# Patient Record
Sex: FEMALE | Race: WHITE | NOT HISPANIC OR LATINO
[De-identification: names, ages, dates, MRNs, and addresses within clinical notes are randomized per-mention and may not be internally consistent; named-entity substitution may affect disease eponyms.]

---

## 2017-09-19 ENCOUNTER — APPOINTMENT (OUTPATIENT)
Dept: ORTHOPEDIC SURGERY | Facility: CLINIC | Age: 62
End: 2017-09-19
Payer: COMMERCIAL

## 2017-09-19 VITALS
HEART RATE: 73 BPM | DIASTOLIC BLOOD PRESSURE: 95 MMHG | WEIGHT: 226 LBS | SYSTOLIC BLOOD PRESSURE: 163 MMHG | HEIGHT: 64 IN | BODY MASS INDEX: 38.58 KG/M2

## 2017-09-19 DIAGNOSIS — Z87.891 PERSONAL HISTORY OF NICOTINE DEPENDENCE: ICD-10-CM

## 2017-09-19 DIAGNOSIS — Z86.39 PERSONAL HISTORY OF OTHER ENDOCRINE, NUTRITIONAL AND METABOLIC DISEASE: ICD-10-CM

## 2017-09-19 PROCEDURE — 99204 OFFICE O/P NEW MOD 45 MIN: CPT

## 2017-09-19 PROCEDURE — 72170 X-RAY EXAM OF PELVIS: CPT | Mod: 59

## 2017-09-19 PROCEDURE — 73562 X-RAY EXAM OF KNEE 3: CPT | Mod: 50

## 2017-09-29 ENCOUNTER — OUTPATIENT (OUTPATIENT)
Dept: OUTPATIENT SERVICES | Facility: HOSPITAL | Age: 62
LOS: 1 days | End: 2017-09-29
Payer: COMMERCIAL

## 2017-09-29 VITALS
HEART RATE: 73 BPM | DIASTOLIC BLOOD PRESSURE: 92 MMHG | HEIGHT: 65 IN | OXYGEN SATURATION: 98 % | SYSTOLIC BLOOD PRESSURE: 160 MMHG | RESPIRATION RATE: 14 BRPM | WEIGHT: 223.99 LBS | TEMPERATURE: 98 F

## 2017-09-29 DIAGNOSIS — M17.11 UNILATERAL PRIMARY OSTEOARTHRITIS, RIGHT KNEE: ICD-10-CM

## 2017-09-29 DIAGNOSIS — Z90.710 ACQUIRED ABSENCE OF BOTH CERVIX AND UTERUS: Chronic | ICD-10-CM

## 2017-09-29 DIAGNOSIS — Z86.711 PERSONAL HISTORY OF PULMONARY EMBOLISM: ICD-10-CM

## 2017-09-29 DIAGNOSIS — Z86.718 PERSONAL HISTORY OF OTHER VENOUS THROMBOSIS AND EMBOLISM: ICD-10-CM

## 2017-09-29 DIAGNOSIS — Z01.818 ENCOUNTER FOR OTHER PREPROCEDURAL EXAMINATION: ICD-10-CM

## 2017-09-29 DIAGNOSIS — Z98.890 OTHER SPECIFIED POSTPROCEDURAL STATES: Chronic | ICD-10-CM

## 2017-09-29 LAB
ALBUMIN SERPL ELPH-MCNC: 4 G/DL — SIGNIFICANT CHANGE UP (ref 3.3–5)
ALP SERPL-CCNC: 93 U/L — SIGNIFICANT CHANGE UP (ref 30–120)
ALT FLD-CCNC: 19 U/L DA — SIGNIFICANT CHANGE UP (ref 10–60)
ANION GAP SERPL CALC-SCNC: 8 MMOL/L — SIGNIFICANT CHANGE UP (ref 5–17)
APTT BLD: 44.1 SEC — HIGH (ref 27.5–37.4)
AST SERPL-CCNC: 24 U/L — SIGNIFICANT CHANGE UP (ref 10–40)
BILIRUB SERPL-MCNC: 0.4 MG/DL — SIGNIFICANT CHANGE UP (ref 0.2–1.2)
BLD GP AB SCN SERPL QL: SIGNIFICANT CHANGE UP
BUN SERPL-MCNC: 20 MG/DL — SIGNIFICANT CHANGE UP (ref 7–23)
CALCIUM SERPL-MCNC: 9.5 MG/DL — SIGNIFICANT CHANGE UP (ref 8.4–10.5)
CHLORIDE SERPL-SCNC: 105 MMOL/L — SIGNIFICANT CHANGE UP (ref 96–108)
CO2 SERPL-SCNC: 26 MMOL/L — SIGNIFICANT CHANGE UP (ref 22–31)
CREAT SERPL-MCNC: 0.91 MG/DL — SIGNIFICANT CHANGE UP (ref 0.5–1.3)
GLUCOSE SERPL-MCNC: 101 MG/DL — HIGH (ref 70–99)
HCT VFR BLD CALC: 40.6 % — SIGNIFICANT CHANGE UP (ref 34.5–45)
HGB BLD-MCNC: 13.3 G/DL — SIGNIFICANT CHANGE UP (ref 11.5–15.5)
INR BLD: 2.44 RATIO — HIGH (ref 0.88–1.16)
MCHC RBC-ENTMCNC: 31 PG — SIGNIFICANT CHANGE UP (ref 27–34)
MCHC RBC-ENTMCNC: 32.8 GM/DL — SIGNIFICANT CHANGE UP (ref 32–36)
MCV RBC AUTO: 94.6 FL — SIGNIFICANT CHANGE UP (ref 80–100)
MRSA PCR RESULT.: SIGNIFICANT CHANGE UP
PLATELET # BLD AUTO: 261 K/UL — SIGNIFICANT CHANGE UP (ref 150–400)
POTASSIUM SERPL-MCNC: 4.6 MMOL/L — SIGNIFICANT CHANGE UP (ref 3.5–5.3)
POTASSIUM SERPL-SCNC: 4.6 MMOL/L — SIGNIFICANT CHANGE UP (ref 3.5–5.3)
PROT SERPL-MCNC: 8.6 G/DL — HIGH (ref 6–8.3)
PROTHROM AB SERPL-ACNC: 27.1 SEC — HIGH (ref 9.8–12.7)
RBC # BLD: 4.3 M/UL — SIGNIFICANT CHANGE UP (ref 3.8–5.2)
RBC # FLD: 11.8 % — SIGNIFICANT CHANGE UP (ref 10.3–14.5)
S AUREUS DNA NOSE QL NAA+PROBE: SIGNIFICANT CHANGE UP
SODIUM SERPL-SCNC: 139 MMOL/L — SIGNIFICANT CHANGE UP (ref 135–145)
WBC # BLD: 12.4 K/UL — HIGH (ref 3.8–10.5)
WBC # FLD AUTO: 12.4 K/UL — HIGH (ref 3.8–10.5)

## 2017-09-29 PROCEDURE — 86901 BLOOD TYPING SEROLOGIC RH(D): CPT

## 2017-09-29 PROCEDURE — 86850 RBC ANTIBODY SCREEN: CPT

## 2017-09-29 PROCEDURE — 93010 ELECTROCARDIOGRAM REPORT: CPT | Mod: NC

## 2017-09-29 PROCEDURE — 85027 COMPLETE CBC AUTOMATED: CPT

## 2017-09-29 PROCEDURE — 87641 MR-STAPH DNA AMP PROBE: CPT

## 2017-09-29 PROCEDURE — 87640 STAPH A DNA AMP PROBE: CPT

## 2017-09-29 PROCEDURE — 85610 PROTHROMBIN TIME: CPT

## 2017-09-29 PROCEDURE — 36415 COLL VENOUS BLD VENIPUNCTURE: CPT

## 2017-09-29 PROCEDURE — 93005 ELECTROCARDIOGRAM TRACING: CPT

## 2017-09-29 PROCEDURE — G0463: CPT

## 2017-09-29 PROCEDURE — 85730 THROMBOPLASTIN TIME PARTIAL: CPT

## 2017-09-29 PROCEDURE — 80053 COMPREHEN METABOLIC PANEL: CPT

## 2017-09-29 PROCEDURE — 86900 BLOOD TYPING SEROLOGIC ABO: CPT

## 2017-09-29 RX ORDER — HYDROCODONE BITARTRATE 50 MG/1
7.5 CAPSULE, EXTENDED RELEASE ORAL
Qty: 0 | Refills: 0 | COMMUNITY

## 2017-09-29 NOTE — H&P PST ADULT - HISTORY OF PRESENT ILLNESS
63 yo male is scheduled for "Right total knee replacement *all titanium implant*" on 10/11/17 with Yury Villalpando MD.  Patient complains of right knee pain with swelling for which she has tried cortisone injections withoit relief.  Pain is constant, rates 10/10, unrelieved with narcotic medications.

## 2017-09-29 NOTE — H&P PST ADULT - NSANTHOSAYNRD_GEN_A_CORE
No. WILDER screening performed.  STOP BANG Legend: 0-2 = LOW Risk; 3-4 = INTERMEDIATE Risk; 5-8 = HIGH Risk

## 2017-09-29 NOTE — H&P PST ADULT - PMH
BMI 37.0-37.9, adult    Chronic pain    DVT (deep venous thrombosis), bilateral    History of DVT (deep vein thrombosis)  bilateral, 2015 related to HRT  History of pulmonary embolism  2015 related to HRT  Hyperlipidemia    Insomnia    Obesity (BMI 30-39.9)    Osteoarthritis of right knee    PE (pulmonary embolism)

## 2017-09-29 NOTE — H&P PST ADULT - NEGATIVE ENMT SYMPTOMS
no dry mouth/no dysphagia/no ear pain/no tinnitus/no nasal discharge/no post-nasal discharge/no hearing difficulty/no abnormal taste sensation/no throat pain/no vertigo/no sinus symptoms/no nasal congestion

## 2017-09-29 NOTE — H&P PST ADULT - NEGATIVE ALLERGY TYPES
no outdoor environmental allergies/no indoor environmental allergies/no reactions to medicines/rash reaction to non-gold jewelry

## 2017-09-29 NOTE — H&P PST ADULT - PROBLEM SELECTOR PLAN 1
"Right total knee replacement *all titanium implant*" on 10/11/17  Pre op instructions were reviewed and signed.  Patient will obtain medical clearance.

## 2017-10-10 RX ORDER — ONDANSETRON 8 MG/1
4 TABLET, FILM COATED ORAL EVERY 6 HOURS
Qty: 0 | Refills: 0 | Status: DISCONTINUED | OUTPATIENT
Start: 2017-10-11 | End: 2017-10-14

## 2017-10-10 RX ORDER — SENNA PLUS 8.6 MG/1
2 TABLET ORAL AT BEDTIME
Qty: 0 | Refills: 0 | Status: DISCONTINUED | OUTPATIENT
Start: 2017-10-11 | End: 2017-10-14

## 2017-10-10 RX ORDER — MAGNESIUM HYDROXIDE 400 MG/1
30 TABLET, CHEWABLE ORAL DAILY
Qty: 0 | Refills: 0 | Status: DISCONTINUED | OUTPATIENT
Start: 2017-10-11 | End: 2017-10-14

## 2017-10-10 RX ORDER — DOCUSATE SODIUM 100 MG
100 CAPSULE ORAL THREE TIMES A DAY
Qty: 0 | Refills: 0 | Status: DISCONTINUED | OUTPATIENT
Start: 2017-10-11 | End: 2017-10-14

## 2017-10-10 RX ORDER — SODIUM CHLORIDE 9 MG/ML
1000 INJECTION, SOLUTION INTRAVENOUS
Qty: 0 | Refills: 0 | Status: DISCONTINUED | OUTPATIENT
Start: 2017-10-11 | End: 2017-10-12

## 2017-10-10 RX ORDER — POLYETHYLENE GLYCOL 3350 17 G/17G
17 POWDER, FOR SOLUTION ORAL DAILY
Qty: 0 | Refills: 0 | Status: DISCONTINUED | OUTPATIENT
Start: 2017-10-11 | End: 2017-10-14

## 2017-10-10 RX ORDER — PANTOPRAZOLE SODIUM 20 MG/1
40 TABLET, DELAYED RELEASE ORAL DAILY
Qty: 0 | Refills: 0 | Status: DISCONTINUED | OUTPATIENT
Start: 2017-10-11 | End: 2017-10-14

## 2017-10-11 ENCOUNTER — APPOINTMENT (OUTPATIENT)
Dept: ORTHOPEDIC SURGERY | Facility: HOSPITAL | Age: 62
End: 2017-10-11

## 2017-10-11 ENCOUNTER — RESULT REVIEW (OUTPATIENT)
Age: 62
End: 2017-10-11

## 2017-10-11 ENCOUNTER — INPATIENT (INPATIENT)
Facility: HOSPITAL | Age: 62
LOS: 2 days | Discharge: NURSING, REHAB OR LONG-TERM CARE | DRG: 470 | End: 2017-10-14
Attending: ORTHOPAEDIC SURGERY | Admitting: ORTHOPAEDIC SURGERY
Payer: COMMERCIAL

## 2017-10-11 VITALS
HEIGHT: 65 IN | DIASTOLIC BLOOD PRESSURE: 103 MMHG | RESPIRATION RATE: 16 BRPM | HEART RATE: 72 BPM | OXYGEN SATURATION: 99 % | SYSTOLIC BLOOD PRESSURE: 156 MMHG | TEMPERATURE: 98 F | WEIGHT: 222.89 LBS

## 2017-10-11 DIAGNOSIS — Z90.710 ACQUIRED ABSENCE OF BOTH CERVIX AND UTERUS: Chronic | ICD-10-CM

## 2017-10-11 DIAGNOSIS — Z01.818 ENCOUNTER FOR OTHER PREPROCEDURAL EXAMINATION: ICD-10-CM

## 2017-10-11 DIAGNOSIS — Z98.890 OTHER SPECIFIED POSTPROCEDURAL STATES: Chronic | ICD-10-CM

## 2017-10-11 DIAGNOSIS — M17.11 UNILATERAL PRIMARY OSTEOARTHRITIS, RIGHT KNEE: ICD-10-CM

## 2017-10-11 LAB
ANION GAP SERPL CALC-SCNC: 11 MMOL/L — SIGNIFICANT CHANGE UP (ref 5–17)
APTT BLD: 30.7 SEC — SIGNIFICANT CHANGE UP (ref 27.5–37.4)
BUN SERPL-MCNC: 18 MG/DL — SIGNIFICANT CHANGE UP (ref 7–23)
CALCIUM SERPL-MCNC: 9.1 MG/DL — SIGNIFICANT CHANGE UP (ref 8.4–10.5)
CHLORIDE SERPL-SCNC: 104 MMOL/L — SIGNIFICANT CHANGE UP (ref 96–108)
CO2 SERPL-SCNC: 24 MMOL/L — SIGNIFICANT CHANGE UP (ref 22–31)
CREAT SERPL-MCNC: 0.94 MG/DL — SIGNIFICANT CHANGE UP (ref 0.5–1.3)
GLUCOSE SERPL-MCNC: 185 MG/DL — HIGH (ref 70–99)
HCT VFR BLD CALC: 38.1 % — SIGNIFICANT CHANGE UP (ref 34.5–45)
HGB BLD-MCNC: 11.8 G/DL — SIGNIFICANT CHANGE UP (ref 11.5–15.5)
INR BLD: 1.03 RATIO — SIGNIFICANT CHANGE UP (ref 0.88–1.16)
MCHC RBC-ENTMCNC: 30.8 PG — SIGNIFICANT CHANGE UP (ref 27–34)
MCHC RBC-ENTMCNC: 30.9 GM/DL — LOW (ref 32–36)
MCV RBC AUTO: 99.6 FL — SIGNIFICANT CHANGE UP (ref 80–100)
PLATELET # BLD AUTO: 234 K/UL — SIGNIFICANT CHANGE UP (ref 150–400)
POTASSIUM SERPL-MCNC: 4 MMOL/L — SIGNIFICANT CHANGE UP (ref 3.5–5.3)
POTASSIUM SERPL-SCNC: 4 MMOL/L — SIGNIFICANT CHANGE UP (ref 3.5–5.3)
PROTHROM AB SERPL-ACNC: 11.2 SEC — SIGNIFICANT CHANGE UP (ref 9.8–12.7)
RBC # BLD: 3.82 M/UL — SIGNIFICANT CHANGE UP (ref 3.8–5.2)
RBC # FLD: 12.1 % — SIGNIFICANT CHANGE UP (ref 10.3–14.5)
SODIUM SERPL-SCNC: 139 MMOL/L — SIGNIFICANT CHANGE UP (ref 135–145)
WBC # BLD: 15.6 K/UL — HIGH (ref 3.8–10.5)
WBC # FLD AUTO: 15.6 K/UL — HIGH (ref 3.8–10.5)

## 2017-10-11 PROCEDURE — 27447 TOTAL KNEE ARTHROPLASTY: CPT | Mod: RT

## 2017-10-11 PROCEDURE — 88305 TISSUE EXAM BY PATHOLOGIST: CPT | Mod: 26

## 2017-10-11 PROCEDURE — 99223 1ST HOSP IP/OBS HIGH 75: CPT

## 2017-10-11 PROCEDURE — 27447 TOTAL KNEE ARTHROPLASTY: CPT | Mod: 82,RT

## 2017-10-11 PROCEDURE — 73562 X-RAY EXAM OF KNEE 3: CPT | Mod: 26,RT

## 2017-10-11 PROCEDURE — 88311 DECALCIFY TISSUE: CPT | Mod: 26

## 2017-10-11 RX ORDER — CEFAZOLIN SODIUM 1 G
2000 VIAL (EA) INJECTION ONCE
Qty: 0 | Refills: 0 | Status: COMPLETED | OUTPATIENT
Start: 2017-10-11 | End: 2017-10-11

## 2017-10-11 RX ORDER — CELECOXIB 200 MG/1
200 CAPSULE ORAL ONCE
Qty: 0 | Refills: 0 | Status: COMPLETED | OUTPATIENT
Start: 2017-10-11 | End: 2017-10-11

## 2017-10-11 RX ORDER — ACETAMINOPHEN 500 MG
1000 TABLET ORAL EVERY 6 HOURS
Qty: 0 | Refills: 0 | Status: COMPLETED | OUTPATIENT
Start: 2017-10-11 | End: 2017-10-12

## 2017-10-11 RX ORDER — ACETAMINOPHEN 500 MG
1000 TABLET ORAL ONCE
Qty: 0 | Refills: 0 | Status: COMPLETED | OUTPATIENT
Start: 2017-10-11 | End: 2017-10-11

## 2017-10-11 RX ORDER — CELECOXIB 200 MG/1
200 CAPSULE ORAL
Qty: 0 | Refills: 0 | Status: DISCONTINUED | OUTPATIENT
Start: 2017-10-11 | End: 2017-10-14

## 2017-10-11 RX ORDER — OXYCODONE HYDROCHLORIDE 5 MG/1
5 TABLET ORAL
Qty: 0 | Refills: 0 | Status: DISCONTINUED | OUTPATIENT
Start: 2017-10-11 | End: 2017-10-12

## 2017-10-11 RX ORDER — WARFARIN SODIUM 2.5 MG/1
5 TABLET ORAL ONCE
Qty: 0 | Refills: 0 | Status: COMPLETED | OUTPATIENT
Start: 2017-10-12 | End: 2017-10-12

## 2017-10-11 RX ORDER — SODIUM CHLORIDE 9 MG/ML
1000 INJECTION, SOLUTION INTRAVENOUS
Qty: 0 | Refills: 0 | Status: DISCONTINUED | OUTPATIENT
Start: 2017-10-11 | End: 2017-10-11

## 2017-10-11 RX ORDER — HYDROMORPHONE HYDROCHLORIDE 2 MG/ML
0.5 INJECTION INTRAMUSCULAR; INTRAVENOUS; SUBCUTANEOUS
Qty: 0 | Refills: 0 | Status: DISCONTINUED | OUTPATIENT
Start: 2017-10-11 | End: 2017-10-11

## 2017-10-11 RX ORDER — ENOXAPARIN SODIUM 100 MG/ML
30 INJECTION SUBCUTANEOUS EVERY 12 HOURS
Qty: 0 | Refills: 0 | Status: COMPLETED | OUTPATIENT
Start: 2017-10-12 | End: 2017-10-12

## 2017-10-11 RX ORDER — OXYCODONE HYDROCHLORIDE 5 MG/1
10 TABLET ORAL
Qty: 0 | Refills: 0 | Status: DISCONTINUED | OUTPATIENT
Start: 2017-10-11 | End: 2017-10-12

## 2017-10-11 RX ORDER — HYDROMORPHONE HYDROCHLORIDE 2 MG/ML
0.5 INJECTION INTRAMUSCULAR; INTRAVENOUS; SUBCUTANEOUS
Qty: 0 | Refills: 0 | Status: DISCONTINUED | OUTPATIENT
Start: 2017-10-11 | End: 2017-10-14

## 2017-10-11 RX ORDER — CEFAZOLIN SODIUM 1 G
2000 VIAL (EA) INJECTION EVERY 8 HOURS
Qty: 0 | Refills: 0 | Status: COMPLETED | OUTPATIENT
Start: 2017-10-11 | End: 2017-10-12

## 2017-10-11 RX ORDER — ZOLPIDEM TARTRATE 10 MG/1
5 TABLET ORAL AT BEDTIME
Qty: 0 | Refills: 0 | Status: DISCONTINUED | OUTPATIENT
Start: 2017-10-11 | End: 2017-10-11

## 2017-10-11 RX ORDER — ACETAMINOPHEN 500 MG
1000 TABLET ORAL EVERY 12 HOURS
Qty: 0 | Refills: 0 | Status: DISCONTINUED | OUTPATIENT
Start: 2017-10-12 | End: 2017-10-14

## 2017-10-11 RX ADMIN — ZOLPIDEM TARTRATE 5 MILLIGRAM(S): 10 TABLET ORAL at 22:09

## 2017-10-11 RX ADMIN — OXYCODONE HYDROCHLORIDE 10 MILLIGRAM(S): 5 TABLET ORAL at 20:15

## 2017-10-11 RX ADMIN — HYDROMORPHONE HYDROCHLORIDE 0.5 MILLIGRAM(S): 2 INJECTION INTRAMUSCULAR; INTRAVENOUS; SUBCUTANEOUS at 21:45

## 2017-10-11 RX ADMIN — Medication 100 MILLIGRAM(S): at 21:25

## 2017-10-11 RX ADMIN — HYDROMORPHONE HYDROCHLORIDE 0.5 MILLIGRAM(S): 2 INJECTION INTRAMUSCULAR; INTRAVENOUS; SUBCUTANEOUS at 19:05

## 2017-10-11 RX ADMIN — OXYCODONE HYDROCHLORIDE 10 MILLIGRAM(S): 5 TABLET ORAL at 19:45

## 2017-10-11 RX ADMIN — HYDROMORPHONE HYDROCHLORIDE 0.5 MILLIGRAM(S): 2 INJECTION INTRAMUSCULAR; INTRAVENOUS; SUBCUTANEOUS at 15:00

## 2017-10-11 RX ADMIN — HYDROMORPHONE HYDROCHLORIDE 0.5 MILLIGRAM(S): 2 INJECTION INTRAMUSCULAR; INTRAVENOUS; SUBCUTANEOUS at 16:15

## 2017-10-11 RX ADMIN — CELECOXIB 200 MILLIGRAM(S): 200 CAPSULE ORAL at 18:34

## 2017-10-11 RX ADMIN — Medication 400 MILLIGRAM(S): at 17:30

## 2017-10-11 RX ADMIN — Medication 400 MILLIGRAM(S): at 23:07

## 2017-10-11 RX ADMIN — CELECOXIB 200 MILLIGRAM(S): 200 CAPSULE ORAL at 09:34

## 2017-10-11 RX ADMIN — SENNA PLUS 2 TABLET(S): 8.6 TABLET ORAL at 21:25

## 2017-10-11 RX ADMIN — HYDROMORPHONE HYDROCHLORIDE 0.5 MILLIGRAM(S): 2 INJECTION INTRAMUSCULAR; INTRAVENOUS; SUBCUTANEOUS at 21:25

## 2017-10-11 RX ADMIN — HYDROMORPHONE HYDROCHLORIDE 0.5 MILLIGRAM(S): 2 INJECTION INTRAMUSCULAR; INTRAVENOUS; SUBCUTANEOUS at 15:50

## 2017-10-11 RX ADMIN — Medication 1000 MILLIGRAM(S): at 23:45

## 2017-10-11 RX ADMIN — SODIUM CHLORIDE 100 MILLILITER(S): 9 INJECTION, SOLUTION INTRAVENOUS at 13:30

## 2017-10-11 RX ADMIN — HYDROMORPHONE HYDROCHLORIDE 0.5 MILLIGRAM(S): 2 INJECTION INTRAMUSCULAR; INTRAVENOUS; SUBCUTANEOUS at 14:29

## 2017-10-11 RX ADMIN — HYDROMORPHONE HYDROCHLORIDE 0.5 MILLIGRAM(S): 2 INJECTION INTRAMUSCULAR; INTRAVENOUS; SUBCUTANEOUS at 18:35

## 2017-10-11 RX ADMIN — CELECOXIB 200 MILLIGRAM(S): 200 CAPSULE ORAL at 18:39

## 2017-10-11 RX ADMIN — Medication 100 MILLIGRAM(S): at 18:34

## 2017-10-11 NOTE — CONSULT NOTE ADULT - SUBJECTIVE AND OBJECTIVE BOX
PCP: Noe     Information Obtained from: Chart, Patient, EHR    Patient is a 62y old  Female who presents with a chief complaint of "Dr Villalpando is going to replace my right knee" (11 Oct 2017 09:11)      HPI:  61yo F with who is admitted s/p RIGHT TKR today.  Had had chronic right knee pain for which she has tried                Anesthesia:      Baseline functional status:      REVIEW OF SYSTEMS:  CONSTITUTIONAL: No fever, weight loss, or fatigue  EYES: No eye pain, visual disturbances, or discharge  ENMT:  No difficulty hearing, tinnitus, vertigo; No sinus or throat pain  NECK: No pain or stiffness  BREASTS: No pain, masses, or nipple discharge  RESPIRATORY: No cough, wheezing, chills or hemoptysis; No shortness of breath  CARDIOVASCULAR: No chest pain, palpitations, dizziness, or leg swelling  GASTROINTESTINAL: No abdominal or epigastric pain. No nausea, vomiting, or hematemesis; No diarrhea or constipation. No melena or hematochezia.  GENITOURINARY: No dysuria, frequency, hematuria, or incontinence  NEUROLOGICAL: No headaches, memory loss, loss of strength, numbness, or tremors  SKIN: No itching, burning, rashes, or lesions   LYMPH NODES: No enlarged glands  ENDOCRINE: No heat or cold intolerance; No hair loss  MUSCULOSKELETAL: No muscle or back pain  PSYCHIATRIC: No depression, anxiety, mood swings, or difficulty sleeping  HEME/LYMPH: No easy bruising, or bleeding gums  ALLERGY AND IMMUNOLOGIC: No hives or eczema    PAST MEDICAL & SURGICAL HISTORY:  BMI 37.0-37.9, adult  Obesity (BMI 30-39.9)  Chronic pain  Insomnia  History of pulmonary embolism: 2015 related to HRT  History of DVT (deep vein thrombosis): bilateral, 2015 related to HRT  Osteoarthritis of right knee  DVT (deep venous thrombosis), bilateral  PE (pulmonary embolism)  Hyperlipidemia  History of hernia repair: 2000, 2015  S/P hysterectomy: 1970&#x27;s      SOCIAL HISTORY:  Lives: Spouse  Smoking Hx: Former smoker - quit 1980, 0.5 PPD x 20 years  ETOH Hx: 1-2 glasses wine /daily  Illicit Drug Use:  None    Allergies    metal jewelry other than gold (Rash)  No Known Drug Allergies    Home Medications:  hydrocodone-acetaminophen 7.5mg-300mg oral tablet: 1 tab(s) orally every 6 hours (11 Oct 2017 09:20)  Lovenox: 50 milligram(s) injectable 2 times a day  started on 10/3 (11 Oct 2017 09:20)  warfarin 5 mg oral tablet: 1 tab(s) orally once a day (11 Oct 2017 09:20)  zolpidem 10 mg oral tablet: 1 tab(s) orally once a day (at bedtime) (11 Oct 2017 09:20)    FAMILY HISTORY:  Family history of colon cancer (Father)    PHYSICAL EXAM:  Vital Signs Last 24 Hrs  T(C): 36.7 (11 Oct 2017 09:27), Max: 36.7 (11 Oct 2017 09:27)  T(F): 98 (11 Oct 2017 09:27), Max: 98 (11 Oct 2017 09:27)  HR: 72 (11 Oct 2017 09:27) (72 - 72)  BP: 156/103 (11 Oct 2017 09:27) (156/103 - 156/103)  BP(mean): --  RR: 16 (11 Oct 2017 09:27) (16 - 16)  SpO2: 99% (11 Oct 2017 09:27) (99% - 99%)    GENERAL: NAD, well-groomed, well-developed, awake, alert, oriented x 3, fluent and coherent speech  HEAD:  Atraumatic, Normocephalic  EYES: EOMI, PERRLA, conjunctiva and sclera clear  ENMT: No tonsillar erythema, exudates, or enlargement; Moist mucous membranes, Good dentition, No lesions  NECK: Supple, No JVD, No Cervical LAD, No thyromegaly  NERVOUS SYSTEM:  Good concentration; Moving all 4 extremities; No gross sensory deficits, no facial droop  CHEST/LUNG: Clear to auscultation bilaterally; No rales, rhonchi, wheezing, or rubs  HEART: Regular rate and rhythm; No murmurs, rubs, or gallops  ABDOMEN: Soft, Nontender, Nondistended, Bowel sounds present, no palpable masses or organomegaly, no bruits  EXTREMITIES:  2+ Peripheral Pulses, No clubbing, cyanosis, or edema  LYMPH: No lymphadenopathy noted  /RECTAL: Not examined  BREAST: Not examined  SKIN: No rashes or lesions  INCISION:  Dressing clean/dry/intact    PT/INR - ( 11 Oct 2017 09:21 )   PT: 11.2 sec;   INR: 1.03 ratio       PTT - ( 11 Oct 2017 09:21 )  PTT:30.7 sec    CAPILLARY BLOOD GLUCOSE            EKG:   Personally Reviewed: YES      Imaging:   Personally Reviewed: NO PCP: Noe     Information Obtained from: Chart, Patient, EHR    Patient is a 62y old  Female who presents with a chief complaint of "Dr Irasema is going to replace my right knee" (11 Oct 2017 09:11)      HPI:  61yo F with who is admitted s/p RIGHT TKR today.  Had had chronic right knee pain for which she has tried cortisone injections, oral pain meds without significant relief.  She kept delaying surgery due to the illness of her .  Pain worsens throughout day up to 10/10 at end of the day.  Pain has been limiting her daily activities and negatively impacting her quality of life.    Anesthesia: Spinal + Block    REVIEW OF SYSTEMS:  CONSTITUTIONAL: No fever, weight loss, or fatigue  EYES: No eye pain, visual disturbances, or discharge  ENMT:  No difficulty hearing, tinnitus, vertigo; No sinus or throat pain  NECK: No pain or stiffness  BREASTS: No pain, masses, or nipple discharge  RESPIRATORY: No cough, wheezing, chills or hemoptysis; No shortness of breath  CARDIOVASCULAR: No chest pain, palpitations, dizziness, or leg swelling  GASTROINTESTINAL: No abdominal or epigastric pain. No nausea, vomiting, or hematemesis; No diarrhea or constipation. No melena or hematochezia.  GENITOURINARY: No dysuria, frequency, hematuria, or incontinence  NEUROLOGICAL: No headaches, memory loss, loss of strength, numbness, or tremors  SKIN: No itching, burning, rashes, or lesions   LYMPH NODES: No enlarged glands  ENDOCRINE: No heat or cold intolerance; No hair loss  MUSCULOSKELETAL: No muscle or back pain  PSYCHIATRIC: No depression, anxiety, mood swings, or difficulty sleeping  HEME/LYMPH: No easy bruising, or bleeding gums  ALLERGY AND IMMUNOLOGIC: No hives or eczema    PAST MEDICAL & SURGICAL HISTORY:  BMI 37.0-37.9, adult  Obesity (BMI 30-39.9)  Chronic pain  Insomnia  History of pulmonary embolism: 2015 related to HRT  History of DVT (deep vein thrombosis): bilateral, 2015 related to HRT  Osteoarthritis of right knee  DVT (deep venous thrombosis), bilateral  PE (pulmonary embolism)  Hyperlipidemia  History of hernia repair: 2000, 2015  S/P hysterectomy: 1970&#x27;s      SOCIAL HISTORY:  Lives: Spouse  Smoking Hx: Former smoker - quit 1980, 0.5 PPD x 20 years  ETOH Hx: 1-2 glasses wine /daily  Illicit Drug Use:  None    Allergies    metal jewelry other than gold (Rash)  No Known Drug Allergies    Home Medications:  hydrocodone-acetaminophen 7.5mg-300mg oral tablet: 1 tab(s) orally every 6 hours (11 Oct 2017 09:20)  Lovenox: 50 milligram(s) injectable 2 times a day  started on 10/3 (11 Oct 2017 09:20)  warfarin 5 mg oral tablet: 1 tab(s) orally once a day (11 Oct 2017 09:20)  zolpidem 10 mg oral tablet: 1 tab(s) orally once a day (at bedtime) (11 Oct 2017 09:20)    FAMILY HISTORY:  Family history of colon cancer (Father)    PHYSICAL EXAM:  Vital Signs Last 24 Hrs  T(C): 36.7 (11 Oct 2017 09:27), Max: 36.7 (11 Oct 2017 09:27)  T(F): 98 (11 Oct 2017 09:27), Max: 98 (11 Oct 2017 09:27)  HR: 72 (11 Oct 2017 09:27) (72 - 72)  BP: 156/103 (11 Oct 2017 09:27) (156/103 - 156/103)  BP(mean): --  RR: 16 (11 Oct 2017 09:27) (16 - 16)  SpO2: 99% (11 Oct 2017 09:27) (99% - 99%)    GENERAL: NAD, well-groomed, well-developed, awake, alert, oriented x 3, fluent and coherent speech  HEAD:  Atraumatic, Normocephalic  EYES: EOMI, PERRLA, conjunctiva and sclera clear  ENMT: No tonsillar erythema, exudates, or enlargement; Moist mucous membranes, Good dentition, No lesions  NECK: Supple, No JVD, No Cervical LAD, No thyromegaly  NERVOUS SYSTEM:  Good concentration; Moving all 4 extremities; No gross sensory deficits, no facial droop  CHEST/LUNG: Clear to auscultation bilaterally; No rales, rhonchi, wheezing, or rubs  HEART: Regular rate and rhythm; No murmurs, rubs, or gallops  ABDOMEN: Soft, Nontender, Nondistended, Bowel sounds present, no palpable masses or organomegaly, no bruits  EXTREMITIES:  2+ Peripheral Pulses, No clubbing, cyanosis, or edema  LYMPH: No lymphadenopathy noted  /RECTAL: Not examined  BREAST: Not examined  SKIN: No rashes or lesions  INCISION:  Dressing clean/dry/intact    PT/INR - ( 11 Oct 2017 09:21 )   PT: 11.2 sec;   INR: 1.03 ratio       PTT - ( 11 Oct 2017 09:21 )  PTT:30.7 sec    EKG:   Personally Reviewed: YES - NSR      Imaging:   Personally Reviewed: NO

## 2017-10-11 NOTE — PROGRESS NOTE ADULT - SUBJECTIVE AND OBJECTIVE BOX
Orthopaedic Post Op  Note    Procedure: Right Total Knee Replacement  Surgeon: Dr. Villalpando    62y Female comfortable, without complaints. Reported pain score = 3  Denies N/V, CP, SOB, numbness/tingling of extremities.    PE:  Vital Signs Last 24 Hrs  T(C): 36.5 (11 Oct 2017 12:51), Max: 36.7 (11 Oct 2017 09:27)  T(F): 97.7 (11 Oct 2017 12:51), Max: 98 (11 Oct 2017 09:27)  HR: 60 (11 Oct 2017 15:15) (60 - 73)  BP: 107/75 (11 Oct 2017 15:15) (103/82 - 156/103)  BP(mean): --  RR: 14 (11 Oct 2017 15:15) (14 - 20)  SpO2: 94% (11 Oct 2017 15:15) (94% - 100%)  General: Pt alert and oriented   Lungs: + BS CTA bilaterally  Heart: +S1 & S2 heard, RRR  Abd: + BS heard, soft, NT, ND  Right Knee Dressing: C/D/I   Bilateral LEs:  Motor:   5/5 dorsiflexion, plantarflexion, EHL  Sensation intact to LT   + DP Pulses    Post Op labs: Pending     A/P: 62y Female POD#0 s/p Right TKR  - Stable  - Tylenol, Celebrex, Oxy and Dilaudid for Pain Control   - DVT ppx: Significant Hx of dvt/PE -------> Lovenox ---> Coumadin Bridge as follows: POD 1: Lov 30mg q12hr x 2 doses  POD 2: Lov 50mg q12hr x 2 (until INR >2) + Coumadin 5mg qhs  - Silvia op IV abx: Ancef  - PT, OT per protocol  - F/U AM Labs  DCP= TBD

## 2017-10-11 NOTE — BRIEF OPERATIVE NOTE - PROCEDURE
<<-----Click on this checkbox to enter Procedure Knee replacement  10/11/2017    Active  James Sanabria

## 2017-10-11 NOTE — CONSULT NOTE ADULT - ASSESSMENT
POD#0 S/P RIGHT TKR  - Pain Control  - Bowel Regimen  - PT/OT  - DVT PPx - POD#0 S/P RIGHT TKR  - Pain Control  - Bowel Regimen  - PT/OT  - DVT PPx - Coumadin, Lovenox for bridge until INR>=2    Hx B/L DVT and PE in 2015 while on HRT  - patient is high risk given hx of DVT/PE (not sure if we can attribute 100% of her baseline risk to the HRT), obesity, s/p ortho srugery  - If ok with surgery, would prefer using Lovenox 1mg/kg q12 hours as bridge = 100mg q12 instead of 50mg q12 as recommended in medical clearance

## 2017-10-12 ENCOUNTER — TRANSCRIPTION ENCOUNTER (OUTPATIENT)
Age: 62
End: 2017-10-12

## 2017-10-12 LAB
ANION GAP SERPL CALC-SCNC: 7 MMOL/L — SIGNIFICANT CHANGE UP (ref 5–17)
BUN SERPL-MCNC: 15 MG/DL — SIGNIFICANT CHANGE UP (ref 7–23)
CALCIUM SERPL-MCNC: 9.3 MG/DL — SIGNIFICANT CHANGE UP (ref 8.4–10.5)
CHLORIDE SERPL-SCNC: 104 MMOL/L — SIGNIFICANT CHANGE UP (ref 96–108)
CO2 SERPL-SCNC: 26 MMOL/L — SIGNIFICANT CHANGE UP (ref 22–31)
CREAT SERPL-MCNC: 0.72 MG/DL — SIGNIFICANT CHANGE UP (ref 0.5–1.3)
GLUCOSE SERPL-MCNC: 126 MG/DL — HIGH (ref 70–99)
HCT VFR BLD CALC: 34.7 % — SIGNIFICANT CHANGE UP (ref 34.5–45)
HGB BLD-MCNC: 11.6 G/DL — SIGNIFICANT CHANGE UP (ref 11.5–15.5)
INR BLD: 1.11 RATIO — SIGNIFICANT CHANGE UP (ref 0.88–1.16)
MCHC RBC-ENTMCNC: 32.2 PG — SIGNIFICANT CHANGE UP (ref 27–34)
MCHC RBC-ENTMCNC: 33.4 GM/DL — SIGNIFICANT CHANGE UP (ref 32–36)
MCV RBC AUTO: 96.3 FL — SIGNIFICANT CHANGE UP (ref 80–100)
PLATELET # BLD AUTO: 231 K/UL — SIGNIFICANT CHANGE UP (ref 150–400)
POTASSIUM SERPL-MCNC: 4.5 MMOL/L — SIGNIFICANT CHANGE UP (ref 3.5–5.3)
POTASSIUM SERPL-SCNC: 4.5 MMOL/L — SIGNIFICANT CHANGE UP (ref 3.5–5.3)
PROTHROM AB SERPL-ACNC: 12.1 SEC — SIGNIFICANT CHANGE UP (ref 9.8–12.7)
RBC # BLD: 3.6 M/UL — LOW (ref 3.8–5.2)
RBC # FLD: 12 % — SIGNIFICANT CHANGE UP (ref 10.3–14.5)
SODIUM SERPL-SCNC: 137 MMOL/L — SIGNIFICANT CHANGE UP (ref 135–145)
WBC # BLD: 15 K/UL — HIGH (ref 3.8–10.5)
WBC # FLD AUTO: 15 K/UL — HIGH (ref 3.8–10.5)

## 2017-10-12 PROCEDURE — 99233 SBSQ HOSP IP/OBS HIGH 50: CPT

## 2017-10-12 RX ORDER — HYDROMORPHONE HYDROCHLORIDE 2 MG/ML
4 INJECTION INTRAMUSCULAR; INTRAVENOUS; SUBCUTANEOUS
Qty: 0 | Refills: 0 | Status: DISCONTINUED | OUTPATIENT
Start: 2017-10-12 | End: 2017-10-13

## 2017-10-12 RX ORDER — HYDROMORPHONE HYDROCHLORIDE 2 MG/ML
2 INJECTION INTRAMUSCULAR; INTRAVENOUS; SUBCUTANEOUS
Qty: 0 | Refills: 0 | Status: DISCONTINUED | OUTPATIENT
Start: 2017-10-12 | End: 2017-10-13

## 2017-10-12 RX ORDER — ENOXAPARIN SODIUM 100 MG/ML
100 INJECTION SUBCUTANEOUS EVERY 12 HOURS
Qty: 0 | Refills: 0 | Status: DISCONTINUED | OUTPATIENT
Start: 2017-10-13 | End: 2017-10-14

## 2017-10-12 RX ORDER — DIAZEPAM 5 MG
5 TABLET ORAL EVERY 8 HOURS
Qty: 0 | Refills: 0 | Status: DISCONTINUED | OUTPATIENT
Start: 2017-10-12 | End: 2017-10-14

## 2017-10-12 RX ADMIN — HYDROMORPHONE HYDROCHLORIDE 0.5 MILLIGRAM(S): 2 INJECTION INTRAMUSCULAR; INTRAVENOUS; SUBCUTANEOUS at 10:55

## 2017-10-12 RX ADMIN — HYDROMORPHONE HYDROCHLORIDE 0.5 MILLIGRAM(S): 2 INJECTION INTRAMUSCULAR; INTRAVENOUS; SUBCUTANEOUS at 15:16

## 2017-10-12 RX ADMIN — ENOXAPARIN SODIUM 30 MILLIGRAM(S): 100 INJECTION SUBCUTANEOUS at 09:27

## 2017-10-12 RX ADMIN — Medication 1000 MILLIGRAM(S): at 23:17

## 2017-10-12 RX ADMIN — Medication 100 MILLIGRAM(S): at 02:34

## 2017-10-12 RX ADMIN — HYDROMORPHONE HYDROCHLORIDE 0.5 MILLIGRAM(S): 2 INJECTION INTRAMUSCULAR; INTRAVENOUS; SUBCUTANEOUS at 03:08

## 2017-10-12 RX ADMIN — HYDROMORPHONE HYDROCHLORIDE 4 MILLIGRAM(S): 2 INJECTION INTRAMUSCULAR; INTRAVENOUS; SUBCUTANEOUS at 18:28

## 2017-10-12 RX ADMIN — HYDROMORPHONE HYDROCHLORIDE 4 MILLIGRAM(S): 2 INJECTION INTRAMUSCULAR; INTRAVENOUS; SUBCUTANEOUS at 18:59

## 2017-10-12 RX ADMIN — HYDROMORPHONE HYDROCHLORIDE 4 MILLIGRAM(S): 2 INJECTION INTRAMUSCULAR; INTRAVENOUS; SUBCUTANEOUS at 23:17

## 2017-10-12 RX ADMIN — Medication 1000 MILLIGRAM(S): at 10:59

## 2017-10-12 RX ADMIN — ENOXAPARIN SODIUM 30 MILLIGRAM(S): 100 INJECTION SUBCUTANEOUS at 21:20

## 2017-10-12 RX ADMIN — OXYCODONE HYDROCHLORIDE 10 MILLIGRAM(S): 5 TABLET ORAL at 04:39

## 2017-10-12 RX ADMIN — CELECOXIB 200 MILLIGRAM(S): 200 CAPSULE ORAL at 18:32

## 2017-10-12 RX ADMIN — Medication 400 MILLIGRAM(S): at 04:39

## 2017-10-12 RX ADMIN — OXYCODONE HYDROCHLORIDE 10 MILLIGRAM(S): 5 TABLET ORAL at 05:12

## 2017-10-12 RX ADMIN — HYDROMORPHONE HYDROCHLORIDE 4 MILLIGRAM(S): 2 INJECTION INTRAMUSCULAR; INTRAVENOUS; SUBCUTANEOUS at 13:15

## 2017-10-12 RX ADMIN — CELECOXIB 200 MILLIGRAM(S): 200 CAPSULE ORAL at 09:29

## 2017-10-12 RX ADMIN — Medication 1000 MILLIGRAM(S): at 05:00

## 2017-10-12 RX ADMIN — WARFARIN SODIUM 5 MILLIGRAM(S): 2.5 TABLET ORAL at 21:20

## 2017-10-12 RX ADMIN — HYDROMORPHONE HYDROCHLORIDE 0.5 MILLIGRAM(S): 2 INJECTION INTRAMUSCULAR; INTRAVENOUS; SUBCUTANEOUS at 21:05

## 2017-10-12 RX ADMIN — OXYCODONE HYDROCHLORIDE 10 MILLIGRAM(S): 5 TABLET ORAL at 08:48

## 2017-10-12 RX ADMIN — CELECOXIB 200 MILLIGRAM(S): 200 CAPSULE ORAL at 09:27

## 2017-10-12 RX ADMIN — PANTOPRAZOLE SODIUM 40 MILLIGRAM(S): 20 TABLET, DELAYED RELEASE ORAL at 12:45

## 2017-10-12 RX ADMIN — Medication 5 MILLIGRAM(S): at 21:23

## 2017-10-12 RX ADMIN — HYDROMORPHONE HYDROCHLORIDE 4 MILLIGRAM(S): 2 INJECTION INTRAMUSCULAR; INTRAVENOUS; SUBCUTANEOUS at 12:45

## 2017-10-12 RX ADMIN — CELECOXIB 200 MILLIGRAM(S): 200 CAPSULE ORAL at 18:28

## 2017-10-12 RX ADMIN — HYDROMORPHONE HYDROCHLORIDE 0.5 MILLIGRAM(S): 2 INJECTION INTRAMUSCULAR; INTRAVENOUS; SUBCUTANEOUS at 20:49

## 2017-10-12 RX ADMIN — HYDROMORPHONE HYDROCHLORIDE 0.5 MILLIGRAM(S): 2 INJECTION INTRAMUSCULAR; INTRAVENOUS; SUBCUTANEOUS at 11:25

## 2017-10-12 RX ADMIN — Medication 1000 MILLIGRAM(S): at 10:55

## 2017-10-12 RX ADMIN — HYDROMORPHONE HYDROCHLORIDE 0.5 MILLIGRAM(S): 2 INJECTION INTRAMUSCULAR; INTRAVENOUS; SUBCUTANEOUS at 15:46

## 2017-10-12 RX ADMIN — OXYCODONE HYDROCHLORIDE 10 MILLIGRAM(S): 5 TABLET ORAL at 08:18

## 2017-10-12 RX ADMIN — HYDROMORPHONE HYDROCHLORIDE 0.5 MILLIGRAM(S): 2 INJECTION INTRAMUSCULAR; INTRAVENOUS; SUBCUTANEOUS at 03:28

## 2017-10-12 NOTE — OCCUPATIONAL THERAPY INITIAL EVALUATION ADULT - ADDITIONAL COMMENTS
Pt lives with her family 4 [address]IE[address] and a flight of stairs Pt lives with her family 4 [address] and a flight of stairs + railing. Pt has a tub

## 2017-10-12 NOTE — DISCHARGE NOTE ADULT - HOSPITAL COURSE
JACKSON CARDOZA    Admitted on 10-11-17     62y y.o.  Female with history of Primary localized osteoarthritis of right knee    Surgery:   Knee replacement    Orthopedic Surgeon:   Dr. JIGNA Villalpando    Silvia-operative antibiotic:    ceFAZolin   IVPB:,       Consulted Services : Hospitalist, Physical Therapy, Occupational Therapy    Typical Physical & occupational therapy modalities post Knee replacement   were performed including ambulation training, range of motion, ADL's, and transfers.     DVT prophylaxis:    enoxaparin Injectable: 30 milliGRAM(s)       The patient had a clean appearing surgical incision with no sign of surgical site infections and had a stable neuro / vascular exam of the operated extremity.  After progression of mobility guided by the PT/ OT staff,  the patient was felt to benefit from further rehabilitative care for restoration to level of function. This was felt to best be accomplished in Rehab/Home.    Discharge and Orthopedic Care instructions were delineated in the Discharge Plan and reviewed with the patient. All medications were delineated in the medication reconciliation tool and key points were reviewed with the patient.     They were deemed stable from an Orthopedic & medical standpoint for discharge today.  Upon (discharge from the rehab facility they will be  following up with Dr. JIGNA Villalpando for office  follow up Orthopedic care. JACKSON CARDOZA    Admitted on 10-11-17     62y y.o.  Female with history of Primary localized osteoarthritis of right knee    Surgery:   Knee replacement    Orthopedic Surgeon:   Dr. JIGNA Villalpando    Silvia-operative antibiotic:    ceFAZolin   IVPB:,       Consulted Services : Hospitalist, Physical Therapy, Occupational Therapy    Typical Physical & occupational therapy modalities post Knee replacement   were performed including ambulation training, range of motion, ADL's, and transfers.     long term DVT prophylaxis:    enoxaparin Injectable: 30 milliGRAM(s) every 12 hours on POD#1, Lovenox 100mg every 12 hours plus Coumadin daily starting POD#2. Lovenox to be discontinued when INR 2 or greater for 2 consecutive days.       The patient had a clean appearing surgical incision with no sign of surgical site infections and had a stable neuro / vascular exam of the operated extremity.  After progression of mobility guided by the PT/ OT staff,  the patient was felt to benefit from further rehabilitative care for restoration to level of function. This was felt to best be accomplished in Rehab/Home.    Discharge and Orthopedic Care instructions were delineated in the Discharge Plan and reviewed with the patient. All medications were delineated in the medication reconciliation tool and key points were reviewed with the patient.     She was deemed stable from an Orthopedic & medical standpoint for discharge today 10-.

## 2017-10-12 NOTE — DISCHARGE NOTE ADULT - CARE PLAN
Principal Discharge DX:	S/P TKR (total knee replacement), right  Goal:	Improve quality of life  Instructions for follow-up, activity and diet:	Physical Therapy/Occupational Therapy for ambulation, transfers, stairs, ADL's, Range of Motion Exercises, Isometrics.  Full weight bearing as tolerated with rolling walker  Range of Motion Goals: Flexion 120 degrees; Extension 0 degrees  Keep incision clean and dry.  Suture/prineo dressing removal 14 days after surgery at rehab facility or Surgeon's office  May shower post-op day #5 if no drainage from incision Principal Discharge DX:	S/P TKR (total knee replacement), right  Goal:	Improve quality of life  Instructions for follow-up, activity and diet:	Physical Therapy/Occupational Therapy for ambulation, transfers, stairs, ADL's, Range of Motion Exercises, Isometrics.  Full weight bearing as tolerated with rolling walker  Range of Motion Goals: Flexion 120 degrees; Extension 0 degrees  Keep incision clean and dry.  Suture/prineo dressing removal 14 days after surgery at rehab facility or Surgeon's office  May shower post-op day #5 if no drainage from incision  Secondary Diagnosis:	History of DVT (deep vein thrombosis)  Goal:	Use Lovenox injections until INR>=2  Instructions for follow-up, activity and diet:	- Use Lovenox 100mg SC q12 until INR>=2  - Coumadin 5mg PO Daily  - Have INR checked every 2 days until INR in stable range between 2-3  - follow-up with your hematologist after having CTA Chest (you were given an order for this by your PCP) Principal Discharge DX:	S/P TKR (total knee replacement), right  Goal:	Improve quality of life and function with reduced knee pain  Instructions for follow-up, activity and diet:	Physical Therapy/Occupational Therapy for ambulation, transfers, stairs, ADL's, Range of Motion Exercises, Isometrics.  Full weight bearing as tolerated with rolling walker  Range of Motion Goals: Flexion 120 degrees; Extension 0 degrees  Keep surgical knee incision clean and dry.  Suture/prineo removal from right knee14 days after surgery at rehab facility or Surgeon's office  May shower if no drainage noted from surgical incision.  Secondary Diagnosis:	History of DVT (deep vein thrombosis)  Goal:	Use Lovenox injections until INR>=2  Instructions for follow-up, activity and diet:	- Use Lovenox 100mg SC q12 until INR>=2 or greater for 2 consecutive days  - Coumadin 5mg PO Daily (PREop)  - Have INR checked every 2 days until INR in stable range between 2-3  - follow-up with your hematologist after having CTA Chest (you were given an order for this by your PCP)

## 2017-10-12 NOTE — PROGRESS NOTE ADULT - ASSESSMENT
POD#1 S/P RIGHT TKR  - Pain Controlled  - Bowel Regimen  - PT/OT  - DVT PPx - Coumadin, Lovenox for bridge until INR>=2    Hx B/L DVT and PE in 2015 while on HRT  - patient is high risk given hx of DVT/PE (not sure if we can attribute 100% of her baseline risk to the HRT), obesity, s/p ortho srugery  - If ok with surgery, would prefer using Lovenox 1mg/kg q12 hours as bridge = 100mg q12 instead of 50mg q12 as recommended in medical clearance - will discuss with Ortho prior to changing POD#1 S/P RIGHT TKR  - Pain Controlled  - Bowel Regimen  - PT/OT  - DVT PPx - Coumadin, Lovenox for bridge until INR>=2    Hx B/L DVT and PE in 2015 while on HRT  - patient is high risk given hx of DVT/PE (not sure if we can attribute 100% of her baseline risk to the HRT), obesity, s/p ortho srugery  - If ok with surgery, would prefer using Lovenox 1mg/kg q12 hours as bridge = 100mg q12 instead of 50mg q12 as recommended in medical clearance - will discuss with Ortho prior to changing  - Spoke with patient's PCP over phone, there was talk of taking her off coumadin, but she still needs to get a CTA of Chest (Rx given by PCP - she did not go for it yet) and then follow-up with her hematologist to get the official go ahead.

## 2017-10-12 NOTE — DISCHARGE NOTE ADULT - PLAN OF CARE
Improve quality of life Physical Therapy/Occupational Therapy for ambulation, transfers, stairs, ADL's, Range of Motion Exercises, Isometrics.  Full weight bearing as tolerated with rolling walker  Range of Motion Goals: Flexion 120 degrees; Extension 0 degrees  Keep incision clean and dry.  Suture/prineo dressing removal 14 days after surgery at rehab facility or Surgeon's office  May shower post-op day #5 if no drainage from incision Use Lovenox injections until INR>=2 - Use Lovenox 100mg SC q12 until INR>=2  - Coumadin 5mg PO Daily  - Have INR checked every 2 days until INR in stable range between 2-3  - follow-up with your hematologist after having CTA Chest (you were given an order for this by your PCP) Improve quality of life and function with reduced knee pain Physical Therapy/Occupational Therapy for ambulation, transfers, stairs, ADL's, Range of Motion Exercises, Isometrics.  Full weight bearing as tolerated with rolling walker  Range of Motion Goals: Flexion 120 degrees; Extension 0 degrees  Keep surgical knee incision clean and dry.  Suture/prineo removal from right knee14 days after surgery at rehab facility or Surgeon's office  May shower if no drainage noted from surgical incision. - Use Lovenox 100mg SC q12 until INR>=2 or greater for 2 consecutive days  - Coumadin 5mg PO Daily (PREop)  - Have INR checked every 2 days until INR in stable range between 2-3  - follow-up with your hematologist after having CTA Chest (you were given an order for this by your PCP)

## 2017-10-12 NOTE — DISCHARGE NOTE ADULT - MEDICATION SUMMARY - MEDICATIONS TO STOP TAKING
I will STOP taking the medications listed below when I get home from the hospital:    hydrocodone-acetaminophen 7.5mg-300mg oral tablet  -- 1 tab(s) by mouth every 6 hours    Lovenox  -- 50 milligram(s) injectable 2 times a day  started on 10/3 I will STOP taking the medications listed below when I get home from the hospital:    zolpidem 10 mg oral tablet  -- 1 tab(s) by mouth once a day (at bedtime)    warfarin 5 mg oral tablet  -- 1 tab(s) by mouth once a day    hydrocodone-acetaminophen 7.5mg-300mg oral tablet  -- 1 tab(s) by mouth every 6 hours    Lovenox  -- 50 milligram(s) injectable 2 times a day  started on 10/3

## 2017-10-12 NOTE — DISCHARGE NOTE ADULT - MEDICATION SUMMARY - MEDICATIONS TO TAKE
I will START or STAY ON the medications listed below when I get home from the hospital:    acetaminophen 500 mg oral tablet  -- 2 tab(s) by mouth every 12 hours for 3 weeks max   -- Indication: For Pain regimen    celecoxib 200 mg oral capsule  -- 1 cap(s) by mouth 2 times a day (after meals) for max of 3 weeks  -- Indication: For Pain regimen    HYDROmorphone 4 mg oral tablet  -- 1 tab(s) by mouth every 3 hours, As needed, Mild Pain (1 - 3)  -- Indication: For Mild Pain    HYDROmorphone  -- 6 milligram(s) by mouth every 3 hours, As Needed for moderate pain  -- Indication: For Moderate Pain    warfarin 5 mg oral tablet  -- 1 tab(s) by mouth once a day  -- Indication: For DVT prophylaxis    enoxaparin  -- 100 milligram(s) subcutaneous every 12 hours, continue until INR is >2 for 2 days or 2 blood draws.  Then discontinue  -- Indication: For DVT prophylaxis    diazePAM 5 mg oral tablet  -- 1 tab(s) by mouth every 8 hours, As needed, Muscle spasm  -- Indication: For Muscle Relaxant    zolpidem 10 mg oral tablet  -- 1 tab(s) by mouth once a day (at bedtime)  -- Indication: For Insomnia    senna oral tablet  -- 2 tab(s) by mouth once a day (at bedtime)  -- Indication: For Constipation    docusate sodium 100 mg oral capsule  -- 1 cap(s) by mouth 3 times a day  -- Indication: For Constipation    pantoprazole 40 mg oral delayed release tablet  -- 1 tab(s) by mouth once a day  -- Indication: For GERD I will START or STAY ON the medications listed below when I get home from the hospital:    acetaminophen 500 mg oral tablet  -- 2 tab(s) by mouth every 12 hours for 3 weeks max   -- Indication: For Pain regimen    celecoxib 200 mg oral capsule  -- 1 cap(s) by mouth 2 times a day (after meals) for max of 3 weeks  -- Indication: For Pain regimen    HYDROmorphone 4 mg oral tablet  -- 1 tab(s) by mouth every 4 hours, As Needed for mild to moderate pain  -- Indication: For mild to moderate knee pain    HYDROmorphone  -- 6 milligram(s) by mouth every 4 hours, As Needed for moderate to severe pain  -- Indication: For moderate to severe knee pain    warfarin 7.5 mg oral tablet  -- 1 tab(s) by mouth once tonight 10-14.  -- Indication: For Hx of DVT/PE    enoxaparin  -- 100 milligram(s) subcutaneous every 12 hours, continue until INR is 2 or greater for 2 days or 2 consecutive blood draws.    -- Indication: For DVT prevention and bridging until Coumadin therapeutic    diazePAM 5 mg oral tablet  -- 1 tab(s) by mouth every 8 hours, As needed, Muscle spasm  -- Indication: For Muscle Relaxant    zolpidem 10 mg oral tablet  -- 1 tab(s) by mouth once a day (at bedtime), As Needed  -- Indication: For insomnia    senna oral tablet  -- 2 tab(s) by mouth once a day (at bedtime)  -- Indication: For Constipation    docusate sodium 100 mg oral capsule  -- 1 cap(s) by mouth 3 times a day  -- Indication: For Constipation    pantoprazole 40 mg oral delayed release tablet  -- 1 tab(s) by mouth once a day  -- Indication: For GI ulcer prevention

## 2017-10-12 NOTE — PROGRESS NOTE ADULT - SUBJECTIVE AND OBJECTIVE BOX
INTERVAL HPI/OVERNIGHT EVENTS:  Patient seen and examined.  Pain controlled.  Eating, voiding, no BM yet.  No fevers, chills, dizziness, cp, sob, n/v/d, abd pain, calf pain, focal weakness, numbness/tingling.    REVIEW OF SYSTEMS:  See HPI,  all others negative    PHYSICAL EXAM:  Vital Signs Last 24 Hrs  T(C): 36.4 (12 Oct 2017 07:10), Max: 36.8 (11 Oct 2017 23:30)  T(F): 97.6 (12 Oct 2017 07:10), Max: 98.2 (11 Oct 2017 23:30)  HR: 64 (12 Oct 2017 07:10) (60 - 78)  BP: 129/70 (12 Oct 2017 07:10) (103/82 - 155/70)  BP(mean): --  RR: 16 (12 Oct 2017 07:10) (14 - 20)  SpO2: 95% (12 Oct 2017 07:10) (94% - 100%)    GENERAL: NAD, well-groomed, well-developed, awake, alert, oriented x 3, fluent and coherent speech  HEAD:  Atraumatic, Normocephalic  EYES: EOMI, PERRLA, conjunctiva and sclera clear  ENMT: No tonsillar erythema, exudates, or enlargement; Moist mucous membranes, Good dentition, No lesions  NECK: Supple, No JVD, No Cervical LAD, No thyromegaly  NERVOUS SYSTEM:  Good concentration; Moving all 4 extremities; No gross sensory deficits, no facial droop  CHEST/LUNG: Clear to auscultation bilaterally; No rales, rhonchi, wheezing, or rubs  HEART: Regular rate and rhythm; No murmurs, rubs, or gallops  ABDOMEN: Soft, Nontender, Nondistended, Bowel sounds present, no palpable masses or organomegaly, no bruits  EXTREMITIES:  2+ Peripheral Pulses, No clubbing, cyanosis, or edema  LYMPH: No lymphadenopathy noted  /RECTAL: Not examined  BREAST: Not examined  SKIN: No rashes or lesions  INCISION: dressing c/d/i      LABS:                        11.6   15.0  )-----------( 231      ( 12 Oct 2017 07:26 )             34.7     12 Oct 2017 07:26    137    |  104    |  15     ----------------------------<  126    4.5     |  26     |  0.72     Ca    9.3        12 Oct 2017 07:26      PT/INR - ( 12 Oct 2017 07:26 )   PT: 12.1 sec;   INR: 1.11 ratio         PTT - ( 11 Oct 2017 09:21 )  PTT:30.7 sec

## 2017-10-12 NOTE — DIETITIAN INITIAL EVALUATION ADULT. - OTHER INFO
61yo female pt admitted s/p TKR. Pt was seen for coumadin education, verbal and written education provided on vit K and medication interactions. Reports , admission weight 211#.  Reports good appetite and no significant weight changes in the last 6 months. Reports signficant weight gain 2 years ago because she was not able to exercise or be active, pt states when she is able she is excited to start exercising again. No edema or pressure ulcers noted. PMH as below:

## 2017-10-12 NOTE — DISCHARGE NOTE ADULT - NS AS ACTIVITY OBS
Do not drive or operate machinery/Showering allowed/Walking-Indoors allowed/No Heavy lifting/straining/Stairs allowed Walking-Indoors allowed/Do not make important decisions/Do not drive or operate machinery/Stairs allowed/Showering allowed/No Heavy lifting/straining Do not make important decisions/Do not drive or operate machinery/Showering allowed/Walking-Indoors allowed/No Heavy lifting/straining/Walking-Outdoors allowed/Stairs allowed/may shower when knee wound dry

## 2017-10-12 NOTE — DIETITIAN INITIAL EVALUATION ADULT. - PERTINENT MEDS FT
coumadin, lactated ringers, valium, tylenol, celebrex, colace, hydromorphone, zofran, miralax, senna

## 2017-10-12 NOTE — PROGRESS NOTE ADULT - SUBJECTIVE AND OBJECTIVE BOX
ORTHOPEDIC PA PROGRESS NOTE  JACKSON SONY      62y Female                                                                                                                               POD #    1d    Pre-Op Dx: Primary localized osteoarthritis of right knee    Post-Op Dx:  Primary localized osteoarthritis of right knee      STATUS POST:       Procedure: Knee replacement                                                Pain (0-10):  Pt reports 7  Current Pain Management:  [ ] PCA   [x ] Po Analgesics [ ] IM /IV [employer]     T(F): 97.5  HR: 66  BP: 118/73  RR: 16  SpO2: 95%                         11.8   15.6  )-----------( 234      ( 11 Oct 2017 21:06 )             38.1         10-11    139  |  104  |  18  ----------------------------<  185<H>  4.0   |  24  |  0.94    Ca    9.1      11 Oct 2017 21:06      Physical Exam :    -   Dressing C/D/I.   -   Distal Neurvascular status intact grossly.   -   Warm well perfused; capillary refill <3 seconds   -   (+)EHL/FHL   -   (+) Sensation to light touch  -   (-) Calf tenderness Bilaterally    A/P: 62y Female s/p Knee replacement     -   Ortho Stable  -   Pain control: Tylenol, Celebrex, Oxy and Dilaudid for Pain Control + Valium for muscle spasm   -   Medicine to follow  -   Tylenol, Celebrex, Oxy and Dilaudid for Pain Control   -   DVT ppx: Significant Hx of dvt/PE -------> Lovenox ---> Coumadin Bridge as follows: POD 1: Lov 30mg q12hr x 2 doses  POD 2: Lov 50mg q12hr x 2 (until INR >2) + Coumadin 5mg qhs  -   Weight bearing status:   -  Dispo:   Pt wants Rehab  -   Prescribed Medications:    NA

## 2017-10-12 NOTE — DISCHARGE NOTE ADULT - PATIENT PORTAL LINK FT
“You can access the [employer] Patient Portal, offered by [facility], by registering with the following website: [email or web address]”

## 2017-10-13 LAB
ANION GAP SERPL CALC-SCNC: 8 MMOL/L — SIGNIFICANT CHANGE UP (ref 5–17)
BUN SERPL-MCNC: 18 MG/DL — SIGNIFICANT CHANGE UP (ref 7–23)
CALCIUM SERPL-MCNC: 8.7 MG/DL — SIGNIFICANT CHANGE UP (ref 8.4–10.5)
CHLORIDE SERPL-SCNC: 106 MMOL/L — SIGNIFICANT CHANGE UP (ref 96–108)
CO2 SERPL-SCNC: 26 MMOL/L — SIGNIFICANT CHANGE UP (ref 22–31)
CREAT SERPL-MCNC: 0.65 MG/DL — SIGNIFICANT CHANGE UP (ref 0.5–1.3)
GLUCOSE SERPL-MCNC: 89 MG/DL — SIGNIFICANT CHANGE UP (ref 70–99)
HCT VFR BLD CALC: 32.2 % — LOW (ref 34.5–45)
HGB BLD-MCNC: 10.3 G/DL — LOW (ref 11.5–15.5)
INR BLD: 1.07 RATIO — SIGNIFICANT CHANGE UP (ref 0.88–1.16)
MCHC RBC-ENTMCNC: 31.7 PG — SIGNIFICANT CHANGE UP (ref 27–34)
MCHC RBC-ENTMCNC: 31.9 GM/DL — LOW (ref 32–36)
MCV RBC AUTO: 99.2 FL — SIGNIFICANT CHANGE UP (ref 80–100)
PLATELET # BLD AUTO: 196 K/UL — SIGNIFICANT CHANGE UP (ref 150–400)
POTASSIUM SERPL-MCNC: 4.1 MMOL/L — SIGNIFICANT CHANGE UP (ref 3.5–5.3)
POTASSIUM SERPL-SCNC: 4.1 MMOL/L — SIGNIFICANT CHANGE UP (ref 3.5–5.3)
PROTHROM AB SERPL-ACNC: 11.7 SEC — SIGNIFICANT CHANGE UP (ref 9.8–12.7)
RBC # BLD: 3.24 M/UL — LOW (ref 3.8–5.2)
RBC # FLD: 12.6 % — SIGNIFICANT CHANGE UP (ref 10.3–14.5)
SODIUM SERPL-SCNC: 140 MMOL/L — SIGNIFICANT CHANGE UP (ref 135–145)
WBC # BLD: 9.3 K/UL — SIGNIFICANT CHANGE UP (ref 3.8–10.5)
WBC # FLD AUTO: 9.3 K/UL — SIGNIFICANT CHANGE UP (ref 3.8–10.5)

## 2017-10-13 PROCEDURE — 99232 SBSQ HOSP IP/OBS MODERATE 35: CPT

## 2017-10-13 RX ORDER — DOCUSATE SODIUM 100 MG
1 CAPSULE ORAL
Qty: 0 | Refills: 0 | COMMUNITY
Start: 2017-10-13

## 2017-10-13 RX ORDER — ENOXAPARIN SODIUM 100 MG/ML
50 INJECTION SUBCUTANEOUS
Qty: 0 | Refills: 0 | COMMUNITY

## 2017-10-13 RX ORDER — ENOXAPARIN SODIUM 100 MG/ML
100 INJECTION SUBCUTANEOUS
Qty: 0 | Refills: 0 | COMMUNITY
Start: 2017-10-13

## 2017-10-13 RX ORDER — HYDROMORPHONE HYDROCHLORIDE 2 MG/ML
6 INJECTION INTRAMUSCULAR; INTRAVENOUS; SUBCUTANEOUS
Qty: 0 | Refills: 0 | COMMUNITY
Start: 2017-10-13

## 2017-10-13 RX ORDER — DIAZEPAM 5 MG
1 TABLET ORAL
Qty: 0 | Refills: 0 | COMMUNITY
Start: 2017-10-13

## 2017-10-13 RX ORDER — SENNA PLUS 8.6 MG/1
2 TABLET ORAL
Qty: 0 | Refills: 0 | COMMUNITY
Start: 2017-10-13

## 2017-10-13 RX ORDER — HYDROMORPHONE HYDROCHLORIDE 2 MG/ML
4 INJECTION INTRAMUSCULAR; INTRAVENOUS; SUBCUTANEOUS
Qty: 0 | Refills: 0 | Status: DISCONTINUED | OUTPATIENT
Start: 2017-10-13 | End: 2017-10-14

## 2017-10-13 RX ORDER — WARFARIN SODIUM 2.5 MG/1
7.5 TABLET ORAL ONCE
Qty: 0 | Refills: 0 | Status: COMPLETED | OUTPATIENT
Start: 2017-10-13 | End: 2017-10-13

## 2017-10-13 RX ORDER — CELECOXIB 200 MG/1
1 CAPSULE ORAL
Qty: 0 | Refills: 0 | COMMUNITY
Start: 2017-10-13

## 2017-10-13 RX ORDER — HYDROMORPHONE HYDROCHLORIDE 2 MG/ML
2 INJECTION INTRAMUSCULAR; INTRAVENOUS; SUBCUTANEOUS ONCE
Qty: 0 | Refills: 0 | Status: DISCONTINUED | OUTPATIENT
Start: 2017-10-13 | End: 2017-10-13

## 2017-10-13 RX ORDER — HYDROMORPHONE HYDROCHLORIDE 2 MG/ML
1 INJECTION INTRAMUSCULAR; INTRAVENOUS; SUBCUTANEOUS
Qty: 0 | Refills: 0 | COMMUNITY
Start: 2017-10-13

## 2017-10-13 RX ORDER — HYDROMORPHONE HYDROCHLORIDE 2 MG/ML
6 INJECTION INTRAMUSCULAR; INTRAVENOUS; SUBCUTANEOUS
Qty: 0 | Refills: 0 | Status: DISCONTINUED | OUTPATIENT
Start: 2017-10-13 | End: 2017-10-14

## 2017-10-13 RX ORDER — ACETAMINOPHEN 500 MG
2 TABLET ORAL
Qty: 0 | Refills: 0 | COMMUNITY
Start: 2017-10-13

## 2017-10-13 RX ORDER — PANTOPRAZOLE SODIUM 20 MG/1
1 TABLET, DELAYED RELEASE ORAL
Qty: 0 | Refills: 0 | COMMUNITY
Start: 2017-10-13

## 2017-10-13 RX ADMIN — HYDROMORPHONE HYDROCHLORIDE 6 MILLIGRAM(S): 2 INJECTION INTRAMUSCULAR; INTRAVENOUS; SUBCUTANEOUS at 20:40

## 2017-10-13 RX ADMIN — CELECOXIB 200 MILLIGRAM(S): 200 CAPSULE ORAL at 18:14

## 2017-10-13 RX ADMIN — Medication 1000 MILLIGRAM(S): at 12:20

## 2017-10-13 RX ADMIN — HYDROMORPHONE HYDROCHLORIDE 4 MILLIGRAM(S): 2 INJECTION INTRAMUSCULAR; INTRAVENOUS; SUBCUTANEOUS at 18:14

## 2017-10-13 RX ADMIN — Medication 1000 MILLIGRAM(S): at 21:08

## 2017-10-13 RX ADMIN — Medication 1000 MILLIGRAM(S): at 11:50

## 2017-10-13 RX ADMIN — HYDROMORPHONE HYDROCHLORIDE 4 MILLIGRAM(S): 2 INJECTION INTRAMUSCULAR; INTRAVENOUS; SUBCUTANEOUS at 08:28

## 2017-10-13 RX ADMIN — HYDROMORPHONE HYDROCHLORIDE 4 MILLIGRAM(S): 2 INJECTION INTRAMUSCULAR; INTRAVENOUS; SUBCUTANEOUS at 06:00

## 2017-10-13 RX ADMIN — ENOXAPARIN SODIUM 100 MILLIGRAM(S): 100 INJECTION SUBCUTANEOUS at 08:29

## 2017-10-13 RX ADMIN — HYDROMORPHONE HYDROCHLORIDE 4 MILLIGRAM(S): 2 INJECTION INTRAMUSCULAR; INTRAVENOUS; SUBCUTANEOUS at 08:58

## 2017-10-13 RX ADMIN — HYDROMORPHONE HYDROCHLORIDE 4 MILLIGRAM(S): 2 INJECTION INTRAMUSCULAR; INTRAVENOUS; SUBCUTANEOUS at 00:05

## 2017-10-13 RX ADMIN — HYDROMORPHONE HYDROCHLORIDE 2 MILLIGRAM(S): 2 INJECTION INTRAMUSCULAR; INTRAVENOUS; SUBCUTANEOUS at 15:04

## 2017-10-13 RX ADMIN — HYDROMORPHONE HYDROCHLORIDE 4 MILLIGRAM(S): 2 INJECTION INTRAMUSCULAR; INTRAVENOUS; SUBCUTANEOUS at 02:30

## 2017-10-13 RX ADMIN — HYDROMORPHONE HYDROCHLORIDE 2 MILLIGRAM(S): 2 INJECTION INTRAMUSCULAR; INTRAVENOUS; SUBCUTANEOUS at 10:26

## 2017-10-13 RX ADMIN — ENOXAPARIN SODIUM 100 MILLIGRAM(S): 100 INJECTION SUBCUTANEOUS at 21:08

## 2017-10-13 RX ADMIN — HYDROMORPHONE HYDROCHLORIDE 4 MILLIGRAM(S): 2 INJECTION INTRAMUSCULAR; INTRAVENOUS; SUBCUTANEOUS at 12:50

## 2017-10-13 RX ADMIN — Medication 100 MILLIGRAM(S): at 15:05

## 2017-10-13 RX ADMIN — HYDROMORPHONE HYDROCHLORIDE 6 MILLIGRAM(S): 2 INJECTION INTRAMUSCULAR; INTRAVENOUS; SUBCUTANEOUS at 20:10

## 2017-10-13 RX ADMIN — HYDROMORPHONE HYDROCHLORIDE 2 MILLIGRAM(S): 2 INJECTION INTRAMUSCULAR; INTRAVENOUS; SUBCUTANEOUS at 15:35

## 2017-10-13 RX ADMIN — CELECOXIB 200 MILLIGRAM(S): 200 CAPSULE ORAL at 17:44

## 2017-10-13 RX ADMIN — HYDROMORPHONE HYDROCHLORIDE 4 MILLIGRAM(S): 2 INJECTION INTRAMUSCULAR; INTRAVENOUS; SUBCUTANEOUS at 05:26

## 2017-10-13 RX ADMIN — HYDROMORPHONE HYDROCHLORIDE 4 MILLIGRAM(S): 2 INJECTION INTRAMUSCULAR; INTRAVENOUS; SUBCUTANEOUS at 13:20

## 2017-10-13 RX ADMIN — Medication 1000 MILLIGRAM(S): at 00:05

## 2017-10-13 RX ADMIN — HYDROMORPHONE HYDROCHLORIDE 2 MILLIGRAM(S): 2 INJECTION INTRAMUSCULAR; INTRAVENOUS; SUBCUTANEOUS at 10:56

## 2017-10-13 RX ADMIN — WARFARIN SODIUM 7.5 MILLIGRAM(S): 2.5 TABLET ORAL at 22:33

## 2017-10-13 RX ADMIN — PANTOPRAZOLE SODIUM 40 MILLIGRAM(S): 20 TABLET, DELAYED RELEASE ORAL at 11:50

## 2017-10-13 RX ADMIN — CELECOXIB 200 MILLIGRAM(S): 200 CAPSULE ORAL at 08:59

## 2017-10-13 RX ADMIN — HYDROMORPHONE HYDROCHLORIDE 4 MILLIGRAM(S): 2 INJECTION INTRAMUSCULAR; INTRAVENOUS; SUBCUTANEOUS at 01:57

## 2017-10-13 RX ADMIN — CELECOXIB 200 MILLIGRAM(S): 200 CAPSULE ORAL at 08:29

## 2017-10-13 RX ADMIN — Medication 5 MILLIGRAM(S): at 21:09

## 2017-10-13 RX ADMIN — Medication 1000 MILLIGRAM(S): at 21:09

## 2017-10-13 RX ADMIN — HYDROMORPHONE HYDROCHLORIDE 4 MILLIGRAM(S): 2 INJECTION INTRAMUSCULAR; INTRAVENOUS; SUBCUTANEOUS at 17:44

## 2017-10-13 NOTE — PROGRESS NOTE ADULT - SUBJECTIVE AND OBJECTIVE BOX
INTERVAL HPI/OVERNIGHT EVENTS:  Patient seen and examined.  Pain controlled.  Eating, voiding, no BM yet.  No fevers, chills, dizziness, cp, sob, n/v/d, abd pain, calf pain, focal weakness, numbness/tingling.    REVIEW OF SYSTEMS:  See HPI,  all others negative    PHYSICAL EXAM:  Vital Signs Last 24 Hrs  T(C): 36.9 (13 Oct 2017 08:07), Max: 36.9 (13 Oct 2017 03:12)  T(F): 98.4 (13 Oct 2017 08:07), Max: 98.4 (13 Oct 2017 03:12)  HR: 74 (13 Oct 2017 08:07) (64 - 74)  BP: 116/78 (13 Oct 2017 08:07) (113/75 - 133/79)  BP(mean): --  RR: 16 (13 Oct 2017 08:07) (16 - 16)  SpO2: 95% (13 Oct 2017 08:07) (93% - 97%)    GENERAL: NAD, well-groomed, well-developed, awake, alert, oriented x 3, fluent and coherent speech  HEAD:  Atraumatic, Normocephalic  EYES: EOMI, PERRLA, conjunctiva and sclera clear  ENMT: No tonsillar erythema, exudates, or enlargement; Moist mucous membranes, Good dentition, No lesions  NECK: Supple, No JVD, No Cervical LAD, No thyromegaly  NERVOUS SYSTEM:  Good concentration; Moving all 4 extremities; No gross sensory deficits, no facial droop  CHEST/LUNG: Clear to auscultation bilaterally; No rales, rhonchi, wheezing, or rubs  HEART: Regular rate and rhythm; No murmurs, rubs, or gallops  ABDOMEN: Soft, Nontender, Nondistended, Bowel sounds present, no palpable masses or organomegaly, no bruits  EXTREMITIES:  2+ Peripheral Pulses, No clubbing, cyanosis, or edema  LYMPH: No lymphadenopathy noted  /RECTAL: Not examined  BREAST: Not examined  SKIN: No rashes or lesions  INCISION: dressing c/d/i      10-13    140  |  106  |  18  ----------------------------<  89  4.1   |  26  |  0.65    Ca    8.7      13 Oct 2017 07:44                          10.3   9.3   )-----------( 196      ( 13 Oct 2017 07:44 )             32.2   PT/INR - ( 13 Oct 2017 07:44 )   PT: 11.7 sec;   INR: 1.07 ratio

## 2017-10-13 NOTE — PROGRESS NOTE ADULT - ASSESSMENT
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    - She should get her INR checked every 2 days until INR is stable in therapeutic range between 2-3

## 2017-10-14 VITALS
TEMPERATURE: 99 F | SYSTOLIC BLOOD PRESSURE: 113 MMHG | OXYGEN SATURATION: 95 % | RESPIRATION RATE: 16 BRPM | DIASTOLIC BLOOD PRESSURE: 61 MMHG | HEART RATE: 88 BPM

## 2017-10-14 LAB
ANION GAP SERPL CALC-SCNC: 4 MMOL/L — LOW (ref 5–17)
BUN SERPL-MCNC: 15 MG/DL — SIGNIFICANT CHANGE UP (ref 7–23)
CALCIUM SERPL-MCNC: 8.8 MG/DL — SIGNIFICANT CHANGE UP (ref 8.4–10.5)
CHLORIDE SERPL-SCNC: 105 MMOL/L — SIGNIFICANT CHANGE UP (ref 96–108)
CO2 SERPL-SCNC: 29 MMOL/L — SIGNIFICANT CHANGE UP (ref 22–31)
CREAT SERPL-MCNC: 0.64 MG/DL — SIGNIFICANT CHANGE UP (ref 0.5–1.3)
GLUCOSE SERPL-MCNC: 95 MG/DL — SIGNIFICANT CHANGE UP (ref 70–99)
HCT VFR BLD CALC: 33.6 % — LOW (ref 34.5–45)
HGB BLD-MCNC: 10.7 G/DL — LOW (ref 11.5–15.5)
INR BLD: 1.22 RATIO — HIGH (ref 0.88–1.16)
MCHC RBC-ENTMCNC: 31.5 PG — SIGNIFICANT CHANGE UP (ref 27–34)
MCHC RBC-ENTMCNC: 32 GM/DL — SIGNIFICANT CHANGE UP (ref 32–36)
MCV RBC AUTO: 98.6 FL — SIGNIFICANT CHANGE UP (ref 80–100)
PLATELET # BLD AUTO: 199 K/UL — SIGNIFICANT CHANGE UP (ref 150–400)
POTASSIUM SERPL-MCNC: 3.9 MMOL/L — SIGNIFICANT CHANGE UP (ref 3.5–5.3)
POTASSIUM SERPL-SCNC: 3.9 MMOL/L — SIGNIFICANT CHANGE UP (ref 3.5–5.3)
PROTHROM AB SERPL-ACNC: 13.3 SEC — HIGH (ref 9.8–12.7)
RBC # BLD: 3.4 M/UL — LOW (ref 3.8–5.2)
RBC # FLD: 12.4 % — SIGNIFICANT CHANGE UP (ref 10.3–14.5)
SODIUM SERPL-SCNC: 138 MMOL/L — SIGNIFICANT CHANGE UP (ref 135–145)
WBC # BLD: 9.1 K/UL — SIGNIFICANT CHANGE UP (ref 3.8–10.5)
WBC # FLD AUTO: 9.1 K/UL — SIGNIFICANT CHANGE UP (ref 3.8–10.5)

## 2017-10-14 PROCEDURE — 97116 GAIT TRAINING THERAPY: CPT

## 2017-10-14 PROCEDURE — 88311 DECALCIFY TISSUE: CPT

## 2017-10-14 PROCEDURE — 99233 SBSQ HOSP IP/OBS HIGH 50: CPT

## 2017-10-14 PROCEDURE — 73562 X-RAY EXAM OF KNEE 3: CPT

## 2017-10-14 PROCEDURE — 80048 BASIC METABOLIC PNL TOTAL CA: CPT

## 2017-10-14 PROCEDURE — 36415 COLL VENOUS BLD VENIPUNCTURE: CPT

## 2017-10-14 PROCEDURE — 97110 THERAPEUTIC EXERCISES: CPT

## 2017-10-14 PROCEDURE — 88305 TISSUE EXAM BY PATHOLOGIST: CPT

## 2017-10-14 PROCEDURE — 85027 COMPLETE CBC AUTOMATED: CPT

## 2017-10-14 PROCEDURE — 85610 PROTHROMBIN TIME: CPT

## 2017-10-14 PROCEDURE — C1713: CPT

## 2017-10-14 PROCEDURE — 97535 SELF CARE MNGMENT TRAINING: CPT

## 2017-10-14 PROCEDURE — 97165 OT EVAL LOW COMPLEX 30 MIN: CPT

## 2017-10-14 PROCEDURE — 85730 THROMBOPLASTIN TIME PARTIAL: CPT

## 2017-10-14 PROCEDURE — 97161 PT EVAL LOW COMPLEX 20 MIN: CPT

## 2017-10-14 PROCEDURE — C1776: CPT

## 2017-10-14 PROCEDURE — 94664 DEMO&/EVAL PT USE INHALER: CPT

## 2017-10-14 PROCEDURE — 97530 THERAPEUTIC ACTIVITIES: CPT

## 2017-10-14 RX ORDER — WARFARIN SODIUM 2.5 MG/1
1 TABLET ORAL
Qty: 0 | Refills: 0 | COMMUNITY
Start: 2017-10-14

## 2017-10-14 RX ORDER — ZOLPIDEM TARTRATE 10 MG/1
1 TABLET ORAL
Qty: 0 | Refills: 0 | COMMUNITY

## 2017-10-14 RX ORDER — WARFARIN SODIUM 2.5 MG/1
7.5 TABLET ORAL ONCE
Qty: 0 | Refills: 0 | Status: DISCONTINUED | OUTPATIENT
Start: 2017-10-14 | End: 2017-10-14

## 2017-10-14 RX ORDER — WARFARIN SODIUM 2.5 MG/1
1 TABLET ORAL
Qty: 0 | Refills: 0 | COMMUNITY

## 2017-10-14 RX ADMIN — HYDROMORPHONE HYDROCHLORIDE 6 MILLIGRAM(S): 2 INJECTION INTRAMUSCULAR; INTRAVENOUS; SUBCUTANEOUS at 12:24

## 2017-10-14 RX ADMIN — CELECOXIB 200 MILLIGRAM(S): 200 CAPSULE ORAL at 08:51

## 2017-10-14 RX ADMIN — HYDROMORPHONE HYDROCHLORIDE 6 MILLIGRAM(S): 2 INJECTION INTRAMUSCULAR; INTRAVENOUS; SUBCUTANEOUS at 11:51

## 2017-10-14 RX ADMIN — ENOXAPARIN SODIUM 100 MILLIGRAM(S): 100 INJECTION SUBCUTANEOUS at 08:51

## 2017-10-14 RX ADMIN — HYDROMORPHONE HYDROCHLORIDE 4 MILLIGRAM(S): 2 INJECTION INTRAMUSCULAR; INTRAVENOUS; SUBCUTANEOUS at 05:30

## 2017-10-14 RX ADMIN — HYDROMORPHONE HYDROCHLORIDE 4 MILLIGRAM(S): 2 INJECTION INTRAMUSCULAR; INTRAVENOUS; SUBCUTANEOUS at 10:03

## 2017-10-14 RX ADMIN — HYDROMORPHONE HYDROCHLORIDE 6 MILLIGRAM(S): 2 INJECTION INTRAMUSCULAR; INTRAVENOUS; SUBCUTANEOUS at 01:00

## 2017-10-14 RX ADMIN — HYDROMORPHONE HYDROCHLORIDE 6 MILLIGRAM(S): 2 INJECTION INTRAMUSCULAR; INTRAVENOUS; SUBCUTANEOUS at 01:30

## 2017-10-14 RX ADMIN — HYDROMORPHONE HYDROCHLORIDE 4 MILLIGRAM(S): 2 INJECTION INTRAMUSCULAR; INTRAVENOUS; SUBCUTANEOUS at 05:00

## 2017-10-14 RX ADMIN — PANTOPRAZOLE SODIUM 40 MILLIGRAM(S): 20 TABLET, DELAYED RELEASE ORAL at 11:51

## 2017-10-14 RX ADMIN — HYDROMORPHONE HYDROCHLORIDE 4 MILLIGRAM(S): 2 INJECTION INTRAMUSCULAR; INTRAVENOUS; SUBCUTANEOUS at 08:51

## 2017-10-14 RX ADMIN — Medication 1000 MILLIGRAM(S): at 11:51

## 2017-10-14 RX ADMIN — Medication 1000 MILLIGRAM(S): at 11:52

## 2017-10-14 NOTE — PROGRESS NOTE ADULT - ASSESSMENT
S/P RIGHT TKR  - Pain Controlled  - Bowel Regimen  - PT/OT  - DVT PPx - Coumadin, Lovenox for bridge until INR>=2  - Stable for DC to [facility] from medical perspective  post op anemia due to acute blood loss.  stable. f/u cbc prn.  hypotention early morning, pt is asymptomatics, encorage po fluids.    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    - She should get her INR checked every 2 days until INR is stable in therapeutic range between 2-3

## 2017-10-14 NOTE — PROGRESS NOTE ADULT - SUBJECTIVE AND OBJECTIVE BOX
Patient is a 62y old  Female who presents with a chief complaint of "Dr Villalpando is going to replace my right knee" (12 Oct 2017 14:29)      INTERVAL HPI/OVERNIGHT EVENTS:  Pt is seen and examined.  pain is controlled. No new complains.  No dizziness, chest pain or palpitation.    Pain Location & Control:     MEDICATIONS  (STANDING):  acetaminophen   Tablet. 1000 milliGRAM(s) Oral every 12 hours  celecoxib 200 milliGRAM(s) Oral two times a day after meals  docusate sodium 100 milliGRAM(s) Oral three times a day  enoxaparin Injectable 100 milliGRAM(s) SubCutaneous every 12 hours  pantoprazole    Tablet 40 milliGRAM(s) Oral daily  senna 2 Tablet(s) Oral at bedtime  warfarin 7.5 milliGRAM(s) Oral once    MEDICATIONS  (PRN):  aluminum hydroxide/magnesium hydroxide/simethicone Suspension 30 milliLiter(s) Oral four times a day PRN Indigestion  bisacodyl Suppository 10 milliGRAM(s) Rectal daily PRN If no bowel movement by postoperative day #2  diazepam    Tablet 5 milliGRAM(s) Oral every 8 hours PRN Muscle spasm  HYDROmorphone   Tablet 4 milliGRAM(s) Oral every 3 hours PRN Mild Pain (1 - 3)  HYDROmorphone   Tablet 6 milliGRAM(s) Oral every 3 hours PRN Moderate Pain (4 - 6)  HYDROmorphone  Injectable 0.5 milliGRAM(s) IV Push every 3 hours PRN Severe Pain (7 - 10)  magnesium hydroxide Suspension 30 milliLiter(s) Oral daily PRN Constipation  ondansetron Injectable 4 milliGRAM(s) IV Push every 6 hours PRN Nausea and/or Vomiting  polyethylene glycol 3350 17 Gram(s) Oral daily PRN Constipation  promethazine IVPB 6.25 milliGRAM(s) IV Intermittent every 6 hours PRN n/v      Allergies    metal jewelry other than gold (Rash)  No Known Drug Allergies    Intolerances            Vital Signs Last 24 Hrs  T(C): 36.8 (14 Oct 2017 07:10), Max: 36.9 (13 Oct 2017 15:33)  T(F): 98.2 (14 Oct 2017 07:10), Max: 98.4 (13 Oct 2017 15:33)  HR: 81 (14 Oct 2017 07:10) (70 - 81)  BP: 145/78 (14 Oct 2017 07:10) (98/68 - 145/78)  BP(mean): --  RR: 16 (14 Oct 2017 07:10) (16 - 18)  SpO2: 96% (14 Oct 2017 07:10) (96% - 97%)        LABS:                        10.7   9.1   )-----------( 199      ( 14 Oct 2017 06:57 )             33.6     14 Oct 2017 06:57    138    |  105    |  15     ----------------------------<  95     3.9     |  29     |  0.64     Ca    8.8        14 Oct 2017 06:57      PT/INR - ( 14 Oct 2017 06:57 )   PT: 13.3 sec;   INR: 1.22 ratio

## 2017-10-14 NOTE — PROGRESS NOTE ADULT - SUBJECTIVE AND OBJECTIVE BOX
Orthopedic P.A.- POD# 3 - s/p Right TKR    Patient alert and comfortable in bed.  Much less knee pain than yesterday.  Vital Signs Last 24 Hrs  T(C): 36.8 (14 Oct 2017 07:10), Max: 36.9 (13 Oct 2017 15:33)  T(F): 98.2 (14 Oct 2017 07:10), Max: 98.4 (13 Oct 2017 15:33)  HR: 81 (14 Oct 2017 07:10) (70 - 81)  BP: 145/78 (14 Oct 2017 07:10) (98/68 - 145/78)  BP(mean): --  RR: 16 (14 Oct 2017 07:10) (16 - 18)  SpO2: 96% (14 Oct 2017 07:10) (96% - 97%)         I&O's Detail          Labs:                                                      10.7<L>  9.1   )-----------( 199      ( 14 Oct 2017 06:57 )             33.6<L>  14 Oct 2017 06:57                                14 Oct 2017 06:57    138    |  105    |  15     ----------------------------<  95     3.9     |  29     |  0.64     Ca    8.8        14 Oct 2017 06:57        MEDICATIONS:acetaminophen   Tablet. 1000 milliGRAM(s) Oral every 12 hours  aluminum hydroxide/magnesium hydroxide/simethicone Suspension 30 milliLiter(s) Oral four times a day PRN  bisacodyl Suppository 10 milliGRAM(s) Rectal daily PRN  celecoxib 200 milliGRAM(s) Oral two times a day after meals  diazepam    Tablet 5 milliGRAM(s) Oral every 8 hours PRN  docusate sodium 100 milliGRAM(s) Oral three times a day  enoxaparin Injectable 100 milliGRAM(s) SubCutaneous every 12 hours  HYDROmorphone   Tablet 4 milliGRAM(s) Oral every 3 hours PRN  HYDROmorphone   Tablet 6 milliGRAM(s) Oral every 3 hours PRN  HYDROmorphone  Injectable 0.5 milliGRAM(s) IV Push every 3 hours PRN  magnesium hydroxide Suspension 30 milliLiter(s) Oral daily PRN  ondansetron Injectable 4 milliGRAM(s) IV Push every 6 hours PRN  pantoprazole    Tablet 40 milliGRAM(s) Oral daily  polyethylene glycol 3350 17 Gram(s) Oral daily PRN  promethazine IVPB 6.25 milliGRAM(s) IV Intermittent every 6 hours PRN  senna 2 Tablet(s) Oral at bedtime  warfarin 7.5 milliGRAM(s) Oral once    Anticoagulation:  enoxaparin Injectable 100 milliGRAM(s) SubCutaneous every 12 hours for bredging  warfarin 7.5 milliGRAM(s) Oral once tonight        Pain medications:   acetaminophen   Tablet. 1000 milliGRAM(s) Oral every 12 hours  celecoxib 200 milliGRAM(s) Oral two times a day after meals  diazepam    Tablet 5 milliGRAM(s) Oral every 8 hours PRN  HYDROmorphone   Tablet 4 milliGRAM(s) Oral every 3 hours PRN  HYDROmorphone   Tablet 6 milliGRAM(s) Oral every 3 hours PRN  HYDROmorphone  Injectable 0.5 milliGRAM(s) IV Push every 3 hours PRN  ondansetron Injectable 4 milliGRAM(s) IV Push every 6 hours PRN  promethazine IVPB 6.25 milliGRAM(s) IV Intermittent every 6 hours PRN                                      Physical Exam:  Right knee- Prineo tape dry and intact over surgical wound.  Neurovascular grossly intact LE's.  PAS on LE's.  Calves soft and non-tender.                                                                                                                                                          A/P:  Orthopedically stable.  -Continue pain management with current plan  -Continue DVT prophylaxis and atrial fibrillation management with daily Coumadin and bridging treatment doses of Lovenox until INR 2. (monitor daily PT/INR)  -Increase ambulation and ROM right knee with PT/OT  -Dr. Knight for medical clearance for discharge to [facility] today.  -Further plan as per attendings. Orthopedic P.A.- POD# 3 - s/p Right TKR    Patient alert and comfortable in bed.; much less knee pain than yesterday. 6 out of 10 on the pain scale with higher doses of oral Dilaudid.  Vital Signs Last 24 Hrs  T(C): 36.8 (14 Oct 2017 07:10), Max: 36.9 (13 Oct 2017 15:33)  T(F): 98.2 (14 Oct 2017 07:10), Max: 98.4 (13 Oct 2017 15:33)  HR: 81 (14 Oct 2017 07:10) (70 - 81)  BP: 145/78 (14 Oct 2017 07:10) (98/68 - 145/78)  BP(mean): --  RR: 16 (14 Oct 2017 07:10) (16 - 18)  SpO2: 96% (14 Oct 2017 07:10) (96% - 97%)         I&O's Detail          Labs:                                                      10.7<L>  9.1   )-----------( 199      ( 14 Oct 2017 06:57 )             33.6<L>  14 Oct 2017 06:57                                14 Oct 2017 06:57    138    |  105    |  15     ----------------------------<  95     3.9     |  29     |  0.64     Ca    8.8        14 Oct 2017 06:57        MEDICATIONS:acetaminophen   Tablet. 1000 milliGRAM(s) Oral every 12 hours  aluminum hydroxide/magnesium hydroxide/simethicone Suspension 30 milliLiter(s) Oral four times a day PRN  bisacodyl Suppository 10 milliGRAM(s) Rectal daily PRN  celecoxib 200 milliGRAM(s) Oral two times a day after meals  diazepam    Tablet 5 milliGRAM(s) Oral every 8 hours PRN  docusate sodium 100 milliGRAM(s) Oral three times a day  enoxaparin Injectable 100 milliGRAM(s) SubCutaneous every 12 hours  HYDROmorphone   Tablet 4 milliGRAM(s) Oral every 3 hours PRN  HYDROmorphone   Tablet 6 milliGRAM(s) Oral every 3 hours PRN  HYDROmorphone  Injectable 0.5 milliGRAM(s) IV Push every 3 hours PRN  magnesium hydroxide Suspension 30 milliLiter(s) Oral daily PRN  ondansetron Injectable 4 milliGRAM(s) IV Push every 6 hours PRN  pantoprazole    Tablet 40 milliGRAM(s) Oral daily  polyethylene glycol 3350 17 Gram(s) Oral daily PRN  promethazine IVPB 6.25 milliGRAM(s) IV Intermittent every 6 hours PRN  senna 2 Tablet(s) Oral at bedtime  warfarin 7.5 milliGRAM(s) Oral once    Anticoagulation:  enoxaparin Injectable 100 milliGRAM(s) SubCutaneous every 12 hours for bredging  warfarin 7.5 milliGRAM(s) Oral once tonight        Pain medications:   acetaminophen   Tablet. 1000 milliGRAM(s) Oral every 12 hours  celecoxib 200 milliGRAM(s) Oral two times a day after meals  diazepam    Tablet 5 milliGRAM(s) Oral every 8 hours PRN  HYDROmorphone   Tablet 4 milliGRAM(s) Oral every 3 hours PRN  HYDROmorphone   Tablet 6 milliGRAM(s) Oral every 3 hours PRN  HYDROmorphone  Injectable 0.5 milliGRAM(s) IV Push every 3 hours PRN  ondansetron Injectable 4 milliGRAM(s) IV Push every 6 hours PRN  promethazine IVPB 6.25 milliGRAM(s) IV Intermittent every 6 hours PRN                                      Physical Exam:  Right knee- Prineo tape dry and intact over surgical wound.  Neurovascular grossly intact LE's.  PAS on LE's.  Calves soft and non-tender.                                                                                                                                                          A/P:  Orthopedically stable.  -Continue pain management with current plan  -Continue DVT prophylaxis and atrial fibrillation management with daily Coumadin and bridging treatment doses of Lovenox until INR 2. (monitor daily PT/INR)  -Increase ambulation and ROM right knee with PT/OT  -Dr. Knight for medical clearance for discharge to [facility] today.  -Further plan as per attendings.

## 2017-10-30 ENCOUNTER — APPOINTMENT (OUTPATIENT)
Dept: ORTHOPEDIC SURGERY | Facility: CLINIC | Age: 62
End: 2017-10-30

## 2017-11-01 ENCOUNTER — APPOINTMENT (OUTPATIENT)
Dept: ORTHOPEDIC SURGERY | Facility: CLINIC | Age: 62
End: 2017-11-01
Payer: COMMERCIAL

## 2017-11-01 PROCEDURE — 73562 X-RAY EXAM OF KNEE 3: CPT | Mod: RT

## 2017-11-01 PROCEDURE — 99024 POSTOP FOLLOW-UP VISIT: CPT

## 2017-11-10 ENCOUNTER — OUTPATIENT (OUTPATIENT)
Dept: OUTPATIENT SERVICES | Facility: HOSPITAL | Age: 62
LOS: 1 days | End: 2017-11-10
Payer: COMMERCIAL

## 2017-11-10 VITALS
HEIGHT: 65 IN | RESPIRATION RATE: 14 BRPM | TEMPERATURE: 98 F | DIASTOLIC BLOOD PRESSURE: 80 MMHG | SYSTOLIC BLOOD PRESSURE: 130 MMHG | HEART RATE: 77 BPM | WEIGHT: 218.92 LBS | OXYGEN SATURATION: 97 %

## 2017-11-10 DIAGNOSIS — Z90.710 ACQUIRED ABSENCE OF BOTH CERVIX AND UTERUS: Chronic | ICD-10-CM

## 2017-11-10 DIAGNOSIS — Z96.651 PRESENCE OF RIGHT ARTIFICIAL KNEE JOINT: Chronic | ICD-10-CM

## 2017-11-10 DIAGNOSIS — M17.12 UNILATERAL PRIMARY OSTEOARTHRITIS, LEFT KNEE: ICD-10-CM

## 2017-11-10 DIAGNOSIS — Z98.890 OTHER SPECIFIED POSTPROCEDURAL STATES: Chronic | ICD-10-CM

## 2017-11-10 DIAGNOSIS — M17.11 UNILATERAL PRIMARY OSTEOARTHRITIS, RIGHT KNEE: ICD-10-CM

## 2017-11-10 DIAGNOSIS — Z01.818 ENCOUNTER FOR OTHER PREPROCEDURAL EXAMINATION: ICD-10-CM

## 2017-11-10 LAB
ALBUMIN SERPL ELPH-MCNC: 3.8 G/DL — SIGNIFICANT CHANGE UP (ref 3.3–5)
ALP SERPL-CCNC: 114 U/L — SIGNIFICANT CHANGE UP (ref 30–120)
ALT FLD-CCNC: 22 U/L DA — SIGNIFICANT CHANGE UP (ref 10–60)
ANION GAP SERPL CALC-SCNC: 8 MMOL/L — SIGNIFICANT CHANGE UP (ref 5–17)
APTT BLD: 40.7 SEC — HIGH (ref 27.5–37.4)
AST SERPL-CCNC: 22 U/L — SIGNIFICANT CHANGE UP (ref 10–40)
BILIRUB SERPL-MCNC: 0.4 MG/DL — SIGNIFICANT CHANGE UP (ref 0.2–1.2)
BLD GP AB SCN SERPL QL: SIGNIFICANT CHANGE UP
BUN SERPL-MCNC: 21 MG/DL — SIGNIFICANT CHANGE UP (ref 7–23)
CALCIUM SERPL-MCNC: 9.4 MG/DL — SIGNIFICANT CHANGE UP (ref 8.4–10.5)
CHLORIDE SERPL-SCNC: 102 MMOL/L — SIGNIFICANT CHANGE UP (ref 96–108)
CO2 SERPL-SCNC: 27 MMOL/L — SIGNIFICANT CHANGE UP (ref 22–31)
CREAT SERPL-MCNC: 0.73 MG/DL — SIGNIFICANT CHANGE UP (ref 0.5–1.3)
GLUCOSE SERPL-MCNC: 106 MG/DL — HIGH (ref 70–99)
HCT VFR BLD CALC: 36.4 % — SIGNIFICANT CHANGE UP (ref 34.5–45)
HGB BLD-MCNC: 11.1 G/DL — LOW (ref 11.5–15.5)
INR BLD: 2.75 RATIO — HIGH (ref 0.88–1.16)
MCHC RBC-ENTMCNC: 28.4 PG — SIGNIFICANT CHANGE UP (ref 27–34)
MCHC RBC-ENTMCNC: 30.4 GM/DL — LOW (ref 32–36)
MCV RBC AUTO: 93.4 FL — SIGNIFICANT CHANGE UP (ref 80–100)
MRSA PCR RESULT.: SIGNIFICANT CHANGE UP
PLATELET # BLD AUTO: 259 K/UL — SIGNIFICANT CHANGE UP (ref 150–400)
POTASSIUM SERPL-MCNC: 4.1 MMOL/L — SIGNIFICANT CHANGE UP (ref 3.5–5.3)
POTASSIUM SERPL-SCNC: 4.1 MMOL/L — SIGNIFICANT CHANGE UP (ref 3.5–5.3)
PROT SERPL-MCNC: 8.2 G/DL — SIGNIFICANT CHANGE UP (ref 6–8.3)
PROTHROM AB SERPL-ACNC: 30.6 SEC — HIGH (ref 9.8–12.7)
RBC # BLD: 3.9 M/UL — SIGNIFICANT CHANGE UP (ref 3.8–5.2)
RBC # FLD: 13.5 % — SIGNIFICANT CHANGE UP (ref 10.3–14.5)
S AUREUS DNA NOSE QL NAA+PROBE: SIGNIFICANT CHANGE UP
SODIUM SERPL-SCNC: 137 MMOL/L — SIGNIFICANT CHANGE UP (ref 135–145)
WBC # BLD: 7 K/UL — SIGNIFICANT CHANGE UP (ref 3.8–10.5)
WBC # FLD AUTO: 7 K/UL — SIGNIFICANT CHANGE UP (ref 3.8–10.5)

## 2017-11-10 PROCEDURE — 85730 THROMBOPLASTIN TIME PARTIAL: CPT

## 2017-11-10 PROCEDURE — 85027 COMPLETE CBC AUTOMATED: CPT

## 2017-11-10 PROCEDURE — 87640 STAPH A DNA AMP PROBE: CPT

## 2017-11-10 PROCEDURE — 86901 BLOOD TYPING SEROLOGIC RH(D): CPT

## 2017-11-10 PROCEDURE — 80053 COMPREHEN METABOLIC PANEL: CPT

## 2017-11-10 PROCEDURE — 36415 COLL VENOUS BLD VENIPUNCTURE: CPT

## 2017-11-10 PROCEDURE — 85610 PROTHROMBIN TIME: CPT

## 2017-11-10 PROCEDURE — G0463: CPT

## 2017-11-10 PROCEDURE — 86850 RBC ANTIBODY SCREEN: CPT

## 2017-11-10 PROCEDURE — 86900 BLOOD TYPING SEROLOGIC ABO: CPT

## 2017-11-10 NOTE — H&P PST ADULT - PMH
BMI 37.0-37.9, adult    Chronic pain    DVT (deep venous thrombosis), bilateral    History of DVT (deep vein thrombosis)  bilateral, 2015 related to HRT  History of pulmonary embolism  2015 related to HRT  Hyperlipidemia    Insomnia    Obesity (BMI 30-39.9)    Osteoarthritis of left knee    PE (pulmonary embolism)

## 2017-11-10 NOTE — H&P PST ADULT - PROBLEM SELECTOR PLAN 1
"Left total knee replacement 8all titanium implant8 " on 11/27/17  pre op instructions were reviewed and signed  Patient will obtain medical clearance.

## 2017-11-10 NOTE — H&P PST ADULT - HISTORY OF PRESENT ILLNESS
61 yo female is scheduled for "Left total knee replacement *all titanium implant" on 11/27/17 with Yury Villalpando MD.  Presents with longstanding left knee pain for 2 years recently exacerbated for which she has tried HA injections without relief. Pain worst with sitting in car and rates 10/10.   S/P right total knee replacement 10/11//2017 with good results.

## 2017-11-10 NOTE — H&P PST ADULT - PSH
History of hernia repair  2000, 2015  History of total right knee replacement  2017  S/P hysterectomy  1970's

## 2017-11-16 RX ORDER — ACETAMINOPHEN 500 MG
1000 TABLET ORAL ONCE
Qty: 0 | Refills: 0 | Status: COMPLETED | OUTPATIENT
Start: 2017-11-27 | End: 2017-11-27

## 2017-11-16 RX ORDER — APREPITANT 80 MG/1
40 CAPSULE ORAL ONCE
Qty: 0 | Refills: 0 | Status: COMPLETED | OUTPATIENT
Start: 2017-11-27 | End: 2017-11-27

## 2017-11-25 RX ORDER — CELECOXIB 200 MG/1
200 CAPSULE ORAL ONCE
Qty: 0 | Refills: 0 | Status: COMPLETED | OUTPATIENT
Start: 2017-11-27 | End: 2017-11-27

## 2017-11-27 ENCOUNTER — APPOINTMENT (OUTPATIENT)
Dept: ORTHOPEDIC SURGERY | Facility: HOSPITAL | Age: 62
End: 2017-11-27

## 2017-11-27 ENCOUNTER — INPATIENT (INPATIENT)
Facility: HOSPITAL | Age: 62
LOS: 1 days | Discharge: NURSING, REHAB OR LONG-TERM CARE | DRG: 470 | End: 2017-11-29
Attending: ORTHOPAEDIC SURGERY | Admitting: ORTHOPAEDIC SURGERY
Payer: COMMERCIAL

## 2017-11-27 ENCOUNTER — RESULT REVIEW (OUTPATIENT)
Age: 62
End: 2017-11-27

## 2017-11-27 VITALS
OXYGEN SATURATION: 98 % | WEIGHT: 218.48 LBS | SYSTOLIC BLOOD PRESSURE: 106 MMHG | HEART RATE: 75 BPM | DIASTOLIC BLOOD PRESSURE: 54 MMHG | TEMPERATURE: 98 F | HEIGHT: 65 IN | RESPIRATION RATE: 16 BRPM

## 2017-11-27 DIAGNOSIS — Z98.890 OTHER SPECIFIED POSTPROCEDURAL STATES: Chronic | ICD-10-CM

## 2017-11-27 DIAGNOSIS — M17.12 UNILATERAL PRIMARY OSTEOARTHRITIS, LEFT KNEE: ICD-10-CM

## 2017-11-27 DIAGNOSIS — Z96.651 PRESENCE OF RIGHT ARTIFICIAL KNEE JOINT: Chronic | ICD-10-CM

## 2017-11-27 DIAGNOSIS — Z90.710 ACQUIRED ABSENCE OF BOTH CERVIX AND UTERUS: Chronic | ICD-10-CM

## 2017-11-27 LAB
ANION GAP SERPL CALC-SCNC: 10 MMOL/L — SIGNIFICANT CHANGE UP (ref 5–17)
BUN SERPL-MCNC: 17 MG/DL — SIGNIFICANT CHANGE UP (ref 7–23)
CALCIUM SERPL-MCNC: 9.2 MG/DL — SIGNIFICANT CHANGE UP (ref 8.4–10.5)
CHLORIDE SERPL-SCNC: 103 MMOL/L — SIGNIFICANT CHANGE UP (ref 96–108)
CO2 SERPL-SCNC: 24 MMOL/L — SIGNIFICANT CHANGE UP (ref 22–31)
CREAT SERPL-MCNC: 0.72 MG/DL — SIGNIFICANT CHANGE UP (ref 0.5–1.3)
GLUCOSE SERPL-MCNC: 176 MG/DL — HIGH (ref 70–99)
HCT VFR BLD CALC: 33.6 % — LOW (ref 34.5–45)
HGB BLD-MCNC: 10.4 G/DL — LOW (ref 11.5–15.5)
INR BLD: 1.13 RATIO — SIGNIFICANT CHANGE UP (ref 0.88–1.16)
POTASSIUM SERPL-MCNC: 4.2 MMOL/L — SIGNIFICANT CHANGE UP (ref 3.5–5.3)
POTASSIUM SERPL-SCNC: 4.2 MMOL/L — SIGNIFICANT CHANGE UP (ref 3.5–5.3)
PROTHROM AB SERPL-ACNC: 12.3 SEC — SIGNIFICANT CHANGE UP (ref 9.8–12.7)
SODIUM SERPL-SCNC: 137 MMOL/L — SIGNIFICANT CHANGE UP (ref 135–145)

## 2017-11-27 PROCEDURE — 88311 DECALCIFY TISSUE: CPT | Mod: 26

## 2017-11-27 PROCEDURE — 88305 TISSUE EXAM BY PATHOLOGIST: CPT | Mod: 26

## 2017-11-27 PROCEDURE — 73562 X-RAY EXAM OF KNEE 3: CPT | Mod: 26,LT

## 2017-11-27 PROCEDURE — 27447 TOTAL KNEE ARTHROPLASTY: CPT | Mod: 79,LT

## 2017-11-27 PROCEDURE — 27447 TOTAL KNEE ARTHROPLASTY: CPT | Mod: AS,LT

## 2017-11-27 RX ORDER — SODIUM CHLORIDE 9 MG/ML
1000 INJECTION, SOLUTION INTRAVENOUS
Qty: 0 | Refills: 0 | Status: DISCONTINUED | OUTPATIENT
Start: 2017-11-27 | End: 2017-11-29

## 2017-11-27 RX ORDER — CELECOXIB 200 MG/1
200 CAPSULE ORAL
Qty: 0 | Refills: 0 | Status: DISCONTINUED | OUTPATIENT
Start: 2017-11-28 | End: 2017-11-29

## 2017-11-27 RX ORDER — WARFARIN SODIUM 2.5 MG/1
5 TABLET ORAL ONCE
Qty: 0 | Refills: 0 | Status: COMPLETED | OUTPATIENT
Start: 2017-11-27 | End: 2017-11-27

## 2017-11-27 RX ORDER — ACETAMINOPHEN 500 MG
1000 TABLET ORAL EVERY 6 HOURS
Qty: 0 | Refills: 0 | Status: COMPLETED | OUTPATIENT
Start: 2017-11-27 | End: 2017-11-28

## 2017-11-27 RX ORDER — PANTOPRAZOLE SODIUM 20 MG/1
40 TABLET, DELAYED RELEASE ORAL
Qty: 0 | Refills: 0 | Status: DISCONTINUED | OUTPATIENT
Start: 2017-11-27 | End: 2017-11-29

## 2017-11-27 RX ORDER — ENOXAPARIN SODIUM 100 MG/ML
30 INJECTION SUBCUTANEOUS EVERY 12 HOURS
Qty: 0 | Refills: 0 | Status: COMPLETED | OUTPATIENT
Start: 2017-11-28 | End: 2017-11-28

## 2017-11-27 RX ORDER — HYDROMORPHONE HYDROCHLORIDE 2 MG/ML
4 INJECTION INTRAMUSCULAR; INTRAVENOUS; SUBCUTANEOUS
Qty: 0 | Refills: 0 | Status: DISCONTINUED | OUTPATIENT
Start: 2017-11-27 | End: 2017-11-29

## 2017-11-27 RX ORDER — ACETAMINOPHEN 500 MG
1000 TABLET ORAL EVERY 12 HOURS
Qty: 0 | Refills: 0 | Status: DISCONTINUED | OUTPATIENT
Start: 2017-11-28 | End: 2017-11-29

## 2017-11-27 RX ORDER — HYDROMORPHONE HYDROCHLORIDE 2 MG/ML
0.5 INJECTION INTRAMUSCULAR; INTRAVENOUS; SUBCUTANEOUS
Qty: 0 | Refills: 0 | Status: DISCONTINUED | OUTPATIENT
Start: 2017-11-27 | End: 2017-11-27

## 2017-11-27 RX ORDER — HYDROMORPHONE HYDROCHLORIDE 2 MG/ML
6 INJECTION INTRAMUSCULAR; INTRAVENOUS; SUBCUTANEOUS
Qty: 0 | Refills: 0 | Status: DISCONTINUED | OUTPATIENT
Start: 2017-11-27 | End: 2017-11-29

## 2017-11-27 RX ORDER — ONDANSETRON 8 MG/1
4 TABLET, FILM COATED ORAL EVERY 6 HOURS
Qty: 0 | Refills: 0 | Status: DISCONTINUED | OUTPATIENT
Start: 2017-11-27 | End: 2017-11-29

## 2017-11-27 RX ORDER — ENOXAPARIN SODIUM 100 MG/ML
60 INJECTION SUBCUTANEOUS EVERY 12 HOURS
Qty: 0 | Refills: 0 | Status: DISCONTINUED | OUTPATIENT
Start: 2017-11-29 | End: 2017-11-29

## 2017-11-27 RX ORDER — CEFAZOLIN SODIUM 1 G
2000 VIAL (EA) INJECTION EVERY 8 HOURS
Qty: 0 | Refills: 0 | Status: COMPLETED | OUTPATIENT
Start: 2017-11-27 | End: 2017-11-28

## 2017-11-27 RX ORDER — HYDROMORPHONE HYDROCHLORIDE 2 MG/ML
0.5 INJECTION INTRAMUSCULAR; INTRAVENOUS; SUBCUTANEOUS
Qty: 0 | Refills: 0 | Status: DISCONTINUED | OUTPATIENT
Start: 2017-11-27 | End: 2017-11-29

## 2017-11-27 RX ORDER — DOCUSATE SODIUM 100 MG
100 CAPSULE ORAL THREE TIMES A DAY
Qty: 0 | Refills: 0 | Status: DISCONTINUED | OUTPATIENT
Start: 2017-11-27 | End: 2017-11-29

## 2017-11-27 RX ORDER — MAGNESIUM HYDROXIDE 400 MG/1
30 TABLET, CHEWABLE ORAL DAILY
Qty: 0 | Refills: 0 | Status: DISCONTINUED | OUTPATIENT
Start: 2017-11-27 | End: 2017-11-29

## 2017-11-27 RX ORDER — SODIUM CHLORIDE 9 MG/ML
1000 INJECTION, SOLUTION INTRAVENOUS
Qty: 0 | Refills: 0 | Status: DISCONTINUED | OUTPATIENT
Start: 2017-11-27 | End: 2017-11-27

## 2017-11-27 RX ORDER — CEFAZOLIN SODIUM 1 G
2000 VIAL (EA) INJECTION ONCE
Qty: 0 | Refills: 0 | Status: COMPLETED | OUTPATIENT
Start: 2017-11-27 | End: 2017-11-27

## 2017-11-27 RX ORDER — ZOLPIDEM TARTRATE 10 MG/1
5 TABLET ORAL AT BEDTIME
Qty: 0 | Refills: 0 | Status: DISCONTINUED | OUTPATIENT
Start: 2017-11-27 | End: 2017-11-29

## 2017-11-27 RX ORDER — POLYETHYLENE GLYCOL 3350 17 G/17G
17 POWDER, FOR SOLUTION ORAL DAILY
Qty: 0 | Refills: 0 | Status: DISCONTINUED | OUTPATIENT
Start: 2017-11-27 | End: 2017-11-29

## 2017-11-27 RX ORDER — ONDANSETRON 8 MG/1
4 TABLET, FILM COATED ORAL ONCE
Qty: 0 | Refills: 0 | Status: COMPLETED | OUTPATIENT
Start: 2017-11-27 | End: 2017-11-27

## 2017-11-27 RX ORDER — SENNA PLUS 8.6 MG/1
2 TABLET ORAL AT BEDTIME
Qty: 0 | Refills: 0 | Status: DISCONTINUED | OUTPATIENT
Start: 2017-11-27 | End: 2017-11-29

## 2017-11-27 RX ADMIN — HYDROMORPHONE HYDROCHLORIDE 0.5 MILLIGRAM(S): 2 INJECTION INTRAMUSCULAR; INTRAVENOUS; SUBCUTANEOUS at 22:30

## 2017-11-27 RX ADMIN — HYDROMORPHONE HYDROCHLORIDE 0.5 MILLIGRAM(S): 2 INJECTION INTRAMUSCULAR; INTRAVENOUS; SUBCUTANEOUS at 13:42

## 2017-11-27 RX ADMIN — HYDROMORPHONE HYDROCHLORIDE 0.5 MILLIGRAM(S): 2 INJECTION INTRAMUSCULAR; INTRAVENOUS; SUBCUTANEOUS at 14:45

## 2017-11-27 RX ADMIN — HYDROMORPHONE HYDROCHLORIDE 4 MILLIGRAM(S): 2 INJECTION INTRAMUSCULAR; INTRAVENOUS; SUBCUTANEOUS at 20:59

## 2017-11-27 RX ADMIN — HYDROMORPHONE HYDROCHLORIDE 4 MILLIGRAM(S): 2 INJECTION INTRAMUSCULAR; INTRAVENOUS; SUBCUTANEOUS at 22:29

## 2017-11-27 RX ADMIN — CELECOXIB 200 MILLIGRAM(S): 200 CAPSULE ORAL at 07:12

## 2017-11-27 RX ADMIN — SENNA PLUS 2 TABLET(S): 8.6 TABLET ORAL at 21:01

## 2017-11-27 RX ADMIN — ONDANSETRON 4 MILLIGRAM(S): 8 TABLET, FILM COATED ORAL at 12:00

## 2017-11-27 RX ADMIN — Medication 1000 MILLIGRAM(S): at 22:29

## 2017-11-27 RX ADMIN — Medication 400 MILLIGRAM(S): at 16:29

## 2017-11-27 RX ADMIN — HYDROMORPHONE HYDROCHLORIDE 0.5 MILLIGRAM(S): 2 INJECTION INTRAMUSCULAR; INTRAVENOUS; SUBCUTANEOUS at 15:15

## 2017-11-27 RX ADMIN — Medication 1000 MILLIGRAM(S): at 16:56

## 2017-11-27 RX ADMIN — HYDROMORPHONE HYDROCHLORIDE 0.5 MILLIGRAM(S): 2 INJECTION INTRAMUSCULAR; INTRAVENOUS; SUBCUTANEOUS at 16:27

## 2017-11-27 RX ADMIN — HYDROMORPHONE HYDROCHLORIDE 0.5 MILLIGRAM(S): 2 INJECTION INTRAMUSCULAR; INTRAVENOUS; SUBCUTANEOUS at 13:26

## 2017-11-27 RX ADMIN — HYDROMORPHONE HYDROCHLORIDE 0.5 MILLIGRAM(S): 2 INJECTION INTRAMUSCULAR; INTRAVENOUS; SUBCUTANEOUS at 22:57

## 2017-11-27 RX ADMIN — Medication 100 MILLIGRAM(S): at 17:53

## 2017-11-27 RX ADMIN — WARFARIN SODIUM 5 MILLIGRAM(S): 2.5 TABLET ORAL at 21:01

## 2017-11-27 RX ADMIN — Medication 100 MILLIGRAM(S): at 21:01

## 2017-11-27 RX ADMIN — APREPITANT 40 MILLIGRAM(S): 80 CAPSULE ORAL at 07:12

## 2017-11-27 RX ADMIN — SODIUM CHLORIDE 100 MILLILITER(S): 9 INJECTION, SOLUTION INTRAVENOUS at 12:03

## 2017-11-27 RX ADMIN — HYDROMORPHONE HYDROCHLORIDE 6 MILLIGRAM(S): 2 INJECTION INTRAMUSCULAR; INTRAVENOUS; SUBCUTANEOUS at 18:43

## 2017-11-27 RX ADMIN — Medication 400 MILLIGRAM(S): at 22:26

## 2017-11-27 RX ADMIN — HYDROMORPHONE HYDROCHLORIDE 6 MILLIGRAM(S): 2 INJECTION INTRAMUSCULAR; INTRAVENOUS; SUBCUTANEOUS at 17:57

## 2017-11-27 RX ADMIN — HYDROMORPHONE HYDROCHLORIDE 0.5 MILLIGRAM(S): 2 INJECTION INTRAMUSCULAR; INTRAVENOUS; SUBCUTANEOUS at 16:56

## 2017-11-27 NOTE — PHYSICAL THERAPY INITIAL EVALUATION ADULT - RANGE OF MOTION EXAMINATION, REHAB EVAL
Right LE ROM was WFL (within functional limits)/left knee/deficits as listed below Right LE ROM was WFL (within functional limits)/left knee flexion with 60 deg/deficits as listed below

## 2017-11-27 NOTE — BRIEF OPERATIVE NOTE - PROCEDURE
<<-----Click on this checkbox to enter Procedure Knee replacement  11/27/2017  left  Active  Steve Bergman

## 2017-11-27 NOTE — PHYSICAL THERAPY INITIAL EVALUATION ADULT - GAIT DEVIATIONS NOTED, PT EVAL
decreased stride length/decreased step length/decreased brandon/decreased velocity of limb motion/decreased weight-shifting ability

## 2017-11-27 NOTE — CONSULT NOTE ADULT - SUBJECTIVE AND OBJECTIVE BOX
patient see and examined.   Full consult dictated.  Monitor CBC/BMP.  Daily PT/INR  Will follow patient.

## 2017-11-27 NOTE — PHYSICAL THERAPY INITIAL EVALUATION ADULT - ADDITIONAL COMMENTS
Pt lives with  in a house w/ 4 steps to enter with no rail,  1 flight of steps to bedroom with rail.   Pt has rolling walker and straight cane

## 2017-11-28 ENCOUNTER — TRANSCRIPTION ENCOUNTER (OUTPATIENT)
Age: 62
End: 2017-11-28

## 2017-11-28 DIAGNOSIS — Z86.711 PERSONAL HISTORY OF PULMONARY EMBOLISM: ICD-10-CM

## 2017-11-28 DIAGNOSIS — M17.12 UNILATERAL PRIMARY OSTEOARTHRITIS, LEFT KNEE: ICD-10-CM

## 2017-11-28 DIAGNOSIS — G89.29 OTHER CHRONIC PAIN: ICD-10-CM

## 2017-11-28 DIAGNOSIS — Z29.9 ENCOUNTER FOR PROPHYLACTIC MEASURES, UNSPECIFIED: ICD-10-CM

## 2017-11-28 DIAGNOSIS — D50.0 IRON DEFICIENCY ANEMIA SECONDARY TO BLOOD LOSS (CHRONIC): ICD-10-CM

## 2017-11-28 LAB
ANION GAP SERPL CALC-SCNC: 6 MMOL/L — SIGNIFICANT CHANGE UP (ref 5–17)
BUN SERPL-MCNC: 12 MG/DL — SIGNIFICANT CHANGE UP (ref 7–23)
CALCIUM SERPL-MCNC: 9.1 MG/DL — SIGNIFICANT CHANGE UP (ref 8.4–10.5)
CHLORIDE SERPL-SCNC: 104 MMOL/L — SIGNIFICANT CHANGE UP (ref 96–108)
CO2 SERPL-SCNC: 27 MMOL/L — SIGNIFICANT CHANGE UP (ref 22–31)
CREAT SERPL-MCNC: 0.6 MG/DL — SIGNIFICANT CHANGE UP (ref 0.5–1.3)
GLUCOSE SERPL-MCNC: 137 MG/DL — HIGH (ref 70–99)
HCT VFR BLD CALC: 27.4 % — LOW (ref 34.5–45)
HGB BLD-MCNC: 9.4 G/DL — LOW (ref 11.5–15.5)
INR BLD: 1.13 RATIO — SIGNIFICANT CHANGE UP (ref 0.88–1.16)
MCHC RBC-ENTMCNC: 31.3 PG — SIGNIFICANT CHANGE UP (ref 27–34)
MCHC RBC-ENTMCNC: 34.3 GM/DL — SIGNIFICANT CHANGE UP (ref 32–36)
MCV RBC AUTO: 91.4 FL — SIGNIFICANT CHANGE UP (ref 80–100)
PLATELET # BLD AUTO: 238 K/UL — SIGNIFICANT CHANGE UP (ref 150–400)
POTASSIUM SERPL-MCNC: 4.3 MMOL/L — SIGNIFICANT CHANGE UP (ref 3.5–5.3)
POTASSIUM SERPL-SCNC: 4.3 MMOL/L — SIGNIFICANT CHANGE UP (ref 3.5–5.3)
PROTHROM AB SERPL-ACNC: 12.4 SEC — SIGNIFICANT CHANGE UP (ref 9.8–12.7)
RBC # BLD: 3 M/UL — LOW (ref 3.8–5.2)
RBC # FLD: 13.7 % — SIGNIFICANT CHANGE UP (ref 10.3–14.5)
SODIUM SERPL-SCNC: 137 MMOL/L — SIGNIFICANT CHANGE UP (ref 135–145)
WBC # BLD: 11.6 K/UL — HIGH (ref 3.8–10.5)
WBC # FLD AUTO: 11.6 K/UL — HIGH (ref 3.8–10.5)

## 2017-11-28 RX ORDER — WARFARIN SODIUM 2.5 MG/1
5 TABLET ORAL ONCE
Qty: 0 | Refills: 0 | Status: COMPLETED | OUTPATIENT
Start: 2017-11-28 | End: 2017-11-28

## 2017-11-28 RX ADMIN — Medication 1000 MILLIGRAM(S): at 04:19

## 2017-11-28 RX ADMIN — Medication 1000 MILLIGRAM(S): at 21:55

## 2017-11-28 RX ADMIN — Medication 1000 MILLIGRAM(S): at 10:00

## 2017-11-28 RX ADMIN — PANTOPRAZOLE SODIUM 40 MILLIGRAM(S): 20 TABLET, DELAYED RELEASE ORAL at 06:02

## 2017-11-28 RX ADMIN — CELECOXIB 200 MILLIGRAM(S): 200 CAPSULE ORAL at 17:21

## 2017-11-28 RX ADMIN — HYDROMORPHONE HYDROCHLORIDE 6 MILLIGRAM(S): 2 INJECTION INTRAMUSCULAR; INTRAVENOUS; SUBCUTANEOUS at 19:39

## 2017-11-28 RX ADMIN — SENNA PLUS 2 TABLET(S): 8.6 TABLET ORAL at 21:57

## 2017-11-28 RX ADMIN — CELECOXIB 200 MILLIGRAM(S): 200 CAPSULE ORAL at 09:36

## 2017-11-28 RX ADMIN — ENOXAPARIN SODIUM 30 MILLIGRAM(S): 100 INJECTION SUBCUTANEOUS at 09:00

## 2017-11-28 RX ADMIN — HYDROMORPHONE HYDROCHLORIDE 6 MILLIGRAM(S): 2 INJECTION INTRAMUSCULAR; INTRAVENOUS; SUBCUTANEOUS at 06:02

## 2017-11-28 RX ADMIN — HYDROMORPHONE HYDROCHLORIDE 6 MILLIGRAM(S): 2 INJECTION INTRAMUSCULAR; INTRAVENOUS; SUBCUTANEOUS at 03:03

## 2017-11-28 RX ADMIN — HYDROMORPHONE HYDROCHLORIDE 6 MILLIGRAM(S): 2 INJECTION INTRAMUSCULAR; INTRAVENOUS; SUBCUTANEOUS at 20:15

## 2017-11-28 RX ADMIN — Medication 1000 MILLIGRAM(S): at 09:37

## 2017-11-28 RX ADMIN — HYDROMORPHONE HYDROCHLORIDE 6 MILLIGRAM(S): 2 INJECTION INTRAMUSCULAR; INTRAVENOUS; SUBCUTANEOUS at 09:01

## 2017-11-28 RX ADMIN — HYDROMORPHONE HYDROCHLORIDE 6 MILLIGRAM(S): 2 INJECTION INTRAMUSCULAR; INTRAVENOUS; SUBCUTANEOUS at 13:00

## 2017-11-28 RX ADMIN — Medication 100 MILLIGRAM(S): at 01:11

## 2017-11-28 RX ADMIN — Medication 100 MILLIGRAM(S): at 13:59

## 2017-11-28 RX ADMIN — WARFARIN SODIUM 5 MILLIGRAM(S): 2.5 TABLET ORAL at 21:57

## 2017-11-28 RX ADMIN — CELECOXIB 200 MILLIGRAM(S): 200 CAPSULE ORAL at 09:01

## 2017-11-28 RX ADMIN — HYDROMORPHONE HYDROCHLORIDE 6 MILLIGRAM(S): 2 INJECTION INTRAMUSCULAR; INTRAVENOUS; SUBCUTANEOUS at 09:55

## 2017-11-28 RX ADMIN — HYDROMORPHONE HYDROCHLORIDE 6 MILLIGRAM(S): 2 INJECTION INTRAMUSCULAR; INTRAVENOUS; SUBCUTANEOUS at 06:32

## 2017-11-28 RX ADMIN — HYDROMORPHONE HYDROCHLORIDE 6 MILLIGRAM(S): 2 INJECTION INTRAMUSCULAR; INTRAVENOUS; SUBCUTANEOUS at 15:45

## 2017-11-28 RX ADMIN — HYDROMORPHONE HYDROCHLORIDE 6 MILLIGRAM(S): 2 INJECTION INTRAMUSCULAR; INTRAVENOUS; SUBCUTANEOUS at 03:38

## 2017-11-28 RX ADMIN — ENOXAPARIN SODIUM 30 MILLIGRAM(S): 100 INJECTION SUBCUTANEOUS at 21:56

## 2017-11-28 RX ADMIN — HYDROMORPHONE HYDROCHLORIDE 6 MILLIGRAM(S): 2 INJECTION INTRAMUSCULAR; INTRAVENOUS; SUBCUTANEOUS at 12:09

## 2017-11-28 RX ADMIN — Medication 100 MILLIGRAM(S): at 06:02

## 2017-11-28 RX ADMIN — HYDROMORPHONE HYDROCHLORIDE 6 MILLIGRAM(S): 2 INJECTION INTRAMUSCULAR; INTRAVENOUS; SUBCUTANEOUS at 16:40

## 2017-11-28 RX ADMIN — HYDROMORPHONE HYDROCHLORIDE 6 MILLIGRAM(S): 2 INJECTION INTRAMUSCULAR; INTRAVENOUS; SUBCUTANEOUS at 23:48

## 2017-11-28 RX ADMIN — Medication 400 MILLIGRAM(S): at 04:17

## 2017-11-28 RX ADMIN — HYDROMORPHONE HYDROCHLORIDE 6 MILLIGRAM(S): 2 INJECTION INTRAMUSCULAR; INTRAVENOUS; SUBCUTANEOUS at 01:09

## 2017-11-28 RX ADMIN — Medication 100 MILLIGRAM(S): at 21:56

## 2017-11-28 RX ADMIN — CELECOXIB 200 MILLIGRAM(S): 200 CAPSULE ORAL at 17:55

## 2017-11-28 RX ADMIN — HYDROMORPHONE HYDROCHLORIDE 6 MILLIGRAM(S): 2 INJECTION INTRAMUSCULAR; INTRAVENOUS; SUBCUTANEOUS at 00:14

## 2017-11-28 NOTE — DISCHARGE NOTE ADULT - MEDICATION SUMMARY - MEDICATIONS TO STOP TAKING
I will STOP taking the medications listed below when I get home from the hospital:    hydrocodone-acetaminophen 7.5mg-300mg oral tablet  -- 1 tab(s) by mouth every 3 hours    Percocet 5/325 oral tablet  -- 1 tab(s) by mouth every 6 hours, As Needed

## 2017-11-28 NOTE — DISCHARGE NOTE ADULT - CARE PLAN
Principal Discharge DX:	S/P TKR (total knee replacement), left  Goal:	Improve quality of life  Instructions for follow-up, activity and diet:	Your new total knee requires proper care.  Your care provider is your best resource for care information.  Please follow these basic guidelines.  Use pain medication if needed as prescribed.  Ice packs are a helpful addition to your comfort.  Applying a  waterproof ice pack over a cloth or thin towel to the site for 30 minutes per hour may provide benefit by reducing swelling and discomfort.  Your Physical Therapy/Occupational Therapy may include ambulation, transfers, stairs, ADL's (activities of daily living), range of motion exercises, and isometrics.  Your participation is vital to your recovery.    -Your Activity  • Weight Bearing as tolerated with rolling walker.  • Take short, frequent walks increasing the distance that you walk each day as tolerated.  • Change your position every hour to decrease pain and stiffness.  • Continue the exercises taught to you by your physical therapist.  • No driving until cleared by the doctor.  • No tub baths, hot tubs, or swimming pools until instructed by your doctor.  • Do not squat down on the floor.  • Do not kneel or twist your knee.    Keep incision clean and dry. Change the dressing daily if there is drainage noted from surgical wound. When there is no drainage, the wound may be left open to air.  Salves, ointments or other topical treatments are not to be used on the wound unless specifically recommended by your doctor.   If you have sutures (stiches) and no drainage form the incision you may shower allowing water to rinse the incision and pat it  dry afterward with a clean towel.  If you have Prineo (tape/glue) you may shower and pat the wound dry.  Prineo removal is done in the office 2 weeks after surgery.    If you have further questions or problems call your doctors office.

## 2017-11-28 NOTE — DISCHARGE NOTE ADULT - PLAN OF CARE
Improve quality of life Your new total knee requires proper care.  Your care provider is your best resource for care information.  Please follow these basic guidelines.  Use pain medication if needed as prescribed.  Ice packs are a helpful addition to your comfort.  Applying a  waterproof ice pack over a cloth or thin towel to the site for 30 minutes per hour may provide benefit by reducing swelling and discomfort.  Your Physical Therapy/Occupational Therapy may include ambulation, transfers, stairs, ADL's (activities of daily living), range of motion exercises, and isometrics.  Your participation is vital to your recovery.    -Your Activity  • Weight Bearing as tolerated with rolling walker.  • Take short, frequent walks increasing the distance that you walk each day as tolerated.  • Change your position every hour to decrease pain and stiffness.  • Continue the exercises taught to you by your physical therapist.  • No driving until cleared by the doctor.  • No tub baths, hot tubs, or swimming pools until instructed by your doctor.  • Do not squat down on the floor.  • Do not kneel or twist your knee.    Keep incision clean and dry. Change the dressing daily if there is drainage noted from surgical wound. When there is no drainage, the wound may be left open to air.  Salves, ointments or other topical treatments are not to be used on the wound unless specifically recommended by your doctor.   If you have sutures (stiches) and no drainage form the incision you may shower allowing water to rinse the incision and pat it  dry afterward with a clean towel.  If you have Prineo (tape/glue) you may shower and pat the wound dry.  Prineo removal is done in the office 2 weeks after surgery.    If you have further questions or problems call your doctors office.

## 2017-11-28 NOTE — DISCHARGE NOTE ADULT - CARE PROVIDER_API CALL
Yury Villalpando), Orthopaedic Surgery  [address] [place] [address]  Phone: [phone]  Fax: [phone]

## 2017-11-28 NOTE — DISCHARGE NOTE ADULT - PATIENT PORTAL LINK FT
“You can access the Follow[facility] Patient Portal, offered by [facility], by registering with the following website: [email or web address]”

## 2017-11-28 NOTE — DIETITIAN INITIAL EVALUATION ADULT. - OTHER INFO
62F s/p L TKR. Pt seen per food-drug interaction policy (coumadin); pt has hx DVT, on coumadin pta and this adm. Pt states she is aware of the interaction and keeps vit k rich foods consistent. Appetite and intake good on regular diet; NKFA. Pt denies nutrition related questions or concerns at time of visit. Skin intact c surgical incision.

## 2017-11-28 NOTE — DISCHARGE NOTE ADULT - MEDICATION SUMMARY - MEDICATIONS TO TAKE
I will START or STAY ON the medications listed below when I get home from the hospital:    acetaminophen 500 mg oral tablet  -- 2 tab(s) by mouth every 12 hours  -- Indication: For Pain    celecoxib 200 mg oral capsule  -- 1 cap(s) by mouth 2 times a day (with meals) x 21 days  -- Indication: For Pain    HYDROmorphone 2 mg oral tablet  -- 3 tab(s) by mouth every 4 hours, As needed, Moderate Pain (4 - 6)  -- Indication: For Pain    HYDROmorphone 4 mg oral tablet  -- 1 tab(s) by mouth every 4hours, As needed, Mild Pain (1 - 3)  -- Indication: For Pain    warfarin 5 mg oral tablet  -- 1 tab(s) by mouth once (To be changed by rehab daily after daily INR report) Warfarin 5mg daily was her home dose before surgery  -- Indication: For Clot prevention    enoxaparin  -- 60 milligram(s) subcutaneous 2 times a day (Continue with warfarin 5mg until INR is theraputic >2.0) and then discontinue and keep on warfarin. Please draw daily INR.  -- Indication: For Clot prevention - please draw daily INR to get INR >2.0 and then discontinue lovenox and keep warfarin    zolpidem 10 mg oral tablet  -- 1 tab(s) by mouth once a day (at bedtime), As Needed  -- Indication: For Anxiety    polyethylene glycol 3350 oral powder for reconstitution  -- 17 gram(s) by mouth once a day, As needed, Constipation  -- Indication: For Constipation    senna oral tablet  -- 2 tab(s) by mouth once a day (at bedtime) as needed  -- Indication: For Constipation    docusate sodium 100 mg oral capsule  -- 1 cap(s) by mouth 3 times a day as needed  -- Indication: For Constipation    pantoprazole 40 mg oral delayed release tablet  -- 1 tab(s) by mouth once a day  -- Indication: For Gastrointestinal protection

## 2017-11-28 NOTE — DISCHARGE NOTE ADULT - HOSPITAL COURSE
JACKSON CARDOZA    Admitted on 11-27-17     62y y.o.  Female with history of Primary localized osteoarthritis of left knee    Surgery:  Left Knee replacement    Orthopedic Surgeon:   Dr. JIGNA Villalpando    Silvia-operative antibiotic:    ceFAZolin   IVPB:,       Consulted Services : Hospitalist, Physical Therapy, Occupational Therapy    Typical Physical & occupational therapy modalities post Knee replacement   were performed including ambulation training, range of motion, ADL's, and transfers.     DVT prophylaxis:  enoxaparin Injectable: 30 milliGRAM(s)  warfarin: 5 milliGRAM(s)       The patient had a clean appearing surgical incision with no sign of surgical site infections and had a stable neuro / vascular exam of the operated extremity.  After progression of mobility guided by the PT/ OT staff,  the patient was felt to benefit from further rehabilitative care for restoration to level of function. This was felt to best be accomplished in Rehab facility.    Discharge and Orthopedic Care instructions were delineated in the Discharge Plan and reviewed with the patient. All medications were delineated in the medication reconciliation tool and key points were reviewed with the patient.     This patient was deemed stable from an Orthopedic & medical standpoint for discharge today.  She will follow up with Dr. JIGNA Villalpando for further Orthopedic care. JACKSON CARDOZA    Admitted on 11-27-17     62y y.o.  Female with history of Primary localized osteoarthritis of left knee    Surgery:  Left Knee replacement    Orthopedic Surgeon:   Dr. JIGNA Villalpando by 11/27/17    Silvia-operative antibiotic:    ceFAZolin   IVPB:,       Consulted Services : Hospitalist, Physical Therapy, Occupational Therapy    Typical Physical & occupational therapy modalities post Knee replacement   were performed including ambulation training, range of motion, ADL's, and transfers.     DVT prophylaxis:  enoxaparin Injectable: 30 milliGRAM(s)  warfarin: 5 milliGRAM(s)       The patient had a clean appearing surgical incision with no sign of surgical site infections and had a stable neuro / vascular exam of the operated extremity.  After progression of mobility guided by the PT/ OT staff,  the patient was felt to benefit from further rehabilitative care for restoration to level of function. This was felt to best be accomplished in Rehab facility.    Discharge and Orthopedic Care instructions were delineated in the Discharge Plan and reviewed with the patient. All medications were delineated in the medication reconciliation tool and key points were reviewed with the patient.     This patient was deemed stable from an Orthopedic & medical standpoint for discharge today.  She will follow up with Dr. JIGNA Villalpando for further Orthopedic care.

## 2017-11-28 NOTE — DISCHARGE NOTE ADULT - MEDICATION SUMMARY - MEDICATIONS TO CHANGE
I will SWITCH the dose or number of times a day I take the medications listed below when I get home from the hospital:    warfarin 7.5 mg oral tablet  -- 1 tab(s) by mouth once tonight 10-14.

## 2017-11-29 VITALS
OXYGEN SATURATION: 97 % | DIASTOLIC BLOOD PRESSURE: 76 MMHG | SYSTOLIC BLOOD PRESSURE: 125 MMHG | TEMPERATURE: 98 F | RESPIRATION RATE: 16 BRPM | HEART RATE: 65 BPM

## 2017-11-29 LAB
ANION GAP SERPL CALC-SCNC: 6 MMOL/L — SIGNIFICANT CHANGE UP (ref 5–17)
BUN SERPL-MCNC: 18 MG/DL — SIGNIFICANT CHANGE UP (ref 7–23)
CALCIUM SERPL-MCNC: 8.8 MG/DL — SIGNIFICANT CHANGE UP (ref 8.4–10.5)
CHLORIDE SERPL-SCNC: 106 MMOL/L — SIGNIFICANT CHANGE UP (ref 96–108)
CO2 SERPL-SCNC: 29 MMOL/L — SIGNIFICANT CHANGE UP (ref 22–31)
CREAT SERPL-MCNC: 0.65 MG/DL — SIGNIFICANT CHANGE UP (ref 0.5–1.3)
GLUCOSE SERPL-MCNC: 97 MG/DL — SIGNIFICANT CHANGE UP (ref 70–99)
HCT VFR BLD CALC: 28 % — LOW (ref 34.5–45)
HGB BLD-MCNC: 8.9 G/DL — LOW (ref 11.5–15.5)
INR BLD: 1.35 RATIO — HIGH (ref 0.88–1.16)
MCHC RBC-ENTMCNC: 29.9 PG — SIGNIFICANT CHANGE UP (ref 27–34)
MCHC RBC-ENTMCNC: 31.9 GM/DL — LOW (ref 32–36)
MCV RBC AUTO: 93.6 FL — SIGNIFICANT CHANGE UP (ref 80–100)
PLATELET # BLD AUTO: 225 K/UL — SIGNIFICANT CHANGE UP (ref 150–400)
POTASSIUM SERPL-MCNC: 3.7 MMOL/L — SIGNIFICANT CHANGE UP (ref 3.5–5.3)
POTASSIUM SERPL-SCNC: 3.7 MMOL/L — SIGNIFICANT CHANGE UP (ref 3.5–5.3)
PROTHROM AB SERPL-ACNC: 14.8 SEC — HIGH (ref 9.8–12.7)
RBC # BLD: 2.99 M/UL — LOW (ref 3.8–5.2)
RBC # FLD: 14.3 % — SIGNIFICANT CHANGE UP (ref 10.3–14.5)
SODIUM SERPL-SCNC: 141 MMOL/L — SIGNIFICANT CHANGE UP (ref 135–145)
WBC # BLD: 7.4 K/UL — SIGNIFICANT CHANGE UP (ref 3.8–10.5)
WBC # FLD AUTO: 7.4 K/UL — SIGNIFICANT CHANGE UP (ref 3.8–10.5)

## 2017-11-29 PROCEDURE — 73562 X-RAY EXAM OF KNEE 3: CPT

## 2017-11-29 PROCEDURE — 85610 PROTHROMBIN TIME: CPT

## 2017-11-29 PROCEDURE — 85027 COMPLETE CBC AUTOMATED: CPT

## 2017-11-29 PROCEDURE — 88311 DECALCIFY TISSUE: CPT

## 2017-11-29 PROCEDURE — C1889: CPT

## 2017-11-29 PROCEDURE — 97161 PT EVAL LOW COMPLEX 20 MIN: CPT

## 2017-11-29 PROCEDURE — 80048 BASIC METABOLIC PNL TOTAL CA: CPT

## 2017-11-29 PROCEDURE — C1776: CPT

## 2017-11-29 PROCEDURE — 97165 OT EVAL LOW COMPLEX 30 MIN: CPT

## 2017-11-29 PROCEDURE — 97110 THERAPEUTIC EXERCISES: CPT

## 2017-11-29 PROCEDURE — 97116 GAIT TRAINING THERAPY: CPT

## 2017-11-29 PROCEDURE — 36415 COLL VENOUS BLD VENIPUNCTURE: CPT

## 2017-11-29 PROCEDURE — C1713: CPT

## 2017-11-29 PROCEDURE — 88305 TISSUE EXAM BY PATHOLOGIST: CPT

## 2017-11-29 PROCEDURE — 85018 HEMOGLOBIN: CPT

## 2017-11-29 RX ORDER — POLYETHYLENE GLYCOL 3350 17 G/17G
17 POWDER, FOR SOLUTION ORAL
Qty: 0 | Refills: 0 | COMMUNITY
Start: 2017-11-29

## 2017-11-29 RX ORDER — HYDROMORPHONE HYDROCHLORIDE 2 MG/ML
3 INJECTION INTRAMUSCULAR; INTRAVENOUS; SUBCUTANEOUS
Qty: 0 | Refills: 0 | COMMUNITY
Start: 2017-11-29

## 2017-11-29 RX ORDER — CELECOXIB 200 MG/1
1 CAPSULE ORAL
Qty: 0 | Refills: 0 | COMMUNITY
Start: 2017-11-29

## 2017-11-29 RX ORDER — ENOXAPARIN SODIUM 100 MG/ML
60 INJECTION SUBCUTANEOUS
Qty: 0 | Refills: 0 | COMMUNITY
Start: 2017-11-29

## 2017-11-29 RX ORDER — WARFARIN SODIUM 2.5 MG/1
5 TABLET ORAL ONCE
Qty: 0 | Refills: 0 | Status: COMPLETED | OUTPATIENT
Start: 2017-11-29 | End: 2017-11-29

## 2017-11-29 RX ORDER — HYDROMORPHONE HYDROCHLORIDE 2 MG/ML
1 INJECTION INTRAMUSCULAR; INTRAVENOUS; SUBCUTANEOUS
Qty: 0 | Refills: 0 | COMMUNITY
Start: 2017-11-29

## 2017-11-29 RX ORDER — ACETAMINOPHEN 500 MG
2 TABLET ORAL
Qty: 0 | Refills: 0 | COMMUNITY
Start: 2017-11-29

## 2017-11-29 RX ORDER — WARFARIN SODIUM 2.5 MG/1
1 TABLET ORAL
Qty: 0 | Refills: 0 | COMMUNITY
Start: 2017-11-29

## 2017-11-29 RX ORDER — WARFARIN SODIUM 2.5 MG/1
0 TABLET ORAL
Refills: 0 | DISCHARGE
Start: 2017-11-29

## 2017-11-29 RX ORDER — SENNA PLUS 8.6 MG/1
2 TABLET ORAL
Qty: 0 | Refills: 0 | COMMUNITY
Start: 2017-11-29

## 2017-11-29 RX ADMIN — Medication 1000 MILLIGRAM(S): at 10:45

## 2017-11-29 RX ADMIN — ENOXAPARIN SODIUM 60 MILLIGRAM(S): 100 INJECTION SUBCUTANEOUS at 11:28

## 2017-11-29 RX ADMIN — HYDROMORPHONE HYDROCHLORIDE 6 MILLIGRAM(S): 2 INJECTION INTRAMUSCULAR; INTRAVENOUS; SUBCUTANEOUS at 12:36

## 2017-11-29 RX ADMIN — HYDROMORPHONE HYDROCHLORIDE 6 MILLIGRAM(S): 2 INJECTION INTRAMUSCULAR; INTRAVENOUS; SUBCUTANEOUS at 03:21

## 2017-11-29 RX ADMIN — HYDROMORPHONE HYDROCHLORIDE 6 MILLIGRAM(S): 2 INJECTION INTRAMUSCULAR; INTRAVENOUS; SUBCUTANEOUS at 00:19

## 2017-11-29 RX ADMIN — WARFARIN SODIUM 5 MILLIGRAM(S): 2.5 TABLET ORAL at 13:05

## 2017-11-29 RX ADMIN — HYDROMORPHONE HYDROCHLORIDE 6 MILLIGRAM(S): 2 INJECTION INTRAMUSCULAR; INTRAVENOUS; SUBCUTANEOUS at 06:43

## 2017-11-29 RX ADMIN — Medication 100 MILLIGRAM(S): at 06:43

## 2017-11-29 RX ADMIN — Medication 1000 MILLIGRAM(S): at 10:15

## 2017-11-29 RX ADMIN — HYDROMORPHONE HYDROCHLORIDE 6 MILLIGRAM(S): 2 INJECTION INTRAMUSCULAR; INTRAVENOUS; SUBCUTANEOUS at 13:00

## 2017-11-29 RX ADMIN — CELECOXIB 200 MILLIGRAM(S): 200 CAPSULE ORAL at 08:45

## 2017-11-29 RX ADMIN — HYDROMORPHONE HYDROCHLORIDE 6 MILLIGRAM(S): 2 INJECTION INTRAMUSCULAR; INTRAVENOUS; SUBCUTANEOUS at 07:19

## 2017-11-29 RX ADMIN — CELECOXIB 200 MILLIGRAM(S): 200 CAPSULE ORAL at 09:15

## 2017-11-29 RX ADMIN — HYDROMORPHONE HYDROCHLORIDE 6 MILLIGRAM(S): 2 INJECTION INTRAMUSCULAR; INTRAVENOUS; SUBCUTANEOUS at 03:56

## 2017-11-29 RX ADMIN — PANTOPRAZOLE SODIUM 40 MILLIGRAM(S): 20 TABLET, DELAYED RELEASE ORAL at 06:43

## 2017-11-29 NOTE — PROGRESS NOTE ADULT - PROBLEM SELECTOR PLAN 3
Patient on chronic anticoagulation.  Currently on Lovenox bridging and Coumadin.  Continue to monitor INR and stop Lovenox once INR >2.0
Patient on chronic anticoagulation.  Currently on Lovenox bridging and Coumadin.  Continue to monitor INR and stop Lovenox once INR >2.0

## 2017-11-29 NOTE — PROGRESS NOTE ADULT - PROBLEM SELECTOR PLAN 5
On full anticoagulation with Lovenox/Coumadin.  On Protonix for GI prophylaxis.
On full anticoagulation with Lovenox/Coumadin.  On Protonix for GI prophylaxis.

## 2017-11-29 NOTE — PROGRESS NOTE ADULT - ASSESSMENT
Ms. 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  The patient had right total knee replacement done in 10/2017 with very good results.  The patient was brought in on 11/27/17 for left total knee replacement.  She is seen postoperatively for medical comanagement at the request of Orthopedics.
Ms. 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  The patient had right total knee replacement done in 10/2017 with very good results.  The patient was brought in on 11/27/17 for left total knee replacement.  She is seen postoperatively for medical comanagement at the request of Orthopedics.

## 2017-11-29 NOTE — PROGRESS NOTE ADULT - PROBLEM SELECTOR PLAN 2
Patient with anemia of acute post op blood loss.  Patient is asymptomatic.   Monitor serial CBC and transfuse as needed.
Patient with anemia of acute post op blood loss.  Patient is asymptomatic.   Monitor serial CBC and transfuse as needed.

## 2017-11-29 NOTE — PROGRESS NOTE ADULT - PROBLEM SELECTOR PLAN 1
S/P left total knee replacement, post op day # 1.  On Tylenol/Celebrex and Dilaudid for pain.  On Lovenox and Coumadin for anticoagulation/DVT prophylaxis.  Continue PT.  Encourage incentive spirometry.  Monitor for post op fever and post op anemia.   Plan is for discharge to [facility] in AM.
S/P left total knee replacement, post op day # 2.  On Tylenol/Celebrex and Dilaudid for pain.  On Lovenox and Coumadin for anticoagulation/DVT prophylaxis.  Continue PT.  Encourage incentive spirometry.  Monitor for post op fever and post op anemia.   Plan is for discharge to [facility] in AM.

## 2017-11-29 NOTE — PROGRESS NOTE ADULT - SUBJECTIVE AND OBJECTIVE BOX
Ortho PA - Post Op Check - S/P -Lt TKR  Day of Sx          Pt alert and comfortable with no complaints, pain controlled  Denies nausea     Vital Signs Last 24 Hrs  T(C): --  T(F): --  HR: 65 (11-27-17 @ 16:30) (60 - 67)  BP: 125/72 (11-27-17 @ 16:30) (105/59 - 140/54)  BP(mean): --  RR: 16 (11-27-17 @ 16:30) (12 - 24)  SpO2: 97% (11-27-17 @ 16:30) (95% - 100%)  I&O's Detail    27 Nov 2017 07:01  -  27 Nov 2017 17:49  --------------------------------------------------------  IN:    lactated ringers.: 1642 mL  Total IN: 1642 mL    OUT:    Estimated Blood Loss: 200 mL  Total OUT: 200 mL    Total NET: 1442 mL        I&O's Summary    27 Nov 2017 07:01  -  27 Nov 2017 17:49  --------------------------------------------------------  IN: 1642 mL / OUT: 200 mL / NET: 1442 mL                                10.4   x     )-----------( x        ( 27 Nov 2017 16:36 )             33.6        11-27    137  |  103  |  17  ----------------------------<  176<H>  4.2   |  24  |  0.72    Ca    9.2      27 Nov 2017 16:37      MEDICATIONS:acetaminophen  IVPB. 1000 milliGRAM(s) IV Intermittent every 6 hours  aluminum hydroxide/magnesium hydroxide/simethicone Suspension 30 milliLiter(s) Oral four times a day PRN  ceFAZolin   IVPB 2000 milliGRAM(s) IV Intermittent every 8 hours  docusate sodium 100 milliGRAM(s) Oral three times a day  HYDROmorphone   Tablet 4 milliGRAM(s) Oral every 3 hours PRN  HYDROmorphone   Tablet 6 milliGRAM(s) Oral every 3 hours PRN  HYDROmorphone  Injectable 0.5 milliGRAM(s) IV Push every 3 hours PRN  lactated ringers. 1000 milliLiter(s) IV Continuous <Continuous>  magnesium hydroxide Suspension 30 milliLiter(s) Oral daily PRN  ondansetron Injectable 4 milliGRAM(s) IV Push every 6 hours PRN  pantoprazole    Tablet 40 milliGRAM(s) Oral before breakfast  polyethylene glycol 3350 17 Gram(s) Oral daily PRN  senna 2 Tablet(s) Oral at bedtime  warfarin 5 milliGRAM(s) Oral once  zolpidem 5 milliGRAM(s) Oral at bedtime PRN    Anticoagulation: Lovenox Bridge to  warfarin 5 milliGRAM(s) Oral once      Antibiotics:   ceFAZolin   IVPB 2000 milliGRAM(s) IV Intermittent every 8 hours      Pain medications:   acetaminophen  IVPB. 1000 milliGRAM(s) IV Intermittent every 6 hours  HYDROmorphone   Tablet 4 milliGRAM(s) Oral every 3 hours PRN  HYDROmorphone   Tablet 6 milliGRAM(s) Oral every 3 hours PRN  HYDROmorphone  Injectable 0.5 milliGRAM(s) IV Push every 3 hours PRN  ondansetron Injectable 4 milliGRAM(s) IV Push every 6 hours PRN  zolpidem 5 milliGRAM(s) Oral at bedtime PRN          PE:  *Knee-primary surgical bandage dry and intact.  *Hemovac drain intact and patent.  Feet mobile and sensate.  *EHLs/ant.tibs. 5/5  PAS on LE's.  Calves soft and nontender.    A/P: Ortho stable post-op  - Continue post-op orders; pain management with  - Check labs today and in A.M.  - DVT prevention with   - PT /OT for OOB, full WBAT  - Medical consult  -Discharge planning  -Will continue to monitor closely with attendings.
Admission medication reconciliation/Presurgical evaluation:  Allergies:  NKDA   metal jewelry other than gold: rash    Home Medications:   · 	zolpidem 10 mg once a day (at bedtime)  · 	warfarin 5 mg once a day  · 	hydrocodone-acetaminophen 7.5mg-300mg every 6 hours  ·	Pantoprazole 40mg QAM  ·	Docusate 100mg TID    Past Medical History:  BMI > 40  Chronic pain    History of DVT (deep vein thrombosis)  bilateral, 2015 related to HRT  History of pulmonary embolism  2015 related to HRT  Hyperlipidemia    Insomnia    Osteoarthritis of right knee      Past Surgical History:  Noncontributory    PST values of interest:  QTc 427ms (WNL)  Hgb 11.1 (slight ¯)  SCr 0.73 mg/dL  LFTs WNL  BMI > 40    Recommendations:  1.	Topical vs IV TXA, per surgeon and anesthesia (either OK based on protocol, clinician preference)  2.	Acetaminophen and celecoxib for multimodal pain management (Acetaminophen 1G IV q6h then 1G PO q12h)  3.	Patient took enoxaparin 60mg q12h (based on pharmacy information only – confirm with MD clearance) preop. Enoxaparin 40mg q12h POD1 (BMI > 40), then Enoxaparin 60mg q12h beginning POD2 and until INR > 2.0. Restart Warfarin night of POD0 or after enoxaparin bridge initiated, night of POD1. Hospitalist preference.  4.	Hydromorphone for PRN pain management, per patient preference (no response to oxycodone with last surgery).
ORTHOPEDIC PA PROGRESS NOTE  JACKSON CARDOZA      62y Female                                                                                                                               POD # 2       STATUS POST:               Pre-Op Dx: Primary localized osteoarthritis of left knee    Post-Op Dx:  Primary localized osteoarthritis of left knee    Procedure: Knee replacement: left by Dr. Villalpando 11/27/17                                                Pain (0-10):  Pt reports 9/10 pain but helps when she gets medication. Pt denies any CP, SOB, fever, chills, numbness/tingling. Pt is positive void.  Current Pain Management:   [ x] Po Analgesics [ x] IM /IV Anagesics     T(F): 98.1  HR: 65  BP: 125/76  RR: 16  SpO2: 97%                         8.9    7.4   )-----------( 225      ( 29 Nov 2017 07:29 )             28.0         11-29    141  |  106  |  18  ----------------------------<  97  3.7   |  29  |  0.65    Ca    8.8      29 Nov 2017 07:29      Physical Exam :    -   Dressing  C/D/I. - Dressing removed, no sign of infection, no erythema, no ecchymosis, sutures intact, no dehesience, no drainage   -   Distal Neurvascular status intact grossly.   -   Warm well perfused; capillary refill <3 seconds   -   (+)EHL/FHL   -   (+) Sensation to light touch  -   (-) Calf tenderness Bilaterally    A/P: 62y Female s/p Knee replacement: left     -   Ortho Stable  -  f/u am labs  -   f/u PT/OT  -   Pain control   -   Medicine to follow  -   DVT ppx:     [ x]SCDs         [ x] Lovenox -> Coumadin  -   Weight bearing status:  WBAT [ x]         -  Dispo:     Rehab when medically cleared
POST OPERATIVE DAY #:  [ ]   STATUS POST: [x ] Left [ ] Right  [x ]TKR [ ]THR                        Patient is a 62y old  Female who presents with a chief complaint of "Dr Villalpando is going to replace my left knee" (24 Nov 2017 16:40)      Pain well controlled    OBJECTIVE:     Vital Signs Last 24 Hrs  T(C): 36.3 (28 Nov 2017 07:15), Max: 36.9 (27 Nov 2017 11:28)  T(F): 97.4 (28 Nov 2017 07:15), Max: 98.4 (27 Nov 2017 11:28)  HR: 61 (28 Nov 2017 07:15) (59 - 77)  BP: 106/65 (28 Nov 2017 07:15) (99/65 - 140/54)  BP(mean): --  RR: 16 (28 Nov 2017 07:15) (12 - 24)  SpO2: 95% (28 Nov 2017 07:15) (94% - 100%)    Affected extremity:          Dressing:  clean/dry/intact             Sensation; intact to light touch            Motor exam: Toes warm and mobile well perfused;  +capillary refill         No calf tenderness bilateral LE's    LABS:                        9.4    11.6  )-----------( 238      ( 28 Nov 2017 06:49 )             27.4     11-28    137  |  104  |  12  ----------------------------<  137<H>  4.3   |  27  |  0.60    Ca    9.1      28 Nov 2017 06:49              A/P :    -    Analgesics PRN  -    DVT ppx: [x ] Lovenox bridging w [ x] Coumadin     -    AM labs ok  -    Weight bearing status: WBAT [x ]        PWB    [ ]     TTWB  [ ]      NWB  [ ]  -    Physical Therapy  -    Occupational Therapy  -    Dispo: Home [ ]     Rehab [ ]      [facility] [ ]      To be determined [x ]
Patient is a 62y old  Female who presents with a chief complaint of "Dr Villalpando is going to replace my left knee" (28 Nov 2017 11:04)    HPI: Ms. 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  The patient had right total knee replacement done in 10/2017 with very good results.  The patient was brought in on 11/27/17 for left total knee replacement.  She is seen postoperatively for medical comanagement at the request of Orthopedics.     INTERVAL HPI/OVERNIGHT EVENTS:  Chart reviewed, notes reviewed.  Patient seen and examined.  Pain is fairly controlled with medications.  Ambulated with PT.   Doing incentive spirometry on regular basis.  Pain Location & Control:  Left knee, controlled.    Denies any chest pain or pressure.   No nausea or vomiting.   No fever or chills.     MEDICATIONS  (STANDING):  acetaminophen   Tablet. 1000 milliGRAM(s) Oral every 12 hours  celecoxib 200 milliGRAM(s) Oral two times a day with meals  docusate sodium 100 milliGRAM(s) Oral three times a day  enoxaparin Injectable 30 milliGRAM(s) SubCutaneous every 12 hours  lactated ringers. 1000 milliLiter(s) (75 mL/Hr) IV Continuous <Continuous>  pantoprazole    Tablet 40 milliGRAM(s) Oral before breakfast  senna 2 Tablet(s) Oral at bedtime  warfarin 5 milliGRAM(s) Oral once    MEDICATIONS  (PRN):  aluminum hydroxide/magnesium hydroxide/simethicone Suspension 30 milliLiter(s) Oral four times a day PRN Indigestion  HYDROmorphone   Tablet 4 milliGRAM(s) Oral every 3 hours PRN Mild Pain (1 - 3)  HYDROmorphone   Tablet 6 milliGRAM(s) Oral every 3 hours PRN Moderate Pain (4 - 6)  HYDROmorphone  Injectable 0.5 milliGRAM(s) IV Push every 3 hours PRN Severe Pain (7 - 10)  magnesium hydroxide Suspension 30 milliLiter(s) Oral daily PRN Constipation  ondansetron Injectable 4 milliGRAM(s) IV Push every 6 hours PRN Nausea and/or Vomiting  polyethylene glycol 3350 17 Gram(s) Oral daily PRN Constipation  zolpidem 5 milliGRAM(s) Oral at bedtime PRN Insomnia    Allergies  metal jewelry other than gold (Rash)  No Known Drug Allergies    Intolerances    REVIEW OF SYSTEMS:  CONSTITUTIONAL: No fever, weight loss, or fatigue  EYES: No eye pain, visual disturbances, or discharge  ENMT:  No difficulty hearing, tinnitus, vertigo; No sinus or throat pain  NECK: No pain or stiffness  BREASTS: No pain, masses, or nipple discharge  RESPIRATORY: No cough, wheezing, chills or hemoptysis; No shortness of breath  CARDIOVASCULAR: No chest pain, palpitations, dizziness, or leg swelling  GASTROINTESTINAL: No abdominal or epigastric pain. No nausea, vomiting, or hematemesis; No diarrhea or constipation. No melena or hematochezia.  GENITOURINARY: No dysuria, frequency, hematuria, or incontinence  NEUROLOGICAL: No headaches, memory loss, loss of strength, numbness, or tremors  SKIN: No itching, burning, rashes, or lesions   LYMPH NODES: No enlarged glands  ENDOCRINE: No heat or cold intolerance; No hair loss; No polydipsia or polyuria  MUSCULOSKELETAL: No back pain  PSYCHIATRIC: No depression, anxiety, mood swings, or difficulty sleeping  HEME/LYMPH: No easy bruising, or bleeding gums  ALLERGY AND IMMUNOLOGIC: No hives or eczema    Vital Signs Last 24 Hrs  T(C): 36.4 (28 Nov 2017 11:25), Max: 36.8 (27 Nov 2017 17:00)  T(F): 97.5 (28 Nov 2017 11:25), Max: 98.3 (27 Nov 2017 17:00)  HR: 67 (28 Nov 2017 11:25) (59 - 75)  BP: 109/67 (28 Nov 2017 11:25) (99/65 - 140/54)  BP(mean): --  RR: 16 (28 Nov 2017 11:25) (14 - 18)  SpO2: 95% (28 Nov 2017 11:25) (94% - 99%)    PHYSICAL EXAM:  GENERAL: NAD, well-groomed, well-developed  HEAD:  Atraumatic, Normocephalic  EYES: EOMI, PERRLA, conjunctiva and sclera clear  ENMT: No tonsillar erythema, exudates, or enlargement; Moist mucous membranes, Good dentition, No lesions  NECK: Supple, No JVD, Normal thyroid  NERVOUS SYSTEM:  Alert & Oriented X3, Good concentration; Bilateral LE mobile, sensation to light touch intact  CHEST/LUNG: Clear to auscultation bilaterally; No rales, rhonchi, wheezing, or rubs  HEART: Regular rate and rhythm; No murmurs, rubs, or gallops  ABDOMEN: Soft, Nontender, Nondistended; Bowel sounds present  EXTREMITIES:  2+ Peripheral Pulses, No clubbing or cyanosis  LYMPH: No lymphadenopathy noted  SKIN: No rashes or lesions  INCISION:  Dressing dry and intact    LABS:                        9.4    11.6  )-----------( 238      ( 28 Nov 2017 06:49 )             27.4     28 Nov 2017 06:49    137    |  104    |  12     ----------------------------<  137    4.3     |  27     |  0.60     Ca    9.1        28 Nov 2017 06:49      PT/INR - ( 28 Nov 2017 06:49 )   PT: 12.4 sec;   INR: 1.13 ratio      RADIOLOGY & ADDITIONAL TESTS:    Imaging Personally Reviewed:  [ ] YES      Consultant(s) Notes Reviewed: Yes    Care Discussed with Consultants/Other Providers: Yes
Patient is a 62y old  Female who presents with a chief complaint of "Dr Villalpando is going to replace my left knee" (28 Nov 2017 11:04)      INTERVAL HPI/OVERNIGHT EVENTS: pt doing well, pod 2 going, to [facility] today, no new complaints    MEDICATIONS  (STANDING):  acetaminophen   Tablet. 1000 milliGRAM(s) Oral every 12 hours  celecoxib 200 milliGRAM(s) Oral two times a day with meals  docusate sodium 100 milliGRAM(s) Oral three times a day  enoxaparin Injectable 60 milliGRAM(s) SubCutaneous every 12 hours  lactated ringers. 1000 milliLiter(s) (75 mL/Hr) IV Continuous <Continuous>  pantoprazole    Tablet 40 milliGRAM(s) Oral before breakfast  senna 2 Tablet(s) Oral at bedtime  warfarin 5 milliGRAM(s) Oral once    MEDICATIONS  (PRN):  aluminum hydroxide/magnesium hydroxide/simethicone Suspension 30 milliLiter(s) Oral four times a day PRN Indigestion  bisacodyl Suppository 10 milliGRAM(s) Rectal daily PRN If no bowel movement by postoperative day #2  HYDROmorphone   Tablet 4 milliGRAM(s) Oral every 3 hours PRN Mild Pain (1 - 3)  HYDROmorphone   Tablet 6 milliGRAM(s) Oral every 3 hours PRN Moderate Pain (4 - 6)  HYDROmorphone  Injectable 0.5 milliGRAM(s) IV Push every 3 hours PRN Severe Pain (7 - 10)  magnesium hydroxide Suspension 30 milliLiter(s) Oral daily PRN Constipation  ondansetron Injectable 4 milliGRAM(s) IV Push every 6 hours PRN Nausea and/or Vomiting  polyethylene glycol 3350 17 Gram(s) Oral daily PRN Constipation  zolpidem 5 milliGRAM(s) Oral at bedtime PRN Insomnia      Allergies    metal jewelry other than gold (Rash)  No Known Drug Allergies    Intolerances        REVIEW OF SYSTEMS:  CONSTITUTIONAL: pain in knee  EYES: No eye pain, visual disturbances  ENMT:  No difficulty hearing, tinnitus, vertigo; No sinus or throat pain  NECK: No pain or stiffness  RESPIRATORY: No cough, wheezing, chills or hemoptysis; No shortness of breath  CARDIOVASCULAR: No chest pain, palpitations, dizziness  GASTROINTESTINAL: No abdominal or epigastric pain. No nausea, vomiting, or hematemesis; No diarrhea or constipation. No melena or hematochezia.  GENITOURINARY: No dysuria, frequency, hematuria, or incontinence  NEUROLOGICAL: No headaches, memory loss, loss of strength, numbness, or tremors  SKIN: No itching, burning  LYMPH NODES: No enlarged glands  MUSCULOSKELETAL: knee pain  PSYCHIATRIC: No depression, mood swings  HEME/LYMPH: No easy bruising, or bleeding gums  ALLERGY AND IMMUNOLOGIC: No hives    Vital Signs Last 24 Hrs  T(C): 36.7 (29 Nov 2017 07:25), Max: 36.9 (28 Nov 2017 23:30)  T(F): 98.1 (29 Nov 2017 07:25), Max: 98.5 (28 Nov 2017 23:30)  HR: 65 (29 Nov 2017 07:25) (65 - 76)  BP: 125/76 (29 Nov 2017 07:25) (109/67 - 137/85)  BP(mean): --  RR: 16 (29 Nov 2017 07:25) (16 - 16)  SpO2: 97% (29 Nov 2017 07:25) (95% - 98%)    PHYSICAL EXAM:  GENERAL: NAD, well-groomed, well-developed  HEAD:  Atraumatic, Normocephalic  EYES: EOMI, PERRLA, conjunctiva and sclera clear  ENMT: No tonsillar erythema, exudates, or enlargement   NECK: Supple, No JVD  NERVOUS SYSTEM:  Alert & Oriented X3, Good concentration  CHEST/LUNG: Clear to auscultation bilaterally; No rales, rhonchi, wheezing  HEART: Regular rate and rhythm  ABDOMEN: Soft, Nontender, Nondistended; Bowel sounds present  EXTREMITIES:  2+ Peripheral Pulses   LYMPH: No lymphadenopathy noted  SKIN: No rashes     LABS:                        8.9    7.4   )-----------( 225      ( 29 Nov 2017 07:29 )             28.0     29 Nov 2017 07:29    141    |  106    |  18     ----------------------------<  97     3.7     |  29     |  0.65     Ca    8.8        29 Nov 2017 07:29      PT/INR - ( 29 Nov 2017 07:29 )   PT: 14.8 sec;   INR: 1.35 ratio             CAPILLARY BLOOD GLUCOSE                RADIOLOGY & ADDITIONAL TESTS:  no new      Consultant(s) Notes Reviewed:  [ x] YES  [ ] NO    Care Discussed with Consultants/Other Providers [x ] YES  [ ] NO    Advanced Care Planning Discussed with Patien/Family [x ] YES  [ ] NO

## 2017-11-29 NOTE — PROGRESS NOTE ADULT - PROBLEM SELECTOR PLAN 4
Patient on chronic pain medication as out patient.   Safe use of narcotic pain medications discussed with patient.   Patient is advised not to take more than prescribed amount of medications.   She is advised to wean pain medication to lowest possible dose as soon as possible.
Patient on chronic pain medication as out patient.   Safe use of narcotic pain medications discussed with patient.   Patient is advised not to take more than prescribed amount of medications.   She is advised to wean pain medication to lowest possible dose as soon as possible.

## 2017-12-11 ENCOUNTER — APPOINTMENT (OUTPATIENT)
Dept: ORTHOPEDIC SURGERY | Facility: CLINIC | Age: 62
End: 2017-12-11

## 2017-12-19 ENCOUNTER — APPOINTMENT (OUTPATIENT)
Dept: ORTHOPEDIC SURGERY | Facility: CLINIC | Age: 62
End: 2017-12-19
Payer: COMMERCIAL

## 2017-12-19 VITALS
DIASTOLIC BLOOD PRESSURE: 86 MMHG | HEART RATE: 75 BPM | SYSTOLIC BLOOD PRESSURE: 159 MMHG | WEIGHT: 225 LBS | BODY MASS INDEX: 38.41 KG/M2 | HEIGHT: 64 IN

## 2017-12-19 DIAGNOSIS — M17.0 BILATERAL PRIMARY OSTEOARTHRITIS OF KNEE: ICD-10-CM

## 2017-12-19 PROCEDURE — 99024 POSTOP FOLLOW-UP VISIT: CPT

## 2017-12-19 PROCEDURE — 73562 X-RAY EXAM OF KNEE 3: CPT | Mod: 50

## 2018-02-17 NOTE — H&P PST ADULT - TEACHING/LEARNING CULTURAL CONSIDERATIONS
[facility]  Cardiology  Consult Note    Patient Name: Holly Patel  MRN: [id number]  Admission Date: 2/16/2018  Hospital Length of Stay: 1 days  Code Status: Full Code   Attending Provider: Jannet Maharaj MD   Consulting Provider: Philip Laguerre MD  Primary Care Physician: Stan Sosa MD  Principal Problem:Hypertensive crisis    Patient information was obtained from patient, relative(s) and ER records.     Inpatient consult to Cardiology  Consult performed by: PHILIP LAGUERRE  Consult ordered by: PHILIP QUEEN  Reason for consult: HTN emerg, elev trop        Subjective:     Chief Complaint:  HTN Emerg     HPI:   27 y.o. female that (in part)  has a past medical history of Anemia in ESRD (end-stage renal disease); Chronic rejection of liver transplant; ESRD on hemodialysis; History of splenomegaly; Immunosuppressed; Iron deficiency anemia secondary to inadequate dietary iron intake; Liver replaced by transplant; Moderate protein-calorie malnutrition; MRSA bacteremia; Prophylactic immunotherapy; Renovascular hypertension; Secondary hyperparathyroidism; and Thrombocytopenia. Presents to [facility] Emergency Department Complaining of elevated blood pressure.  Associated symptoms include a headache over the right temporal region with acute onset at approximately 2 PM today while doing home dialysis.  She also has associated mild blurry vision.  Her blood pressure was markedly elevated at home and when she arrived emergency department for systolic blood pressure was as high as 230.  She had removed a clonidine patch earlier in the day and did not promote any one.  She normally does dialysis for approximately 2 hours but to dialysis for approximately 30 minutes and stopped due to the symptoms noted above.  She reports being compliant normally with her outpatient medication regimen which includes a clonidine patch, torsemide, labetalol, nifedipine, and  "hydralazine.  She also takes Prograf and steroids for liver transplant.  She denies peripheral edema, chest pain, shortness of breath, or dyspnea with exertion.  No diaphoresis or paresthesias.  There is report that her blood pressure is "always elevated".  Generalized location.  Generalized radiation.  Severe intensity.  Recurrent frequency.  Constant duration.     In the emergency department routine laboratory studies, cardiac enzymes, chest x-ray, EKG were obtained.  She was given hydralazine IV for the elevated blood pressure which did not show any improvement.  She was then given an additional dose of hydralazine and Nitropaste was placed on her chest wall, again without significant improvement.  She was given Lasix and then started on a Tridil drip.  She also received Valium and Fioricet.  It was noted that she had elevated troponin level and some evidence of mild respiratory failure on chest x-ray.  Original referral center notified for transfer to [facility].  Shortly after arrival she began having seizures lasting approximately 5 seconds and occurring every 5 minutes for approximately 4 episodes.  Seizures have been responsive to Ativan and initiation of Cardene drip.      Pt seen in ICU, case d/w Dr. Maharaj.  Pt admitted with htn emerg and associated HA/seizure.  Bp controlled on cardene gtt.  Pt unable to provide much in the way of hx this am, but BP appears controlled off cardene gtt, ?compliance issue.    Past Medical History:   Diagnosis Date    Anemia in ESRD (end-stage renal disease) 10/12/2015    Chronic rejection of liver transplant 3/22/2016    ESRD on hemodialysis 9/30/2015    History of splenomegaly 4/12/2016    Immunosuppressed 8/5/2017    Iron deficiency anemia secondary to inadequate dietary iron intake 8/16/2017    She receives IV iron periodically at the Dialysis Center.   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)    Moderate protein-calorie " malnutrition 2017    MRSA bacteremia 2017    Prophylactic immunotherapy 2014    Renovascular hypertension 10/2/2015    Secondary hyperparathyroidism 2017    Thrombocytopenia 2016       Past Surgical History:   Procedure Laterality Date     SECTION      2     KIDNEY TRANSPLANT      LIVER BIOPSY      LIVER TRANSPLANT  1992    TUBAL LIGATION         Review of patient's allergies indicates:   Allergen Reactions    Chloral hydrate      Other reaction(s): Hallucinations  Other reaction(s): Hives    Hydrocodone Other (See Comments)     Mental status changes       Current Facility-Administered Medications on File Prior to Encounter   Medication    [COMPLETED] butalbital-acetaminophen-caffeine -40 mg per tablet 1 tablet    [COMPLETED] diazePAM tablet 10 mg    [COMPLETED] furosemide injection 60 mg    [COMPLETED] hydrALAZINE injection 20 mg    [COMPLETED] hydrALAZINE injection 20 mg    [COMPLETED] hydrOXYzine pamoate capsule 25 mg    [COMPLETED] nitroGLYCERIN 2% TD oint ointment 1 inch    [DISCONTINUED] nitroGLYCERIN 50 mg in dextrose 5 % 250 mL infusion     Current Outpatient Prescriptions on File Prior to Encounter   Medication Sig    cinacalcet (SENSIPAR) 30 MG Tab Take 1 tablet (30 mg total) by mouth daily with breakfast.    cloNIDine 0.2 mg/24 hr td ptwk (CATAPRES) 0.2 mg/24 hr Place 1 patch onto the skin every 7 days. Change every Friday    famotidine (PEPCID) 20 MG tablet Take 1 tablet (20 mg total) by mouth 2 (two) times daily.    food supplemt, lactose-reduced (ENSURE ACTIVE HIGH PROTEIN) Liqd Take 236 mLs by mouth 2 (two) times daily.    furosemide (LASIX) 20 MG tablet Take 5 tablets (100 mg total) by mouth 2 (two) times daily.    hydrALAZINE (APRESOLINE) 25 MG tablet Take 1 tablet (25 mg total) by mouth every 12 (twelve) hours.    labetalol (NORMODYNE) 200 MG tablet Take 4 tablets (800 mg total) by mouth every 8 (eight) hours.    NIFEdipine  (PROCARDIA-XL) 30 MG (OSM) 24 hr tablet Take 2 tablets (60 mg total) by mouth once daily.    ondansetron (ZOFRAN) 4 MG tablet Take 1 tablet (4 mg total) by mouth every 6 (six) hours.    oxyCODONE (ROXICODONE) 5 MG immediate release tablet Take 1 tablet (5 mg total) by mouth every 4 (four) hours as needed for Pain.    predniSONE (DELTASONE) 1 MG tablet Take 1 mg by mouth every other day.    tacrolimus (PROGRAF) 1 MG Cap Take 5 capsules (5 mg total) by mouth every 12 (twelve) hours.    triamcinolone acetonide 0.1% (KENALOG) 0.1 % ointment AAA on arms, legs, and neck bid x 1-2 wks then prn flares only (Patient taking differently: Apply to affected area(s) on arms, legs, and neck twice daily x 1-2 wks then as needed for flares only)     Family History     Problem Relation (Age of Onset)    Hypertension Mother, Father        Social History Main Topics    Smoking status: Never Smoker    Smokeless tobacco: Never Used    Alcohol use No    Drug use: No    Sexual activity: Yes     Partners: Male     Review of Systems   Unable to perform ROS: mental status change     Objective:     Vital Signs (Most Recent):  Temp: 97.9 °F (36.6 °C) (02/17/18 0715)  Pulse: 92 (02/17/18 0830)  Resp: (!) 33 (02/17/18 0830)  BP: 124/67 (02/17/18 0800)  SpO2: 98 % (02/17/18 0830) Vital Signs (24h Range):  Temp:  [97.7 °F (36.5 °C)-98.5 °F (36.9 °C)] 97.9 °F (36.6 °C)  Pulse:  [] 92  Resp:  [18-76] 33  SpO2:  [73 %-100 %] 98 %  BP: (124-241)/() 124/67     Weight: 53.4 kg (117 lb 11.6 oz)  Body mass index is 22.99 kg/m².    SpO2: 98 %  O2 Device (Oxygen Therapy): room air      Intake/Output Summary (Last 24 hours) at 02/17/18 0955  Last data filed at 02/17/18 0858   Gross per 24 hour   Intake           419.71 ml   Output                0 ml   Net           419.71 ml       Lines/Drains/Airways     Drain                 Hemodialysis AV Fistula Left forearm -- days          Peripheral Intravenous Line                 Peripheral IV  - Single Lumen 02/16/18 1646 Right Hand less than 1 day         Peripheral IV - Single Lumen 02/16/18 2115 Right Antecubital less than 1 day                Physical Exam   Constitutional: She appears well-developed and well-nourished. No distress.   HENT:   Head: Normocephalic and atraumatic.   Eyes: Conjunctivae and EOM are normal. Pupils are equal, round, and reactive to light. No scleral icterus.   Neck: Normal range of motion. No JVD present. No tracheal deviation present. No thyromegaly present.   Cardiovascular: Normal rate, regular rhythm, S1 normal and S2 normal.  Exam reveals no gallop and no friction rub.    No murmur heard.  Pulmonary/Chest: Effort normal and breath sounds normal. No respiratory distress. She has no wheezes.   Abdominal: Soft. She exhibits no distension.   Musculoskeletal: She exhibits no edema.   Neurological: No cranial nerve deficit.   Dec MS   Skin: Skin is warm. She is not diaphoretic. No erythema.   Psychiatric:   UTO       Current Medications:   aspirin  325 mg Oral Daily    atorvastatin  40 mg Oral Daily    cinacalcet  30 mg Oral Daily with breakfast    cloNIDine 0.2 mg/24 hr td ptwk  1 patch Transdermal Q7 Days    famotidine  20 mg Oral Daily    furosemide  100 mg Oral BID    heparin (porcine)  5,000 Units Subcutaneous Q8H    hydrALAZINE  25 mg Oral Q12H    labetalol  800 mg Oral Q8H    levetiracetam IVPB  1,000 mg Intravenous Q12H    NIFEdipine  60 mg Oral Daily    polyethylene glycol  17 g Oral Daily    predniSONE  1 mg Oral Every other day    senna-docusate 8.6-50 mg  1 tablet Oral BID    sodium chloride 0.9%  3 mL Intravenous Q8H    tacrolimus  5 mg Oral BID      nicardipine Stopped (02/17/18 0716)     acetaminophen, bisacodyl, influenza, labetalol, lorazepam, mineral oil, nitroGLYCERIN, ondansetron, promethazine, ramelteon    Laboratory:  CBC:    Recent Labs  Lab 01/26/18  0418 01/29/18  1404 02/05/18  0930   WHITE BLOOD CELL COUNT 3.09 L 3.34 L 4.06    HEMOGLOBIN 9.2 L 8.8 L 10.0 L   HEMATOCRIT 28.3 L 27.9 L 30.8 L   PLATELETS 81 L 72 L 65 L       CHEMISTRIES:    Recent Labs  Lab 01/25/18  0424 01/26/18  0418 01/29/18  1404 02/05/18  0930 02/17/18  0352   GLUCOSE 99 84 163 H 90 99   SODIUM 136 136 137 138 136   POTASSIUM 4.4 4.6 4.8 4.9 3.9   BUN BLD 30 H 34 H 32 H 62 H 66 H   CREATININE 9.5 H 11.8 H 11.9 H 13.9 H 12.3 H   EGFR IF  5.9 A 4.5 A 4.5 A 3.7 A 4 A   EGFR IF NON- 5.1 A 3.9 A 3.9 A 3.2 A 4 A   CALCIUM 8.7 6.8 LL 8.1 L 8.8 9.6   MAGNESIUM 1.8 1.8  --   --  2.2       CARDIAC BIOMARKERS:    Recent Labs  Lab 03/17/15  1152  01/29/18  1404 02/16/18  2146 02/17/18  0352   CPK 52  --   --   --   --    TROPONIN I  --   < > 0.016 0.788 H 0.676 H   < > = values in this interval not displayed.    COAGS:    Recent Labs  Lab 01/04/18  0444 01/18/18  1618 02/16/18  2146   INR 1.1 1.1 1.1       LIPIDS/LFTS:    Recent Labs  Lab 01/29/18  1404 02/05/18  0930 02/17/18  0352   CHOLESTEROL  --   --  245 H   TRIGLYCERIDES  --   --  87   HDL  --   --  58   LDL CHOLESTEROL  --   --  169.6 H   NON-HDL CHOLESTEROL  --   --  187   AST 36 35 29   ALT 21 32 33     Lab Results   Component Value Date    TSH 2.875 12/22/2017           Diagnostic Results:  ECG (personally reviewed tracings):   2/16/18 1508 , LVH  2/16/18 2349 , LVH, lat ST depression, ?isch vs strain pattern    Chest X-Ray (personally reviewed image(s)): 2/16/18 cmeg, mild CHF    Echo: 9/5/17 (repeat pending, prelim: LVH, normal LVEF)    1 - Normal left ventricular systolic function (EF 55-60%).     2 - Concentric hypertrophy.     3 - Impaired LV relaxation, elevated LAP (grade 2 diastolic dysfunction).     4 - Trivial aortic regurgitation.     5 - Mild to moderate mitral regurgitation.     6 - Trivial to mild tricuspid regurgitation.     7 - Trivial pulmonic regurgitation.       Assessment and Plan:     * Hypertensive crisis    BP now controlled off cardene gtt,  ?compliance issues with med rx/HD.  Cont med rx        ESRD on hemodialysis    Per IM/neph        Chronic rejection of liver transplant    Per IM        Elevated troponin    Likely strain in setting of HTN crisis +/- ESRD, doubt ACS  Will plan eventual isch eval pending clinical course            VTE Risk Mitigation         Ordered     heparin (porcine) injection 5,000 Units  Every 8 hours     Route:  Subcutaneous        02/16/18 2120     Medium Risk of VTE  Once      02/16/18 2120        Critical care time 30min      Thank you for your consult. I will follow-up with patient. Please contact us if you have any additional questions.    Philip Ely MD  Cardiology   [facility]     none

## 2018-02-27 ENCOUNTER — APPOINTMENT (OUTPATIENT)
Dept: ORTHOPEDIC SURGERY | Facility: CLINIC | Age: 63
End: 2018-02-27

## 2018-03-22 ENCOUNTER — MEDICATION RENEWAL (OUTPATIENT)
Age: 63
End: 2018-03-22

## 2018-07-16 PROBLEM — Z86.718 PERSONAL HISTORY OF OTHER VENOUS THROMBOSIS AND EMBOLISM: Chronic | Status: ACTIVE | Noted: 2017-09-29

## 2018-07-16 PROBLEM — Z86.711 PERSONAL HISTORY OF PULMONARY EMBOLISM: Chronic | Status: ACTIVE | Noted: 2017-09-29

## 2018-07-16 PROBLEM — M17.11 UNILATERAL PRIMARY OSTEOARTHRITIS, RIGHT KNEE: Chronic | Status: INACTIVE | Noted: 2017-09-29 | Resolved: 2017-11-10

## 2018-11-05 PROBLEM — E66.9 OBESITY, UNSPECIFIED: Chronic | Status: ACTIVE | Noted: 2017-09-29

## 2018-11-05 PROBLEM — G89.29 OTHER CHRONIC PAIN: Chronic | Status: ACTIVE | Noted: 2017-09-29

## 2018-11-05 PROBLEM — M17.12 UNILATERAL PRIMARY OSTEOARTHRITIS, LEFT KNEE: Chronic | Status: ACTIVE | Noted: 2017-11-10

## 2018-11-05 PROBLEM — G47.00 INSOMNIA, UNSPECIFIED: Chronic | Status: ACTIVE | Noted: 2017-09-29

## 2018-11-09 ENCOUNTER — APPOINTMENT (OUTPATIENT)
Dept: BARIATRICS | Facility: CLINIC | Age: 63
End: 2018-11-09
Payer: COMMERCIAL

## 2018-11-09 VITALS
WEIGHT: 226 LBS | HEIGHT: 64 IN | DIASTOLIC BLOOD PRESSURE: 88 MMHG | HEART RATE: 69 BPM | OXYGEN SATURATION: 97 % | BODY MASS INDEX: 38.58 KG/M2 | SYSTOLIC BLOOD PRESSURE: 148 MMHG

## 2018-11-09 PROCEDURE — 99244 OFF/OP CNSLTJ NEW/EST MOD 40: CPT

## 2018-11-09 RX ORDER — DIAZEPAM 5 MG/1
5 TABLET ORAL
Qty: 15 | Refills: 0 | Status: COMPLETED | COMMUNITY
Start: 2017-10-25 | End: 2018-11-09

## 2018-11-09 RX ORDER — PANTOPRAZOLE 40 MG/1
40 TABLET, DELAYED RELEASE ORAL
Qty: 30 | Refills: 0 | Status: COMPLETED | COMMUNITY
Start: 2017-10-25 | End: 2018-11-09

## 2018-11-09 RX ORDER — ENOXAPARIN SODIUM 100 MG/ML
60 INJECTION SUBCUTANEOUS
Qty: 8 | Refills: 0 | Status: COMPLETED | COMMUNITY
Start: 2017-10-06 | End: 2018-11-09

## 2018-11-09 RX ORDER — AMOXICILLIN 500 MG/1
500 CAPSULE ORAL
Qty: 20 | Refills: 2 | Status: COMPLETED | COMMUNITY
Start: 2017-11-01 | End: 2018-11-09

## 2018-11-09 RX ORDER — HYDROMORPHONE HYDROCHLORIDE 2 MG/1
2 TABLET ORAL
Qty: 40 | Refills: 0 | Status: COMPLETED | COMMUNITY
Start: 2017-10-25 | End: 2018-11-09

## 2019-02-01 ENCOUNTER — APPOINTMENT (OUTPATIENT)
Dept: BARIATRICS/WEIGHT MGMT | Facility: CLINIC | Age: 64
End: 2019-02-01

## 2019-02-01 ENCOUNTER — APPOINTMENT (OUTPATIENT)
Dept: BARIATRICS | Facility: CLINIC | Age: 64
End: 2019-02-01

## 2019-02-05 ENCOUNTER — APPOINTMENT (OUTPATIENT)
Dept: ORTHOPEDIC SURGERY | Facility: CLINIC | Age: 64
End: 2019-02-05
Payer: COMMERCIAL

## 2019-02-05 VITALS
BODY MASS INDEX: 36.19 KG/M2 | DIASTOLIC BLOOD PRESSURE: 85 MMHG | HEART RATE: 67 BPM | WEIGHT: 212 LBS | HEIGHT: 64 IN | SYSTOLIC BLOOD PRESSURE: 154 MMHG

## 2019-02-05 PROCEDURE — 73562 X-RAY EXAM OF KNEE 3: CPT | Mod: LT

## 2019-02-05 PROCEDURE — 99213 OFFICE O/P EST LOW 20 MIN: CPT

## 2019-02-05 NOTE — DISCUSSION/SUMMARY
[de-identified] : Patient was advised on the nature of the x-rays and examination feel that the patient would benefit from physical therapy and home exercise program was advised. A few visits to get started but then she must continue to do the exercise program at home.\par Patient understands and will follow up in the future if required\par wl

## 2019-02-05 NOTE — PHYSICAL EXAM
[LE] : Sensory: Intact in bilateral lower extremities [DP] : dorsalis pedis 2+ and symmetric bilaterally [PT] : posterior tibial 2+ and symmetric bilaterally [Normal RLE] : Right Lower Extremity: No scars, rashes, lesions, ulcers, skin intact [Normal LLE] : Left Lower Extremity: No scars, rashes, lesions, ulcers, skin intact [Normal Touch] : sensation intact for touch [Normal] : no peripheral adenopathy appreciated [de-identified] : Slightly hea, 64-year-old woman complaining of some stiffness in bilateral knees\par Wounds are well-healed,Calves are non-tender , sensation and pulses are equal at both ankles. Strength against resistance and EHL and dorsiflexion are equal in both lower extremities.\par The alignment is in anatomic neutral. There is no significant mediolateral instability. There is no anterior, posterior instability at 90° there is no flexion contracture. There is no extensor lag.Range of motion of both knees is from full extension and flexing to approximately 120° bila [de-identified] : AP lateral, and merchant view of both knees shows well-positioned well fit. Bilateral knee replacements with a resurfaced patella of each knee lying centrally in the trochlear groove of that knee.\par All of the installed components appear well fixed to the underlying bone with an excellent bone cement prosthesis interface

## 2019-02-05 NOTE — HISTORY OF PRESENT ILLNESS
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

## 2019-02-12 ENCOUNTER — RECORD ABSTRACTING (OUTPATIENT)
Age: 64
End: 2019-02-12

## 2019-02-12 DIAGNOSIS — Z82.49 FAMILY HISTORY OF ISCHEMIC HEART DISEASE AND OTHER DISEASES OF THE CIRCULATORY SYSTEM: ICD-10-CM

## 2019-02-12 DIAGNOSIS — Z87.42 PERSONAL HISTORY OF OTHER DISEASES OF THE FEMALE GENITAL TRACT: ICD-10-CM

## 2019-02-12 DIAGNOSIS — Z86.59 PERSONAL HISTORY OF OTHER MENTAL AND BEHAVIORAL DISORDERS: ICD-10-CM

## 2019-02-12 DIAGNOSIS — Z80.42 FAMILY HISTORY OF MALIGNANT NEOPLASM OF PROSTATE: ICD-10-CM

## 2019-02-14 ENCOUNTER — APPOINTMENT (OUTPATIENT)
Dept: INTERNAL MEDICINE | Facility: CLINIC | Age: 64
End: 2019-02-14
Payer: COMMERCIAL

## 2019-02-14 VITALS
TEMPERATURE: 97.7 F | HEART RATE: 90 BPM | HEIGHT: 65 IN | DIASTOLIC BLOOD PRESSURE: 90 MMHG | SYSTOLIC BLOOD PRESSURE: 148 MMHG | OXYGEN SATURATION: 99 % | RESPIRATION RATE: 16 BRPM | BODY MASS INDEX: 38.99 KG/M2 | WEIGHT: 234 LBS

## 2019-02-14 DIAGNOSIS — Z96.651 PRESENCE OF RIGHT ARTIFICIAL KNEE JOINT: ICD-10-CM

## 2019-02-14 DIAGNOSIS — Z96.652 PRESENCE OF LEFT ARTIFICIAL KNEE JOINT: ICD-10-CM

## 2019-02-14 DIAGNOSIS — Z87.898 PERSONAL HISTORY OF OTHER SPECIFIED CONDITIONS: ICD-10-CM

## 2019-02-14 DIAGNOSIS — R53.81 OTHER MALAISE: ICD-10-CM

## 2019-02-14 LAB
INR PPP: 1.9 RATIO
POCT-PROTHROMBIN TIME: 23 SECS

## 2019-02-14 PROCEDURE — 85610 PROTHROMBIN TIME: CPT | Mod: QW

## 2019-02-14 PROCEDURE — 99214 OFFICE O/P EST MOD 30 MIN: CPT | Mod: 25

## 2019-02-14 RX ORDER — CELECOXIB 200 MG/1
200 CAPSULE ORAL
Qty: 22 | Refills: 0 | Status: COMPLETED | COMMUNITY
Start: 2017-10-25 | End: 2018-02-14

## 2019-02-14 RX ORDER — HYDROCORTISONE/PRAMOXINE 2.5 %-1 %
1-2.5 CREAM WITH APPLICATOR RECTAL
Refills: 0 | Status: COMPLETED | COMMUNITY
End: 2018-02-14

## 2019-02-14 NOTE — PLAN
[FreeTextEntry1] : Endocrinology  -  Obesity   Patient was educated about the importance of diet and exercise.   We discussed  a goal of a BMI near 25.   Patient was advised different treatment modalities including prescription medication as well as surgical procedures. \par \par Pulmonary - WILDER -  We discussed cardiovascular, metabolic and other symptomatic ramifications of untreated WILDER.  JACKSON was advised the importance of evaluation, treatment, compliance and follow up appointments.  We also discussed the importance of  diet and exercise programs to control weight.  Advised the importance of sleep hygiene. Advised to avoid alcohol and other sedatives that tend to exacerbate WILDER.  Pending sleep study report \par \par Orthopedic-degenerative joint disease bilateral knees status post total knee replacement. Advise patient to continue with physical therapy. Refills Vicodin but decreased from 80 tabs to 70 tabs.\par \par Neurology-insomnia continue with Ambien. Advise risk alternatives benefits and side effects of medication. Did check  \par \par Vascular- history of DVT/PE continue with current dose of warfarin 5 mg daily. Advised if she notices any abnormal bleeding to return to the office immediately. Check INR in 4 weeks. Refilled medication\par \par

## 2019-02-14 NOTE — PHYSICAL EXAM

## 2019-02-14 NOTE — COUNSELING
[Weight management counseling provided] : Weight management [Decrease Portions] : Decrease food portions [Walking] : Walking

## 2019-02-14 NOTE — REVIEW OF SYSTEMS
[Negative] : Heme/Lymph [Joint Pain] : joint pain [Joint Stiffness] : joint stiffness [FreeTextEntry9] : knee pain

## 2019-02-14 NOTE — HEALTH RISK ASSESSMENT
[0] : 1) Little interest or pleasure doing things: Not at all (0) [1] : 2) Feeling down, depressed, or hopeless for several days (1) [] : No [[id number]] : 1

## 2019-02-14 NOTE — HISTORY OF PRESENT ILLNESS
[Spouse] : spouse [FreeTextEntry1] : 64-year-old female who presents to the office today to have her INR checked secondary to warfarin therapy. [de-identified] : A 64-year-old female who presents to office with a past medical history as noted below for an INR check. Patient denies any abnormal bleeding .  Is currently taking 5 mg of warfarin daily. Patient would also like to have a refill of her medication.\par \par Refill of her Ambien 10 mg at nighttime that she takes for her insomnia. Patient states the medication works very well.  Patient denies any side effects of the medication.\par Saw orthopedics today Dr. Graham  who renewed her physical therapy. Still with pain and discomfort. Has been cutting down on the \par Vicodin. Taken medication prior to physical therapy.\par \par Patient also states she completed a home sleep study and is waiting for the results.

## 2019-02-21 ENCOUNTER — EMERGENCY (EMERGENCY)
Facility: HOSPITAL | Age: 64
LOS: 1 days | Discharge: HOME | End: 2019-02-21
Attending: EMERGENCY MEDICINE | Admitting: EMERGENCY MEDICINE
Payer: COMMERCIAL

## 2019-02-21 VITALS
HEIGHT: 65 IN | RESPIRATION RATE: 16 BRPM | OXYGEN SATURATION: 99 % | WEIGHT: 210.1 LBS | SYSTOLIC BLOOD PRESSURE: 171 MMHG | TEMPERATURE: 98 F | DIASTOLIC BLOOD PRESSURE: 89 MMHG | HEART RATE: 79 BPM

## 2019-02-21 VITALS
OXYGEN SATURATION: 99 % | RESPIRATION RATE: 15 BRPM | TEMPERATURE: 98 F | HEART RATE: 65 BPM | SYSTOLIC BLOOD PRESSURE: 131 MMHG | DIASTOLIC BLOOD PRESSURE: 64 MMHG

## 2019-02-21 DIAGNOSIS — Z96.659 PRESENCE OF UNSPECIFIED ARTIFICIAL KNEE JOINT: Chronic | ICD-10-CM

## 2019-02-21 DIAGNOSIS — Z90.710 ACQUIRED ABSENCE OF BOTH CERVIX AND UTERUS: Chronic | ICD-10-CM

## 2019-02-21 DIAGNOSIS — Z96.651 PRESENCE OF RIGHT ARTIFICIAL KNEE JOINT: Chronic | ICD-10-CM

## 2019-02-21 DIAGNOSIS — Z98.890 OTHER SPECIFIED POSTPROCEDURAL STATES: Chronic | ICD-10-CM

## 2019-02-21 LAB
ALBUMIN SERPL ELPH-MCNC: 3.8 G/DL — SIGNIFICANT CHANGE UP (ref 3.3–5)
ALP SERPL-CCNC: 97 U/L — SIGNIFICANT CHANGE UP (ref 30–120)
ALT FLD-CCNC: 26 U/L DA — SIGNIFICANT CHANGE UP (ref 10–60)
ANION GAP SERPL CALC-SCNC: 8 MMOL/L — SIGNIFICANT CHANGE UP (ref 5–17)
APTT BLD: 39.2 SEC — HIGH (ref 28.5–37)
AST SERPL-CCNC: 18 U/L — SIGNIFICANT CHANGE UP (ref 10–40)
BILIRUB SERPL-MCNC: 0.4 MG/DL — SIGNIFICANT CHANGE UP (ref 0.2–1.2)
BUN SERPL-MCNC: 18 MG/DL — SIGNIFICANT CHANGE UP (ref 7–23)
CALCIUM SERPL-MCNC: 9.3 MG/DL — SIGNIFICANT CHANGE UP (ref 8.4–10.5)
CHLORIDE SERPL-SCNC: 100 MMOL/L — SIGNIFICANT CHANGE UP (ref 96–108)
CO2 SERPL-SCNC: 27 MMOL/L — SIGNIFICANT CHANGE UP (ref 22–31)
CREAT SERPL-MCNC: 0.8 MG/DL — SIGNIFICANT CHANGE UP (ref 0.5–1.3)
D DIMER BLD IA.RAPID-MCNC: <150 NG/ML DDU — SIGNIFICANT CHANGE UP
GLUCOSE SERPL-MCNC: 98 MG/DL — SIGNIFICANT CHANGE UP (ref 70–99)
HCT VFR BLD CALC: 38.2 % — SIGNIFICANT CHANGE UP (ref 34.5–45)
HGB BLD-MCNC: 12.4 G/DL — SIGNIFICANT CHANGE UP (ref 11.5–15.5)
INR BLD: 2.09 RATIO — HIGH (ref 0.88–1.16)
MCHC RBC-ENTMCNC: 30.5 PG — SIGNIFICANT CHANGE UP (ref 27–34)
MCHC RBC-ENTMCNC: 32.5 GM/DL — SIGNIFICANT CHANGE UP (ref 32–36)
MCV RBC AUTO: 93.9 FL — SIGNIFICANT CHANGE UP (ref 80–100)
NRBC # BLD: 0 /100 WBCS — SIGNIFICANT CHANGE UP (ref 0–0)
PLATELET # BLD AUTO: 247 K/UL — SIGNIFICANT CHANGE UP (ref 150–400)
POTASSIUM SERPL-MCNC: 4.1 MMOL/L — SIGNIFICANT CHANGE UP (ref 3.5–5.3)
POTASSIUM SERPL-SCNC: 4.1 MMOL/L — SIGNIFICANT CHANGE UP (ref 3.5–5.3)
PROT SERPL-MCNC: 8 G/DL — SIGNIFICANT CHANGE UP (ref 6–8.3)
PROTHROM AB SERPL-ACNC: 23.3 SEC — HIGH (ref 10–12.9)
RBC # BLD: 4.07 M/UL — SIGNIFICANT CHANGE UP (ref 3.8–5.2)
RBC # FLD: 12.5 % — SIGNIFICANT CHANGE UP (ref 10.3–14.5)
SODIUM SERPL-SCNC: 135 MMOL/L — SIGNIFICANT CHANGE UP (ref 135–145)
TROPONIN I SERPL-MCNC: 0 NG/ML — LOW (ref 0.02–0.06)
WBC # BLD: 7.48 K/UL — SIGNIFICANT CHANGE UP (ref 3.8–10.5)
WBC # FLD AUTO: 7.48 K/UL — SIGNIFICANT CHANGE UP (ref 3.8–10.5)

## 2019-02-21 PROCEDURE — 99284 EMERGENCY DEPT VISIT MOD MDM: CPT

## 2019-02-21 PROCEDURE — 71046 X-RAY EXAM CHEST 2 VIEWS: CPT

## 2019-02-21 PROCEDURE — 76705 ECHO EXAM OF ABDOMEN: CPT

## 2019-02-21 PROCEDURE — 93010 ELECTROCARDIOGRAM REPORT: CPT

## 2019-02-21 PROCEDURE — 96374 THER/PROPH/DIAG INJ IV PUSH: CPT

## 2019-02-21 PROCEDURE — 36415 COLL VENOUS BLD VENIPUNCTURE: CPT

## 2019-02-21 PROCEDURE — 84484 ASSAY OF TROPONIN QUANT: CPT

## 2019-02-21 PROCEDURE — 76705 ECHO EXAM OF ABDOMEN: CPT | Mod: 26

## 2019-02-21 PROCEDURE — 85610 PROTHROMBIN TIME: CPT

## 2019-02-21 PROCEDURE — 85379 FIBRIN DEGRADATION QUANT: CPT

## 2019-02-21 PROCEDURE — 85027 COMPLETE CBC AUTOMATED: CPT

## 2019-02-21 PROCEDURE — 71046 X-RAY EXAM CHEST 2 VIEWS: CPT | Mod: 26

## 2019-02-21 PROCEDURE — 93005 ELECTROCARDIOGRAM TRACING: CPT

## 2019-02-21 PROCEDURE — 87389 HIV-1 AG W/HIV-1&-2 AB AG IA: CPT

## 2019-02-21 PROCEDURE — 99284 EMERGENCY DEPT VISIT MOD MDM: CPT | Mod: 25

## 2019-02-21 PROCEDURE — 80053 COMPREHEN METABOLIC PANEL: CPT

## 2019-02-21 PROCEDURE — 85730 THROMBOPLASTIN TIME PARTIAL: CPT

## 2019-02-21 RX ORDER — KETOROLAC TROMETHAMINE 30 MG/ML
30 SYRINGE (ML) INJECTION ONCE
Qty: 0 | Refills: 0 | Status: DISCONTINUED | OUTPATIENT
Start: 2019-02-21 | End: 2019-02-21

## 2019-02-21 RX ADMIN — Medication 30 MILLIGRAM(S): at 15:35

## 2019-02-21 RX ADMIN — Medication 30 MILLIGRAM(S): at 16:00

## 2019-02-21 NOTE — ED ADULT NURSE NOTE - PSH
History of hernia repair  2000, 2015  History of total knee replacement  left  History of total right knee replacement  2017  S/P hysterectomy  1970's

## 2019-02-21 NOTE — ED PROVIDER NOTE - CLINICAL SUMMARY MEDICAL DECISION MAKING FREE TEXT BOX
65 y/o F pt presents to the ED c/o constant sternal CP with radiation to bilateral chest and back for 3 days. Will do EKG, labs, CXR then reassess.

## 2019-02-21 NOTE — CONSULT NOTE ADULT - ASSESSMENT
The patient is a 64 year old female with a history of HL, OA, DVT/PE in 2013 who presents with chest pain.    Plan:  - Chest pain is atypical in nature and she is very tender to touch, making a musculoskeletal etiology most likely  - ECG with no evidence of ischemia or infarction  - Given duration of continuous symptoms (>3 days), an acute MI is ruled out with one set of cardiac enzymes  - CXR with clear lungs  - INR therapeutic (patient on warfarin for VTE). D-dimer is negative, which is reassuring that there is no active clot breakdown.  - US gallbladder pending  - No further cardiac testing indicated at this time. She can follow-up as outpatient if symptoms persist.

## 2019-02-21 NOTE — ED ADULT NURSE NOTE - NSIMPLEMENTINTERV_GEN_ALL_ED
Implemented All Universal Safety Interventions:  [place]iona to call system. Call bell, personal items and telephone within reach. Instruct patient to call for assistance. Room bathroom lighting operational. Non-slip footwear when patient is off stretcher. Physically safe environment: no spills, clutter or unnecessary equipment. Stretcher in lowest position, wheels locked, appropriate side rails in place.

## 2019-02-21 NOTE — ED PROVIDER NOTE - OBJECTIVE STATEMENT
63 y/o F pt with PMHx of PE presents to the ED c/o constant sternal CP with radiation to bilateral chest and back for 3 days, pain worsened with breathing deeply and palpation. Confirms mild SOB. Nonsmoker. Denies hx of similar sx in the past. No further complaints at this time.

## 2019-02-21 NOTE — ED ADULT NURSE NOTE - OBJECTIVE STATEMENT
Patient c/o right upper quadrant abdominal pain constant for the past 3 days that radiates to chest and bandlike across chest and back. Patient describes nausea and dry heaves, regurgitation with the pain. Pain lessens when lying down and is worse when she stands and walks. Denies diarrhea.

## 2019-02-21 NOTE — ED ADULT NURSE NOTE - CHPI ED NUR SYMPTOMS NEG
no dysuria/no blood in stool/no abdominal distension/no burning urination/no fever/no hematuria/no diarrhea/no chills

## 2019-02-22 LAB — HIV 1+2 AB+HIV1 P24 AG SERPL QL IA: SIGNIFICANT CHANGE UP

## 2019-03-08 NOTE — BRIEF OPERATIVE NOTE - CO SURGEON
Catrina Jain (Self) [phone] (W)     Patient called stating that she was told to call back and give Louisa Ole a update of how she was doing following office visit on 02/18/19. Patient states she is taking the anti acid medication as Holly instructed and she has stared to alternate days and on the days she don't take the medication she is still getting stomach pains. Patient also states that she has gotten the blood work done that was requested and would like to get results of test? Please advise? Thanks! MD Roberto

## 2019-03-15 ENCOUNTER — APPOINTMENT (OUTPATIENT)
Dept: INTERNAL MEDICINE | Facility: CLINIC | Age: 64
End: 2019-03-15
Payer: COMMERCIAL

## 2019-03-15 VITALS
BODY MASS INDEX: 39.49 KG/M2 | OXYGEN SATURATION: 97 % | SYSTOLIC BLOOD PRESSURE: 130 MMHG | DIASTOLIC BLOOD PRESSURE: 82 MMHG | WEIGHT: 237 LBS | HEIGHT: 65 IN | HEART RATE: 80 BPM | TEMPERATURE: 97.8 F | RESPIRATION RATE: 18 BRPM

## 2019-03-15 LAB
INR PPP: 2.3 RATIO
POCT-PROTHROMBIN TIME: 28.9 SECS
QUALITY CONTROL: NORMAL

## 2019-03-15 PROCEDURE — 99214 OFFICE O/P EST MOD 30 MIN: CPT | Mod: 25

## 2019-03-15 PROCEDURE — 85610 PROTHROMBIN TIME: CPT | Mod: QW

## 2019-03-15 NOTE — COUNSELING
[Weight management counseling provided] : Weight management [Decrease Portions] : Decrease food portions

## 2019-03-15 NOTE — HISTORY OF PRESENT ILLNESS
[Spouse] : spouse [FreeTextEntry1] : 64-year-old female who presents to the office today to have her INR checked secondary to warfarin therapy. [de-identified] : A 64- year- old female who presents to office with a past medical history as noted below for an INR check. Patient denies any abnormal bleeding .  Is currently taking 5 mg of warfarin daily. Patient would also like to have a refill of her medication.\par \par Patient recently went to emergency room for evaluation of chest pain. Patient states that she was discharged to home all testing was negative. Patient believes her symptoms are related to acid reflux secondary to a new diet she was on.\par \par Also here to discuss recent sleep study.\par Refill of her Ambien 10 mg at nighttime that she takes for her insomnia. Patient states the medication works very well.  Patient denies any side effects of the medication.\par \par Denies going to  physical therapy

## 2019-03-15 NOTE — REVIEW OF SYSTEMS
[Recent Change In Weight] : ~T recent weight change [Joint Pain] : joint pain [Joint Stiffness] : joint stiffness [Insomnia] : insomnia [Negative] : Heme/Lymph [FreeTextEntry9] : knee pain

## 2019-03-15 NOTE — PLAN
[FreeTextEntry1] : Endocrinology  -  Obesity   Patient was educated about the importance of diet and exercise.   We discussed  a goal of a BMI near 25.   Patient was advised different treatment modalities including prescription medication as well as surgical procedures.  \par \par Pulmonary - WILDER -  We discussed cardiovascular, metabolic and other symptomatic ramifications of untreated WILDER.  JACKSON was advised the importance of evaluation, treatment, compliance and follow up appointments.  We also discussed the importance of  diet and exercise programs to control weight.  Advised the importance of sleep hygiene. Advised to avoid alcohol and other sedatives that tend to exacerbate WILDER.Reviewed sleep study with patient. Advised to treatment options. Patient states that she wants to get a dental appliance and does not believe CPAP therapy she would be compliant with doing  \par \par Orthopedic-degenerative joint disease bilateral knees status post total knee replacement. Advise patient to continue with physical therapy. Refills Vicodin but decreased from 80 tabs to 70 tabs.\par \par Neurology-insomnia continue with Ambien. Advise risk alternatives benefits and side effects of medication. Did check .   Advise patient risk of taking sleep medication secondary to have an obstructive sleep apnea.\par \par Vascular- history of DVT/PE continue with current dose of warfarin 5 mg daily. Advised if she notices any abnormal bleeding to return to the office immediately. Check INR in 4 weeks. Refilled medication\par \par Copy of the sleep study was given to the patient. Also gave a referral to see a dentist to make a dental appliance

## 2019-03-15 NOTE — ASSESSMENT
She has had a couple pathological fracture. She has osteoporosis. She cannot tolerate infusion therapy or oral bisphosphonates. Advised to continue vitamin D and weightbearing walking. She does not want to continue monitoring DEXA scan, because she is not going to take medication for that    [FreeTextEntry1] : A 64-year-old female with degenerative joint disease, insomnia, obesity and sleep apnea presents to the office for followup from ED visit

## 2019-03-15 NOTE — PHYSICAL EXAM
[No Acute Distress] : no acute distress [Well Nourished] : well nourished [Well Developed] : well developed [Well-Appearing] : well-appearing [Normal Sclera/Conjunctiva] : normal sclera/conjunctiva [PERRL] : pupils equal round and reactive to light [EOMI] : extraocular movements intact [Normal Outer Ear/Nose] : the outer ears and nose were normal in appearance [Normal Oropharynx] : the oropharynx was normal [Normal TMs] : both tympanic membranes were normal [Normal Nasal Mucosa] : the nasal mucosa was normal [No JVD] : no jugular venous distention [Supple] : supple [No Lymphadenopathy] : no lymphadenopathy [Thyroid Normal, No Nodules] : the thyroid was normal and there were no nodules present [No Respiratory Distress] : no respiratory distress  [Clear to Auscultation] : lungs were clear to auscultation bilaterally [No Accessory Muscle Use] : no accessory muscle use [Normal Rate] : normal rate  [Regular Rhythm] : with a regular rhythm [Normal S1, S2] : normal S1 and S2 [No Carotid Bruits] : no carotid bruits [No Abdominal Bruit] : a ~M bruit was not heard ~T in the abdomen [No Varicosities] : no varicosities [Pedal Pulses Present] : the pedal pulses are present [No Edema] : there was no peripheral edema [No Extremity Clubbing/Cyanosis] : no extremity clubbing/cyanosis [No Palpable Aorta] : no palpable aorta [Soft] : abdomen soft [Non Tender] : non-tender [Non-distended] : non-distended [No Masses] : no abdominal mass palpated [No HSM] : no HSM [Normal Bowel Sounds] : normal bowel sounds [Normal Posterior Cervical Nodes] : no posterior cervical lymphadenopathy [Normal Anterior Cervical Nodes] : no anterior cervical lymphadenopathy [No CVA Tenderness] : no CVA  tenderness [No Spinal Tenderness] : no spinal tenderness [No Joint Swelling] : no joint swelling [Grossly Normal Strength/Tone] : grossly normal strength/tone [No Rash] : no rash [Normal Gait] : normal gait [Coordination Grossly Intact] : coordination grossly intact [No Focal Deficits] : no focal deficits [Deep Tendon Reflexes (DTR)] : deep tendon reflexes were 2+ and symmetric [Normal Affect] : the affect was normal [Normal Mood] : the mood was normal [Normal Insight/Judgement] : insight and judgment were intact [de-identified] : obese  [de-identified] : with murmur  [de-identified] : Decrease  B/L ROM Knees with slight tenderness to palpation right knee

## 2019-03-15 NOTE — HEALTH RISK ASSESSMENT
[] : No [0] : 1) Little interest or pleasure doing things: Not at all (0) [1] : 2) Feeling down, depressed, or hopeless for several days (1) [[id number]] : 1

## 2019-04-12 ENCOUNTER — APPOINTMENT (OUTPATIENT)
Dept: INTERNAL MEDICINE | Facility: CLINIC | Age: 64
End: 2019-04-12

## 2019-04-17 ENCOUNTER — APPOINTMENT (OUTPATIENT)
Dept: INTERNAL MEDICINE | Facility: CLINIC | Age: 64
End: 2019-04-17
Payer: COMMERCIAL

## 2019-04-17 VITALS
TEMPERATURE: 98.2 F | WEIGHT: 234.06 LBS | OXYGEN SATURATION: 97 % | SYSTOLIC BLOOD PRESSURE: 148 MMHG | BODY MASS INDEX: 39 KG/M2 | HEART RATE: 85 BPM | DIASTOLIC BLOOD PRESSURE: 90 MMHG | RESPIRATION RATE: 16 BRPM | HEIGHT: 65 IN

## 2019-04-17 LAB — INR PPP: 1.8 RATIO

## 2019-04-17 PROCEDURE — 85610 PROTHROMBIN TIME: CPT | Mod: QW

## 2019-04-17 PROCEDURE — 99213 OFFICE O/P EST LOW 20 MIN: CPT | Mod: 25

## 2019-04-17 NOTE — PLAN
[FreeTextEntry1] : Endocrinology  -  Obesity   Patient was educated about the importance of diet and exercise.   We discussed  a goal of a BMI near 25.   Patient was advised different treatment modalities including prescription medication as well as surgical procedures.  Going to surgeon  for consultation.\par \par Pulmonary - WILDER -  We discussed cardiovascular, metabolic and other symptomatic ramifications of untreated WILDER.  JACKSON was advised the importance of evaluation, treatment, compliance and follow up appointments.  We also discussed the importance of  diet and exercise programs to control weight.  Advised the importance of sleep hygiene. Advised to avoid alcohol and other sedatives that tend to exacerbate WILDER.Reviewed sleep study with patient. Advised to treatment options. Patient states that she wants to get a dental appliance and does not believe CPAP therapy she would be compliant with doing  \par \par Orthopedic-degenerative joint disease bilateral knees status post total knee replacement. Advise patient to continue with physical therapy. Refills Vicodin but decreased from 80 tabs to 70 tabs.\par \par Neurology-insomnia continue with Ambien. Advise risk alternatives benefits and side effects of medication. Did check /Istop.   Advise patient risk of taking sleep medication secondary to have an obstructive sleep apnea.\par \par Vascular- history of DVT/PE take 7.5 mg today than continue with current dose of warfarin 5 mg daily. Advised if she notices any abnormal bleeding to return to the office immediately. Check INR in 4 weeks. Refilled medication\par \par Copy of the sleep study was given to the patient. Also gave a referral to see a dentist to make a dental appliance

## 2019-04-17 NOTE — REVIEW OF SYSTEMS
[Recent Change In Weight] : ~T recent weight change [Joint Stiffness] : joint stiffness [Joint Pain] : joint pain [Insomnia] : insomnia [Negative] : Neurological [FreeTextEntry9] : knee pain

## 2019-04-17 NOTE — HISTORY OF PRESENT ILLNESS
[Spouse] : spouse [FreeTextEntry1] : 64-year-old female who presents to the office today to have her INR checked secondary to warfarin therapy. [de-identified] : A 64- year- old female who presents to office with a past medical history as noted below for an INR check. Patient denies any abnormal bleeding .  Is currently taking 5 mg of warfarin daily. Patient would also like to have a refill on her medication \par \par Refill of her Ambien 10 mg at nighttime that she takes for her insomnia. Patient states the medication works very well.  Patient denies any side effects of the medication.  Waiting tor authorization for the dental appliance\par \par Denies going to  physical therapy for her knees secondary to work related issues.

## 2019-04-17 NOTE — PHYSICAL EXAM
[No Acute Distress] : no acute distress [Well Nourished] : well nourished [Well Developed] : well developed [Normal Sclera/Conjunctiva] : normal sclera/conjunctiva [Well-Appearing] : well-appearing [EOMI] : extraocular movements intact [PERRL] : pupils equal round and reactive to light [Normal Oropharynx] : the oropharynx was normal [Normal Outer Ear/Nose] : the outer ears and nose were normal in appearance [Normal Nasal Mucosa] : the nasal mucosa was normal [Normal TMs] : both tympanic membranes were normal [No JVD] : no jugular venous distention [Supple] : supple [No Lymphadenopathy] : no lymphadenopathy [Thyroid Normal, No Nodules] : the thyroid was normal and there were no nodules present [No Respiratory Distress] : no respiratory distress  [Clear to Auscultation] : lungs were clear to auscultation bilaterally [No Accessory Muscle Use] : no accessory muscle use [Regular Rhythm] : with a regular rhythm [Normal Rate] : normal rate  [No Carotid Bruits] : no carotid bruits [Normal S1, S2] : normal S1 and S2 [No Varicosities] : no varicosities [No Abdominal Bruit] : a ~M bruit was not heard ~T in the abdomen [Pedal Pulses Present] : the pedal pulses are present [No Edema] : there was no peripheral edema [No Extremity Clubbing/Cyanosis] : no extremity clubbing/cyanosis [No Palpable Aorta] : no palpable aorta [Soft] : abdomen soft [Non Tender] : non-tender [Non-distended] : non-distended [No HSM] : no HSM [No Masses] : no abdominal mass palpated [Normal Posterior Cervical Nodes] : no posterior cervical lymphadenopathy [Normal Bowel Sounds] : normal bowel sounds [No CVA Tenderness] : no CVA  tenderness [Normal Anterior Cervical Nodes] : no anterior cervical lymphadenopathy [No Spinal Tenderness] : no spinal tenderness [No Joint Swelling] : no joint swelling [Grossly Normal Strength/Tone] : grossly normal strength/tone [No Rash] : no rash [Normal Gait] : normal gait [Coordination Grossly Intact] : coordination grossly intact [No Focal Deficits] : no focal deficits [Deep Tendon Reflexes (DTR)] : deep tendon reflexes were 2+ and symmetric [Speech Grossly Normal] : speech grossly normal [Alert and Oriented x3] : oriented to person, place, and time [Normal Affect] : the affect was normal [Normal Mood] : the mood was normal [Normal Insight/Judgement] : insight and judgment were intact [de-identified] : obese  [de-identified] : with murmur  [de-identified] : Decrease  B/L ROM Knees with slight tenderness to palpation right knee

## 2019-04-17 NOTE — COUNSELING
[Weight management counseling provided] : Weight management [Decrease Portions] : Decrease food portions [Good understanding] : Patient has a good understanding of lifestyle changes and the steps needed to achieve self management goals [Patient motivation] : Patient motivation [de-identified] : 1500 nicole diet

## 2019-04-17 NOTE — ASSESSMENT
[FreeTextEntry1] : A 64-year-old female with degenerative joint disease, insomnia, obesity and sleep apnea presents to the office for INR check

## 2019-04-17 NOTE — HEALTH RISK ASSESSMENT
[] : No [0] : 1) Little interest or pleasure doing things: Not at all (0) [1] : 2) Feeling down, depressed, or hopeless for several days (1) [[id number]] : 1

## 2019-05-17 ENCOUNTER — APPOINTMENT (OUTPATIENT)
Dept: INTERNAL MEDICINE | Facility: CLINIC | Age: 64
End: 2019-05-17
Payer: COMMERCIAL

## 2019-05-17 ENCOUNTER — MED ADMIN CHARGE (OUTPATIENT)
Age: 64
End: 2019-05-17

## 2019-05-17 VITALS
TEMPERATURE: 98.6 F | RESPIRATION RATE: 16 BRPM | HEART RATE: 86 BPM | DIASTOLIC BLOOD PRESSURE: 80 MMHG | SYSTOLIC BLOOD PRESSURE: 138 MMHG | OXYGEN SATURATION: 97 % | HEIGHT: 65 IN | BODY MASS INDEX: 38.65 KG/M2 | WEIGHT: 232 LBS

## 2019-05-17 LAB — INR PPP: 2.6 RATIO

## 2019-05-17 PROCEDURE — 99214 OFFICE O/P EST MOD 30 MIN: CPT | Mod: 25

## 2019-05-17 PROCEDURE — 85610 PROTHROMBIN TIME: CPT | Mod: QW

## 2019-05-17 PROCEDURE — 90715 TDAP VACCINE 7 YRS/> IM: CPT

## 2019-05-17 PROCEDURE — 90471 IMMUNIZATION ADMIN: CPT

## 2019-05-18 NOTE — PHYSICAL EXAM
[No Acute Distress] : no acute distress [Well Nourished] : well nourished [Well Developed] : well developed [Well-Appearing] : well-appearing [Normal Sclera/Conjunctiva] : normal sclera/conjunctiva [PERRL] : pupils equal round and reactive to light [EOMI] : extraocular movements intact [Normal Outer Ear/Nose] : the outer ears and nose were normal in appearance [Normal Oropharynx] : the oropharynx was normal [Normal TMs] : both tympanic membranes were normal [Normal Nasal Mucosa] : the nasal mucosa was normal [No JVD] : no jugular venous distention [Supple] : supple [No Lymphadenopathy] : no lymphadenopathy [Thyroid Normal, No Nodules] : the thyroid was normal and there were no nodules present [No Respiratory Distress] : no respiratory distress  [Clear to Auscultation] : lungs were clear to auscultation bilaterally [No Accessory Muscle Use] : no accessory muscle use [Normal Rate] : normal rate  [Regular Rhythm] : with a regular rhythm [Normal S1, S2] : normal S1 and S2 [No Carotid Bruits] : no carotid bruits [No Abdominal Bruit] : a ~M bruit was not heard ~T in the abdomen [No Varicosities] : no varicosities [Pedal Pulses Present] : the pedal pulses are present [No Edema] : there was no peripheral edema [No Palpable Aorta] : no palpable aorta [No Extremity Clubbing/Cyanosis] : no extremity clubbing/cyanosis [Soft] : abdomen soft [Non Tender] : non-tender [Non-distended] : non-distended [No HSM] : no HSM [No Masses] : no abdominal mass palpated [Normal Bowel Sounds] : normal bowel sounds [Normal Posterior Cervical Nodes] : no posterior cervical lymphadenopathy [No CVA Tenderness] : no CVA  tenderness [Normal Anterior Cervical Nodes] : no anterior cervical lymphadenopathy [No Spinal Tenderness] : no spinal tenderness [No Joint Swelling] : no joint swelling [Grossly Normal Strength/Tone] : grossly normal strength/tone [No Rash] : no rash [Coordination Grossly Intact] : coordination grossly intact [Normal Gait] : normal gait [No Focal Deficits] : no focal deficits [Deep Tendon Reflexes (DTR)] : deep tendon reflexes were 2+ and symmetric [Speech Grossly Normal] : speech grossly normal [Normal Affect] : the affect was normal [Alert and Oriented x3] : oriented to person, place, and time [Normal Mood] : the mood was normal [Normal Insight/Judgement] : insight and judgment were intact [de-identified] : obese  [de-identified] : with murmur  [de-identified] : Decrease  B/L ROM Knees with slight tenderness to palpation right knee

## 2019-05-18 NOTE — HEALTH RISK ASSESSMENT
[0] : 1) Little interest or pleasure doing things: Not at all (0) [1] : 2) Feeling down, depressed, or hopeless for several days (1) [] : No [No falls in past year] : Patient reported no falls in the past year [[id number]] : 1

## 2019-05-18 NOTE — COUNSELING
[Decrease Portions] : Decrease food portions [Weight management counseling provided] : Weight management [Patient motivation] : Patient motivation [Good understanding] : Patient has a good understanding of lifestyle changes and the steps needed to achieve self management goals [Healthy eating counseling provided] : healthy eating [Fall prevention counseling provided] : fall prevention  [Low Salt Diet] : Low salt diet [Low Fat Diet] : Low fat diet [Walking] : Walking [None] : None [de-identified] : 1500 nicole diet \par Advised to walk daily for a goal of 10,000 steps

## 2019-05-18 NOTE — PLAN
[FreeTextEntry1] : Endocrinology  -  Obesity   Patient was educated about the importance of diet and exercise.   We discussed  a goal of a BMI near 25.   Patient was advised different treatment modalities including prescription medication as well as surgical procedures.  Going to surgeon  for consultation.\par \par Pulmonary - WILDER -  We discussed cardiovascular, metabolic and other symptomatic ramifications of untreated WILDER.  JACKSON was advised the importance of evaluation, treatment, compliance and follow up appointments.  We also discussed the importance of  diet and exercise programs to control weight.  Advised the importance of sleep hygiene. Advised to avoid alcohol and other sedatives that tend to exacerbate WILDER.Reviewed sleep study with patient. Advised to treatment options. Patient states that she wants to get a dental appliance and does not believe CPAP therapy she would be compliant with doing  \par \par Orthopedic-degenerative joint disease bilateral knees status post total knee replacement. Advise patient to continue with physical therapy. Refills Vicodin but decreased from 80 tabs to 70 tabs.\par \par Neurology-insomnia continue with Ambien. Advise risk alternatives benefits and side effects of medication. Did check /Istop.   Advise patient risk of taking sleep medication secondary to have an obstructive sleep apnea.\par \par Vascular- history of DVT/PE take 7.5 mg today than continue with current dose of warfarin 5 mg daily. Advised if she notices any abnormal bleeding to return to the office immediately. Check INR in 4 weeks. Refilled medication\par SEnt pt for CT of the chest and Doppler of lower ext if both are negative will discontinue warfarin use. Advised to followup with hematology.  \par \par Copy of the sleep study was given to the patient. Also gave a referral to see a dentist to make a dental appliance

## 2019-05-18 NOTE — HISTORY OF PRESENT ILLNESS
[Spouse] : spouse [FreeTextEntry1] : 64-year-old female who presents to the office today to have her INR checked secondary to warfarin therapy and refill on medications.   [de-identified] : A 64- year- old female who presents to office with a past medical history as noted below for an INR check. Patient denies any abnormal bleeding .  Is currently taking 5 mg of warfarin daily.\par Also needs a refill of her Ambien 10 mg one tab  nighttime that she takes for her insomnia. Patient states the medication works very well.  Patient denies any side effects of the medication.  Still  waiting tor authorization for the dental appliance to treat her WILDER.\par Patient states she also had bariatric surgical consult. Would like to go through with the gastric sleeve surgery.  Was referred to go see cardiology, endocrinology, pulmonology, gastroenterology, and nutritionist.  Patient also needs to see hematologist to see if her long-term warfarin therapy can be discontinued after having a pulmonary emboli.  Was referred multiple times to hematologist over the years patient never followed through.\par Patient denies having any recent tetanus shot, believes her last tetanus shot was over 10 years

## 2019-06-14 ENCOUNTER — APPOINTMENT (OUTPATIENT)
Dept: INTERNAL MEDICINE | Facility: CLINIC | Age: 64
End: 2019-06-14
Payer: COMMERCIAL

## 2019-06-14 VITALS
OXYGEN SATURATION: 98 % | RESPIRATION RATE: 16 BRPM | HEIGHT: 66 IN | DIASTOLIC BLOOD PRESSURE: 82 MMHG | SYSTOLIC BLOOD PRESSURE: 128 MMHG | TEMPERATURE: 97.8 F | HEART RATE: 74 BPM | WEIGHT: 236 LBS | BODY MASS INDEX: 37.93 KG/M2

## 2019-06-14 LAB — INR PPP: 3.8 RATIO

## 2019-06-14 PROCEDURE — 99213 OFFICE O/P EST LOW 20 MIN: CPT | Mod: 25

## 2019-06-14 PROCEDURE — 85610 PROTHROMBIN TIME: CPT | Mod: QW

## 2019-06-14 NOTE — PAST MEDICAL HISTORY
[Postmenopausal] : postmenopausal [Total Preg ___] : G[unfilled] [Definite ___ (Date)] : the last menstrual period was [unfilled]

## 2019-06-17 NOTE — HEALTH RISK ASSESSMENT
[No falls in past year] : Patient reported no falls in the past year [1] : 2) Feeling down, depressed, or hopeless for several days (1) [0] : 1) Little interest or pleasure doing things: Not at all (0) [] : No [ColonoscopyDate] : 09/18 [[id number]] : 1 [ColonoscopyComments] : Repeat 5 years  Diverticulosis redundant colon

## 2019-06-17 NOTE — PHYSICAL EXAM
[No Acute Distress] : no acute distress [Well Nourished] : well nourished [Well Developed] : well developed [Well-Appearing] : well-appearing [Normal Sclera/Conjunctiva] : normal sclera/conjunctiva [PERRL] : pupils equal round and reactive to light [Normal Outer Ear/Nose] : the outer ears and nose were normal in appearance [EOMI] : extraocular movements intact [Normal TMs] : both tympanic membranes were normal [Normal Oropharynx] : the oropharynx was normal [No JVD] : no jugular venous distention [Normal Nasal Mucosa] : the nasal mucosa was normal [Supple] : supple [No Lymphadenopathy] : no lymphadenopathy [Thyroid Normal, No Nodules] : the thyroid was normal and there were no nodules present [No Respiratory Distress] : no respiratory distress  [Clear to Auscultation] : lungs were clear to auscultation bilaterally [No Accessory Muscle Use] : no accessory muscle use [Normal Rate] : normal rate  [Regular Rhythm] : with a regular rhythm [Normal S1, S2] : normal S1 and S2 [No Carotid Bruits] : no carotid bruits [No Varicosities] : no varicosities [No Abdominal Bruit] : a ~M bruit was not heard ~T in the abdomen [Pedal Pulses Present] : the pedal pulses are present [No Edema] : there was no peripheral edema [No Extremity Clubbing/Cyanosis] : no extremity clubbing/cyanosis [Soft] : abdomen soft [No Palpable Aorta] : no palpable aorta [Non Tender] : non-tender [Non-distended] : non-distended [No HSM] : no HSM [No Masses] : no abdominal mass palpated [Normal Posterior Cervical Nodes] : no posterior cervical lymphadenopathy [Normal Bowel Sounds] : normal bowel sounds [No CVA Tenderness] : no CVA  tenderness [Normal Anterior Cervical Nodes] : no anterior cervical lymphadenopathy [No Spinal Tenderness] : no spinal tenderness [No Joint Swelling] : no joint swelling [Grossly Normal Strength/Tone] : grossly normal strength/tone [No Rash] : no rash [Normal Gait] : normal gait [Coordination Grossly Intact] : coordination grossly intact [No Focal Deficits] : no focal deficits [Deep Tendon Reflexes (DTR)] : deep tendon reflexes were 2+ and symmetric [Speech Grossly Normal] : speech grossly normal [Normal Affect] : the affect was normal [Alert and Oriented x3] : oriented to person, place, and time [Normal Mood] : the mood was normal [Normal Insight/Judgement] : insight and judgment were intact [de-identified] : obese  [de-identified] : with murmur  [de-identified] : Decrease  B/L ROM Knees with slight tenderness to palpation right knee

## 2019-06-17 NOTE — ASSESSMENT
[FreeTextEntry1] : A 64-year-old female with degenerative joint disease, insomnia, obesity and sleep apnea presents to the office for INR check.

## 2019-06-17 NOTE — HISTORY OF PRESENT ILLNESS
[Spouse] : spouse [FreeTextEntry1] : 64-year-old female who presents to the office today to have her INR checked secondary to warfarin therapy and refill on medications.   [de-identified] : A 64- year- old female who presents to office with a past medical history as noted below presents to the office for an INR check. Patient denies any abnormal bleeding .  Is currently taking 5 mg of warfarin daily.  Denies any abnormal bleeding \par Also needs a refill of her Ambien 10 mg one tab  nighttime that she takes for her insomnia. Patient states the medication works very well.  Patient denies any side effects of the medication. Alma states since last visit she has not gone for her CT of her chest, Doppler of lower extremities, or followup with the cardiologist.  \par

## 2019-06-17 NOTE — COUNSELING
[Weight management counseling provided] : Weight management [Healthy eating counseling provided] : healthy eating [Fall prevention counseling provided] : fall prevention  [Needs reinforcement, provided] : Patient needs reinforcement on understanding of disease, goals and obesity follow-up plan; reinforcement was provided [Low Fat Diet] : Low fat diet [Low Salt Diet] : Low salt diet [Decrease Portions] : Decrease food portions [Patient motivation] : Patient motivation [None] : None [Good understanding] : Patient has a good understanding of lifestyle changes and the steps needed to achieve self management goals [de-identified] : 1500 nicole diet \par Advised to walk daily for a goal of 10,000 steps \par Advised to see cardiology, get ct scan and Doppler as directed last visit

## 2019-06-17 NOTE — PLAN
[FreeTextEntry1] : Endocrinology  -  Obesity   Patient was educated about the importance of diet and exercise.   We discussed  a goal of a BMI near 25.   Olga is planning bariatric surgery.   \par \par Pulmonary - WILDER -  We discussed cardiovascular, metabolic and other symptomatic ramifications of untreated WILDER.  JACKSON was advised the importance of evaluation, treatment, compliance and follow up appointments.  We also discussed the importance of  diet and exercise programs to control weight.  Advised the importance of sleep hygiene. Advised to avoid alcohol and other sedatives that tend to exacerbate WILDER.Reviewed sleep study with patient. Advised to treatment options. Patient states that she wants to get a dental appliance and does not believe CPAP therapy she would be compliant with doing. Still has not made an apt. with a dentist \par \par Orthopedic-degenerative joint disease bilateral knees status post total knee replacement. Advise patient to continue with physical therapy. Refills Vicodin but decreased from 80 tabs to 70 tabs. Will consider decreasing next months amount.  \par \par Neurology-insomnia continue with Ambien. Advise risk alternatives benefits and side effects of medication. Did check /Istop.   Advise patient risk of taking sleep medication secondary to have an obstructive sleep apnea.\par \par Vascular- history of DVT/PE.  INR elevated, hold x 3 days than continue with current dose of warfarin 5 mg daily. Advised if she notices any abnormal bleeding to return to the office immediately. Check INR in 1week.\par \par Sent pt for CT of the chest and Doppler of lower ext if both are negative will discontinue warfarin use. Advised to followup with hematology.   Re printed requisitions today for the pt \par \par

## 2019-07-08 ENCOUNTER — APPOINTMENT (OUTPATIENT)
Dept: INTERNAL MEDICINE | Facility: CLINIC | Age: 64
End: 2019-07-08
Payer: COMMERCIAL

## 2019-07-08 VITALS
BODY MASS INDEX: 37.93 KG/M2 | DIASTOLIC BLOOD PRESSURE: 70 MMHG | TEMPERATURE: 98 F | OXYGEN SATURATION: 97 % | WEIGHT: 236 LBS | HEIGHT: 66 IN | SYSTOLIC BLOOD PRESSURE: 118 MMHG | RESPIRATION RATE: 14 BRPM | HEART RATE: 62 BPM

## 2019-07-08 LAB — INR PPP: 3.1 RATIO

## 2019-07-08 PROCEDURE — 99214 OFFICE O/P EST MOD 30 MIN: CPT | Mod: 25

## 2019-07-08 PROCEDURE — 85610 PROTHROMBIN TIME: CPT | Mod: QW

## 2019-07-09 ENCOUNTER — MEDICATION RENEWAL (OUTPATIENT)
Age: 64
End: 2019-07-09

## 2019-07-10 NOTE — PLAN
[FreeTextEntry1] : Endocrinology  -  Obesity   Patient was educated about the importance of diet and exercise.   We discussed  a goal of a BMI near 25.   Jackson is planning bariatric surgery.  Advised to see cardio for TST and GI  for endoscopy    \par \par Pulmonary - WILDER -  We discussed cardiovascular, metabolic and other symptomatic ramifications of untreated WILDER.  JACKSON was advised the importance of evaluation, treatment, compliance and follow up appointments.  We also discussed the importance of  diet and exercise programs to control weight.  Advised the importance of sleep hygiene. Advised to avoid alcohol and other sedatives that tend to exacerbate WILDER.Reviewed sleep study with patient. Advised to treatment options. Patient states that she wants to get a dental appliance and mcintosh  s not believe CPAP therapy she would be compliant with doing. Still has not made an apt. with a dentist \par \par HX PE- Current CT scan was negative for PE, Lower ext doppler negative for DVT.  Patient with no hx of clotting factors.  We discussed discontinue warfarin therapy and  checking labs for coagulopathies. Advised ASA daily.\par  \par Cardio - CAD and hyper lipidemia Advised to see cardio for TST.   Pt has hx of intolerance to statins start zetia.  \par Recheck labs fasting \par Return  to the office for fasting labs in 6 weeks \par \par Orthopedic-degenerativ joint disease bilateral knees status post total knee replacement. Advise patient to continue with physical therapy. Refills Vicodin but decreased from 80 tabs to 70 tabs. Will consider decreasing next months amount.  \par \par Neurology-insomnia continue with Ambien. Advise risk alternatives benefits and side effects of medication. Did check /Istop.   Advise patient risk of taking sleep medication secondary to have an obstructive sleep apnea.\par \par Vascular- history of DVT/PE.  INR elevated, hold x 3 days than continue with current dose of warfarin 5 mg daily. Advised if she notices any abnormal bleeding to return to the office immediately. Check INR in 1week.\par \par Sent pt for CT of the chest and Doppler of lower ext if both are negative will discontinue warfarin use. Advised to followup with hematology.   Re printed requisitions today for the pt \par \par

## 2019-07-10 NOTE — HISTORY OF PRESENT ILLNESS
[Spouse] : spouse [FreeTextEntry1] : 64-year-old female who presents to the office today to have her INR checked secondary to warfarin therapy and refill on medications.   [de-identified] : A 64- year- old female who presents to office with a past medical history as noted below presents to the office for an INR check. Patient denies any abnormal bleeding .  Is currently taking 5 mg of warfarin daily.  Patient also wants to review her recent CT scan of her chest and venous Doppler of her lower ext.  \par Also needs a refill of her Ambien 10 mg one tab  nighttime that she takes for her insomnia. Patient states the medication works very well.  Patient denies any side effects of the medication.\par

## 2019-07-10 NOTE — DATA REVIEWED
[FreeTextEntry1] :  checked  today [id number]\par \par Reviewed Doppler which showed no DVT and CTA which was negative for PE. Did show CAD

## 2019-07-10 NOTE — PHYSICAL EXAM
[No Acute Distress] : no acute distress [Well Developed] : well developed [Well Nourished] : well nourished [Well-Appearing] : well-appearing [Normal Sclera/Conjunctiva] : normal sclera/conjunctiva [PERRL] : pupils equal round and reactive to light [EOMI] : extraocular movements intact [Normal Outer Ear/Nose] : the outer ears and nose were normal in appearance [Normal TMs] : both tympanic membranes were normal [Normal Oropharynx] : the oropharynx was normal [Normal Nasal Mucosa] : the nasal mucosa was normal [No JVD] : no jugular venous distention [Supple] : supple [Thyroid Normal, No Nodules] : the thyroid was normal and there were no nodules present [No Respiratory Distress] : no respiratory distress  [No Lymphadenopathy] : no lymphadenopathy [Clear to Auscultation] : lungs were clear to auscultation bilaterally [No Accessory Muscle Use] : no accessory muscle use [Regular Rhythm] : with a regular rhythm [Normal Rate] : normal rate  [Normal S1, S2] : normal S1 and S2 [No Carotid Bruits] : no carotid bruits [No Abdominal Bruit] : a ~M bruit was not heard ~T in the abdomen [No Varicosities] : no varicosities [Pedal Pulses Present] : the pedal pulses are present [No Edema] : there was no peripheral edema [No Extremity Clubbing/Cyanosis] : no extremity clubbing/cyanosis [Soft] : abdomen soft [No Palpable Aorta] : no palpable aorta [Non-distended] : non-distended [Non Tender] : non-tender [No HSM] : no HSM [No Masses] : no abdominal mass palpated [Normal Bowel Sounds] : normal bowel sounds [Normal Posterior Cervical Nodes] : no posterior cervical lymphadenopathy [No Spinal Tenderness] : no spinal tenderness [Normal Anterior Cervical Nodes] : no anterior cervical lymphadenopathy [No CVA Tenderness] : no CVA  tenderness [No Joint Swelling] : no joint swelling [Grossly Normal Strength/Tone] : grossly normal strength/tone [No Rash] : no rash [Normal Gait] : normal gait [Coordination Grossly Intact] : coordination grossly intact [Deep Tendon Reflexes (DTR)] : deep tendon reflexes were 2+ and symmetric [No Focal Deficits] : no focal deficits [Speech Grossly Normal] : speech grossly normal [Normal Affect] : the affect was normal [Alert and Oriented x3] : oriented to person, place, and time [Normal Mood] : the mood was normal [Normal Insight/Judgement] : insight and judgment were intact [de-identified] : obese  [de-identified] : with murmur  [de-identified] : Decrease  B/L ROM Knees with slight tenderness to palpation right knee

## 2019-07-10 NOTE — COUNSELING
[Weight management counseling provided] : Weight management [Healthy eating counseling provided] : healthy eating [Fall prevention counseling provided] : fall prevention  [Low Fat Diet] : Low fat diet [Needs reinforcement, provided] : Patient needs reinforcement on understanding of disease, goals and obesity follow-up plan; reinforcement was provided [Low Salt Diet] : Low salt diet [Decrease Portions] : Decrease food portions [Patient motivation] : Patient motivation [None] : None [de-identified] : 1500 nicole diet \par Advised to walk daily for a goal of 10,000 steps \par Advised PT for B/l Knee replacemment

## 2019-07-10 NOTE — ASSESSMENT
[FreeTextEntry1] : A 64-year-old female with degenerative joint disease, insomnia, obesity and sleep apnea presents to the office for INR check and review of recent ct scan and lower ext Doppler

## 2019-07-10 NOTE — HEALTH RISK ASSESSMENT
[Yes] : Yes [16-20] : 16-20 [4 or more  times a week (4 pts)] : 4 or more  times a week (4 points) [1 or 2 (0 pts)] : 1 or 2 (0 points) [Never (0 pts)] : Never (0 points) [No falls in past year] : Patient reported no falls in the past year [No] : In the past 12 months have you used drugs other than those required for medical reasons? No [0] : 1) Little interest or pleasure doing things: Not at all (0) [1] : 2) Feeling down, depressed, or hopeless for several days (1) [HIV test declined] : HIV test declined [Hepatitis C test declined] : Hepatitis C test declined [With Significant Other] : lives with significant other [None] : None [# of Members in Household ___] :  household currently consist of [unfilled] member(s) [] :  [High School] : high school [Employed] : employed [Sexually Active] : sexually active [Feels Safe at Home] : Feels safe at home [Fully functional (bathing, dressing, toileting, transferring, walking, feeding)] : Fully functional (bathing, dressing, toileting, transferring, walking, feeding) [Fully functional (using the telephone, shopping, preparing meals, housekeeping, doing laundry, using] : Fully functional and needs no help or supervision to perform IADLs (using the telephone, shopping, preparing meals, housekeeping, doing laundry, using transportation, managing medications and managing finances) [Reports normal functional visual acuity (ie: able to read med bottle)] : Reports normal functional visual acuity [Guns at Home] : guns at home [Smoke Detector] : smoke detector [Safety elements used in home] : safety elements used in home [Seat Belt] :  uses seat belt [Sunscreen] : uses sunscreen [] : No [de-identified] : quit [YearQuit] : 2000 [Audit-CScore] : 4 [de-identified] : regular [de-identified] : walking at work [[id number]] : 1 [Change in mental status noted] : No change in mental status noted [Language] : denies difficulty with language [Behavior] : denies difficulty with behavior [Learning/Retaining New Information] : denies difficulty learning/retaining new information [Handling Complex Tasks] : denies difficulty handling complex tasks [Reasoning] : denies difficulty with reasoning [Spatial Ability and Orientation] : denies difficulty with spatial ability and orientation [High Risk Behavior] : no high risk behavior [Reports changes in hearing] : Reports no changes in hearing [Reports changes in vision] : Reports no changes in vision [Reports changes in dental health] : Reports no changes in dental health [TB Exposure] : is not being exposed to tuberculosis [ColonoscopyDate] : 09/18 [MammogramDate] : 04/19 [ColonoscopyComments] : Repeat 5 years  Diverticulosis redundant colon  [HIVComments] : pt. reports having it done in 2017 prior to surgery [HepatitisCComments] : pt. 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

## 2019-08-23 ENCOUNTER — APPOINTMENT (OUTPATIENT)
Dept: INTERNAL MEDICINE | Facility: CLINIC | Age: 64
End: 2019-08-23
Payer: COMMERCIAL

## 2019-08-23 VITALS
OXYGEN SATURATION: 97 % | RESPIRATION RATE: 16 BRPM | HEART RATE: 79 BPM | BODY MASS INDEX: 38.29 KG/M2 | DIASTOLIC BLOOD PRESSURE: 88 MMHG | HEIGHT: 66 IN | TEMPERATURE: 98 F | WEIGHT: 238.25 LBS | SYSTOLIC BLOOD PRESSURE: 138 MMHG

## 2019-08-23 LAB — INR PPP: 1.3 RATIO

## 2019-08-23 PROCEDURE — 85610 PROTHROMBIN TIME: CPT | Mod: QW

## 2019-08-23 PROCEDURE — 99214 OFFICE O/P EST MOD 30 MIN: CPT | Mod: 25

## 2019-08-23 NOTE — COUNSELING
[Potential consequences of obesity discussed] : Potential consequences of obesity discussed [Encouraged to maintain food diary] : Encouraged to maintain food diary [Encouraged to increase physical activity] : Encouraged to increase physical activity [Weight management counseling provided] : Weight management [Healthy eating counseling provided] : healthy eating [Fall prevention counseling provided] : fall prevention  [Needs reinforcement, provided] : Patient needs reinforcement on understanding of disease, goals and obesity follow-up plan; reinforcement was provided [Low Fat Diet] : Low fat diet [Low Salt Diet] : Low salt diet [Decrease Portions] : Decrease food portions [Patient motivation] : Patient motivation [None] : None [FreeTextEntry2] : 1600 nicole diet  increase exercise  [de-identified] : 1500 nicole diet \par Advised to walk daily for a goal of 10,000 steps \par Advised PT for B/l Knee replacemment

## 2019-08-23 NOTE — PLAN
[FreeTextEntry1] : Endocrinology  -  Obesity   Patient was educated about the importance of diet and exercise.   We discussed  a goal of a BMI near 25.   Jackson is planning bariatric surgery.  Advised to see cardio for TST and GI  for endoscopy. Follow up with cardio on Friday.  Return to the office for fasting labs \par \par Viral syndrome - increase fluids and rest.  any changes in medical condition to call the office \par \par Pulmonary - WILDER -  We discussed cardiovascular, metabolic and other symptomatic ramifications of untreated WILDER.  JACKSON was advised the importance of evaluation, treatment, compliance and follow up appointments.  We also discussed the importance of  diet and exercise programs to control weight.  Advised the importance of sleep hygiene. Advised to avoid alcohol and other sedatives that tend to exacerbate WILDER.Reviewed sleep study with patient. Advised to treatment options. Patient states that she wants to get a dental appliance and mcintosh  s not believe CPAP therapy she would be compliant with doing. Still has not made an apt. with a dentist \par  \par Cardio - CAD and hyper lipidemia Advised to see cardio for TST.   Pt has hx of intolerance to statins start zetia.  \par Recheck labs fasting \par \par Orthopedic-degenerativ joint disease bilateral knees status post total knee replacement. Advise patient to continue with physical therapy. Refills Vicodin but decreased from 80 tabs to 60 tabs. Will consider decreasing next months amount.  \par \par Neurology-insomnia continue with Ambien. Advise risk alternatives benefits and side effects of medication. Did check /Istop.   Advise patient risk of taking sleep medication secondary to have an obstructive sleep apnea.\par \par Vascular- history of DVT/PE.  INR discussed, follow up with heme to see if she needs to continue warfarin therapy.  Doppler negative CTA negative \par \par immunization  will follow up with shringrix next visit \par \par We discussed the importance of healthy lifestyles which include exercise, weight control and good diet.\par Patient's questions were answered in full detail. Advise patient if any other concerns arise to please call office to have a discussion. \par

## 2019-08-23 NOTE — PHYSICAL EXAM
[No Acute Distress] : no acute distress [Well Nourished] : well nourished [Well Developed] : well developed [Well-Appearing] : well-appearing [Normal Sclera/Conjunctiva] : normal sclera/conjunctiva [EOMI] : extraocular movements intact [PERRL] : pupils equal round and reactive to light [Normal Outer Ear/Nose] : the outer ears and nose were normal in appearance [Normal Oropharynx] : the oropharynx was normal [Normal Nasal Mucosa] : the nasal mucosa was normal [Normal TMs] : both tympanic membranes were normal [No JVD] : no jugular venous distention [Supple] : supple [No Lymphadenopathy] : no lymphadenopathy [Thyroid Normal, No Nodules] : the thyroid was normal and there were no nodules present [No Accessory Muscle Use] : no accessory muscle use [No Respiratory Distress] : no respiratory distress  [Clear to Auscultation] : lungs were clear to auscultation bilaterally [Normal Rate] : normal rate  [Regular Rhythm] : with a regular rhythm [No Carotid Bruits] : no carotid bruits [Normal S1, S2] : normal S1 and S2 [No Abdominal Bruit] : a ~M bruit was not heard ~T in the abdomen [No Varicosities] : no varicosities [Pedal Pulses Present] : the pedal pulses are present [No Edema] : there was no peripheral edema [No Extremity Clubbing/Cyanosis] : no extremity clubbing/cyanosis [Soft] : abdomen soft [No Palpable Aorta] : no palpable aorta [Non-distended] : non-distended [No Masses] : no abdominal mass palpated [Non Tender] : non-tender [No HSM] : no HSM [Normal Bowel Sounds] : normal bowel sounds [Normal Anterior Cervical Nodes] : no anterior cervical lymphadenopathy [Normal Posterior Cervical Nodes] : no posterior cervical lymphadenopathy [No CVA Tenderness] : no CVA  tenderness [No Spinal Tenderness] : no spinal tenderness [Grossly Normal Strength/Tone] : grossly normal strength/tone [No Joint Swelling] : no joint swelling [No Rash] : no rash [Coordination Grossly Intact] : coordination grossly intact [Normal Gait] : normal gait [Deep Tendon Reflexes (DTR)] : deep tendon reflexes were 2+ and symmetric [No Focal Deficits] : no focal deficits [Normal Affect] : the affect was normal [Speech Grossly Normal] : speech grossly normal [Alert and Oriented x3] : oriented to person, place, and time [Normal Mood] : the mood was normal [Normal Insight/Judgement] : insight and judgment were intact [de-identified] : obese  [de-identified] : with murmur  [de-identified] : Decrease  B/L ROM Knees with slight tenderness to palpation right knee

## 2019-08-23 NOTE — HISTORY OF PRESENT ILLNESS
[Spouse] : spouse [FreeTextEntry1] : 64-year-old female who presents to the office today to have her INR checked secondary to warfarin therapy and refill on medications.   [de-identified] : A 64- year- old female who presents to office with a past medical history as noted below presents to the office for an INR check. Patient denies any abnormal bleeding .  Is currently taking 5 mg of warfarin daily.  Pt denies stopping therapy afraid to get a clot.  Has cardiologist appointment on Friday  \par Also needs a refill of her Ambien 10 mg one tab  nighttime that she takes for her insomnia. Patient states the medication works very well.  Patient denies any side effects of the medication.\par \par Still decreeing pain medication for the knee.  Feeling much better.  Just joined a gym.  \par \par States for the last few days had the chills, denies fever, cough, sob, Nausea.  Did have a sore throat in the begging of the week but went away \par

## 2019-08-23 NOTE — HEALTH RISK ASSESSMENT
[16-20] : 16-20 [Yes] : Yes [4 or more  times a week (4 pts)] : 4 or more  times a week (4 points) [1 or 2 (0 pts)] : 1 or 2 (0 points) [Never (0 pts)] : Never (0 points) [No] : In the past 12 months have you used drugs other than those required for medical reasons? No [No falls in past year] : Patient reported no falls in the past year [0] : 1) Little interest or pleasure doing things: Not at all (0) [1] : 2) Feeling down, depressed, or hopeless for several days (1) [HIV test declined] : HIV test declined [Hepatitis C test declined] : Hepatitis C test declined [None] : None [With Significant Other] : lives with significant other [# of Members in Household ___] :  household currently consist of [unfilled] member(s) [High School] : high school [Employed] : employed [Sexually Active] : sexually active [] :  [Fully functional (bathing, dressing, toileting, transferring, walking, feeding)] : Fully functional (bathing, dressing, toileting, transferring, walking, feeding) [Feels Safe at Home] : Feels safe at home [Fully functional (using the telephone, shopping, preparing meals, housekeeping, doing laundry, using] : Fully functional and needs no help or supervision to perform IADLs (using the telephone, shopping, preparing meals, housekeeping, doing laundry, using transportation, managing medications and managing finances) [Reports normal functional visual acuity (ie: able to read med bottle)] : Reports normal functional visual acuity [Smoke Detector] : smoke detector [Safety elements used in home] : safety elements used in home [Guns at Home] : guns at home [Seat Belt] :  uses seat belt [Sunscreen] : uses sunscreen [] : No [de-identified] : quit [YearQuit] : 2000 [Audit-CScore] : 4 [de-identified] : walking at work [de-identified] : regular [[id number]] : 1 [Change in mental status noted] : No change in mental status noted [Language] : denies difficulty with language [Behavior] : denies difficulty with behavior [Learning/Retaining New Information] : denies difficulty learning/retaining new information [Handling Complex Tasks] : denies difficulty handling complex tasks [Reasoning] : denies difficulty with reasoning [Spatial Ability and Orientation] : denies difficulty with spatial ability and orientation [High Risk Behavior] : no high risk behavior [Reports changes in hearing] : Reports no changes in hearing [Reports changes in vision] : Reports no changes in vision [Reports changes in dental health] : Reports no changes in dental health [TB Exposure] : is not being exposed to tuberculosis [MammogramDate] : 04/19 [ColonoscopyComments] : Repeat 5 years  Diverticulosis redundant colon  [ColonoscopyDate] : 09/18 [HIVComments] : pt. 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 reports having it done in 2017 prior to surgery [de-identified] : uses reading glasses

## 2019-09-16 ENCOUNTER — OUTPATIENT (OUTPATIENT)
Dept: OUTPATIENT SERVICES | Facility: HOSPITAL | Age: 64
LOS: 1 days | Discharge: HOME | End: 2019-09-16

## 2019-09-16 DIAGNOSIS — Z96.651 PRESENCE OF RIGHT ARTIFICIAL KNEE JOINT: Chronic | ICD-10-CM

## 2019-09-16 DIAGNOSIS — Z90.710 ACQUIRED ABSENCE OF BOTH CERVIX AND UTERUS: Chronic | ICD-10-CM

## 2019-09-16 DIAGNOSIS — D68.2 HEREDITARY DEFICIENCY OF OTHER CLOTTING FACTORS: ICD-10-CM

## 2019-09-16 DIAGNOSIS — Z96.659 PRESENCE OF UNSPECIFIED ARTIFICIAL KNEE JOINT: Chronic | ICD-10-CM

## 2019-09-16 DIAGNOSIS — Z98.890 OTHER SPECIFIED POSTPROCEDURAL STATES: Chronic | ICD-10-CM

## 2019-09-17 ENCOUNTER — RESULT REVIEW (OUTPATIENT)
Age: 64
End: 2019-09-17

## 2019-09-17 ENCOUNTER — LABORATORY RESULT (OUTPATIENT)
Age: 64
End: 2019-09-17

## 2019-09-17 ENCOUNTER — OUTPATIENT (OUTPATIENT)
Dept: OUTPATIENT SERVICES | Facility: HOSPITAL | Age: 64
LOS: 1 days | End: 2019-09-17
Payer: COMMERCIAL

## 2019-09-17 ENCOUNTER — APPOINTMENT (OUTPATIENT)
Dept: HEMATOLOGY ONCOLOGY | Facility: CLINIC | Age: 64
End: 2019-09-17
Payer: COMMERCIAL

## 2019-09-17 DIAGNOSIS — Z96.659 PRESENCE OF UNSPECIFIED ARTIFICIAL KNEE JOINT: Chronic | ICD-10-CM

## 2019-09-17 DIAGNOSIS — Z96.651 PRESENCE OF RIGHT ARTIFICIAL KNEE JOINT: Chronic | ICD-10-CM

## 2019-09-17 DIAGNOSIS — D68.2 HEREDITARY DEFICIENCY OF OTHER CLOTTING FACTORS: ICD-10-CM

## 2019-09-17 DIAGNOSIS — Z90.710 ACQUIRED ABSENCE OF BOTH CERVIX AND UTERUS: Chronic | ICD-10-CM

## 2019-09-17 DIAGNOSIS — Z98.890 OTHER SPECIFIED POSTPROCEDURAL STATES: Chronic | ICD-10-CM

## 2019-09-17 LAB
BASOPHILS # BLD AUTO: 0 K/UL — SIGNIFICANT CHANGE UP (ref 0–0.2)
BASOPHILS NFR BLD AUTO: 0.5 % — SIGNIFICANT CHANGE UP (ref 0–2)
EOSINOPHIL # BLD AUTO: 0.1 K/UL — SIGNIFICANT CHANGE UP (ref 0–0.5)
EOSINOPHIL NFR BLD AUTO: 1.6 % — SIGNIFICANT CHANGE UP (ref 0–6)
HCT VFR BLD CALC: 38.3 % — SIGNIFICANT CHANGE UP (ref 34.5–45)
HGB BLD-MCNC: 12.7 G/DL — SIGNIFICANT CHANGE UP (ref 11.5–15.5)
LYMPHOCYTES # BLD AUTO: 2.1 K/UL — SIGNIFICANT CHANGE UP (ref 1–3.3)
LYMPHOCYTES # BLD AUTO: 30.1 % — SIGNIFICANT CHANGE UP (ref 13–44)
MCHC RBC-ENTMCNC: 31.4 PG — SIGNIFICANT CHANGE UP (ref 27–34)
MCHC RBC-ENTMCNC: 33.2 G/DL — SIGNIFICANT CHANGE UP (ref 32–36)
MCV RBC AUTO: 94.8 FL — SIGNIFICANT CHANGE UP (ref 80–100)
MONOCYTES # BLD AUTO: 0.5 K/UL — SIGNIFICANT CHANGE UP (ref 0–0.9)
MONOCYTES NFR BLD AUTO: 7.6 % — SIGNIFICANT CHANGE UP (ref 2–14)
NEUTROPHILS # BLD AUTO: 4.2 K/UL — SIGNIFICANT CHANGE UP (ref 1.8–7.4)
NEUTROPHILS NFR BLD AUTO: 60.1 % — SIGNIFICANT CHANGE UP (ref 43–77)
PLATELET # BLD AUTO: 254 K/UL — SIGNIFICANT CHANGE UP (ref 150–400)
RBC # BLD: 4.04 M/UL — SIGNIFICANT CHANGE UP (ref 3.8–5.2)
RBC # FLD: 12.9 % — SIGNIFICANT CHANGE UP (ref 10.3–14.5)
WBC # BLD: 7.1 K/UL — SIGNIFICANT CHANGE UP (ref 3.8–10.5)
WBC # FLD AUTO: 7.1 K/UL — SIGNIFICANT CHANGE UP (ref 3.8–10.5)

## 2019-09-17 PROCEDURE — 81241 F5 GENE: CPT

## 2019-09-17 PROCEDURE — 99204 OFFICE O/P NEW MOD 45 MIN: CPT

## 2019-09-17 PROCEDURE — G0452: CPT | Mod: 26

## 2019-09-17 PROCEDURE — 81240 F2 GENE: CPT

## 2019-09-17 NOTE — HISTORY OF PRESENT ILLNESS
[0 - No Distress] : Distress Level: 0 [de-identified] : 65 yo woman with PMhx of morbid obesity scheduled for bariatric surgery, referred for clearance. Patient has history of thrombosis. \par \par Patient developed a PE and a DVT in bilateral lower extremities in 2012 while on Premarin. Patient was sedentary at work at that time. Patient has been on anticoagulation Coumadin since 2012. Tolerated well, no bleeding episodes. \par \par Denies fevers, night sweats or weight loss.

## 2019-09-17 NOTE — PHYSICAL EXAM
[Obese] : obese [Normal] : affect appropriate [Fully active, able to carry on all pre-disease performance without restriction] : Status 0 - Fully active, able to carry on all pre-disease performance without restriction

## 2019-09-17 NOTE — ASSESSMENT
[FreeTextEntry1] : 65 yo woman with PMhx of morbid obesity scheduled for bariatric surgery, referred for clearance. Patient has history of thrombosis. \par \par Patient developed a PE and a DVT in bilateral lower extremities in 2012 while on Premarin. Patient was sedentary at work at that time. Patient has been on anticoagulation Coumadin since 2012.\par \par I had a detailed discussion today with the patient regarding the natural history, epidemiology, diagnosis and treatment of  an unprovoked thrombosis .Patient has been on Coumadin since 2012, recommendations in the literature is to anticoagulate for a year.  If her hypercoagulable work up is negative; she can come off Coumadin. For the bariatric surgery, she will need to be on Lovenox 40 mg daily for two weeks after surgery.  I answered all her questions to satisfaction.\par \par Greater than 50% of the encounter time was spent on counseling and coordination of care for  thrombosis    and I have spent  45   minutes of face to face time with the patient.\par \par RTC 6 weeks. \par \par \par

## 2019-09-17 NOTE — CONSULT LETTER
[Dear  ___] : Dear  [unfilled], [Consult Letter:] : I had the pleasure of evaluating your patient, [unfilled]. [Please see my note below.] : Please see my note below. [Consult Closing:] : Thank you very much for allowing me to participate in the care of this patient.  If you have any questions, please do not hesitate to contact me. [Sincerely,] : Sincerely, [FreeTextEntry2] : Dr Katelyn Ivan

## 2019-09-17 NOTE — REVIEW OF SYSTEMS
[Negative] : Allergic/Immunologic [Fatigue] : fatigue [FreeTextEntry9] : bilateral knees s/p knee replacement

## 2019-09-19 LAB
ALBUMIN SERPL ELPH-MCNC: 4.5 G/DL
ALP BLD-CCNC: 83 U/L
ALT SERPL-CCNC: 16 U/L
ANION GAP SERPL CALC-SCNC: 11 MMOL/L
AST SERPL-CCNC: 17 U/L
AT III PPP CHRO-ACNC: 107 %
B2 GLYCOPROT1 AB SER QL: POSITIVE
BILIRUB SERPL-MCNC: 0.4 MG/DL
BUN SERPL-MCNC: 15 MG/DL
CALCIUM SERPL-MCNC: 9.7 MG/DL
CARDIOLIPIN AB SER IA-ACNC: POSITIVE
CHLORIDE SERPL-SCNC: 104 MMOL/L
CO2 SERPL-SCNC: 25 MMOL/L
CREAT SERPL-MCNC: 0.64 MG/DL
GLUCOSE SERPL-MCNC: 99 MG/DL
POTASSIUM SERPL-SCNC: 3.9 MMOL/L
PROT C PPP CHRO-ACNC: 90 %
PROT SERPL-MCNC: 7.3 G/DL
SODIUM SERPL-SCNC: 140 MMOL/L

## 2019-09-21 LAB
DNA PLOIDY SPEC FC-IMP: SIGNIFICANT CHANGE UP
PTR INTERPRETATION: SIGNIFICANT CHANGE UP

## 2019-09-24 LAB — PROT S FREE PPP-ACNC: 55 % NORMAL

## 2019-09-27 ENCOUNTER — MED ADMIN CHARGE (OUTPATIENT)
Age: 64
End: 2019-09-27

## 2019-09-27 ENCOUNTER — APPOINTMENT (OUTPATIENT)
Dept: INTERNAL MEDICINE | Facility: CLINIC | Age: 64
End: 2019-09-27
Payer: COMMERCIAL

## 2019-09-27 ENCOUNTER — APPOINTMENT (OUTPATIENT)
Dept: GASTROENTEROLOGY | Facility: CLINIC | Age: 64
End: 2019-09-27
Payer: COMMERCIAL

## 2019-09-27 VITALS
HEART RATE: 74 BPM | DIASTOLIC BLOOD PRESSURE: 82 MMHG | OXYGEN SATURATION: 98 % | BODY MASS INDEX: 38.32 KG/M2 | TEMPERATURE: 97.9 F | HEIGHT: 65 IN | SYSTOLIC BLOOD PRESSURE: 120 MMHG | WEIGHT: 230 LBS

## 2019-09-27 VITALS
OXYGEN SATURATION: 98 % | SYSTOLIC BLOOD PRESSURE: 124 MMHG | TEMPERATURE: 98.7 F | BODY MASS INDEX: 37.45 KG/M2 | HEIGHT: 66 IN | HEART RATE: 85 BPM | DIASTOLIC BLOOD PRESSURE: 80 MMHG | WEIGHT: 233 LBS | RESPIRATION RATE: 16 BRPM

## 2019-09-27 DIAGNOSIS — Z80.0 FAMILY HISTORY OF MALIGNANT NEOPLASM OF DIGESTIVE ORGANS: ICD-10-CM

## 2019-09-27 DIAGNOSIS — Z86.69 PERSONAL HISTORY OF OTHER DISEASES OF THE NERVOUS SYSTEM AND SENSE ORGANS: ICD-10-CM

## 2019-09-27 DIAGNOSIS — Z78.9 OTHER SPECIFIED HEALTH STATUS: ICD-10-CM

## 2019-09-27 PROCEDURE — 99204 OFFICE O/P NEW MOD 45 MIN: CPT

## 2019-09-27 PROCEDURE — 90686 IIV4 VACC NO PRSV 0.5 ML IM: CPT

## 2019-09-27 PROCEDURE — 90471 IMMUNIZATION ADMIN: CPT

## 2019-09-27 PROCEDURE — 36415 COLL VENOUS BLD VENIPUNCTURE: CPT

## 2019-09-27 PROCEDURE — 90472 IMMUNIZATION ADMIN EACH ADD: CPT

## 2019-09-27 PROCEDURE — 90750 HZV VACC RECOMBINANT IM: CPT

## 2019-09-27 PROCEDURE — 99214 OFFICE O/P EST MOD 30 MIN: CPT | Mod: 25

## 2019-09-27 NOTE — PHYSICAL EXAM
[No Acute Distress] : no acute distress [Well Nourished] : well nourished [Well Developed] : well developed [Well-Appearing] : well-appearing [PERRL] : pupils equal round and reactive to light [Normal Sclera/Conjunctiva] : normal sclera/conjunctiva [EOMI] : extraocular movements intact [Normal Outer Ear/Nose] : the outer ears and nose were normal in appearance [Normal Oropharynx] : the oropharynx was normal [Normal TMs] : both tympanic membranes were normal [Normal Nasal Mucosa] : the nasal mucosa was normal [No JVD] : no jugular venous distention [Supple] : supple [No Lymphadenopathy] : no lymphadenopathy [Thyroid Normal, No Nodules] : the thyroid was normal and there were no nodules present [No Respiratory Distress] : no respiratory distress  [Clear to Auscultation] : lungs were clear to auscultation bilaterally [Normal Rate] : normal rate  [No Accessory Muscle Use] : no accessory muscle use [Normal S1, S2] : normal S1 and S2 [Regular Rhythm] : with a regular rhythm [No Carotid Bruits] : no carotid bruits [No Abdominal Bruit] : a ~M bruit was not heard ~T in the abdomen [No Varicosities] : no varicosities [Pedal Pulses Present] : the pedal pulses are present [No Edema] : there was no peripheral edema [No Extremity Clubbing/Cyanosis] : no extremity clubbing/cyanosis [No Palpable Aorta] : no palpable aorta [Soft] : abdomen soft [Non Tender] : non-tender [Non-distended] : non-distended [No Masses] : no abdominal mass palpated [No HSM] : no HSM [Normal Bowel Sounds] : normal bowel sounds [Normal Posterior Cervical Nodes] : no posterior cervical lymphadenopathy [Normal Anterior Cervical Nodes] : no anterior cervical lymphadenopathy [No CVA Tenderness] : no CVA  tenderness [No Spinal Tenderness] : no spinal tenderness [No Joint Swelling] : no joint swelling [Grossly Normal Strength/Tone] : grossly normal strength/tone [Normal Gait] : normal gait [No Rash] : no rash [Coordination Grossly Intact] : coordination grossly intact [No Focal Deficits] : no focal deficits [Deep Tendon Reflexes (DTR)] : deep tendon reflexes were 2+ and symmetric [Speech Grossly Normal] : speech grossly normal [Normal Affect] : the affect was normal [Alert and Oriented x3] : oriented to person, place, and time [Normal Mood] : the mood was normal [Normal Insight/Judgement] : insight and judgment were intact [de-identified] : obese  [de-identified] : Still with Decrease  B/L ROM Knees with slight tenderness to palpation right knee  [de-identified] : with murmur

## 2019-09-27 NOTE — COUNSELING
[Potential consequences of obesity discussed] : Potential consequences of obesity discussed [Encouraged to maintain food diary] : Encouraged to maintain food diary [Encouraged to increase physical activity] : Encouraged to increase physical activity [Weight management counseling provided] : Weight management [Fall prevention counseling provided] : fall prevention  [Healthy eating counseling provided] : healthy eating [Low Fat Diet] : Low fat diet [Needs reinforcement, provided] : Patient needs reinforcement on understanding of disease, goals and obesity follow-up plan; reinforcement was provided [Low Salt Diet] : Low salt diet [Patient motivation] : Patient motivation [Decrease Portions] : Decrease food portions [None] : None [AUDIT-C Screening administered and reviewed] : AUDIT-C Screening administered and reviewed [FreeTextEntry2] : 1600 nicole diet  increase exercise  [de-identified] : 1500 nicole diet \par Advised to walk daily for a goal of 10,000 steps \par Advised PT for B/l Knee replacemment

## 2019-09-27 NOTE — REVIEW OF SYSTEMS
[Recent Change In Weight] : ~T recent weight change [Joint Stiffness] : joint stiffness [Joint Pain] : joint pain [Insomnia] : insomnia [Negative] : Heme/Lymph [FreeTextEntry9] : knee pain

## 2019-09-27 NOTE — ASSESSMENT
[FreeTextEntry1] : A 64-year-old female with degenerative joint disease, insomnia, obesity and sleep apnea presents to the office for INR check and update on immunization

## 2019-09-27 NOTE — PHYSICAL EXAM
[General Appearance - Alert] : alert [General Appearance - In No Acute Distress] : in no acute distress [Neck Appearance] : the appearance of the neck was normal [Neck Cervical Mass (___cm)] : no neck mass was observed [Jugular Venous Distention Increased] : there was no jugular-venous distention [Thyroid Diffuse Enlargement] : the thyroid was not enlarged [Thyroid Nodule] : there were no palpable thyroid nodules [Auscultation Breath Sounds / Voice Sounds] : lungs were clear to auscultation bilaterally [Heart Rate And Rhythm] : heart rate was normal and rhythm regular [Heart Sounds] : normal S1 and S2 [Heart Sounds Gallop] : no gallops [Heart Sounds Pericardial Friction Rub] : no pericardial rub [Murmurs] : no murmurs [Edema] : there was no peripheral edema [Bowel Sounds] : normal bowel sounds [Abdomen Soft] : soft [Abdomen Tenderness] : non-tender [Abdomen Mass (___ Cm)] : no abdominal mass palpated [] : no hepato-splenomegaly [No CVA Tenderness] : no ~M costovertebral angle tenderness [No Spinal Tenderness] : no spinal tenderness [Oriented To Time, Place, And Person] : oriented to person, place, and time [Impaired Insight] : insight and judgment were intact [Affect] : the affect was normal

## 2019-09-27 NOTE — HISTORY OF PRESENT ILLNESS
[FreeTextEntry1] : Check up [de-identified] : A 64 year old female who present to the office for a check up, I NR check, and update on immunizations.  [place] States she is still taking the pain medication for b/l knee pain.  Is hoping once her weight come off (sleeve gastrectomy)that she can and be completely off medication. \par States she has a stress scheduled for next week.  Denies chest pain.  Still taking warfarin.  Did see hematologist and if blood work is normal than she will stop warfarin therapy.  Denies any abnormal bleeding. \par Would like to get a flu shot and shrngrix today.  Denies fever

## 2019-09-27 NOTE — PLAN
[FreeTextEntry1] : Endocrinology  -  Obesity   Patient was educated about the importance of diet and exercise.   We discussed  a goal of a BMI near 25.   Jackson is planning bariatric surgery.  Schedule to get TST done n\par \par Pulmonary - WILDER -  We discussed cardiovascular, metabolic and other symptomatic ramifications of untreated WILDER.  JACKSON was advised the importance of evaluation, treatment, compliance and follow up appointments.  We also discussed the importance of  diet and exercise programs to control weight.  Advised the importance of sleep hygiene. Advised to avoid alcohol and other sedatives that tend to exacerbate WILDER.Reviewed sleep study with patient. Advised to treatment options. Patient states that she wants to get a dental appliance and mcintosh  s not believe CPAP therapy she would be compliant with doing. Still has not made an apt. with a dentist for dental appliance \par  \par Cardio - CAD and hyperlipidemia Advised to see cardio for TST.   Pt has hx of intolerance to statins on zetia.  \par \par \par Orthopedic-degenerative joint disease bilateral knees status post total knee replacement. Advise patient to continue with physical therapy. Refills Vicodin but decreased from 80 tabs to 60 tabs. Will consider decreasing next months amount.  \par \par Neurology-insomnia - refilled  Ambien. Advise risk alternatives benefits and side effects of medication. Did check / I-stop.   Advise patient risk of taking sleep medication secondary to have an obstructive sleep apnea.\par \par Vascular- history of DVT/PE.  INR discussed, follow up with heme to see if she needs to continue warfarin therapy.  Doppler negative CTA negative. Pending genetic testing \par \par Immunization  Flu shot and Shingrix was given today.  discussed side effects.  Return to the office 2 months for the second dose of Shingrix \par \par We discussed the importance of healthy lifestyles which include exercise, weight control and good diet.\par Patient's questions were answered in full detail. Advise patient if any other concerns arise to please call office to have a discussion. \par

## 2019-09-27 NOTE — HEALTH RISK ASSESSMENT
[16-20] : 16-20 [Yes] : Yes [1 or 2 (0 pts)] : 1 or 2 (0 points) [4 or more  times a week (4 pts)] : 4 or more  times a week (4 points) [Never (0 pts)] : Never (0 points) [No] : In the past 12 months have you used drugs other than those required for medical reasons? No [0] : 1) Little interest or pleasure doing things: Not at all (0) [No falls in past year] : Patient reported no falls in the past year [1] : 2) Feeling down, depressed, or hopeless for several days (1) [HIV test declined] : HIV test declined [Hepatitis C test declined] : Hepatitis C test declined [None] : None [With Significant Other] : lives with significant other [# of Members in Household ___] :  household currently consist of [unfilled] member(s) [High School] : high school [Employed] : employed [Sexually Active] : sexually active [] :  [Fully functional (bathing, dressing, toileting, transferring, walking, feeding)] : Fully functional (bathing, dressing, toileting, transferring, walking, feeding) [Feels Safe at Home] : Feels safe at home [Fully functional (using the telephone, shopping, preparing meals, housekeeping, doing laundry, using] : Fully functional and needs no help or supervision to perform IADLs (using the telephone, shopping, preparing meals, housekeeping, doing laundry, using transportation, managing medications and managing finances) [Reports normal functional visual acuity (ie: able to read med bottle)] : Reports normal functional visual acuity [Smoke Detector] : smoke detector [Guns at Home] : guns at home [Seat Belt] :  uses seat belt [Safety elements used in home] : safety elements used in home [Sunscreen] : uses sunscreen [] : No [YearQuit] : 2000 [de-identified] : quit [Audit-CScore] : 4 [de-identified] : walking at work [de-identified] : regular [UNG0Zseic] : 1 [Change in mental status noted] : No change in mental status noted [Behavior] : denies difficulty with behavior [Language] : denies difficulty with language [Handling Complex Tasks] : denies difficulty handling complex tasks [Learning/Retaining New Information] : denies difficulty learning/retaining new information [Spatial Ability and Orientation] : denies difficulty with spatial ability and orientation [Reasoning] : denies difficulty with reasoning [High Risk Behavior] : no high risk behavior [Reports changes in hearing] : Reports no changes in hearing [Reports changes in vision] : Reports no changes in vision [Reports changes in dental health] : Reports no changes in dental health [TB Exposure] : is not being exposed to tuberculosis [MammogramDate] : 04/19 [ColonoscopyComments] : Repeat 5 years  Diverticulosis redundant colon  [ColonoscopyDate] : 09/18 [HepatitisCComments] : pt. reports having it done in 2017 prior to surgery [HIVComments] : pt. 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

## 2019-09-30 NOTE — ASSESSMENT
[FreeTextEntry1] : Patient undergoing pre-bariatric surgery evaluation.  She will require an EGD.  She is currently on warfarin for a history of DVT and is being evaluated by hematology.\par \par We will plan on an EGD.  The risks, benefits, alternatives, and limitations of the procedure were explained.  We will wait to see if the patient is able to come off of warfarin.  If she is not off of it, we will need approval to hold the warfarin for 4 days and leave the decision as to whether she needs any kind of bridging with low molecular weight heparin to the hematologist.  We will also await the patient's stress test next month.  Once she is appropriately cleared, the EGD will be scheduled.\par \par I advised the patient that she should have a repeat colonoscopy at some point given her prep but she wishes to focus on the bariatric surgery first.

## 2019-09-30 NOTE — HISTORY OF PRESENT ILLNESS
[FreeTextEntry1] : The patient is a 64-year-old woman with a history of a DVT who is currently on warfarin who is being evaluated for a gastric sleeve for her BMI of 38.27.  As part of the pre-bariatric evaluation, she will need EGD.  She denies abdominal pain, heartburn, dysphasia.  She has a solid bowel movement every 1 to 2 days and denies diarrhea, constipation, melena, bright red blood per rectum.  The patient's weight is stable.  She had a colonoscopy on September 7, 2018 which apparently had a fair to poor preparation but the patient was advised to have another exam in 5 years.  The patient may be coming off of warfarin soon.  The patient has not been hospitalized in the past year.  She is to have a stress test in October as part of her cardiac evaluation.

## 2019-09-30 NOTE — REASON FOR VISIT
[Initial Evaluation] : an initial evaluation [FreeTextEntry1] : pre-bariatric surgery assessment, EGD

## 2019-09-30 NOTE — CONSULT LETTER
[FreeTextEntry1] : Dear Dr. Austyn Orona and Dr. Olga Oneill,\par \par I had the pleasure of seeing your patient JACKSON CARDOZA in the office today.  My office note is attached.\par \par Thank you very much for allowing me to participate in the care of your patient.\par \par Sincerely,\par \par Dakota Meraz M.D., FACG, FACP\par Director, Celiac Program at [facility]\par  of Medicine\par [employer] at [facility] Practice Director,\par [facility] - Gastroenterology/Internal Medicine at [place]\[facility] [address]\[place] [address]\par Tel: [phone]\par Email: [email or web address]\par \par \par The attached note has been created using a voice recognition system (Dragon).  There may be some misspellings and typos.  Please call my office if you have any issues or questions.

## 2019-10-07 LAB
INR PPP: 2.14 RATIO
PT BLD: 24.8 SEC

## 2019-10-18 ENCOUNTER — APPOINTMENT (OUTPATIENT)
Dept: INTERNAL MEDICINE | Facility: CLINIC | Age: 64
End: 2019-10-18
Payer: COMMERCIAL

## 2019-10-18 VITALS
SYSTOLIC BLOOD PRESSURE: 172 MMHG | TEMPERATURE: 97.8 F | RESPIRATION RATE: 16 BRPM | WEIGHT: 231.13 LBS | HEIGHT: 65 IN | DIASTOLIC BLOOD PRESSURE: 100 MMHG | BODY MASS INDEX: 38.51 KG/M2 | OXYGEN SATURATION: 97 % | HEART RATE: 80 BPM

## 2019-10-18 VITALS — DIASTOLIC BLOOD PRESSURE: 92 MMHG | SYSTOLIC BLOOD PRESSURE: 156 MMHG

## 2019-10-18 LAB
BILIRUB UR QL STRIP: NEGATIVE
CLARITY UR: CLEAR
COLLECTION METHOD: NORMAL
GLUCOSE UR-MCNC: NEGATIVE
HCG UR QL: 0.2 EU/DL
HGB UR QL STRIP.AUTO: ABNORMAL
KETONES UR-MCNC: NEGATIVE
LEUKOCYTE ESTERASE UR QL STRIP: NORMAL
NITRITE UR QL STRIP: NEGATIVE
PH UR STRIP: 5
PROT UR STRIP-MCNC: NEGATIVE
SP GR UR STRIP: 1.03

## 2019-10-18 PROCEDURE — 81003 URINALYSIS AUTO W/O SCOPE: CPT | Mod: QW

## 2019-10-18 PROCEDURE — 36415 COLL VENOUS BLD VENIPUNCTURE: CPT

## 2019-10-18 PROCEDURE — 99213 OFFICE O/P EST LOW 20 MIN: CPT | Mod: 25

## 2019-10-18 NOTE — PLAN
[FreeTextEntry1] : Endocrinology  -  Obesity   Patient was educated about the importance of diet and exercise.   We discussed  a goal of a BMI near 25.   Jackson is planning bariatric surgery.  \par Has rx for blood work to be done for surgery \par \par Pulmonary - WILDER -  We discussed cardiovascular, metabolic and other symptomatic ramifications of untreated WILDER.  JACKSON was advised the importance of evaluation, treatment, compliance and follow up appointments.  We also discussed the importance of  diet and exercise programs to control weight.  Advised the importance of sleep hygiene. Advised to avoid alcohol and other sedatives that tend to exacerbate WILDER.Reviewed sleep study with patient. Advised to treatment options. Patient states that she wants to get a dental appliance and mcintosh  s not believe CPAP therapy she would be compliant with doing. Still has not made an apt. with a dentist for dental appliance \par  \par Cardio - CAD and hyperlipidemia Advised to see cardio for TST.   Pt has hx of intolerance to statins on zetia.  \par Anticardiolipin Ab- Continue with warfarin therapy.  Renewed medication \par Check fasting labs today \par \par Orthopedic-degenerative joint disease bilateral knees status post total knee replacement. Advise patient to continue with physical therapy. Refills Vicodin but decreased from 80 tabs to 60 tabs. Will consider decreasing next months amount.  \par \par Neurology-insomnia - refilled  Ambien. Advise risk alternatives benefits and side effects of medication. Did check / I-stop.   Advise patient risk of taking sleep medication secondary to have an obstructive sleep apnea.\par \par Vascular- history of DVT/PE.  INR discussed, continue warfarin therapy.  Doppler negative CTA negative. Pending genetic testing \par \par \par We discussed the importance of healthy lifestyles which include exercise, weight control and good diet.\par Patient's questions were answered in full detail. Advise patient if any other concerns arise to please call office to have a discussion. \par

## 2019-10-18 NOTE — HISTORY OF PRESENT ILLNESS
[FreeTextEntry1] : Fasting labs and a check up [de-identified] : A 64-year-old female, with a past medical history as noted below, presents to the office today for fasting blood work and a general checkup.  Patient states she got a call from hematology who recommended she stay on warfarin for the remainder of her life secondary to genetic factors.  Patient states she has a followup with hematologist next week\par \par

## 2019-10-18 NOTE — COUNSELING
[AUDIT-C Screening administered and reviewed] : AUDIT-C Screening administered and reviewed [Potential consequences of obesity discussed] : Potential consequences of obesity discussed [Encouraged to increase physical activity] : Encouraged to increase physical activity [Encouraged to maintain food diary] : Encouraged to maintain food diary [Weight management counseling provided] : Weight management [Fall prevention counseling provided] : fall prevention  [Healthy eating counseling provided] : healthy eating [Needs reinforcement, provided] : Patient needs reinforcement on understanding of disease, goals and obesity follow-up plan; reinforcement was provided [Low Fat Diet] : Low fat diet [Low Salt Diet] : Low salt diet [Patient motivation] : Patient motivation [Decrease Portions] : Decrease food portions [None] : None [FreeTextEntry2] : 1600 nicole diet  increase exercise  [de-identified] : 1500 nicole diet \par Advised to walk daily for a goal of 10,000 steps \par Advised PT for B/l Knee replacemment

## 2019-10-18 NOTE — ASSESSMENT
[FreeTextEntry1] : A 64-year-old female with degenerative joint disease, insomnia, obesity and sleep apnea presents to the office for INR check and fasting labs

## 2019-10-18 NOTE — HEALTH RISK ASSESSMENT
[16-20] : 16-20 [Yes] : Yes [4 or more  times a week (4 pts)] : 4 or more  times a week (4 points) [1 or 2 (0 pts)] : 1 or 2 (0 points) [Never (0 pts)] : Never (0 points) [No] : In the past 12 months have you used drugs other than those required for medical reasons? No [No falls in past year] : Patient reported no falls in the past year [0] : 1) Little interest or pleasure doing things: Not at all (0) [1] : 2) Feeling down, depressed, or hopeless for several days (1) [] : No [de-identified] : quit [YearQuit] : 2000 [Audit-CScore] : 4 [de-identified] : walking at work [de-identified] : regular [PUX4Wnxet] : 1

## 2019-10-18 NOTE — PHYSICAL EXAM
[Well Nourished] : well nourished [No Acute Distress] : no acute distress [Well Developed] : well developed [Well-Appearing] : well-appearing [Normal Sclera/Conjunctiva] : normal sclera/conjunctiva [EOMI] : extraocular movements intact [PERRL] : pupils equal round and reactive to light [Normal Outer Ear/Nose] : the outer ears and nose were normal in appearance [Normal Oropharynx] : the oropharynx was normal [Normal TMs] : both tympanic membranes were normal [Normal Nasal Mucosa] : the nasal mucosa was normal [No JVD] : no jugular venous distention [Supple] : supple [No Lymphadenopathy] : no lymphadenopathy [Thyroid Normal, No Nodules] : the thyroid was normal and there were no nodules present [No Respiratory Distress] : no respiratory distress  [Clear to Auscultation] : lungs were clear to auscultation bilaterally [No Accessory Muscle Use] : no accessory muscle use [Regular Rhythm] : with a regular rhythm [Normal Rate] : normal rate  [Normal S1, S2] : normal S1 and S2 [No Carotid Bruits] : no carotid bruits [No Abdominal Bruit] : a ~M bruit was not heard ~T in the abdomen [No Varicosities] : no varicosities [Pedal Pulses Present] : the pedal pulses are present [No Edema] : there was no peripheral edema [No Extremity Clubbing/Cyanosis] : no extremity clubbing/cyanosis [No Palpable Aorta] : no palpable aorta [Soft] : abdomen soft [Non Tender] : non-tender [Non-distended] : non-distended [No Masses] : no abdominal mass palpated [Normal Bowel Sounds] : normal bowel sounds [No HSM] : no HSM [Normal Posterior Cervical Nodes] : no posterior cervical lymphadenopathy [Normal Anterior Cervical Nodes] : no anterior cervical lymphadenopathy [No Spinal Tenderness] : no spinal tenderness [No CVA Tenderness] : no CVA  tenderness [No Joint Swelling] : no joint swelling [Grossly Normal Strength/Tone] : grossly normal strength/tone [No Rash] : no rash [Normal Gait] : normal gait [Coordination Grossly Intact] : coordination grossly intact [No Focal Deficits] : no focal deficits [Speech Grossly Normal] : speech grossly normal [Deep Tendon Reflexes (DTR)] : deep tendon reflexes were 2+ and symmetric [Normal Affect] : the affect was normal [Alert and Oriented x3] : oriented to person, place, and time [Normal Mood] : the mood was normal [Normal Insight/Judgement] : insight and judgment were intact [de-identified] : obese  [de-identified] : with murmur  [de-identified] : Still with Decrease  B/L ROM Knees with slight tenderness to palpation right knee

## 2019-10-18 NOTE — REVIEW OF SYSTEMS
[Recent Change In Weight] : ~T recent weight change [Joint Pain] : joint pain [Insomnia] : insomnia [Joint Stiffness] : joint stiffness [Negative] : Heme/Lymph [FreeTextEntry9] : knee pain

## 2019-10-23 ENCOUNTER — OUTPATIENT (OUTPATIENT)
Dept: OUTPATIENT SERVICES | Facility: HOSPITAL | Age: 64
LOS: 1 days | Discharge: HOME | End: 2019-10-23

## 2019-10-23 ENCOUNTER — RX RENEWAL (OUTPATIENT)
Age: 64
End: 2019-10-23

## 2019-10-23 DIAGNOSIS — Z96.651 PRESENCE OF RIGHT ARTIFICIAL KNEE JOINT: Chronic | ICD-10-CM

## 2019-10-23 DIAGNOSIS — Z96.659 PRESENCE OF UNSPECIFIED ARTIFICIAL KNEE JOINT: Chronic | ICD-10-CM

## 2019-10-23 DIAGNOSIS — Z98.890 OTHER SPECIFIED POSTPROCEDURAL STATES: Chronic | ICD-10-CM

## 2019-10-23 DIAGNOSIS — D68.2 HEREDITARY DEFICIENCY OF OTHER CLOTTING FACTORS: ICD-10-CM

## 2019-10-23 DIAGNOSIS — Z90.710 ACQUIRED ABSENCE OF BOTH CERVIX AND UTERUS: Chronic | ICD-10-CM

## 2019-10-27 LAB
25(OH)D3 SERPL-MCNC: 24.1 NG/ML
ALBUMIN SERPL ELPH-MCNC: 4.7 G/DL
ALP BLD-CCNC: 100 U/L
ALT SERPL-CCNC: 19 U/L
ANION GAP SERPL CALC-SCNC: 15 MMOL/L
AST SERPL-CCNC: 21 U/L
BASOPHILS # BLD AUTO: 0.05 K/UL
BASOPHILS NFR BLD AUTO: 0.6 %
BILIRUB SERPL-MCNC: 0.4 MG/DL
BUN SERPL-MCNC: 16 MG/DL
CALCIUM SERPL-MCNC: 10 MG/DL
CALCIUM SERPL-MCNC: 10 MG/DL
CHLORIDE SERPL-SCNC: 100 MMOL/L
CHOLEST SERPL-MCNC: 270 MG/DL
CHOLEST/HDLC SERPL: 5.1 RATIO
CO2 SERPL-SCNC: 24 MMOL/L
CREAT SERPL-MCNC: 0.69 MG/DL
EOSINOPHIL # BLD AUTO: 0.17 K/UL
EOSINOPHIL NFR BLD AUTO: 2.2 %
ESTIMATED AVERAGE GLUCOSE: 105 MG/DL
FERRITIN SERPL-MCNC: 97 NG/ML
FOLATE SERPL-MCNC: 11.1 NG/ML
GLUCOSE SERPL-MCNC: 89 MG/DL
HBA1C MFR BLD HPLC: 5.3 %
HCT VFR BLD CALC: 40.1 %
HDLC SERPL-MCNC: 53 MG/DL
HGB BLD-MCNC: 12.6 G/DL
IMM GRANULOCYTES NFR BLD AUTO: 0.3 %
INR PPP: 2.66 RATIO
IRON SATN MFR SERPL: 18 %
IRON SERPL-MCNC: 62 UG/DL
LDLC SERPL CALC-MCNC: 192 MG/DL
LYMPHOCYTES # BLD AUTO: 2.84 K/UL
LYMPHOCYTES NFR BLD AUTO: 36.5 %
MAGNESIUM SERPL-MCNC: 2.1 MG/DL
MAN DIFF?: NORMAL
MCHC RBC-ENTMCNC: 30.7 PG
MCHC RBC-ENTMCNC: 31.4 GM/DL
MCV RBC AUTO: 97.6 FL
MONOCYTES # BLD AUTO: 0.68 K/UL
MONOCYTES NFR BLD AUTO: 8.7 %
NEUTROPHILS # BLD AUTO: 4.02 K/UL
NEUTROPHILS NFR BLD AUTO: 51.7 %
PARATHYROID HORMONE INTACT: 49 PG/ML
PLATELET # BLD AUTO: 276 K/UL
POTASSIUM SERPL-SCNC: 4.6 MMOL/L
PROT SERPL-MCNC: 7.9 G/DL
PT BLD: 31 SEC
RBC # BLD: 4.11 M/UL
RBC # FLD: 12.7 %
SODIUM SERPL-SCNC: 139 MMOL/L
TIBC SERPL-MCNC: 338 UG/DL
TRIGL SERPL-MCNC: 123 MG/DL
TSH SERPL-ACNC: 1.57 UIU/ML
UIBC SERPL-MCNC: 276 UG/DL
VIT B1 SERPL-MCNC: 116 NMOL/L
VIT B12 SERPL-MCNC: 466 PG/ML
WBC # FLD AUTO: 7.78 K/UL

## 2019-11-01 ENCOUNTER — APPOINTMENT (OUTPATIENT)
Dept: HEMATOLOGY ONCOLOGY | Facility: CLINIC | Age: 64
End: 2019-11-01
Payer: COMMERCIAL

## 2019-11-01 ENCOUNTER — RESULT REVIEW (OUTPATIENT)
Age: 64
End: 2019-11-01

## 2019-11-01 VITALS
TEMPERATURE: 97.7 F | OXYGEN SATURATION: 96 % | DIASTOLIC BLOOD PRESSURE: 91 MMHG | HEART RATE: 73 BPM | RESPIRATION RATE: 18 BRPM | SYSTOLIC BLOOD PRESSURE: 162 MMHG | BODY MASS INDEX: 38.89 KG/M2 | WEIGHT: 233.69 LBS

## 2019-11-01 LAB
BASOPHILS # BLD AUTO: 0.1 K/UL — SIGNIFICANT CHANGE UP (ref 0–0.2)
BASOPHILS NFR BLD AUTO: 1.1 % — SIGNIFICANT CHANGE UP (ref 0–2)
EOSINOPHIL # BLD AUTO: 0.2 K/UL — SIGNIFICANT CHANGE UP (ref 0–0.5)
EOSINOPHIL NFR BLD AUTO: 2 % — SIGNIFICANT CHANGE UP (ref 0–6)
HCT VFR BLD CALC: 37.1 % — SIGNIFICANT CHANGE UP (ref 34.5–45)
HGB BLD-MCNC: 12.9 G/DL — SIGNIFICANT CHANGE UP (ref 11.5–15.5)
LYMPHOCYTES # BLD AUTO: 2.7 K/UL — SIGNIFICANT CHANGE UP (ref 1–3.3)
LYMPHOCYTES # BLD AUTO: 33.1 % — SIGNIFICANT CHANGE UP (ref 13–44)
MCHC RBC-ENTMCNC: 32.5 PG — SIGNIFICANT CHANGE UP (ref 27–34)
MCHC RBC-ENTMCNC: 34.7 G/DL — SIGNIFICANT CHANGE UP (ref 32–36)
MCV RBC AUTO: 93.6 FL — SIGNIFICANT CHANGE UP (ref 80–100)
MONOCYTES # BLD AUTO: 0.7 K/UL — SIGNIFICANT CHANGE UP (ref 0–0.9)
MONOCYTES NFR BLD AUTO: 8.6 % — SIGNIFICANT CHANGE UP (ref 2–14)
NEUTROPHILS # BLD AUTO: 4.6 K/UL — SIGNIFICANT CHANGE UP (ref 1.8–7.4)
NEUTROPHILS NFR BLD AUTO: 55.2 % — SIGNIFICANT CHANGE UP (ref 43–77)
PLATELET # BLD AUTO: 245 K/UL — SIGNIFICANT CHANGE UP (ref 150–400)
RBC # BLD: 3.96 M/UL — SIGNIFICANT CHANGE UP (ref 3.8–5.2)
RBC # FLD: 11.6 % — SIGNIFICANT CHANGE UP (ref 10.3–14.5)
WBC # BLD: 8.3 K/UL — SIGNIFICANT CHANGE UP (ref 3.8–10.5)
WBC # FLD AUTO: 8.3 K/UL — SIGNIFICANT CHANGE UP (ref 3.8–10.5)

## 2019-11-01 PROCEDURE — 99214 OFFICE O/P EST MOD 30 MIN: CPT

## 2019-11-01 NOTE — HISTORY OF PRESENT ILLNESS
[0 - No Distress] : Distress Level: 0 [de-identified] : 65 yo woman with PMhx of morbid obesity scheduled for bariatric surgery, referred for clearance. Patient has history of thrombosis. \par \par Patient developed a PE and a DVT in bilateral lower extremities in 2012 while on Premarin. Patient was sedentary at work at that time. Patient has been on anticoagulation Coumadin since 2012. Tolerated well, no bleeding episodes. \par \par Denies fevers, night sweats or weight loss.  [de-identified] : 9/19/19 cardiolipin antibody positive\par Anticardiolipin IgM 18.4. \par B 2 glycoprotein \par \par Protein S 55%\par \par All other hypercoagulable work up was negative. \par \par No other changes in her medical, surgical or social history since 9/17/19\par

## 2019-11-01 NOTE — REVIEW OF SYSTEMS
[Fatigue] : fatigue [Negative] : Allergic/Immunologic [FreeTextEntry9] : bilateral knees s/p knee replacement

## 2019-11-01 NOTE — ASSESSMENT
[FreeTextEntry1] : 63 yo woman with PMhx of morbid obesity scheduled for bariatric surgery, referred for clearance. Patient has history of thrombosis. \par \par Patient developed a PE and a DVT in bilateral lower extremities in 2012 while on Premarin. Patient was sedentary at work at that time. Patient has been on anticoagulation Coumadin since 2012.\par \par 9/19/19 cardiolipin antibody positive\par Anticardiolipin IgM 18.4. \par B 2 glycoprotein \par \par Protein S 55%\par \par All other hypercoagulable work up was negative. \par \par Patient is scheduled for endoscopy and surgery ( gastric bypass) ; I advised her to stop Coumadin 5 days prior to the procedure and to start Lovenox 100 mg SQ every 12 hrs , and hold   Lovenox 24 hrs prior to endoscopy or surgery. \par \par RTC  3 months.  \par \par \par

## 2019-11-21 ENCOUNTER — APPOINTMENT (OUTPATIENT)
Dept: INTERNAL MEDICINE | Facility: CLINIC | Age: 64
End: 2019-11-21
Payer: COMMERCIAL

## 2019-11-21 VITALS
RESPIRATION RATE: 16 BRPM | BODY MASS INDEX: 38.24 KG/M2 | DIASTOLIC BLOOD PRESSURE: 90 MMHG | HEIGHT: 65 IN | OXYGEN SATURATION: 98 % | SYSTOLIC BLOOD PRESSURE: 142 MMHG | WEIGHT: 229.5 LBS | HEART RATE: 65 BPM | TEMPERATURE: 98.3 F

## 2019-11-21 LAB
INR PPP: 2.7 RATIO
S PYO AG SPEC QL IA: NEGATIVE

## 2019-11-21 PROCEDURE — 99214 OFFICE O/P EST MOD 30 MIN: CPT | Mod: 25

## 2019-11-21 PROCEDURE — 85610 PROTHROMBIN TIME: CPT | Mod: QW

## 2019-11-21 PROCEDURE — 87880 STREP A ASSAY W/OPTIC: CPT | Mod: QW

## 2019-11-21 NOTE — PHYSICAL EXAM
[No Acute Distress] : no acute distress [Well Nourished] : well nourished [Well Developed] : well developed [Well-Appearing] : well-appearing [Normal Sclera/Conjunctiva] : normal sclera/conjunctiva [PERRL] : pupils equal round and reactive to light [EOMI] : extraocular movements intact [Normal Outer Ear/Nose] : the outer ears and nose were normal in appearance [Normal Oropharynx] : the oropharynx was normal [Normal TMs] : both tympanic membranes were normal [Normal Nasal Mucosa] : the nasal mucosa was normal [No JVD] : no jugular venous distention [Supple] : supple [No Lymphadenopathy] : no lymphadenopathy [Thyroid Normal, No Nodules] : the thyroid was normal and there were no nodules present [No Respiratory Distress] : no respiratory distress  [Clear to Auscultation] : lungs were clear to auscultation bilaterally [No Accessory Muscle Use] : no accessory muscle use [Normal Rate] : normal rate  [Regular Rhythm] : with a regular rhythm [Normal S1, S2] : normal S1 and S2 [No Carotid Bruits] : no carotid bruits [No Abdominal Bruit] : a ~M bruit was not heard ~T in the abdomen [No Varicosities] : no varicosities [Pedal Pulses Present] : the pedal pulses are present [No Edema] : there was no peripheral edema [No Extremity Clubbing/Cyanosis] : no extremity clubbing/cyanosis [No Palpable Aorta] : no palpable aorta [Soft] : abdomen soft [Non Tender] : non-tender [Non-distended] : non-distended [No Masses] : no abdominal mass palpated [No HSM] : no HSM [Normal Bowel Sounds] : normal bowel sounds [Normal Posterior Cervical Nodes] : no posterior cervical lymphadenopathy [Normal Anterior Cervical Nodes] : no anterior cervical lymphadenopathy [No CVA Tenderness] : no CVA  tenderness [No Spinal Tenderness] : no spinal tenderness [No Joint Swelling] : no joint swelling [Grossly Normal Strength/Tone] : grossly normal strength/tone [No Rash] : no rash [Normal Gait] : normal gait [Coordination Grossly Intact] : coordination grossly intact [No Focal Deficits] : no focal deficits [Deep Tendon Reflexes (DTR)] : deep tendon reflexes were 2+ and symmetric [Speech Grossly Normal] : speech grossly normal [Normal Affect] : the affect was normal [Alert and Oriented x3] : oriented to person, place, and time [Normal Mood] : the mood was normal [Normal Insight/Judgement] : insight and judgment were intact [de-identified] : obese  [de-identified] : with murmur  [de-identified] : Still with Decrease  B/L ROM Knees with slight tenderness to palpation right knee

## 2019-11-21 NOTE — PLAN
[FreeTextEntry1] : ENT Pharyngitis - Check rapid throat culture which was negative.  Advised to increase fluids, Tylenol or Motrin for  fever and pain.  Will check over night culture for confirmation.  Also can use throat lozenges as needed. \par \par Endocrinology  -  Obesity   Patient was educated about the importance of diet and exercise.   We discussed  a goal of a BMI near 25.   Jackson is planning bariatric surgery.  \par \par Pulmonary - WILDER -  We discussed cardiovascular, metabolic and other symptomatic ramifications of untreated WILDER.  JACKSON was advised the importance of evaluation, treatment, compliance and follow up appointments.  We also discussed the importance of  diet and exercise programs to control weight.  Advised the importance of sleep hygiene. Advised to avoid alcohol and other sedatives that tend to exacerbate WILDER.Reviewed sleep study with patient. Advised to treatment options. Patient states that she wants to get a dental appliance and mcintosh  s not believe CPAP therapy she would be compliant with doing. Still has not made an apt. with a dentist for dental appliance \par  \par Cardio - CAD and hyperlipidemia - Continue with current medication.  Renewed Zetia ans rosuvastatin today \par Anticardiolipin Ab- Continue with warfarin therapy.  Renewed medication.  monitor INR.  \par \par Orthopedic-degenerative joint disease bilateral knees status post total knee replacement. Advise patient to continue with physical therapy. Refills Vicodin but decreased from 80 tabs to 60 tabs. Will consider decreasing next months amount.  \par \par Neurology-insomnia - refilled  Ambien. Advise risk alternatives benefits and side effects of medication. Did check / I-stop.   Advise patient risk of taking sleep medication secondary to have an obstructive sleep apnea.\par \par We discussed the importance of healthy lifestyles which include exercise, weight control and good diet.\par Patient's questions were answered in full detail. Advise patient if any other concerns arise to please call office to have a discussion. \par

## 2019-11-21 NOTE — REVIEW OF SYSTEMS
[Joint Pain] : joint pain [Joint Stiffness] : joint stiffness [Insomnia] : insomnia [Negative] : Constitutional [Recent Change In Weight] : ~T no recent weight change [Sore Throat] : no sore throat [FreeTextEntry9] : knee pain

## 2019-11-21 NOTE — COUNSELING
[AUDIT-C Screening administered and reviewed] : AUDIT-C Screening administered and reviewed [Potential consequences of obesity discussed] : Potential consequences of obesity discussed [Encouraged to maintain food diary] : Encouraged to maintain food diary [Encouraged to increase physical activity] : Encouraged to increase physical activity [Weight management counseling provided] : Weight management [Healthy eating counseling provided] : healthy eating [Fall prevention counseling provided] : fall prevention  [Needs reinforcement, provided] : Patient needs reinforcement on understanding of disease, goals and obesity follow-up plan; reinforcement was provided [Low Fat Diet] : Low fat diet [Low Salt Diet] : Low salt diet [Decrease Portions] : Decrease food portions [Patient motivation] : Patient motivation [None] : None [Good understanding] : Patient has a good understanding of disease, goals and obesity follow-up plan [FreeTextEntry2] : 1600 nicole diet  increase exercise  [de-identified] : 1500 nicole diet \par Advised to walk daily for a goal of 10,000 steps \par Advised PT for B/l Knee replacemment

## 2019-11-21 NOTE — HEALTH RISK ASSESSMENT
[16-20] : 16-20 [Yes] : Yes [4 or more  times a week (4 pts)] : 4 or more  times a week (4 points) [1 or 2 (0 pts)] : 1 or 2 (0 points) [Never (0 pts)] : Never (0 points) [No] : In the past 12 months have you used drugs other than those required for medical reasons? No [No falls in past year] : Patient reported no falls in the past year [0] : 1) Little interest or pleasure doing things: Not at all (0) [1] : 2) Feeling down, depressed, or hopeless for several days (1) [] : No [de-identified] : quit [YearQuit] : 2000 [Audit-CScore] : 4 [de-identified] : walking at work [de-identified] : regular [[id number]] : 1

## 2019-11-21 NOTE — HISTORY OF PRESENT ILLNESS
[Moderate] : moderate [___ Days ago] : [unfilled] days ago [Gradual] : gradually [Constant] : constant [Congestion] : congestion [Sore Throat] : sore throat [Stable] : stable [Cough] : no cough [Wheezing] : no wheezing [Chills] : no chills [Anorexia] : no anorexia [Earache] : no earache [Fatigue] : not fatigue [Headache] : no headache [Fever] : no fever [FreeTextEntry8] : She states she also needs a refill on all her medication.  \par Patient states she is taking warfarin as directed. Has not noticed any abnormal bleeding or bruising.  \par Patient denies chest pain, SOB, MIRELES or nausea \par

## 2019-11-21 NOTE — ASSESSMENT
[FreeTextEntry1] : A 64-year-old female with degenerative joint disease, insomnia, obesity and sleep apnea presents to the office for INR check and refill on medication.  Patient also present to the office for evaluation of pharyngitis

## 2019-11-25 LAB — BACTERIA THROAT CULT: NORMAL

## 2019-12-16 NOTE — DISCHARGE NOTE ADULT - CARE PROVIDER_API CALL
Yury Villalpando), Orthopaedic Surgery  [address] [place] [address]  Phone: [phone]  Fax: [phone]
diabetes

## 2019-12-20 ENCOUNTER — APPOINTMENT (OUTPATIENT)
Dept: INTERNAL MEDICINE | Facility: CLINIC | Age: 64
End: 2019-12-20
Payer: COMMERCIAL

## 2019-12-20 ENCOUNTER — MED ADMIN CHARGE (OUTPATIENT)
Age: 64
End: 2019-12-20

## 2019-12-20 VITALS
OXYGEN SATURATION: 96 % | DIASTOLIC BLOOD PRESSURE: 80 MMHG | SYSTOLIC BLOOD PRESSURE: 140 MMHG | TEMPERATURE: 97.6 F | HEART RATE: 67 BPM | WEIGHT: 233 LBS | HEIGHT: 65 IN | RESPIRATION RATE: 16 BRPM | BODY MASS INDEX: 38.82 KG/M2

## 2019-12-20 LAB
INR PPP: 3.6 RATIO
POCT-PROTHROMBIN TIME: 43.8 SECS
QUALITY CONTROL: YES

## 2019-12-20 PROCEDURE — 85610 PROTHROMBIN TIME: CPT | Mod: QW

## 2019-12-20 PROCEDURE — 90471 IMMUNIZATION ADMIN: CPT

## 2019-12-20 PROCEDURE — 99214 OFFICE O/P EST MOD 30 MIN: CPT | Mod: 25

## 2019-12-20 PROCEDURE — 90750 HZV VACC RECOMBINANT IM: CPT

## 2019-12-20 NOTE — HISTORY OF PRESENT ILLNESS
[FreeTextEntry1] : INR check, Shingrix shot and refill on medication  [de-identified] : Ms. CARDOZA is a 64 year  female, who present to the office for INR check and refill on medication.  Also need second dose of shingrix.  States she feels well.  had a cardiac MRI done today pending results.  Denies chest pain, SOB, MIRELES.  \par Denies any abnormal bleeding.  \par \par Doing well on current medication regimen Denies any side effects.  Still with B/l Knee pain.  States she is hoping weight loss will help\par \par States sore throat from last visit went away did not take the antibiotic

## 2019-12-20 NOTE — PHYSICAL EXAM
[Well Developed] : well developed [Well Nourished] : well nourished [No Acute Distress] : no acute distress [Well-Appearing] : well-appearing [Normal Sclera/Conjunctiva] : normal sclera/conjunctiva [PERRL] : pupils equal round and reactive to light [EOMI] : extraocular movements intact [Normal Outer Ear/Nose] : the outer ears and nose were normal in appearance [Normal TMs] : both tympanic membranes were normal [Normal Oropharynx] : the oropharynx was normal [No JVD] : no jugular venous distention [Normal Nasal Mucosa] : the nasal mucosa was normal [Supple] : supple [Thyroid Normal, No Nodules] : the thyroid was normal and there were no nodules present [No Lymphadenopathy] : no lymphadenopathy [No Respiratory Distress] : no respiratory distress  [Clear to Auscultation] : lungs were clear to auscultation bilaterally [No Accessory Muscle Use] : no accessory muscle use [Normal Rate] : normal rate  [Regular Rhythm] : with a regular rhythm [Normal S1, S2] : normal S1 and S2 [No Carotid Bruits] : no carotid bruits [No Varicosities] : no varicosities [No Abdominal Bruit] : a ~M bruit was not heard ~T in the abdomen [Pedal Pulses Present] : the pedal pulses are present [No Edema] : there was no peripheral edema [No Palpable Aorta] : no palpable aorta [Soft] : abdomen soft [No Extremity Clubbing/Cyanosis] : no extremity clubbing/cyanosis [Non-distended] : non-distended [Non Tender] : non-tender [No Masses] : no abdominal mass palpated [No HSM] : no HSM [Normal Posterior Cervical Nodes] : no posterior cervical lymphadenopathy [Normal Bowel Sounds] : normal bowel sounds [No CVA Tenderness] : no CVA  tenderness [No Spinal Tenderness] : no spinal tenderness [Normal Anterior Cervical Nodes] : no anterior cervical lymphadenopathy [No Joint Swelling] : no joint swelling [No Rash] : no rash [Grossly Normal Strength/Tone] : grossly normal strength/tone [Coordination Grossly Intact] : coordination grossly intact [Normal Gait] : normal gait [Deep Tendon Reflexes (DTR)] : deep tendon reflexes were 2+ and symmetric [No Focal Deficits] : no focal deficits [Normal Affect] : the affect was normal [Speech Grossly Normal] : speech grossly normal [Normal Mood] : the mood was normal [Alert and Oriented x3] : oriented to person, place, and time [Normal Insight/Judgement] : insight and judgment were intact [de-identified] : obese  [de-identified] : with murmur  [de-identified] : Still with Decrease  B/L ROM Knees with slight tenderness to palpation right knee

## 2019-12-20 NOTE — COUNSELING
[Potential consequences of obesity discussed] : Potential consequences of obesity discussed [AUDIT-C Screening administered and reviewed] : AUDIT-C Screening administered and reviewed [Encouraged to maintain food diary] : Encouraged to maintain food diary [Encouraged to increase physical activity] : Encouraged to increase physical activity [Good understanding] : Patient has a good understanding of disease, goals and obesity follow-up plan [Healthy eating counseling provided] : healthy eating [Fall prevention counseling provided] : fall prevention  [Weight management counseling provided] : Weight management [Needs reinforcement, provided] : Patient needs reinforcement on understanding of disease, goals and obesity follow-up plan; reinforcement was provided [Low Salt Diet] : Low salt diet [Low Fat Diet] : Low fat diet [Patient motivation] : Patient motivation [Decrease Portions] : Decrease food portions [None] : None [FreeTextEntry2] : 1600 nicole diet  increase exercise  [de-identified] : 1500 nicole diet \par Advised to walk daily for a goal of 10,000 steps \par Advised PT for B/l Knee replacemment

## 2019-12-20 NOTE — PLAN
[FreeTextEntry1] : ENT Pharyngitis -resolved \par \par Endocrinology  -  Obesity   Patient was educated about the importance of diet and exercise.   We discussed  a goal of a BMI near 25.   [facility] is planning bariatric surgery.  \par \par Pulmonary - WILDER -  We discussed cardiovascular, metabolic and other symptomatic ramifications of untreated WILDER.  JACKSON was advised the importance of evaluation, treatment, compliance and follow up appointments.  We also discussed the importance of  diet and exercise programs to control weight.  Advised the importance of sleep hygiene. Advised to avoid alcohol and other sedatives that tend to exacerbate WILDER.Reviewed sleep study with patient. Advised to treatment options. Patient states that she wants to get a dental appliance and mcintosh  s not believe CPAP therapy she would be compliant with doing. Still has not made an apt. with a dentist for dental appliance \par  \par Cardio - CAD and hyperlipidemia - Continue with current medication.  \par Anticardiolipin Ab- Continue with warfarin therapy.  Renewed medication.  Hold warfarin x 2 days than restart 5mg daily. Monitor INR.  \par \par Orthopedic-degenerative joint disease bilateral knees status post total knee replacement. Advise patient to continue with physical therapy. Refills Vicodin but decreased from 80 tabs to 60 tabs. Will consider decreasing next months amount.  \par \par Neurology-insomnia - refilled  Ambien. Advise risk alternatives benefits and side effects of medication. Did check / I-stop.   Advise patient risk of taking sleep medication secondary to have an obstructive sleep apnea.\par \par We discussed the importance of healthy lifestyles which include exercise, weight control and good diet.\par Patient's questions were answered in full detail. Advise patient if any other concerns arise to please call office to have a discussion. \par \par Immunization Shringrix #2 given today

## 2019-12-20 NOTE — HEALTH RISK ASSESSMENT
[4 or more  times a week (4 pts)] : 4 or more  times a week (4 points) [Yes] : Yes [16-20] : 16-20 [1 or 2 (0 pts)] : 1 or 2 (0 points) [No] : In the past 12 months have you used drugs other than those required for medical reasons? No [Never (0 pts)] : Never (0 points) [No falls in past year] : Patient reported no falls in the past year [0] : 1) Little interest or pleasure doing things: Not at all (0) [1] : 2) Feeling down, depressed, or hopeless for several days (1) [] : No [de-identified] : quit [YearQuit] : 2000 [Audit-CScore] : 4 [de-identified] : walking at work [de-identified] : regular [[id number]] : 1

## 2019-12-20 NOTE — ASSESSMENT
[FreeTextEntry1] : A 64-year-old female with degenerative joint disease, insomnia, obesity and sleep apnea presents to the office for INR check and refill on medication.  Patient also present to the office for 2nd shingrix shot

## 2019-12-20 NOTE — REVIEW OF SYSTEMS
[Joint Stiffness] : joint stiffness [Joint Pain] : joint pain [Insomnia] : insomnia [Recent Change In Weight] : ~T no recent weight change [Sore Throat] : no sore throat [Negative] : ENT [FreeTextEntry9] : knee pain

## 2019-12-27 NOTE — ASU PREOP CHECKLIST - TO WHOM
Throat Pain/Nasal Mak HPI





- HPI Summary


HPI Summary: 


60-year-old woman comes in with a chief complaint of sore throat and laryngitis 

symptoms.  Started 2 days ago.  It hurts to swallow.  Pain moved down in her 

throat and then now she has laryngitis symptoms.  Over-the-counter remedies 

have helped some with the pain.  No complaint of any chest congestion.  Minimal 

rhinorrhea.





- History of Current Complaint


Chief Complaint: UCGeneralIllness


Stated Complaint: SORE THROAT COUGH


Time Seen by Provider: 12/27/19 09:01


Pain Intensity: 8





- Allergies/Home Medications


Allergies/Adverse Reactions: 


 Allergies











Allergy/AdvReac Type Severity Reaction Status Date / Time


 


lisinopril AdvReac  Coughing Verified 12/27/19 08:51











Home Medications: 


 Home Medications





Metoprolol Succinate XL TAB* [Toprol XL TAB*] 12.5 mg PO DAILY 12/27/19 [

History Confirmed 12/27/19]


Naproxen Sodium [Aleve] 440 mg PO ONCE 12/27/19 [History Confirmed 12/27/19]


Pseudoephedrine HCl [Sudafed] 30 mg PO ONCE 12/27/19 [History Confirmed 12/27/19

]











PMH/Surg Hx/FS Hx/Imm Hx


Previously Healthy: Yes


Endocrine History: Dyslipidemia


Cardiovascular History: Hypertension





- Surgical History


Surgical History: Yes


Surgery Procedure, Year, and Place: 2 knee surgeries.  bilat. ankle implants.  

tonsillectomy





- Family History


Known Family History: Positive: Non-Contributory





- Social History


Alcohol Use: Occasionally


Substance Use Type: None


Smoking Status (MU): Never Smoked Tobacco





- Immunization History


Most Recent Tetanus Shot: 1106-2887 Tdap





Review of Systems


All Other Systems Reviewed And Are Negative: Yes


Constitutional: Positive: Other - SEE HPI


Skin: Positive: Negative


Eyes: Positive: Negative


ENT: Positive: Sore Throat, Nasal Discharge, Other - SEE HPI


Respiratory: Positive: Negative


Cardiovascular: Positive: Negative


Gastrointestinal: Positive: Negative


Motor: Positive: Negative


Neurovascular: Positive: Negative


Musculoskeletal: Positive: Negative


Neurological: Positive: Negative


Psychological: Positive: Negative


Is Patient Immunocompromised?: No





Physical Exam


Triage Information Reviewed: Yes


Appearance: No Pain Distress, Well-Nourished, Ill-Appearing - MILD


Vital Signs: 


 Initial Vital Signs











Temp  99.0 F   12/27/19 08:52


 


Pulse  88   12/27/19 08:52


 


Resp  19   12/27/19 08:52


 


BP  100/57   12/27/19 08:52


 


Pulse Ox  100   12/27/19 08:52











Vital Signs Reviewed: Yes


Eye Exam: Normal


Eyes: Positive: Conjunctiva Clear


ENT: Positive: Pharyngeal erythema, TMs normal, Hoarse voice, Uvula midline, 

Other - Oropharynx open.  No sign of a peritonsillar abscess at this time..  

Negative: Tonsillar swelling, Muffled voice


Neck: Positive: Supple


Respiratory: Positive: Lungs clear, Normal breath sounds, No respiratory 

distress


Cardiovascular: Positive: RRR


Musculoskeletal: Positive: Strength Intact, ROM Intact


Neurological: Positive: Alert, Muscle Tone Normal


Psychological: Positive: Age Appropriate Behavior


Skin Exam: Normal





Throat Pain/Nasal Course/Dx





- Course


Course Of Treatment: 





DISCUSSED VIRAL VERSES BACTERIAL INFECTIONS AND THE ROLE OF ANTIBIOTICS.


THE PATIENT PREFERS TO BE ON ANTIBIOTICS AT THIS TIME.











- Differential Dx/Diagnosis


Provider Diagnosis: 


 Laryngitis








Discharge ED





- Sign-Out/Discharge


Documenting (check all that apply): Patient Departure


All imaging exams completed and their final reports reviewed: No Studies





- Discharge Plan


Condition: Stable


Disposition: HOME


Prescriptions: 


Amoxicillin PO (*) [Amoxicillin 875 MG (*)] 875 mg PO BID #20 tab


Patient Education Materials:  Laryngitis (ED)


Referrals: 


Guerrero Shah MD [Primary Care Provider] - 


Additional Instructions: 


FOLLOW UP WITH YOUR DOCTOR IF NOT COMPLETELY IMPROVED.


GET REEVALUATED SOONER IF NOT IMPROVING OR WORSE OR ANY QUESTIONS OR CONCERNS.








- Billing Disposition and Condition


Condition: STABLE


Disposition: Home
Sis GAGNON,RN

## 2020-01-09 ENCOUNTER — RX RENEWAL (OUTPATIENT)
Age: 65
End: 2020-01-09

## 2020-01-17 ENCOUNTER — APPOINTMENT (OUTPATIENT)
Dept: INTERNAL MEDICINE | Facility: CLINIC | Age: 65
End: 2020-01-17
Payer: MEDICARE

## 2020-01-17 ENCOUNTER — RESULT CHARGE (OUTPATIENT)
Age: 65
End: 2020-01-17

## 2020-01-17 VITALS
DIASTOLIC BLOOD PRESSURE: 100 MMHG | SYSTOLIC BLOOD PRESSURE: 152 MMHG | BODY MASS INDEX: 38.65 KG/M2 | OXYGEN SATURATION: 98 % | HEART RATE: 80 BPM | TEMPERATURE: 97.6 F | HEIGHT: 65 IN | RESPIRATION RATE: 18 BRPM | WEIGHT: 232 LBS

## 2020-01-17 VITALS
RESPIRATION RATE: 18 BRPM | OXYGEN SATURATION: 98 % | HEIGHT: 65 IN | SYSTOLIC BLOOD PRESSURE: 152 MMHG | TEMPERATURE: 97.6 F | BODY MASS INDEX: 38.74 KG/M2 | WEIGHT: 232.5 LBS | DIASTOLIC BLOOD PRESSURE: 100 MMHG | HEART RATE: 80 BPM

## 2020-01-17 PROCEDURE — 99213 OFFICE O/P EST LOW 20 MIN: CPT | Mod: 25

## 2020-01-17 PROCEDURE — 85610 PROTHROMBIN TIME: CPT | Mod: QW

## 2020-01-17 NOTE — REVIEW OF SYSTEMS
[Joint Pain] : joint pain [Joint Stiffness] : joint stiffness [Insomnia] : insomnia [Negative] : Heme/Lymph [Recent Change In Weight] : ~T no recent weight change [Sore Throat] : no sore throat [FreeTextEntry9] : knee pain

## 2020-01-17 NOTE — ASSESSMENT
[FreeTextEntry1] : A 64-year-old female with degenerative joint disease, insomnia, obesity and sleep apnea presents to the office for INR check and refill on medication.

## 2020-01-17 NOTE — PHYSICAL EXAM
[Well Nourished] : well nourished [No Acute Distress] : no acute distress [Well Developed] : well developed [Normal Sclera/Conjunctiva] : normal sclera/conjunctiva [Well-Appearing] : well-appearing [PERRL] : pupils equal round and reactive to light [EOMI] : extraocular movements intact [Normal Outer Ear/Nose] : the outer ears and nose were normal in appearance [Normal Oropharynx] : the oropharynx was normal [Normal TMs] : both tympanic membranes were normal [Normal Nasal Mucosa] : the nasal mucosa was normal [No JVD] : no jugular venous distention [Supple] : supple [Thyroid Normal, No Nodules] : the thyroid was normal and there were no nodules present [No Lymphadenopathy] : no lymphadenopathy [No Respiratory Distress] : no respiratory distress  [Clear to Auscultation] : lungs were clear to auscultation bilaterally [No Accessory Muscle Use] : no accessory muscle use [Normal Rate] : normal rate  [Regular Rhythm] : with a regular rhythm [Normal S1, S2] : normal S1 and S2 [No Abdominal Bruit] : a ~M bruit was not heard ~T in the abdomen [No Carotid Bruits] : no carotid bruits [No Varicosities] : no varicosities [Pedal Pulses Present] : the pedal pulses are present [No Edema] : there was no peripheral edema [No Extremity Clubbing/Cyanosis] : no extremity clubbing/cyanosis [No Palpable Aorta] : no palpable aorta [Soft] : abdomen soft [Non Tender] : non-tender [Non-distended] : non-distended [No Masses] : no abdominal mass palpated [No HSM] : no HSM [Normal Bowel Sounds] : normal bowel sounds [Normal Posterior Cervical Nodes] : no posterior cervical lymphadenopathy [Normal Anterior Cervical Nodes] : no anterior cervical lymphadenopathy [No CVA Tenderness] : no CVA  tenderness [No Spinal Tenderness] : no spinal tenderness [No Joint Swelling] : no joint swelling [Grossly Normal Strength/Tone] : grossly normal strength/tone [No Rash] : no rash [Normal Gait] : normal gait [Coordination Grossly Intact] : coordination grossly intact [No Focal Deficits] : no focal deficits [Deep Tendon Reflexes (DTR)] : deep tendon reflexes were 2+ and symmetric [Speech Grossly Normal] : speech grossly normal [Normal Affect] : the affect was normal [Normal Mood] : the mood was normal [Alert and Oriented x3] : oriented to person, place, and time [Normal Insight/Judgement] : insight and judgment were intact [de-identified] : obese  [de-identified] : Still with Decrease  B/L ROM Knees with slight tenderness to palpation right knee  [de-identified] : with murmur

## 2020-01-17 NOTE — PLAN
[FreeTextEntry1] : Endocrinology  -  Obesity   Patient was educated about the importance of diet and exercise.   We discussed  a goal of a BMI near 25.   [facility] is planning bariatric surgery.  \par \par Pulmonary - WILDER -  We discussed cardiovascular, metabolic and other symptomatic ramifications of untreated WILDER.  JACKSON was advised the importance of evaluation, treatment, compliance and follow up appointments.  We also discussed the importance of  diet and exercise programs to control weight.  Advised the importance of sleep hygiene. Advised to avoid alcohol and other sedatives that tend to exacerbate WILDER.Reviewed sleep study with patient. Advised to treatment options. Patient states that she wants to get a dental appliance and mcintosh  s not believe CPAP therapy she would be compliant with doing. Still has not made an apt. with a dentist for dental appliance \par  \par Cardio - CAD and hyperlipidemia - Continue with current medication.  \par Follow up with Cardio  and request copy of recent CT scan \par \par Anticardiolipin Ab- Continue with warfarin therapy.  Renewed medication.  Hold warfarin x 3 days than restart 5mg Monday, wed, Friday, Sat and Sunday.  @.5 mg Tuesday and Thursday.  Repeat INR 10 days \par \par Orthopedic-degenerative joint disease bilateral knees status post total knee replacement. Advise patient to continue with physical therapy. Refills Vicodin but decreased from 80 tabs to 60 tabs. Will consider decreasing next months amount.  \par \par Neurology-insomnia - refilled  Ambien. Advise risk alternatives benefits and side effects of medication. Did check / I-stop.   Advise patient risk of taking sleep medication secondary to have an obstructive sleep apnea.\par \par We discussed the importance of healthy lifestyles which include exercise, weight control and good diet.\par Patient's questions were answered in full detail. Advise patient if any other concerns arise to please call office to have a discussion. \par \par

## 2020-01-17 NOTE — HEALTH RISK ASSESSMENT
[16-20] : 16-20 [Yes] : Yes [4 or more  times a week (4 pts)] : 4 or more  times a week (4 points) [1 or 2 (0 pts)] : 1 or 2 (0 points) [Never (0 pts)] : Never (0 points) [No] : In the past 12 months have you used drugs other than those required for medical reasons? No [No falls in past year] : Patient reported no falls in the past year [0] : 1) Little interest or pleasure doing things: Not at all (0) [1] : 2) Feeling down, depressed, or hopeless for several days (1) [] : No [YearQuit] : 2000 [de-identified] : quit [[id number]] : 1 [de-identified] : walking at work [de-identified] : regular [Audit-CScore] : 4

## 2020-01-17 NOTE — COUNSELING
[AUDIT-C Screening administered and reviewed] : AUDIT-C Screening administered and reviewed [Potential consequences of obesity discussed] : Potential consequences of obesity discussed [Encouraged to maintain food diary] : Encouraged to maintain food diary [Encouraged to increase physical activity] : Encouraged to increase physical activity [Good understanding] : Patient has a good understanding of disease, goals and obesity follow-up plan [Weight management counseling provided] : Weight management [Healthy eating counseling provided] : healthy eating [Fall prevention counseling provided] : fall prevention  [Needs reinforcement, provided] : Patient needs reinforcement on understanding of disease, goals and obesity follow-up plan; reinforcement was provided [Low Fat Diet] : Low fat diet [Low Salt Diet] : Low salt diet [Decrease Portions] : Decrease food portions [Patient motivation] : Patient motivation [None] : None [FreeTextEntry2] : 1600 nicole diet  increase exercise  [de-identified] : 1500 nicole diet \par Advised to walk daily for a goal of 10,000 steps \par Advised PT for B/l Knee replacemment

## 2020-01-17 NOTE — HISTORY OF PRESENT ILLNESS
[FreeTextEntry1] : INR check, and  refill on medication  [de-identified] : Ms. CARDOZA is a 64 year  female, who present to the office for INR check and refill on medication. \par Denies any abnormal bleeding.  \par states waiting for cardiologist to call her back to go over recent scans. \par \par Doing well on current medication regimen Denies any side effects.  Still with B/l Knee pain.  States she is hoping weight loss will help\par

## 2020-02-21 ENCOUNTER — APPOINTMENT (OUTPATIENT)
Dept: HEMATOLOGY ONCOLOGY | Facility: CLINIC | Age: 65
End: 2020-02-21

## 2020-02-28 ENCOUNTER — APPOINTMENT (OUTPATIENT)
Dept: INTERNAL MEDICINE | Facility: CLINIC | Age: 65
End: 2020-02-28
Payer: MEDICARE

## 2020-02-28 VITALS
DIASTOLIC BLOOD PRESSURE: 96 MMHG | RESPIRATION RATE: 16 BRPM | BODY MASS INDEX: 38.49 KG/M2 | TEMPERATURE: 97.9 F | OXYGEN SATURATION: 98 % | HEIGHT: 65 IN | SYSTOLIC BLOOD PRESSURE: 150 MMHG | WEIGHT: 231 LBS | HEART RATE: 77 BPM

## 2020-02-28 LAB
INR PPP: 3.1 %
POCT-PROTHROMBIN TIME: 37.5 SECS
QUALITY CONTROL: YES

## 2020-02-28 PROCEDURE — 99214 OFFICE O/P EST MOD 30 MIN: CPT | Mod: 25

## 2020-02-28 PROCEDURE — 85610 PROTHROMBIN TIME: CPT | Mod: QW

## 2020-02-28 NOTE — HEALTH RISK ASSESSMENT
[16-20] : 16-20 [Yes] : Yes [1 or 2 (0 pts)] : 1 or 2 (0 points) [4 or more  times a week (4 pts)] : 4 or more  times a week (4 points) [Never (0 pts)] : Never (0 points) [No falls in past year] : Patient reported no falls in the past year [No] : In the past 12 months have you used drugs other than those required for medical reasons? No [0] : 1) Little interest or pleasure doing things: Not at all (0) [1] : 2) Feeling down, depressed, or hopeless for several days (1) [] : No [de-identified] : quit [YearQuit] : 2000 [de-identified] : walking at work [Audit-CScore] : 4 [[id number]] : 1 [de-identified] : regular

## 2020-02-28 NOTE — PHYSICAL EXAM
[No Acute Distress] : no acute distress [Normal Sclera/Conjunctiva] : normal sclera/conjunctiva [Well Developed] : well developed [Well Nourished] : well nourished [Well-Appearing] : well-appearing [Normal Outer Ear/Nose] : the outer ears and nose were normal in appearance [EOMI] : extraocular movements intact [PERRL] : pupils equal round and reactive to light [Normal TMs] : both tympanic membranes were normal [Normal Nasal Mucosa] : the nasal mucosa was normal [Normal Oropharynx] : the oropharynx was normal [No JVD] : no jugular venous distention [Supple] : supple [No Lymphadenopathy] : no lymphadenopathy [No Respiratory Distress] : no respiratory distress  [Clear to Auscultation] : lungs were clear to auscultation bilaterally [Thyroid Normal, No Nodules] : the thyroid was normal and there were no nodules present [No Accessory Muscle Use] : no accessory muscle use [Regular Rhythm] : with a regular rhythm [Normal S1, S2] : normal S1 and S2 [Normal Rate] : normal rate  [No Varicosities] : no varicosities [No Carotid Bruits] : no carotid bruits [No Abdominal Bruit] : a ~M bruit was not heard ~T in the abdomen [Pedal Pulses Present] : the pedal pulses are present [No Extremity Clubbing/Cyanosis] : no extremity clubbing/cyanosis [No Edema] : there was no peripheral edema [No Palpable Aorta] : no palpable aorta [Soft] : abdomen soft [Non Tender] : non-tender [Non-distended] : non-distended [No HSM] : no HSM [No Masses] : no abdominal mass palpated [Normal Anterior Cervical Nodes] : no anterior cervical lymphadenopathy [Normal Bowel Sounds] : normal bowel sounds [Normal Posterior Cervical Nodes] : no posterior cervical lymphadenopathy [No Joint Swelling] : no joint swelling [No CVA Tenderness] : no CVA  tenderness [No Spinal Tenderness] : no spinal tenderness [Normal Gait] : normal gait [Grossly Normal Strength/Tone] : grossly normal strength/tone [Coordination Grossly Intact] : coordination grossly intact [No Rash] : no rash [Speech Grossly Normal] : speech grossly normal [Deep Tendon Reflexes (DTR)] : deep tendon reflexes were 2+ and symmetric [No Focal Deficits] : no focal deficits [Normal Affect] : the affect was normal [Normal Mood] : the mood was normal [Alert and Oriented x3] : oriented to person, place, and time [Normal Insight/Judgement] : insight and judgment were intact [de-identified] : with murmur  [de-identified] : obese

## 2020-02-28 NOTE — ASSESSMENT
[FreeTextEntry1] : A 65-year-old female with degenerative joint disease, insomnia, obesity and sleep apnea presents to the office for INR check and refill on medication.

## 2020-02-28 NOTE — PLAN
[FreeTextEntry1] : Endocrinology  -  Obesity   Patient was educated about the importance of diet and exercise.   We discussed  a goal of a BMI near 25.   [facility] is planning bariatric surgery.  \par \par Pulmonary - WILDER -  We discussed cardiovascular, metabolic and other symptomatic ramifications of untreated WILDER.  JACKSON was advised the importance of evaluation, treatment, compliance and follow up appointments.  We also discussed the importance of  diet and exercise programs to control weight.  Advised the importance of sleep hygiene. Advised to avoid alcohol and other sedatives that tend to exacerbate WILDER.Reviewed sleep study with patient. Advised to treatment options. Patient states that she wants to get a dental appliance and [facility]  s not believe CPAP therapy she would be compliant with doing. Still has not made an apt. with a dentist for dental appliance \par  \par Cardio - CAD and hyperlipidemia - Continue with current medication.  \par Follow up with Cardio  and request copy of recent CT scan \par Refilled Crestor.  advised to return to the office for fasting labs and a blood pressure check \par \par Pre hypertension - Advised weight loss.  If BP is elevated will consider starting medication \par \par Anticardiolipin Ab- Continue with warfarin therapy.  Renewed medication.  Hold warfarin x 1 days than restart warfarin therapy \par \par Orthopedic-degenerative joint disease bilateral knees status post total knee replacement. Advise patient to continue with physical therapy.  Off of vicodin for a few week \par \par Neurology-insomnia - refilled  Ambien. Advise risk alternatives benefits and side effects of medication. Did check / I-stop.   Advise patient risk of taking sleep medication secondary to have an obstructive sleep apnea.\par \par We discussed the importance of healthy lifestyles which include exercise, weight control and good diet.\par Patient's questions were answered in full detail. Advise patient if any other concerns arise to please call office to have a discussion. \par \par

## 2020-02-28 NOTE — REVIEW OF SYSTEMS
[Insomnia] : insomnia [Negative] : Musculoskeletal [Recent Change In Weight] : ~T no recent weight change [Sore Throat] : no sore throat [Joint Pain] : no joint pain [Joint Stiffness] : no joint stiffness

## 2020-02-28 NOTE — COUNSELING
[Potential consequences of obesity discussed] : Potential consequences of obesity discussed [AUDIT-C Screening administered and reviewed] : AUDIT-C Screening administered and reviewed [Encouraged to maintain food diary] : Encouraged to maintain food diary [Encouraged to increase physical activity] : Encouraged to increase physical activity [Weight management counseling provided] : Weight management [Good understanding] : Patient has a good understanding of disease, goals and obesity follow-up plan [Needs reinforcement, provided] : Patient needs reinforcement on understanding of disease, goals and obesity follow-up plan; reinforcement was provided [Healthy eating counseling provided] : healthy eating [Fall prevention counseling provided] : fall prevention  [Decrease Portions] : Decrease food portions [Low Salt Diet] : Low salt diet [Low Fat Diet] : Low fat diet [Patient motivation] : Patient motivation [None] : None [FreeTextEntry2] : 1600 nicole diet  increase exercise  [de-identified] : 1500 nicole diet \par Advised to walk daily for a goal of 10,000 steps \par Advised PT for B/l Knee replacemment

## 2020-03-02 ENCOUNTER — RX CHANGE (OUTPATIENT)
Age: 65
End: 2020-03-02

## 2020-05-04 ENCOUNTER — APPOINTMENT (OUTPATIENT)
Dept: INTERNAL MEDICINE | Facility: CLINIC | Age: 65
End: 2020-05-04
Payer: MEDICARE

## 2020-05-04 PROCEDURE — 99442: CPT | Mod: 95

## 2020-05-26 ENCOUNTER — LABORATORY RESULT (OUTPATIENT)
Age: 65
End: 2020-05-26

## 2020-05-27 ENCOUNTER — APPOINTMENT (OUTPATIENT)
Dept: INTERNAL MEDICINE | Facility: CLINIC | Age: 65
End: 2020-05-27
Payer: MEDICARE

## 2020-05-27 VITALS
SYSTOLIC BLOOD PRESSURE: 150 MMHG | HEIGHT: 65 IN | RESPIRATION RATE: 16 BRPM | OXYGEN SATURATION: 98 % | TEMPERATURE: 98.2 F | BODY MASS INDEX: 39.99 KG/M2 | WEIGHT: 240 LBS | DIASTOLIC BLOOD PRESSURE: 80 MMHG | HEART RATE: 77 BPM

## 2020-05-27 LAB
BILIRUB UR QL STRIP: NEGATIVE
CLARITY UR: CLEAR
COLLECTION METHOD: NORMAL
GLUCOSE UR-MCNC: NEGATIVE
HCG UR QL: 0.2 EU/DL
HGB UR QL STRIP.AUTO: ABNORMAL
KETONES UR-MCNC: NEGATIVE
LEUKOCYTE ESTERASE UR QL STRIP: NEGATIVE
NITRITE UR QL STRIP: NEGATIVE
PH UR STRIP: 5
PROT UR STRIP-MCNC: NEGATIVE
SP GR UR STRIP: 1.02

## 2020-05-27 PROCEDURE — 81003 URINALYSIS AUTO W/O SCOPE: CPT | Mod: QW

## 2020-05-27 PROCEDURE — 99214 OFFICE O/P EST MOD 30 MIN: CPT | Mod: 25

## 2020-05-27 NOTE — PHYSICAL EXAM
[Well Nourished] : well nourished [No Acute Distress] : no acute distress [Well Developed] : well developed [Well-Appearing] : well-appearing [Normal Sclera/Conjunctiva] : normal sclera/conjunctiva [PERRL] : pupils equal round and reactive to light [EOMI] : extraocular movements intact [Normal Outer Ear/Nose] : the outer ears and nose were normal in appearance [Normal Oropharynx] : the oropharynx was normal [Normal TMs] : both tympanic membranes were normal [Normal Nasal Mucosa] : the nasal mucosa was normal [No JVD] : no jugular venous distention [Supple] : supple [No Lymphadenopathy] : no lymphadenopathy [Thyroid Normal, No Nodules] : the thyroid was normal and there were no nodules present [No Respiratory Distress] : no respiratory distress  [Clear to Auscultation] : lungs were clear to auscultation bilaterally [No Accessory Muscle Use] : no accessory muscle use [Normal Rate] : normal rate  [Regular Rhythm] : with a regular rhythm [Normal S1, S2] : normal S1 and S2 [No Carotid Bruits] : no carotid bruits [No Abdominal Bruit] : a ~M bruit was not heard ~T in the abdomen [No Varicosities] : no varicosities [No Edema] : there was no peripheral edema [Pedal Pulses Present] : the pedal pulses are present [No Palpable Aorta] : no palpable aorta [No Extremity Clubbing/Cyanosis] : no extremity clubbing/cyanosis [Non Tender] : non-tender [Soft] : abdomen soft [No Masses] : no abdominal mass palpated [Non-distended] : non-distended [Normal Bowel Sounds] : normal bowel sounds [No HSM] : no HSM [Normal Anterior Cervical Nodes] : no anterior cervical lymphadenopathy [Normal Posterior Cervical Nodes] : no posterior cervical lymphadenopathy [No CVA Tenderness] : no CVA  tenderness [No Spinal Tenderness] : no spinal tenderness [Grossly Normal Strength/Tone] : grossly normal strength/tone [No Joint Swelling] : no joint swelling [Normal Gait] : normal gait [No Rash] : no rash [No Focal Deficits] : no focal deficits [Coordination Grossly Intact] : coordination grossly intact [Speech Grossly Normal] : speech grossly normal [Deep Tendon Reflexes (DTR)] : deep tendon reflexes were 2+ and symmetric [Normal Affect] : the affect was normal [Normal Mood] : the mood was normal [Alert and Oriented x3] : oriented to person, place, and time [Normal Insight/Judgement] : insight and judgment were intact [de-identified] : with murmur  [de-identified] : obese

## 2020-05-27 NOTE — COUNSELING
[AUDIT-C Screening administered and reviewed] : AUDIT-C Screening administered and reviewed [Encouraged to maintain food diary] : Encouraged to maintain food diary [Potential consequences of obesity discussed] : Potential consequences of obesity discussed [Good understanding] : Patient has a good understanding of disease, goals and obesity follow-up plan [Encouraged to increase physical activity] : Encouraged to increase physical activity [Healthy eating counseling provided] : healthy eating [Weight management counseling provided] : Weight management [Fall prevention counseling provided] : fall prevention  [Needs reinforcement, provided] : Patient needs reinforcement on understanding of disease, goals and obesity follow-up plan; reinforcement was provided [Low Fat Diet] : Low fat diet [Low Salt Diet] : Low salt diet [Patient motivation] : Patient motivation [Decrease Portions] : Decrease food portions [None] : None [FreeTextEntry2] : 1600 nicole diet  increase exercise  [de-identified] : 1500 nicole diet \par Advised to walk daily for a goal of 10,000 steps \par Advised PT for B/l Knee replacemment

## 2020-05-27 NOTE — ASSESSMENT
[FreeTextEntry1] : A 65-year-old female with degenerative joint disease, insomnia, obesity present  to the office for INR check, LBP  and refill on medication.

## 2020-05-27 NOTE — REVIEW OF SYSTEMS
[Insomnia] : insomnia [Negative] : Heme/Lymph [Joint Stiffness] : joint stiffness [Back Pain] : back pain [Muscle Pain] : muscle pain [Recent Change In Weight] : ~T no recent weight change [Sore Throat] : no sore throat [Joint Pain] : no joint pain

## 2020-05-27 NOTE — HEALTH RISK ASSESSMENT
[16-20] : 16-20 [Yes] : Yes [4 or more  times a week (4 pts)] : 4 or more  times a week (4 points) [1 or 2 (0 pts)] : 1 or 2 (0 points) [Never (0 pts)] : Never (0 points) [No] : In the past 12 months have you used drugs other than those required for medical reasons? No [No falls in past year] : Patient reported no falls in the past year [0] : 1) Little interest or pleasure doing things: Not at all (0) [1] : 2) Feeling down, depressed, or hopeless for several days (1) [] : No [de-identified] : quit [YearQuit] : 2000 [Audit-CScore] : 4 [de-identified] : walking at work [de-identified] : regular [[id number]] : 1

## 2020-05-27 NOTE — HISTORY OF PRESENT ILLNESS
[FreeTextEntry1] : Follow up on back pain, renewal of medication \par  [de-identified] : Ms. CARDOZA is a 65 year  female, who present to the office for back pain that has been chronic for 2 months.  Had a virtual appointment with Dr. Liu was sent for an x-ray.  Waiting to discuss the report.  Unaware of what makes the pain better. Pain is exacerbated in the am upon awakening and with activities. Pain has been effecting her sleep patterns and regular daily task.  Denies radiation of pain down the legs.  Denies numbness \par Also need a renewal of Ambien for insomnia.  Doing well with current dosage.  \par Lastly need an INR check  denies abnormal bleeding or bruising \par

## 2020-05-27 NOTE — PLAN
[FreeTextEntry1] : Endocrinology  -  Obesity   Patient was educated about the importance of diet and exercise.   We discussed  a goal of a BMI near 25.   [facility] is planning bariatric surgery.  \par \par Pulmonary - WILDER -  We discussed cardiovascular, metabolic and other symptomatic ramifications of untreated WILDER.  JACKSON was advised the importance of evaluation, treatment, compliance and follow up appointments.  We also discussed the importance of  diet and exercise programs to control weight.  Advised the importance of sleep hygiene. Advised to avoid alcohol and other sedatives that tend to exacerbate WILDER.Reviewed sleep study with patient. Advised to treatment options. Patient states that she wants to get a dental appliance and [facility]  s not believe CPAP therapy she would be compliant with doing. Still has not made an apt. with a dentist for dental appliance \par  \par Cardio - CAD and hyperlipidemia - Continue with current medication.  refilled zetia.  discussed fasting labs (pt has a fear of blood draws)\par Follow up with Cardio \par \par Pre hypertension - Advised weight loss.  If BP is elevated will consider starting medication. Check BP on Monday \par \par Anticardiolipin Ab- Continue with warfarin therapy.  INR machine error  pt refused labs.  RTO for INR check on Monday \par \par Orthopedic-degenerative joint disease bilateral knees status post total knee replacement. Advise patient to start PT for LBP - Renewed vicodan advised to only take for sever pain.  No change will check MRI of the spine \par \par Neurology-insomnia - refilled  Ambien. Advise risk alternatives benefits and side effects of medication. Did check / I-stop.   Advise patient risk of taking sleep medication secondary to have an obstructive sleep apnea.\par \par We discussed the importance of healthy lifestyles which include exercise, weight control and good diet.\par Patient's questions were answered in full detail. Advise patient if any other concerns arise to please call office to have a discussion. \par \par Patient  education  - COVID-19   Counseling and education provided to the patient.  Advised sign and symptoms of the virus.  Advised contact precautions.  Educated patient on proper hand washing and to participate in social distancing. . \par \par Patient is in full awareness of the plan and agrees to it.  All pt question was answered.  \par \par   RTO  Monday for BP check and INR

## 2020-06-01 ENCOUNTER — APPOINTMENT (OUTPATIENT)
Dept: INTERNAL MEDICINE | Facility: CLINIC | Age: 65
End: 2020-06-01
Payer: MEDICARE

## 2020-06-01 VITALS
SYSTOLIC BLOOD PRESSURE: 138 MMHG | RESPIRATION RATE: 16 BRPM | OXYGEN SATURATION: 97 % | WEIGHT: 240 LBS | BODY MASS INDEX: 39.99 KG/M2 | DIASTOLIC BLOOD PRESSURE: 84 MMHG | TEMPERATURE: 98 F | HEIGHT: 65 IN | HEART RATE: 65 BPM

## 2020-06-01 VITALS
TEMPERATURE: 98 F | HEIGHT: 65 IN | HEART RATE: 65 BPM | OXYGEN SATURATION: 97 % | RESPIRATION RATE: 16 BRPM | BODY MASS INDEX: 39.99 KG/M2 | WEIGHT: 240 LBS | SYSTOLIC BLOOD PRESSURE: 138 MMHG | DIASTOLIC BLOOD PRESSURE: 84 MMHG

## 2020-06-01 DIAGNOSIS — Z12.39 ENCOUNTER FOR OTHER SCREENING FOR MALIGNANT NEOPLASM OF BREAST: ICD-10-CM

## 2020-06-01 DIAGNOSIS — Z13.820 ENCOUNTER FOR SCREENING FOR OSTEOPOROSIS: ICD-10-CM

## 2020-06-01 PROCEDURE — 99213 OFFICE O/P EST LOW 20 MIN: CPT | Mod: 25

## 2020-06-01 PROCEDURE — 36415 COLL VENOUS BLD VENIPUNCTURE: CPT

## 2020-06-01 NOTE — HEALTH RISK ASSESSMENT
[Yes] : Yes [16-20] : 16-20 [1 or 2 (0 pts)] : 1 or 2 (0 points) [4 or more  times a week (4 pts)] : 4 or more  times a week (4 points) [Never (0 pts)] : Never (0 points) [0] : 1) Little interest or pleasure doing things: Not at all (0) [No] : In the past 12 months have you used drugs other than those required for medical reasons? No [No falls in past year] : Patient reported no falls in the past year [1] : 2) Feeling down, depressed, or hopeless for several days (1) [] : No [de-identified] : quit [Audit-CScore] : 4 [YearQuit] : 2000 [de-identified] : walking at work [[id number]] : 1 [de-identified] : regular

## 2020-06-01 NOTE — REVIEW OF SYSTEMS
[Joint Stiffness] : joint stiffness [Insomnia] : insomnia [Back Pain] : back pain [Muscle Pain] : muscle pain [Negative] : Heme/Lymph [Recent Change In Weight] : ~T no recent weight change [Sore Throat] : no sore throat [Joint Pain] : no joint pain

## 2020-06-01 NOTE — HISTORY OF PRESENT ILLNESS
[FreeTextEntry1] : Follow up on back pain, renewal of medication \par  [de-identified] : Ms. CARDOZA is a 65 year  female, who present to the office for  follow up on back pain and INR evaluation.  States she also wants to be tested for COVID antibodies.  \par States her back pain is the same.  Denies starting PT.  Does state the pain medication is helping make thing more bearable.       Denies radiation of pain down the legs.  Denies numbness \par Want covid ab taking   Denies being ill \par Lastly need an INR check  denies abnormal bleeding or bruising \par

## 2020-06-01 NOTE — PAST MEDICAL HISTORY
[Definite ___ (Date)] : the last menstrual period was [unfilled] [Total Preg ___] : G[unfilled] [Postmenopausal] : postmenopausal

## 2020-06-01 NOTE — PLAN
[FreeTextEntry1] : Endocrinology  -  Obesity   Patient was educated about the importance of diet and exercise.   We discussed  a goal of a BMI near 25.   [facility] is planning bariatric surgery.  \par \par Pulmonary - WILDER -  We discussed cardiovascular, metabolic and other symptomatic ramifications of untreated WILDER.  JACKSON was advised the importance of evaluation, treatment, compliance and follow up appointments.  We also discussed the importance of  diet and exercise programs to control weight.  Advised the importance of sleep hygiene. Advised to avoid alcohol and other sedatives that tend to exacerbate WILDER.Reviewed sleep study with patient. Advised to treatment options. Patient states that she wants to get a dental appliance and [facility]  s not believe CPAP therapy she would be compliant with doing. Still has not made an apt. with a dentist for dental appliance \par  \par Cardio - CAD and hyperlipidemia - Continue with current medication. Continue zetia .  discussed fasting labs Check CMP and CBC today.  Check INR \par Follow up with Cardio \par \par Pre hypertension - Advised weight loss.  BP is better today \par \par Anticardiolipin Ab- Continue with warfarin therapy.  Check INR\par \par Orthopedic-degenerative joint disease bilateral knees status post total knee replacement. Advise patient to start PT for LBP - Continue  Vicodin advised to only take for sever pain. Check MRI l-s spine \par \par Neurology-insomnia - Continue  Ambien. Advise risk alternatives benefits and side effects of medication.   Advise patient risk of taking sleep medication secondary to have an obstructive sleep apnea.\par \par We discussed the importance of healthy lifestyles which include exercise, weight control and good diet.\par Patient's questions were answered in full detail. Advise patient if any other concerns arise to please call office to have a discussion. \par \par Patient  education  - COVID-19   Counseling and education provided to the patient.  Advised sign and symptoms of the virus.  Advised contact precautions.  Educated patient on proper hand washing and to participate in social distancing.\par Check COVID 19 ab \par \par Patient is in full awareness of the plan and agrees to it.  All pt question was answered.  \par \par HCM Rx for mammo was given and BDS

## 2020-06-01 NOTE — COUNSELING
[Potential consequences of obesity discussed] : Potential consequences of obesity discussed [AUDIT-C Screening administered and reviewed] : AUDIT-C Screening administered and reviewed [Encouraged to increase physical activity] : Encouraged to increase physical activity [Encouraged to maintain food diary] : Encouraged to maintain food diary [Good understanding] : Patient has a good understanding of disease, goals and obesity follow-up plan [Weight management counseling provided] : Weight management [Fall prevention counseling provided] : fall prevention  [Healthy eating counseling provided] : healthy eating [Low Salt Diet] : Low salt diet [Needs reinforcement, provided] : Patient needs reinforcement on understanding of disease, goals and obesity follow-up plan; reinforcement was provided [Low Fat Diet] : Low fat diet [Patient motivation] : Patient motivation [Decrease Portions] : Decrease food portions [None] : None [FreeTextEntry2] : 1600 nicole diet  increase exercise  [de-identified] : 1500 nicole diet \par Advised to walk daily for a goal of 10,000 steps \par Advised PT for B/l Knee replacemment

## 2020-06-01 NOTE — PHYSICAL EXAM
[No Acute Distress] : no acute distress [Well Nourished] : well nourished [Well-Appearing] : well-appearing [Well Developed] : well developed [PERRL] : pupils equal round and reactive to light [Normal Sclera/Conjunctiva] : normal sclera/conjunctiva [Normal Oropharynx] : the oropharynx was normal [Normal Outer Ear/Nose] : the outer ears and nose were normal in appearance [EOMI] : extraocular movements intact [No JVD] : no jugular venous distention [Normal TMs] : both tympanic membranes were normal [Normal Nasal Mucosa] : the nasal mucosa was normal [Thyroid Normal, No Nodules] : the thyroid was normal and there were no nodules present [No Lymphadenopathy] : no lymphadenopathy [Supple] : supple [Clear to Auscultation] : lungs were clear to auscultation bilaterally [No Respiratory Distress] : no respiratory distress  [Regular Rhythm] : with a regular rhythm [Normal Rate] : normal rate  [No Accessory Muscle Use] : no accessory muscle use [Normal S1, S2] : normal S1 and S2 [No Carotid Bruits] : no carotid bruits [No Abdominal Bruit] : a ~M bruit was not heard ~T in the abdomen [No Varicosities] : no varicosities [Pedal Pulses Present] : the pedal pulses are present [No Edema] : there was no peripheral edema [No Palpable Aorta] : no palpable aorta [No Extremity Clubbing/Cyanosis] : no extremity clubbing/cyanosis [Soft] : abdomen soft [Non Tender] : non-tender [No HSM] : no HSM [No Masses] : no abdominal mass palpated [Non-distended] : non-distended [Normal Posterior Cervical Nodes] : no posterior cervical lymphadenopathy [Normal Bowel Sounds] : normal bowel sounds [Normal Anterior Cervical Nodes] : no anterior cervical lymphadenopathy [No CVA Tenderness] : no CVA  tenderness [No Spinal Tenderness] : no spinal tenderness [No Joint Swelling] : no joint swelling [Grossly Normal Strength/Tone] : grossly normal strength/tone [No Rash] : no rash [Normal Gait] : normal gait [Coordination Grossly Intact] : coordination grossly intact [No Focal Deficits] : no focal deficits [Deep Tendon Reflexes (DTR)] : deep tendon reflexes were 2+ and symmetric [Speech Grossly Normal] : speech grossly normal [Normal Affect] : the affect was normal [Normal Mood] : the mood was normal [Alert and Oriented x3] : oriented to person, place, and time [Normal Insight/Judgement] : insight and judgment were intact [de-identified] : obese  [de-identified] : with murmur

## 2020-06-01 NOTE — ASSESSMENT
[FreeTextEntry1] : A 65-year-old female with degenerative joint disease, insomnia, obesity present  to the office for INR check, LBP  and Covid ab testing

## 2020-06-09 LAB
ALBUMIN SERPL ELPH-MCNC: 4.6 G/DL
ALP BLD-CCNC: 100 U/L
ALT SERPL-CCNC: 19 U/L
ANION GAP SERPL CALC-SCNC: 13 MMOL/L
AST SERPL-CCNC: 16 U/L
BASOPHILS # BLD AUTO: 0.04 K/UL
BASOPHILS NFR BLD AUTO: 0.5 %
BILIRUB SERPL-MCNC: 0.4 MG/DL
BUN SERPL-MCNC: 13 MG/DL
CALCIUM SERPL-MCNC: 9.6 MG/DL
CHLORIDE SERPL-SCNC: 101 MMOL/L
CO2 SERPL-SCNC: 25 MMOL/L
CREAT SERPL-MCNC: 0.65 MG/DL
EOSINOPHIL # BLD AUTO: 0.18 K/UL
EOSINOPHIL NFR BLD AUTO: 2.3 %
GLUCOSE SERPL-MCNC: 103 MG/DL
HCT VFR BLD CALC: 38.1 %
HGB BLD-MCNC: 11.7 G/DL
IMM GRANULOCYTES NFR BLD AUTO: 0.3 %
INR PPP: 2.34 RATIO
LYMPHOCYTES # BLD AUTO: 1.98 K/UL
LYMPHOCYTES NFR BLD AUTO: 25.8 %
MAN DIFF?: NORMAL
MCHC RBC-ENTMCNC: 30.6 PG
MCHC RBC-ENTMCNC: 30.7 GM/DL
MCV RBC AUTO: 99.7 FL
MONOCYTES # BLD AUTO: 0.59 K/UL
MONOCYTES NFR BLD AUTO: 7.7 %
NEUTROPHILS # BLD AUTO: 4.86 K/UL
NEUTROPHILS NFR BLD AUTO: 63.4 %
PLATELET # BLD AUTO: 235 K/UL
POTASSIUM SERPL-SCNC: 4.5 MMOL/L
PROT SERPL-MCNC: 7.1 G/DL
PT BLD: 27.1 SEC
RBC # BLD: 3.82 M/UL
RBC # FLD: 13.6 %
SARS-COV-2 IGG SERPL IA-ACNC: 0.01 INDEX
SARS-COV-2 IGG SERPL QL IA: NEGATIVE
SODIUM SERPL-SCNC: 139 MMOL/L
WBC # FLD AUTO: 7.67 K/UL

## 2020-06-25 DIAGNOSIS — R92.8 OTHER ABNORMAL AND INCONCLUSIVE FINDINGS ON DIAGNOSTIC IMAGING OF BREAST: ICD-10-CM

## 2020-07-01 PROBLEM — R92.8 ABNORMAL MAMMOGRAM: Status: ACTIVE | Noted: 2020-07-01

## 2020-07-10 ENCOUNTER — APPOINTMENT (OUTPATIENT)
Dept: INTERNAL MEDICINE | Facility: CLINIC | Age: 65
End: 2020-07-10
Payer: MEDICARE

## 2020-07-10 VITALS
DIASTOLIC BLOOD PRESSURE: 88 MMHG | HEIGHT: 65 IN | WEIGHT: 236 LBS | BODY MASS INDEX: 39.32 KG/M2 | RESPIRATION RATE: 16 BRPM | OXYGEN SATURATION: 97 % | HEART RATE: 76 BPM | TEMPERATURE: 99 F | SYSTOLIC BLOOD PRESSURE: 152 MMHG

## 2020-07-10 LAB
INR PPP: 2.9 RATIO
POCT-PROTHROMBIN TIME: 34.7 SECS

## 2020-07-10 PROCEDURE — 99214 OFFICE O/P EST MOD 30 MIN: CPT | Mod: 25

## 2020-07-10 PROCEDURE — 36415 COLL VENOUS BLD VENIPUNCTURE: CPT

## 2020-07-10 PROCEDURE — 85610 PROTHROMBIN TIME: CPT | Mod: QW

## 2020-07-12 LAB — BACTERIA THROAT CULT: NORMAL

## 2020-07-12 NOTE — PLAN
[FreeTextEntry1] : ID - viral syndrome -  - COVID-19   Counseling and education provided to the patient.  Advised sign and symptoms of the virus.  Advised contact precautions.  Educated patient on proper hand washing and to participate in social distancing. \par  We discussed how COVID 19  is tested.  Which is via nasopharyngeal swab.  Patient agree to testing at this time.\par   \par Advised  Ms. CARDOZA  if they feel SOB, MIRELES, change in mental status, orthopnea, or dizziness upon standing to seek immediate medical attention. \par Advised to post pone her MRI.\par Patient is in full awareness of the plan and agrees to it.  All pt question was answered.  \par \par Ms. CARDOZA  advised to self quarantine for 14 days. Discussed in full detail what that means. Note was provided to the pt.\par \par  JACKSON  was advised to contact anybody that has been in close contact with since she started feeling sick.  They should be told that you are highly suspicious for being COVID 19 positive and they should monitor for signs and symptoms of COVID.  If the start to have symptoms to call there medical provider for evaluation.  \par \par ENT Pharyngitis - zpack -  Advised to increase fluids, Tylenol or Motrin for  fever and pain.  Will check over night culture for confirmation.  Also can use throat lozenges as needed. \par \par  Endocrinology  -  Obesity   Patient was educated about the importance of diet and exercise.   We discussed  a goal of a BMI near 25.   Jackson is planning bariatric surgery.  \par \par Pulmonary - WILDER -  We discussed cardiovascular, metabolic and other symptomatic ramifications of untreated WILDER.  JACKSON was advised the importance of evaluation, treatment, compliance and follow up appointments.  We also discussed the importance of  diet and exercise programs to control weight.  Advised the importance of sleep hygiene. Advised to avoid alcohol and other sedatives that tend to exacerbate WILDER.Reviewed sleep study with patient. Advised to treatment options. Patient states that she wants to get a dental appliance and mireles  s not believe CPAP therapy she would be compliant with doing. Still has not made an apt. with a dentist for dental appliance \par  \par Cardio - CAD and hyperlipidemia - Continue with current medication. Continue zetia . \par Follow up with Cardio \par \par Pre hypertension - Advised weight loss.  \par \par Anticardiolipin Ab- Continue with warfarin therapy.  Check INR\par \par Orthopedic-degenerative joint disease bilateral knees status post total knee replacement. Advise patient to start PT for LBP - Continue  Vicodin advised to only take for sever pain. Check MRI l-s spine after 14 days  \par \par Neurology-insomnia - Continue  Ambien. Advise risk alternatives benefits and side effects of medication.   Advise patient risk of taking sleep medication secondary to have an obstructive sleep apnea.\par \par We discussed the importance of healthy lifestyles which include exercise, weight control and good diet.\par Patient's questions were answered in full detail. Advise patient if any other concerns arise to please call office to have a discussion. \par \par Reviewed mammo and breast sonogram advised repeat in 6 months

## 2020-07-12 NOTE — HEALTH RISK ASSESSMENT
[16-20] : 16-20 [1 or 2 (0 pts)] : 1 or 2 (0 points) [4 or more  times a week (4 pts)] : 4 or more  times a week (4 points) [Yes] : Yes [Never (0 pts)] : Never (0 points) [No] : In the past 12 months have you used drugs other than those required for medical reasons? No [No falls in past year] : Patient reported no falls in the past year [1] : 2) Feeling down, depressed, or hopeless for several days (1) [0] : 1) Little interest or pleasure doing things: Not at all (0) [] : No [de-identified] : quit [YearQuit] : 2000 [Audit-CScore] : 4 [de-identified] : walking at work [de-identified] : regular [[id number]] : 1

## 2020-07-12 NOTE — ASSESSMENT
[FreeTextEntry1] : A 65-year-old female with degenerative joint disease, insomnia, obesity present  to the office for INR check, LBP  and evaluation of a cough and sore throat

## 2020-07-12 NOTE — COUNSELING
[AUDIT-C Screening administered and reviewed] : AUDIT-C Screening administered and reviewed [Potential consequences of obesity discussed] : Potential consequences of obesity discussed [Encouraged to increase physical activity] : Encouraged to increase physical activity [Encouraged to maintain food diary] : Encouraged to maintain food diary [Weight management counseling provided] : Weight management [Good understanding] : Patient has a good understanding of disease, goals and obesity follow-up plan [Healthy eating counseling provided] : healthy eating [Fall prevention counseling provided] : fall prevention  [Needs reinforcement, provided] : Patient needs reinforcement on understanding of disease, goals and obesity follow-up plan; reinforcement was provided [Low Salt Diet] : Low salt diet [Low Fat Diet] : Low fat diet [Decrease Portions] : Decrease food portions [None] : None [Patient motivation] : Patient motivation [FreeTextEntry2] : 1600 nicole diet  increase exercise  [de-identified] : 1500 nicole diet \par Advised to walk daily for a goal of 10,000 steps \par Advised PT for B/l Knee replacemment

## 2020-07-12 NOTE — HISTORY OF PRESENT ILLNESS
[Cold Symptoms] : cold symptoms [Moderate] : moderate [___ Days ago] : [unfilled] days ago [Sudden] : suddenly [Constant] : constant [Cough] : cough [Sore Throat] : sore throat [Chills] : chills [Worsening] : worsening [Congestion] : no congestion [Wheezing] : no wheezing [Anorexia] : no anorexia [Shortness Of Breath] : no shortness of breath [Earache] : no earache [Headache] : no headache [Fever] : no fever [FreeTextEntry8] : Ms. CARDOZA is a 65 year  female, who also present to review recent Mammo and breast sonogram, INR check and renewal of medication for back pain.  States she is going for an open MRI later today.  Still having a lot of back pain.  States she has been taking the pain medication which help her get through daily activities.  \par Denies any abnormal bleeding

## 2020-07-12 NOTE — PHYSICAL EXAM
[No Murmur] : no murmur heard [Grossly Normal Strength/Tone] : grossly normal strength/tone [No Acute Distress] : no acute distress [Well Nourished] : well nourished [Well Developed] : well developed [Well-Appearing] : well-appearing [Normal Sclera/Conjunctiva] : normal sclera/conjunctiva [PERRL] : pupils equal round and reactive to light [Normal Outer Ear/Nose] : the outer ears and nose were normal in appearance [EOMI] : extraocular movements intact [Normal Oropharynx] : the oropharynx was normal [Normal Nasal Mucosa] : the nasal mucosa was normal [Normal TMs] : both tympanic membranes were normal [No JVD] : no jugular venous distention [Thyroid Normal, No Nodules] : the thyroid was normal and there were no nodules present [No Lymphadenopathy] : no lymphadenopathy [Supple] : supple [No Respiratory Distress] : no respiratory distress  [No Accessory Muscle Use] : no accessory muscle use [Clear to Auscultation] : lungs were clear to auscultation bilaterally [Normal S1, S2] : normal S1 and S2 [Normal Rate] : normal rate  [Regular Rhythm] : with a regular rhythm [No Abdominal Bruit] : a ~M bruit was not heard ~T in the abdomen [No Carotid Bruits] : no carotid bruits [No Varicosities] : no varicosities [No Edema] : there was no peripheral edema [Pedal Pulses Present] : the pedal pulses are present [No Extremity Clubbing/Cyanosis] : no extremity clubbing/cyanosis [No Palpable Aorta] : no palpable aorta [Soft] : abdomen soft [Non-distended] : non-distended [Non Tender] : non-tender [Normal Bowel Sounds] : normal bowel sounds [No Masses] : no abdominal mass palpated [No HSM] : no HSM [Normal Posterior Cervical Nodes] : no posterior cervical lymphadenopathy [No CVA Tenderness] : no CVA  tenderness [Normal Anterior Cervical Nodes] : no anterior cervical lymphadenopathy [No Spinal Tenderness] : no spinal tenderness [No Joint Swelling] : no joint swelling [No Rash] : no rash [Coordination Grossly Intact] : coordination grossly intact [Normal Gait] : normal gait [No Focal Deficits] : no focal deficits [Speech Grossly Normal] : speech grossly normal [Deep Tendon Reflexes (DTR)] : deep tendon reflexes were 2+ and symmetric [Normal Affect] : the affect was normal [Alert and Oriented x3] : oriented to person, place, and time [Normal Mood] : the mood was normal [Normal Insight/Judgement] : insight and judgment were intact [de-identified] : obese  [de-identified] : with murmur  [de-identified] :  to palp of L-s spine    neg SLR

## 2020-07-12 NOTE — REVIEW OF SYSTEMS
[Negative] : Psychiatric [Muscle Pain] : muscle pain [Insomnia] : insomnia [Back Pain] : back pain [Chills] : chills [Fatigue] : fatigue [Sore Throat] : sore throat [Cough] : cough [Recent Change In Weight] : ~T no recent weight change [Joint Pain] : no joint pain [Joint Stiffness] : no joint stiffness

## 2020-07-14 LAB — SARS-COV-2 N GENE NPH QL NAA+PROBE: NOT DETECTED

## 2020-08-06 ENCOUNTER — APPOINTMENT (OUTPATIENT)
Dept: INTERNAL MEDICINE | Facility: CLINIC | Age: 65
End: 2020-08-06
Payer: MEDICARE

## 2020-08-06 VITALS — SYSTOLIC BLOOD PRESSURE: 152 MMHG | DIASTOLIC BLOOD PRESSURE: 86 MMHG

## 2020-08-06 VITALS
RESPIRATION RATE: 16 BRPM | SYSTOLIC BLOOD PRESSURE: 160 MMHG | TEMPERATURE: 98.6 F | DIASTOLIC BLOOD PRESSURE: 88 MMHG | OXYGEN SATURATION: 98 % | WEIGHT: 237 LBS | HEART RATE: 80 BPM | HEIGHT: 65 IN | BODY MASS INDEX: 39.49 KG/M2

## 2020-08-06 VITALS — DIASTOLIC BLOOD PRESSURE: 86 MMHG | SYSTOLIC BLOOD PRESSURE: 138 MMHG

## 2020-08-06 PROCEDURE — 99213 OFFICE O/P EST LOW 20 MIN: CPT | Mod: 25

## 2020-08-06 PROCEDURE — 85610 PROTHROMBIN TIME: CPT | Mod: QW

## 2020-08-06 RX ORDER — AZITHROMYCIN 250 MG/1
250 TABLET, FILM COATED ORAL
Qty: 1 | Refills: 0 | Status: DISCONTINUED | COMMUNITY
Start: 2020-07-10 | End: 2020-08-06

## 2020-08-10 LAB
INR PPP: 3.5 RATIO
POCT-PROTHROMBIN TIME: 41.9 SECS
QUALITY CONTROL: YES

## 2020-08-10 NOTE — ASSESSMENT
[FreeTextEntry1] : A 65-year-old female with degenerative joint disease, insomnia, obesity present  to the office for INR check, LBP  and renewal of medications

## 2020-08-10 NOTE — PHYSICAL EXAM
[No Acute Distress] : no acute distress [Well Developed] : well developed [Well Nourished] : well nourished [Well-Appearing] : well-appearing [Normal Sclera/Conjunctiva] : normal sclera/conjunctiva [PERRL] : pupils equal round and reactive to light [Normal Outer Ear/Nose] : the outer ears and nose were normal in appearance [EOMI] : extraocular movements intact [Normal Oropharynx] : the oropharynx was normal [Normal TMs] : both tympanic membranes were normal [Normal Nasal Mucosa] : the nasal mucosa was normal [Supple] : supple [No JVD] : no jugular venous distention [No Lymphadenopathy] : no lymphadenopathy [Thyroid Normal, No Nodules] : the thyroid was normal and there were no nodules present [No Respiratory Distress] : no respiratory distress  [Clear to Auscultation] : lungs were clear to auscultation bilaterally [Normal Rate] : normal rate  [No Accessory Muscle Use] : no accessory muscle use [Normal S1, S2] : normal S1 and S2 [Regular Rhythm] : with a regular rhythm [No Abdominal Bruit] : a ~M bruit was not heard ~T in the abdomen [No Carotid Bruits] : no carotid bruits [No Edema] : there was no peripheral edema [Pedal Pulses Present] : the pedal pulses are present [No Varicosities] : no varicosities [No Extremity Clubbing/Cyanosis] : no extremity clubbing/cyanosis [No Palpable Aorta] : no palpable aorta [Soft] : abdomen soft [Non Tender] : non-tender [No Masses] : no abdominal mass palpated [Non-distended] : non-distended [No HSM] : no HSM [Normal Bowel Sounds] : normal bowel sounds [Normal Anterior Cervical Nodes] : no anterior cervical lymphadenopathy [Normal Posterior Cervical Nodes] : no posterior cervical lymphadenopathy [No CVA Tenderness] : no CVA  tenderness [No Joint Swelling] : no joint swelling [No Spinal Tenderness] : no spinal tenderness [Normal Gait] : normal gait [No Rash] : no rash [Coordination Grossly Intact] : coordination grossly intact [No Focal Deficits] : no focal deficits [Speech Grossly Normal] : speech grossly normal [Deep Tendon Reflexes (DTR)] : deep tendon reflexes were 2+ and symmetric [Normal Affect] : the affect was normal [Alert and Oriented x3] : oriented to person, place, and time [Normal Mood] : the mood was normal [Normal Insight/Judgement] : insight and judgment were intact [Obese] : obese [de-identified] : obese  [de-identified] : with murmur  [de-identified] :  to palp of L-s spine    neg SLR

## 2020-08-10 NOTE — HEALTH RISK ASSESSMENT
[16-20] : 16-20 [Yes] : Yes [4 or more  times a week (4 pts)] : 4 or more  times a week (4 points) [1 or 2 (0 pts)] : 1 or 2 (0 points) [Never (0 pts)] : Never (0 points) [No] : In the past 12 months have you used drugs other than those required for medical reasons? No [1] : 2) Feeling down, depressed, or hopeless for several days (1) [0] : 1) Little interest or pleasure doing things: Not at all (0) [No falls in past year] : Patient reported no falls in the past year [] : No [de-identified] : quit [YearQuit] : 2000 [Audit-CScore] : 4 [de-identified] : walking at work [de-identified] : regular [[id number]] : 1

## 2020-08-10 NOTE — HISTORY OF PRESENT ILLNESS
[FreeTextEntry1] : Renewal of medication  [de-identified] : Ms. CARDOZA is a 65 year  female, who present to the office for  renewal of medication for sleep and recent back pain.  States she is going on Sunday to get the MRI done.  Does state the back pain has improved.  \par Also needs a INR check.  Denies any abnormal bleeding.  \par \par Since last visit feels much better. COugh resolved

## 2020-08-10 NOTE — PLAN
[FreeTextEntry1] : \par Anticardiolipin Ab- Continue with warfarin therapy.  Check INR in 2 weeks \par Advised o hold tonight dose than 2.5 mg on Saturday\par  \par Cardio - CAD and hyperlipidemia - Continue with current medication. Continue zetia . \par Follow up with Cardio \par  Endocrinology  -  Obesity   Patient was educated about the importance of diet and exercise.   We discussed  a goal of a BMI near 25.   Jackson is planning bariatric surgery.  \par \par Anticardiolipin Ab- Continue with warfarin therapy.  Check INR\par Pulmonary - WILDER -  We discussed cardiovascular, metabolic and other symptomatic ramifications of untreated WILDER.  JACKSON was advised the importance of evaluation, treatment, compliance and follow up appointments.  We also discussed the importance of  diet and exercise programs to control weight.  Advised the importance of sleep hygiene. Advised to avoid alcohol and other sedatives that tend to exacerbate WILDER.Reviewed sleep study with patient. Advised to treatment options. Patient states that she wants to get a dental appliance and mcintosh  s not believe CPAP therapy she would be compliant with doing. Still has not made an apt. with a dentist for dental appliance \par  \par Pre hypertension - Advised weight loss.  \par \par Orthopedic-degenerative joint disease bilateral knees status post total knee replacement. Advise patient to start PT for LBP - Continue  Vicodin advised to only take for sever pain. Check MRI l-s spine \par \par Neurology-insomnia - Continue  Ambien. Advise risk alternatives benefits and side effects of medication.   Advise patient risk of taking sleep medication secondary to have an obstructive sleep apnea.\par \par We discussed the importance of healthy lifestyles which include exercise, weight control and good diet.\par Patient's questions were answered in full detail. Advise patient if any other concerns arise to please call office to have a discussion. \par \par Reviewed mammo and breast sonogram advised repeat in 6 months \par \par Patient  education  - COVID-19   Counseling and education provided to the patient.  Advised sign and symptoms of the virus.  Advised contact precautions.  Educated patient on proper hand washing and to participate in social distancing. . \par \par Patient is in full awareness of the plan and agrees to it.  All pt question was answered.  \par \par

## 2020-08-10 NOTE — REVIEW OF SYSTEMS
[Insomnia] : insomnia [Muscle Pain] : muscle pain [Back Pain] : back pain [Negative] : Heme/Lymph [Fever] : no fever [Chills] : no chills [Fatigue] : no fatigue [Recent Change In Weight] : ~T no recent weight change [Sore Throat] : no sore throat [Chest Pain] : no chest pain [Shortness Of Breath] : no shortness of breath [Palpitations] : no palpitations [Cough] : no cough [Abdominal Pain] : no abdominal pain [Joint Pain] : no joint pain [Joint Stiffness] : no joint stiffness

## 2020-08-10 NOTE — COUNSELING
[AUDIT-C Screening administered and reviewed] : AUDIT-C Screening administered and reviewed [Encouraged to maintain food diary] : Encouraged to maintain food diary [Potential consequences of obesity discussed] : Potential consequences of obesity discussed [Encouraged to increase physical activity] : Encouraged to increase physical activity [Good understanding] : Patient has a good understanding of disease, goals and obesity follow-up plan [Weight management counseling provided] : Weight management [Healthy eating counseling provided] : healthy eating [Fall prevention counseling provided] : fall prevention  [Needs reinforcement, provided] : Patient needs reinforcement on understanding of disease, goals and obesity follow-up plan; reinforcement was provided [Low Fat Diet] : Low fat diet [Low Salt Diet] : Low salt diet [Decrease Portions] : Decrease food portions [Patient motivation] : Patient motivation [None] : None [FreeTextEntry2] : 1600 nicole diet  increase exercise  [de-identified] : 1500 nicole diet \par Advised to walk daily for a goal of 10,000 steps \par Advised PT for B/l Knee replacemment

## 2020-08-14 NOTE — PHYSICAL THERAPY INITIAL EVALUATION ADULT - MD ORDER
wbat Mohs Histo Method Verbiage: Each section was then chromacoded and processed in the Mohs lab using the Mohs protocol and submitted for horizontal frozen section.

## 2020-09-04 ENCOUNTER — APPOINTMENT (OUTPATIENT)
Dept: INTERNAL MEDICINE | Facility: CLINIC | Age: 65
End: 2020-09-04
Payer: MEDICARE

## 2020-09-04 VITALS
HEIGHT: 65 IN | DIASTOLIC BLOOD PRESSURE: 80 MMHG | WEIGHT: 236 LBS | HEART RATE: 82 BPM | BODY MASS INDEX: 39.32 KG/M2 | TEMPERATURE: 98.2 F | OXYGEN SATURATION: 96 % | SYSTOLIC BLOOD PRESSURE: 154 MMHG

## 2020-09-04 DIAGNOSIS — J06.9 ACUTE UPPER RESPIRATORY INFECTION, UNSPECIFIED: ICD-10-CM

## 2020-09-04 DIAGNOSIS — Z87.09 PERSONAL HISTORY OF OTHER DISEASES OF THE RESPIRATORY SYSTEM: ICD-10-CM

## 2020-09-04 LAB
INR PPP: 2.2 RATIO
POCT-PROTHROMBIN TIME: 26.6 SECS
QUALITY CONTROL: YES

## 2020-09-04 PROCEDURE — 85610 PROTHROMBIN TIME: CPT | Mod: QW

## 2020-09-04 PROCEDURE — 36416 COLLJ CAPILLARY BLOOD SPEC: CPT

## 2020-09-04 PROCEDURE — 99214 OFFICE O/P EST MOD 30 MIN: CPT | Mod: 25

## 2020-09-04 NOTE — HISTORY OF PRESENT ILLNESS
[FreeTextEntry1] : LBP and renewal of medication and INR check  [de-identified] : Ms. CARDOZA is a 65 year  female, who present to the office for  INR check,  Patient states she having significant increase in LBP.  States the pain is exacerbated when getting in and out of care.  Disrupt sleep pattern.  Knee pain slighl more discomfort.  \par HAd MRI of l-spine would like to discuss the results \par Denies fever, chills, SOB, or night sweats\par Denies abnormal bleeding\par Denies side effects to current medications

## 2020-09-04 NOTE — REVIEW OF SYSTEMS
[Back Pain] : back pain [Insomnia] : insomnia [Negative] : Heme/Lymph [Fever] : no fever [Chills] : no chills [Recent Change In Weight] : ~T no recent weight change [Nasal Discharge] : no nasal discharge [Fatigue] : no fatigue [Sore Throat] : no sore throat [Chest Pain] : no chest pain [Palpitations] : no palpitations [Cough] : no cough [Shortness Of Breath] : no shortness of breath [Abdominal Pain] : no abdominal pain [Joint Stiffness] : no joint stiffness [Joint Pain] : no joint pain [Headache] : no headache [Muscle Pain] : no muscle pain [Mole Changes] : no mole changes [Memory Loss] : no memory loss

## 2020-09-04 NOTE — PLAN
[FreeTextEntry1] : Anticardiolipin Ab- INR 2.2.   Continue with warfarin therapy.  Check INR in 2 weeks \par \par  Cardio - CAD and hyperlipidemia - Continue with current medication. Continue zetia . \par Follow up with Cardio \par  Endocrinology  -  Obesity   Patient was educated about the importance of diet and exercise.   We discussed  a goal of a BMI near 25.   [facility] is planning bariatric surgery.  \par \par Anticardiolipin Ab- Continue with warfarin therapy.  Check INR in 3 weeks \par Pulmonary - WILDER -  We discussed cardiovascular, metabolic and other symptomatic ramifications of untreated WILDER.  JACKSON was advised the importance of evaluation, treatment, compliance and follow up appointments.  We also discussed the importance of  diet and exercise programs to control weight.  Advised the importance of sleep hygiene. Advised to avoid alcohol and other sedatives that tend to exacerbate WILDER.Reviewed sleep study with patient. Advised to treatment options. Patient states that she wants to get a dental appliance and [facility]  s not believe CPAP therapy she would be compliant with doing. Still has not made an apt. with a dentist for dental appliance \par  \par Pre hypertension - Advised weight loss.  if remains elevated will start medication next ov.  Advised to try to lose 5 lbs before next visit \par \par Orthopedic -- spinal stenoisis- DJD lumbar spine -degenerative joint disease bilateral knees status post total knee replacement. Advise patient to start PT for LBP - Continue  Vicodin advised to only take for sever pain. \par \par Neurology-insomnia - Continue  Ambien. Advise risk alternatives benefits and side effects of medication.   Advise patient risk of taking sleep medication secondary to have an obstructive sleep apnea.  reviewed  \par \par We discussed the importance of healthy lifestyles which include exercise, weight control and good diet.\par Patient's questions were answered in full detail. Advise patient if any other concerns arise to please call office to have a discussion. \par \par Patient  education  - COVID-19   Counseling and education provided to the patient.  Advised sign and symptoms of the virus.  Advised contact precautions.  Educated patient on proper hand washing and to participate in social distancing. \par Patient is in full awareness of the plan and agrees to it.  All pt question was answered.  \par \par I spent 25 Minutes with the patient, half of which we discussed finding on physical exam  and coordinated care.  As well as reviewed my plans and follow ups. \par

## 2020-09-04 NOTE — COUNSELING
[AUDIT-C Screening administered and reviewed] : AUDIT-C Screening administered and reviewed [Potential consequences of obesity discussed] : Potential consequences of obesity discussed [Encouraged to maintain food diary] : Encouraged to maintain food diary [Encouraged to increase physical activity] : Encouraged to increase physical activity [Good understanding] : Patient has a good understanding of disease, goals and obesity follow-up plan [Weight management counseling provided] : Weight management [Healthy eating counseling provided] : healthy eating [Fall prevention counseling provided] : fall prevention  [Needs reinforcement, provided] : Patient needs reinforcement on understanding of disease, goals and obesity follow-up plan; reinforcement was provided [Low Fat Diet] : Low fat diet [Low Salt Diet] : Low salt diet [Decrease Portions] : Decrease food portions [Patient motivation] : Patient motivation [None] : None [FreeTextEntry2] : 1600 nicole diet  increase exercise  [de-identified] : 1500 nicole diet \par Advised to walk daily for a goal of 10,000 steps \par Advised PT for B/l Knee replacemment

## 2020-09-04 NOTE — HEALTH RISK ASSESSMENT
[16-20] : 16-20 [Yes] : Yes [4 or more  times a week (4 pts)] : 4 or more  times a week (4 points) [1 or 2 (0 pts)] : 1 or 2 (0 points) [Never (0 pts)] : Never (0 points) [No] : In the past 12 months have you used drugs other than those required for medical reasons? No [No falls in past year] : Patient reported no falls in the past year [0] : 1) Little interest or pleasure doing things: Not at all (0) [1] : 2) Feeling down, depressed, or hopeless for several days (1) [de-identified] : quit [] : No [Audit-CScore] : 4 [YearQuit] : 2000 [de-identified] : walking at work [[id number]] : 1 [de-identified] : regular

## 2020-09-04 NOTE — PHYSICAL EXAM
[No Acute Distress] : no acute distress [Well Nourished] : well nourished [Well Developed] : well developed [Well-Appearing] : well-appearing [Normal Sclera/Conjunctiva] : normal sclera/conjunctiva [PERRL] : pupils equal round and reactive to light [EOMI] : extraocular movements intact [Normal Outer Ear/Nose] : the outer ears and nose were normal in appearance [Normal Oropharynx] : the oropharynx was normal [Normal TMs] : both tympanic membranes were normal [Normal Nasal Mucosa] : the nasal mucosa was normal [No JVD] : no jugular venous distention [Supple] : supple [No Lymphadenopathy] : no lymphadenopathy [Thyroid Normal, No Nodules] : the thyroid was normal and there were no nodules present [No Respiratory Distress] : no respiratory distress  [Clear to Auscultation] : lungs were clear to auscultation bilaterally [No Accessory Muscle Use] : no accessory muscle use [Normal Rate] : normal rate  [Regular Rhythm] : with a regular rhythm [Normal S1, S2] : normal S1 and S2 [No Carotid Bruits] : no carotid bruits [No Abdominal Bruit] : a ~M bruit was not heard ~T in the abdomen [No Varicosities] : no varicosities [Pedal Pulses Present] : the pedal pulses are present [No Edema] : there was no peripheral edema [No Extremity Clubbing/Cyanosis] : no extremity clubbing/cyanosis [No Palpable Aorta] : no palpable aorta [Soft] : abdomen soft [Non Tender] : non-tender [Non-distended] : non-distended [No Masses] : no abdominal mass palpated [No HSM] : no HSM [Normal Bowel Sounds] : normal bowel sounds [Obese] : obese [Normal Posterior Cervical Nodes] : no posterior cervical lymphadenopathy [Normal Anterior Cervical Nodes] : no anterior cervical lymphadenopathy [No CVA Tenderness] : no CVA  tenderness [No Spinal Tenderness] : no spinal tenderness [No Joint Swelling] : no joint swelling [No Rash] : no rash [Normal Gait] : normal gait [Coordination Grossly Intact] : coordination grossly intact [No Focal Deficits] : no focal deficits [Deep Tendon Reflexes (DTR)] : deep tendon reflexes were 2+ and symmetric [Speech Grossly Normal] : speech grossly normal [Normal Affect] : the affect was normal [Alert and Oriented x3] : oriented to person, place, and time [Normal Mood] : the mood was normal [Normal Insight/Judgement] : insight and judgment were intact [de-identified] : with murmur  [de-identified] : obese  [de-identified] :  to palp of L-s spine    neg SLR

## 2020-10-06 ENCOUNTER — MED ADMIN CHARGE (OUTPATIENT)
Age: 65
End: 2020-10-06

## 2020-10-06 ENCOUNTER — APPOINTMENT (OUTPATIENT)
Dept: INTERNAL MEDICINE | Facility: CLINIC | Age: 65
End: 2020-10-06
Payer: MEDICARE

## 2020-10-06 VITALS
SYSTOLIC BLOOD PRESSURE: 152 MMHG | RESPIRATION RATE: 16 BRPM | TEMPERATURE: 97.2 F | HEART RATE: 87 BPM | DIASTOLIC BLOOD PRESSURE: 92 MMHG | OXYGEN SATURATION: 98 % | HEIGHT: 65 IN | WEIGHT: 238.38 LBS | BODY MASS INDEX: 39.72 KG/M2

## 2020-10-06 PROCEDURE — 99214 OFFICE O/P EST MOD 30 MIN: CPT | Mod: 25

## 2020-10-06 PROCEDURE — 90686 IIV4 VACC NO PRSV 0.5 ML IM: CPT

## 2020-10-06 PROCEDURE — 36415 COLL VENOUS BLD VENIPUNCTURE: CPT

## 2020-10-06 PROCEDURE — G0008: CPT

## 2020-10-06 NOTE — REVIEW OF SYSTEMS
[Back Pain] : back pain [Insomnia] : insomnia [Negative] : Heme/Lymph [Fever] : no fever [Chills] : no chills [Fatigue] : no fatigue [Recent Change In Weight] : ~T no recent weight change [Nasal Discharge] : no nasal discharge [Sore Throat] : no sore throat [Chest Pain] : no chest pain [Palpitations] : no palpitations [Shortness Of Breath] : no shortness of breath [Cough] : no cough [Abdominal Pain] : no abdominal pain [Dysuria] : no dysuria [Incontinence] : no incontinence [Joint Pain] : no joint pain [Joint Stiffness] : no joint stiffness [Muscle Pain] : muscle pain [Itching] : no itching [Mole Changes] : no mole changes [Skin Rash] : no skin rash [Headache] : no headache [Memory Loss] : no memory loss

## 2020-10-06 NOTE — ASSESSMENT
[FreeTextEntry1] : A  65-year-old female with degenerative joint disease, insomnia, obesity present  to the office for INR check, LBP  Flu shot and renewal of medications \par

## 2020-10-06 NOTE — PHYSICAL EXAM
[No Acute Distress] : no acute distress [Well Nourished] : well nourished [Well Developed] : well developed [Well-Appearing] : well-appearing [Normal Sclera/Conjunctiva] : normal sclera/conjunctiva [PERRL] : pupils equal round and reactive to light [EOMI] : extraocular movements intact [Normal Outer Ear/Nose] : the outer ears and nose were normal in appearance [Normal Oropharynx] : the oropharynx was normal [Normal TMs] : both tympanic membranes were normal [Normal Nasal Mucosa] : the nasal mucosa was normal [No JVD] : no jugular venous distention [Supple] : supple [No Lymphadenopathy] : no lymphadenopathy [Thyroid Normal, No Nodules] : the thyroid was normal and there were no nodules present [No Respiratory Distress] : no respiratory distress  [Clear to Auscultation] : lungs were clear to auscultation bilaterally [No Accessory Muscle Use] : no accessory muscle use [Normal Rate] : normal rate  [Regular Rhythm] : with a regular rhythm [Normal S1, S2] : normal S1 and S2 [No Carotid Bruits] : no carotid bruits [No Abdominal Bruit] : a ~M bruit was not heard ~T in the abdomen [No Varicosities] : no varicosities [Pedal Pulses Present] : the pedal pulses are present [No Edema] : there was no peripheral edema [No Extremity Clubbing/Cyanosis] : no extremity clubbing/cyanosis [No Palpable Aorta] : no palpable aorta [Soft] : abdomen soft [Non Tender] : non-tender [Non-distended] : non-distended [No Masses] : no abdominal mass palpated [No HSM] : no HSM [Normal Bowel Sounds] : normal bowel sounds [Obese] : obese [Normal Posterior Cervical Nodes] : no posterior cervical lymphadenopathy [Normal Anterior Cervical Nodes] : no anterior cervical lymphadenopathy [No CVA Tenderness] : no CVA  tenderness [No Spinal Tenderness] : no spinal tenderness [No Joint Swelling] : no joint swelling [No Rash] : no rash [Normal Gait] : normal gait [Coordination Grossly Intact] : coordination grossly intact [No Focal Deficits] : no focal deficits [Deep Tendon Reflexes (DTR)] : deep tendon reflexes were 2+ and symmetric [Speech Grossly Normal] : speech grossly normal [Normal Affect] : the affect was normal [Alert and Oriented x3] : oriented to person, place, and time [Normal Mood] : the mood was normal [Normal Insight/Judgement] : insight and judgment were intact [de-identified] : obese  [de-identified] : with murmur  [de-identified] :  to palp of L-s spine    neg SLR

## 2020-10-06 NOTE — PLAN
[FreeTextEntry1] : Anticardiolipin Ab-  check INR will call when labs return.   Continue with warfarin therapy.  Check INR in 2 weeks \par \par Cardio - CAD and hyperlipidemia - Continue with current medication. Continue zetia . \par Follow up with Cardio \par Cardio - Hypertension -  Patient was educated about hypertension and the importance of controlling the pressure through lifestyle modification which include low sodium  diet and  aerobic  exercise.  Also discussed the use of prescription medication which included their benefits and their side effects. We discussed the use of ASA 81 mg daily.   Having a BMI less than or equal to 25.  Start losartan 25 mg   Check BP in 4 weeks \par \par  Endocrinology  -  Obesity   Patient was educated about the importance of diet and exercise.   We discussed  a goal of a BMI near 25.   [facility] is planning bariatric surgery.  \par \par Anticardiolipin Ab- Continue with warfarin therapy. INR results pending \par \par Pulmonary - WILDER -  We discussed cardiovascular, metabolic and other symptomatic ramifications of untreated WILDER.  JACKSON was advised the importance of evaluation, treatment, compliance and follow up appointments.  We also discussed the importance of  diet and exercise programs to control weight.  Advised the importance of sleep hygiene. Advised to avoid alcohol and other sedatives that tend to exacerbate WILDER.Reviewed sleep study with patient. Advised to treatment options. Patient states that she wants to get a dental appliance and [facility]  s not believe CPAP therapy she would be compliant with doing. Still has not made an apt. with a dentist for dental appliance \par \par \par Orthopedic -- spinal stenosis- DJD lumbar spine -degenerative joint disease bilateral knees status post total knee replacement. Advise patient to start PT for LBP - Continue  Vicodin advised to only take for sever pain. \par advised risk of dependancy \par \par Neurology-insomnia - Continue  Ambien. Advise risk alternatives benefits and side effects of medication.   Advise patient risk of taking sleep medication secondary to have an obstructive sleep apnea.  reviewed  \par \par We discussed the importance of healthy lifestyles which include exercise, weight control and good diet.\par Patient's questions were answered in full detail. Advise patient if any other concerns arise to please call office to have a discussion. \par \par Patient  education  - COVID-19   Counseling and education provided to the patient.  Advised sign and symptoms of the virus.  Advised contact precautions.  Educated patient on proper hand washing and to participate in social distancing. \par Patient is in full awareness of the plan and agrees to it.  All pt question was answered.  \par \par I spent 25 Minutes with the patient, half of which we discussed finding on physical exam  and coordinated care.  As well as reviewed my plans and follow ups. \par  \par Immunization - Flu shot given - consent formed signed.  CDC sheet given for pt educations

## 2020-10-06 NOTE — HEALTH RISK ASSESSMENT
[16-20] : 16-20 [Yes] : Yes [4 or more  times a week (4 pts)] : 4 or more  times a week (4 points) [1 or 2 (0 pts)] : 1 or 2 (0 points) [Never (0 pts)] : Never (0 points) [No] : In the past 12 months have you used drugs other than those required for medical reasons? No [No falls in past year] : Patient reported no falls in the past year [0] : 1) Little interest or pleasure doing things: Not at all (0) [1] : 2) Feeling down, depressed, or hopeless for several days (1) [] : No [de-identified] : quit [YearQuit] : 2000 [Audit-CScore] : 4 [de-identified] : walking at work [de-identified] : regular [[id number]] : 1

## 2020-10-06 NOTE — HISTORY OF PRESENT ILLNESS
[FreeTextEntry1] : Refill on medication, Flu shot, INR check  [de-identified] : Ms. CARDOZA is a 65 year  female, who present to the office for INR check.  Denies any abnormal bleeding.  \par \par Also need refill on pain medication for the back.  States she is getting some improvement.  States she believes weight loss would help.  \par Denies starting PT as directed.  States pain scale 1-10 today is a 7.  Over the last two weeks the worse was a ten.  Does help her to perform certain task at work and home which she wouldn't’t be able to do with out the medication secondary to the pain.  \par \par Also needs a refill on ambien.  Denies any side effects to the current medications \par

## 2020-10-06 NOTE — COUNSELING
[AUDIT-C Screening administered and reviewed] : AUDIT-C Screening administered and reviewed [Potential consequences of obesity discussed] : Potential consequences of obesity discussed [Encouraged to maintain food diary] : Encouraged to maintain food diary [Encouraged to increase physical activity] : Encouraged to increase physical activity [Good understanding] : Patient has a good understanding of disease, goals and obesity follow-up plan [Weight management counseling provided] : Weight management [Healthy eating counseling provided] : healthy eating [Fall prevention counseling provided] : fall prevention  [Needs reinforcement, provided] : Patient needs reinforcement on understanding of disease, goals and obesity follow-up plan; reinforcement was provided [Low Fat Diet] : Low fat diet [Low Salt Diet] : Low salt diet [Decrease Portions] : Decrease food portions [Patient motivation] : Patient motivation [None] : None [FreeTextEntry2] : 1600 nicole diet  increase exercise  [de-identified] : 1500 nicole diet \par Advised to walk daily for a goal of 10,000 steps \par Advised PT for B/l Knee replacemment

## 2020-10-13 LAB
INR PPP: 2.87 RATIO
PT BLD: 32.2 SEC

## 2020-11-12 ENCOUNTER — RX RENEWAL (OUTPATIENT)
Age: 65
End: 2020-11-12

## 2020-11-17 ENCOUNTER — APPOINTMENT (OUTPATIENT)
Dept: INTERNAL MEDICINE | Facility: CLINIC | Age: 65
End: 2020-11-17
Payer: MEDICARE

## 2020-11-17 VITALS — DIASTOLIC BLOOD PRESSURE: 92 MMHG | SYSTOLIC BLOOD PRESSURE: 152 MMHG

## 2020-11-17 VITALS
OXYGEN SATURATION: 97 % | TEMPERATURE: 97.6 F | HEIGHT: 65 IN | SYSTOLIC BLOOD PRESSURE: 150 MMHG | DIASTOLIC BLOOD PRESSURE: 90 MMHG | BODY MASS INDEX: 39.04 KG/M2 | WEIGHT: 234.31 LBS | RESPIRATION RATE: 16 BRPM | HEART RATE: 73 BPM

## 2020-11-17 DIAGNOSIS — Z86.718 PERSONAL HISTORY OF OTHER VENOUS THROMBOSIS AND EMBOLISM: ICD-10-CM

## 2020-11-17 DIAGNOSIS — Z13.21 ENCOUNTER FOR SCREENING FOR NUTRITIONAL DISORDER: ICD-10-CM

## 2020-11-17 DIAGNOSIS — D55.1 ANEMIA DUE TO OTHER DISORDERS OF GLUTATHIONE METABOLISM: ICD-10-CM

## 2020-11-17 PROCEDURE — 36415 COLL VENOUS BLD VENIPUNCTURE: CPT

## 2020-11-17 PROCEDURE — 99214 OFFICE O/P EST MOD 30 MIN: CPT | Mod: 25

## 2020-11-17 NOTE — HISTORY OF PRESENT ILLNESS
[FreeTextEntry1] : Refill on medication, fasting labs  [de-identified] : Ms. CARDOZA is a 65 year  female, who present to the office for INR check.  Denies any abnormal bleeding.  \par \par Also need refill on pain medication for the back.  States the pain has been improving.    \par Denies starting PT as directed.  States pain scale 1-10 today is a 5.  Over the last two weeks the worse was a ten.  Does help her to perform certain task at work and home which she wouldn't’t be able to do with out the medication secondary to the pain.  \par \par Also needs a refill on Ambien.  Denies any side effects to the current medications.  Denies early am drowsiness.\par \par Under stress secondary to being worried about catching covid \par

## 2020-11-17 NOTE — PLAN
[FreeTextEntry1] : Anticardiolipin Ab-  check INR will call when labs return.   Continue with warfarin therapy.  Check INR in 2 weeks \par \par Cardio - CAD and hyperlipidemia - Continue with current medication. Continue Zetia daily. Check FLP, and LFT today \par Follow up with Cardio \par Cardio - Hypertension -  Patient was educated about hypertension and the importance of controlling the pressure through lifestyle modification which include low sodium  diet and  aerobic  exercise.  Also discussed the use of prescription medication which included their benefits and their side effects. We discussed the use of ASA 81 mg daily.   Having a BMI less than or equal to 25.  Increase losartan 50  mg   Check BP in 4 weeks \par \par .\par  Endocrinology  -  Obesity   Patient was educated about the importance of diet and exercise.   We discussed  a goal of a BMI near 25.   [facility] is planning bariatric surgery.  check a1c and TSH\par \par Anticardiolipin Ab- Continue with warfarin therapy. INR results pending \par \par Pulmonary - WILDER -  We discussed cardiovascular, metabolic and other symptomatic ramifications of untreated WILDER.  JACKSON was advised the importance of evaluation, treatment, compliance and follow up appointments.  We also discussed the importance of  diet and exercise programs to control weight.  Advised the importance of sleep hygiene. Advised to avoid alcohol and other sedatives that tend to exacerbate WILDER.Reviewed sleep study with patient. Advised to treatment options. Patient states that she wants to get a dental appliance and [facility]  s not believe CPAP therapy she would be compliant with doing. Still has not made an apt. with a dentist for dental appliance \par \par \par Orthopedic -- spinal stenosis- DJD lumbar spine -degenerative joint disease bilateral knees status post total knee replacement. Advise patient to start PT for LBP - Continue  Vicodin advised to only take for sever pain. \par advised risk of dependancy.  Will start to taper next month if pt continues to show improvement \par \par Neurology-insomnia - Continue  Ambien. Advise risk alternatives benefits and side effects of medication.   Advise patient risk of taking sleep medication secondary to have an obstructive sleep apnea.  Reviewed  see scan.\par \par We discussed the importance of healthy lifestyles which include exercise, weight control and good diet.\par Patient's questions were answered in full detail. Advise patient if any other concerns arise to please call office to have a discussion. \par \par Patient  education  - COVID-19   Counseling and education provided to the patient.  Advised sign and symptoms of the virus.  Advised contact precautions.  Educated patient on proper hand washing and to participate in social distancing. \par Patient is in full awareness of the plan and agrees to it.  All pt question was answered.  \par \par I spent 25 Minutes with the patient, half of which we discussed finding on physical exam  and coordinated care.  As well as reviewed my plans and follow ups. \par

## 2020-11-17 NOTE — ASSESSMENT
[FreeTextEntry1] : A  65-year-old female with degenerative joint disease, insomnia, obesity present  to the office for INR check, LBP   and renewal of medications \par

## 2020-11-17 NOTE — COUNSELING
[AUDIT-C Screening administered and reviewed] : AUDIT-C Screening administered and reviewed [Potential consequences of obesity discussed] : Potential consequences of obesity discussed [Encouraged to maintain food diary] : Encouraged to maintain food diary [Encouraged to increase physical activity] : Encouraged to increase physical activity [Good understanding] : Patient has a good understanding of disease, goals and obesity follow-up plan [Weight management counseling provided] : Weight management [Healthy eating counseling provided] : healthy eating [Fall prevention counseling provided] : fall prevention  [Needs reinforcement, provided] : Patient needs reinforcement on understanding of disease, goals and obesity follow-up plan; reinforcement was provided [Low Fat Diet] : Low fat diet [Low Salt Diet] : Low salt diet [Decrease Portions] : Decrease food portions [Patient motivation] : Patient motivation [None] : None [FreeTextEntry2] : 1600 nicole diet  increase exercise  [de-identified] : 1500 nicole diet \par Advised to walk daily for a goal of 10,000 steps \par Advised PT for B/l Knee replacemment

## 2020-11-17 NOTE — HEALTH RISK ASSESSMENT
[16-20] : 16-20 [Yes] : Yes [4 or more  times a week (4 pts)] : 4 or more  times a week (4 points) [1 or 2 (0 pts)] : 1 or 2 (0 points) [Never (0 pts)] : Never (0 points) [No] : In the past 12 months have you used drugs other than those required for medical reasons? No [No falls in past year] : Patient reported no falls in the past year [0] : 1) Little interest or pleasure doing things: Not at all (0) [1] : 2) Feeling down, depressed, or hopeless for several days (1) [] : No [de-identified] : quit [YearQuit] : 2000 [Audit-CScore] : 4 [de-identified] : walking at work [de-identified] : regular [[id number]] : 1

## 2020-11-17 NOTE — REVIEW OF SYSTEMS
[Muscle Pain] : muscle pain [Back Pain] : back pain [Insomnia] : insomnia [Negative] : Heme/Lymph [Fever] : no fever [Chills] : no chills [Fatigue] : no fatigue [Recent Change In Weight] : ~T no recent weight change [Nasal Discharge] : no nasal discharge [Sore Throat] : no sore throat [Chest Pain] : no chest pain [Palpitations] : no palpitations [Shortness Of Breath] : no shortness of breath [Cough] : no cough [Abdominal Pain] : no abdominal pain [Dysuria] : no dysuria [Incontinence] : no incontinence [Joint Stiffness] : no joint stiffness [Itching] : no itching [Mole Changes] : no mole changes [Skin Rash] : no skin rash [Headache] : no headache [Dizziness] : no dizziness [Memory Loss] : no memory loss [de-identified] : stressed

## 2020-11-17 NOTE — PHYSICAL EXAM
[No Acute Distress] : no acute distress [Well Nourished] : well nourished [Well Developed] : well developed [Well-Appearing] : well-appearing [Normal Sclera/Conjunctiva] : normal sclera/conjunctiva [PERRL] : pupils equal round and reactive to light [EOMI] : extraocular movements intact [Normal Outer Ear/Nose] : the outer ears and nose were normal in appearance [Normal Oropharynx] : the oropharynx was normal [Normal TMs] : both tympanic membranes were normal [Normal Nasal Mucosa] : the nasal mucosa was normal [No JVD] : no jugular venous distention [Supple] : supple [No Lymphadenopathy] : no lymphadenopathy [Thyroid Normal, No Nodules] : the thyroid was normal and there were no nodules present [No Respiratory Distress] : no respiratory distress  [Clear to Auscultation] : lungs were clear to auscultation bilaterally [No Accessory Muscle Use] : no accessory muscle use [Normal Rate] : normal rate  [Regular Rhythm] : with a regular rhythm [Normal S1, S2] : normal S1 and S2 [No Carotid Bruits] : no carotid bruits [No Abdominal Bruit] : a ~M bruit was not heard ~T in the abdomen [No Varicosities] : no varicosities [Pedal Pulses Present] : the pedal pulses are present [No Edema] : there was no peripheral edema [No Extremity Clubbing/Cyanosis] : no extremity clubbing/cyanosis [No Palpable Aorta] : no palpable aorta [Soft] : abdomen soft [Non Tender] : non-tender [Non-distended] : non-distended [No Masses] : no abdominal mass palpated [No HSM] : no HSM [Normal Bowel Sounds] : normal bowel sounds [Obese] : obese [Normal Posterior Cervical Nodes] : no posterior cervical lymphadenopathy [Normal Anterior Cervical Nodes] : no anterior cervical lymphadenopathy [No CVA Tenderness] : no CVA  tenderness [No Spinal Tenderness] : no spinal tenderness [No Joint Swelling] : no joint swelling [No Rash] : no rash [Normal Gait] : normal gait [Coordination Grossly Intact] : coordination grossly intact [No Focal Deficits] : no focal deficits [Deep Tendon Reflexes (DTR)] : deep tendon reflexes were 2+ and symmetric [Speech Grossly Normal] : speech grossly normal [Normal Affect] : the affect was normal [Alert and Oriented x3] : oriented to person, place, and time [Normal Mood] : the mood was normal [Normal Insight/Judgement] : insight and judgment were intact [de-identified] : obese  [de-identified] : with murmur  [de-identified] :  to palp of L-s spine    negative SLR

## 2020-11-19 LAB
25(OH)D3 SERPL-MCNC: 19.2 NG/ML
ALBUMIN SERPL ELPH-MCNC: 4.8 G/DL
ALP BLD-CCNC: 98 U/L
ALT SERPL-CCNC: 14 U/L
ANION GAP SERPL CALC-SCNC: 15 MMOL/L
AST SERPL-CCNC: 19 U/L
BASOPHILS # BLD AUTO: 0.05 K/UL
BASOPHILS NFR BLD AUTO: 0.6 %
BILIRUB SERPL-MCNC: 0.4 MG/DL
BUN SERPL-MCNC: 20 MG/DL
CALCIUM SERPL-MCNC: 9.6 MG/DL
CHLORIDE SERPL-SCNC: 103 MMOL/L
CHOLEST SERPL-MCNC: 162 MG/DL
CO2 SERPL-SCNC: 21 MMOL/L
CREAT SERPL-MCNC: 0.62 MG/DL
EOSINOPHIL # BLD AUTO: 0.1 K/UL
EOSINOPHIL NFR BLD AUTO: 1.2 %
ESTIMATED AVERAGE GLUCOSE: 111 MG/DL
GLUCOSE SERPL-MCNC: 90 MG/DL
HBA1C MFR BLD HPLC: 5.5 %
HCT VFR BLD CALC: 39.2 %
HDLC SERPL-MCNC: 53 MG/DL
HGB BLD-MCNC: 12.3 G/DL
IMM GRANULOCYTES NFR BLD AUTO: 0.1 %
INR PPP: 2.44 RATIO
LDLC SERPL CALC-MCNC: 84 MG/DL
LYMPHOCYTES # BLD AUTO: 2.19 K/UL
LYMPHOCYTES NFR BLD AUTO: 25.2 %
MAN DIFF?: NORMAL
MCHC RBC-ENTMCNC: 31 PG
MCHC RBC-ENTMCNC: 31.4 GM/DL
MCV RBC AUTO: 98.7 FL
MONOCYTES # BLD AUTO: 0.67 K/UL
MONOCYTES NFR BLD AUTO: 7.7 %
NEUTROPHILS # BLD AUTO: 5.67 K/UL
NEUTROPHILS NFR BLD AUTO: 65.2 %
NONHDLC SERPL-MCNC: 109 MG/DL
PLATELET # BLD AUTO: 258 K/UL
POTASSIUM SERPL-SCNC: 4.6 MMOL/L
PROT SERPL-MCNC: 7.4 G/DL
PT BLD: 27.6 SEC
RBC # BLD: 3.97 M/UL
RBC # FLD: 13.1 %
SODIUM SERPL-SCNC: 139 MMOL/L
TRIGL SERPL-MCNC: 127 MG/DL
TSH SERPL-ACNC: 1.19 UIU/ML
WBC # FLD AUTO: 8.69 K/UL

## 2020-12-11 ENCOUNTER — APPOINTMENT (OUTPATIENT)
Dept: INTERNAL MEDICINE | Facility: CLINIC | Age: 65
End: 2020-12-11
Payer: MEDICARE

## 2020-12-11 VITALS
HEIGHT: 65 IN | OXYGEN SATURATION: 98 % | BODY MASS INDEX: 39.65 KG/M2 | DIASTOLIC BLOOD PRESSURE: 80 MMHG | RESPIRATION RATE: 16 BRPM | HEART RATE: 89 BPM | WEIGHT: 238 LBS | TEMPERATURE: 97.3 F | SYSTOLIC BLOOD PRESSURE: 140 MMHG

## 2020-12-11 VITALS — DIASTOLIC BLOOD PRESSURE: 88 MMHG | SYSTOLIC BLOOD PRESSURE: 140 MMHG

## 2020-12-11 PROCEDURE — 99214 OFFICE O/P EST MOD 30 MIN: CPT | Mod: 25

## 2020-12-11 PROCEDURE — 36416 COLLJ CAPILLARY BLOOD SPEC: CPT

## 2020-12-11 PROCEDURE — 85610 PROTHROMBIN TIME: CPT | Mod: QW

## 2020-12-13 LAB
INR PPP: 3.7 RATIO
POCT-PROTHROMBIN TIME: 44.9 SECS
QUALITY CONTROL: YES

## 2020-12-13 NOTE — HEALTH RISK ASSESSMENT
[16-20] : 16-20 [Yes] : Yes [4 or more  times a week (4 pts)] : 4 or more  times a week (4 points) [1 or 2 (0 pts)] : 1 or 2 (0 points) [Never (0 pts)] : Never (0 points) [No] : In the past 12 months have you used drugs other than those required for medical reasons? No [No falls in past year] : Patient reported no falls in the past year [0] : 1) Little interest or pleasure doing things: Not at all (0) [1] : 2) Feeling down, depressed, or hopeless for several days (1) [] : No [de-identified] : quit [YearQuit] : 2000 [Audit-CScore] : 4 [de-identified] : walking at work [de-identified] : regular [[id number]] : 1

## 2020-12-13 NOTE — PLAN
[FreeTextEntry1] : Anticardiolipin Ab-  INR was elevated.   Hold warfarin for two days   RTO Monday for INR check .  Monitor for abnormal bleeding  \par \par Cardio - CAD and hyperlipidemia - Continue with current medication. Continue Zetia daily. \par Follow up with Cardio \par \par Cardio - Hypertension -  Patient was educated about hypertension and the importance of controlling the pressure through lifestyle modification which include low sodium  diet and  aerobic  exercise.  Also discussed the use of prescription medication which included their benefits and their side effects. We discussed the use of ASA 81 mg daily.   Having a BMI less than or equal to 25.  Increase losartan 50  mg   Check BP on Monday  if remains elevated will increase medications  \par \par  Endocrinology  -  Obesity   Patient was educated about the importance of diet and exercise.   We discussed  a goal of a BMI near 25.   [facility] is planning bariatric surgery.  check a1c and TSH\par \par Anticardiolipin Ab- Continue with warfarin therapy.\par \par Pulmonary - WILDER -  We discussed cardiovascular, metabolic and other symptomatic ramifications of untreated WILDER.  JACKSON was advised the importance of evaluation, treatment, compliance and follow up appointments.  We also discussed the importance of  diet and exercise programs to control weight.  Advised the importance of sleep hygiene. Advised to avoid alcohol and other sedatives that tend to exacerbate WILDER.Reviewed sleep study with patient. Advised to treatment options. Patient states that she wants to get a dental appliance and mcintosh  s not believe CPAP therapy she would be compliant with doing. Still has not made an apt. with a dentist for dental appliance \par \par \par Orthopedic -- spinal stenosis- DJD lumbar spine -degenerative joint disease bilateral knees status post total knee replacement. Advise patient to start PT for LBP - Continue  Vicodin advised to only take for sever pain. \par advised risk of dependancy.  \par \par Neurology-insomnia - Continue  Ambien. Advise risk alternatives benefits and side effects of medication.   Advise patient risk of taking sleep medication secondary to have an obstructive sleep apnea.  Reviewed  see scan.\par \par We discussed the importance of healthy lifestyles which include exercise, weight control and good diet.\par Patient's questions were answered in full detail. Advise patient if any other concerns arise to please call office to have a discussion. \par \par Patient  education  - COVID-19   Counseling and education provided to the patient.  Advised sign and symptoms of the virus.  Advised contact precautions.  Educated patient on proper hand washing and to participate in social distancing. \par Patient is in full awareness of the plan and agrees to it.  All pt question was answered.  \par \par I spent 25 Minutes with the patient, half of which we discussed finding on physical exam  and coordinated care.  As well as reviewed my plans and follow ups. \par

## 2020-12-13 NOTE — COUNSELING
[AUDIT-C Screening administered and reviewed] : AUDIT-C Screening administered and reviewed [Potential consequences of obesity discussed] : Potential consequences of obesity discussed [Encouraged to maintain food diary] : Encouraged to maintain food diary [Encouraged to increase physical activity] : Encouraged to increase physical activity [Good understanding] : Patient has a good understanding of disease, goals and obesity follow-up plan [Weight management counseling provided] : Weight management [Healthy eating counseling provided] : healthy eating [Fall prevention counseling provided] : fall prevention  [Needs reinforcement, provided] : Patient needs reinforcement on understanding of disease, goals and obesity follow-up plan; reinforcement was provided [Low Fat Diet] : Low fat diet [Low Salt Diet] : Low salt diet [Decrease Portions] : Decrease food portions [Patient motivation] : Patient motivation [None] : None [FreeTextEntry2] : 1600 nicole diet  increase exercise  [de-identified] : 1500 nicole diet \par Advised to walk daily for a goal of 10,000 steps \par Advised PT for B/l Knee replacemment

## 2020-12-13 NOTE — PHYSICAL EXAM
[No Acute Distress] : no acute distress [Well Nourished] : well nourished [Well Developed] : well developed [Well-Appearing] : well-appearing [Normal Sclera/Conjunctiva] : normal sclera/conjunctiva [PERRL] : pupils equal round and reactive to light [EOMI] : extraocular movements intact [Normal Outer Ear/Nose] : the outer ears and nose were normal in appearance [Normal Oropharynx] : the oropharynx was normal [Normal TMs] : both tympanic membranes were normal [Normal Nasal Mucosa] : the nasal mucosa was normal [No JVD] : no jugular venous distention [Supple] : supple [No Lymphadenopathy] : no lymphadenopathy [Thyroid Normal, No Nodules] : the thyroid was normal and there were no nodules present [No Respiratory Distress] : no respiratory distress  [Clear to Auscultation] : lungs were clear to auscultation bilaterally [No Accessory Muscle Use] : no accessory muscle use [Normal Rate] : normal rate  [Regular Rhythm] : with a regular rhythm [Normal S1, S2] : normal S1 and S2 [No Carotid Bruits] : no carotid bruits [No Abdominal Bruit] : a ~M bruit was not heard ~T in the abdomen [No Varicosities] : no varicosities [Pedal Pulses Present] : the pedal pulses are present [No Edema] : there was no peripheral edema [No Extremity Clubbing/Cyanosis] : no extremity clubbing/cyanosis [No Palpable Aorta] : no palpable aorta [Soft] : abdomen soft [Non Tender] : non-tender [Non-distended] : non-distended [No Masses] : no abdominal mass palpated [No HSM] : no HSM [Normal Bowel Sounds] : normal bowel sounds [Obese] : obese [Normal Posterior Cervical Nodes] : no posterior cervical lymphadenopathy [Normal Anterior Cervical Nodes] : no anterior cervical lymphadenopathy [No CVA Tenderness] : no CVA  tenderness [No Spinal Tenderness] : no spinal tenderness [No Joint Swelling] : no joint swelling [No Rash] : no rash [Normal Gait] : normal gait [Coordination Grossly Intact] : coordination grossly intact [No Focal Deficits] : no focal deficits [Deep Tendon Reflexes (DTR)] : deep tendon reflexes were 2+ and symmetric [Speech Grossly Normal] : speech grossly normal [Normal Affect] : the affect was normal [Alert and Oriented x3] : oriented to person, place, and time [Normal Mood] : the mood was normal [Normal Insight/Judgement] : insight and judgment were intact [de-identified] : obese  [de-identified] : with murmur  [de-identified] :  to palp of L-s spine    negative SLR

## 2020-12-13 NOTE — REVIEW OF SYSTEMS
[Muscle Pain] : muscle pain [Back Pain] : back pain [Insomnia] : insomnia [Negative] : Heme/Lymph [Fever] : no fever [Chills] : no chills [Fatigue] : no fatigue [Recent Change In Weight] : ~T no recent weight change [Nasal Discharge] : no nasal discharge [Sore Throat] : no sore throat [Chest Pain] : no chest pain [Palpitations] : no palpitations [Shortness Of Breath] : no shortness of breath [Cough] : no cough [Abdominal Pain] : no abdominal pain [Dysuria] : no dysuria [Incontinence] : no incontinence [Joint Stiffness] : no joint stiffness [Itching] : no itching [Mole Changes] : no mole changes [Skin Rash] : no skin rash [Headache] : no headache [Dizziness] : no dizziness [Memory Loss] : no memory loss [de-identified] : stressed

## 2020-12-13 NOTE — HISTORY OF PRESENT ILLNESS
[FreeTextEntry1] : Refill on medication, and INR check  [de-identified] : Ms. CARDOZA is a 65 year  female, who present to the office for INR check.  Denies any abnormal bleeding.  Tolerates current medication without side effects \par \par Also need refill on pain medication for the back.  States the pain has been improving.    \par Denies starting PT as directed.  States pain scale 1-10 today is a 5.  Over the last two weeks the worse was a ten.  States pain over the last few weeks has gotten worse. \par \par Also needs a refill on Ambien.  Denies any side effects to the current medications.  Denies early am drowsiness.\par  \par

## 2020-12-14 ENCOUNTER — APPOINTMENT (OUTPATIENT)
Dept: INTERNAL MEDICINE | Facility: CLINIC | Age: 65
End: 2020-12-14

## 2020-12-18 ENCOUNTER — APPOINTMENT (OUTPATIENT)
Dept: INTERNAL MEDICINE | Facility: CLINIC | Age: 65
End: 2020-12-18
Payer: MEDICARE

## 2020-12-18 VITALS
DIASTOLIC BLOOD PRESSURE: 70 MMHG | SYSTOLIC BLOOD PRESSURE: 120 MMHG | TEMPERATURE: 97.5 F | OXYGEN SATURATION: 98 % | RESPIRATION RATE: 16 BRPM | HEIGHT: 65 IN | HEART RATE: 73 BPM

## 2020-12-18 PROCEDURE — 99213 OFFICE O/P EST LOW 20 MIN: CPT | Mod: 25

## 2020-12-18 PROCEDURE — 90670 PCV13 VACCINE IM: CPT

## 2020-12-18 PROCEDURE — 36415 COLL VENOUS BLD VENIPUNCTURE: CPT

## 2020-12-18 PROCEDURE — G0009: CPT

## 2020-12-18 PROCEDURE — 85610 PROTHROMBIN TIME: CPT | Mod: QW

## 2020-12-18 NOTE — COUNSELING
[AUDIT-C Screening administered and reviewed] : AUDIT-C Screening administered and reviewed [Potential consequences of obesity discussed] : Potential consequences of obesity discussed [Encouraged to maintain food diary] : Encouraged to maintain food diary [Encouraged to increase physical activity] : Encouraged to increase physical activity [Good understanding] : Patient has a good understanding of disease, goals and obesity follow-up plan [Weight management counseling provided] : Weight management [Healthy eating counseling provided] : healthy eating [Fall prevention counseling provided] : fall prevention  [Needs reinforcement, provided] : Patient needs reinforcement on understanding of disease, goals and obesity follow-up plan; reinforcement was provided [Low Fat Diet] : Low fat diet [Low Salt Diet] : Low salt diet [Decrease Portions] : Decrease food portions [Patient motivation] : Patient motivation [None] : None [FreeTextEntry2] : 1600 nicole diet  increase exercise  [de-identified] : 1500 nicole diet \par Advised to walk daily for a goal of 10,000 steps \par Advised PT for B/l Knee replacemment

## 2020-12-18 NOTE — PLAN
[FreeTextEntry1] : Anticardiolipin Ab-  INR is 1.5 .   Restart warfarin therapy - check INR 2 weeks   Monitor for abnormal bleeding  \par \par Cardio - CAD and hyperlipidemia - Continue with current medication. Continue Zetia daily. \par Follow up with Cardio \par \par Cardio - Hypertension -  Patient was educated about hypertension and the importance of controlling the pressure through lifestyle modification which include low sodium  diet and  aerobic  exercise.  Also discussed the use of prescription medication which included their benefits and their side effects. We discussed the use of ASA 81 mg daily.   Having a BMI less than or equal to 25.  Increase losartan 50  mg   Check BP on Monday  if remains elevated will increase medications  \par \par  Endocrinology  -  Obesity   Patient was educated about the importance of diet and exercise.   We discussed  a goal of a BMI near 25.   [facility] is planning bariatric surgery.  check a1c and TSH\par \par Pulmonary - WILDER -  We discussed cardiovascular, metabolic and other symptomatic ramifications of untreated WILDER.  JACKSON was advised the importance of evaluation, treatment, compliance and follow up appointments.  We also discussed the importance of  diet and exercise programs to control weight.  Advised the importance of sleep hygiene. Advised to avoid alcohol and other sedatives that tend to exacerbate WILDER.Reviewed sleep study with patient. Advised to treatment options. Patient states that she wants to get a dental appliance and mcintosh  s not believe CPAP therapy she would be compliant with doing. Still has not made an apt. with a dentist for dental appliance \par \par \par Orthopedic -- spinal stenosis- DJD lumbar spine -degenerative joint disease bilateral knees status post total knee replacement. Advise patient to start PT for LBP - Continue  Vicodin advised to only take for sever pain. \par advised risk of dependancy.  \par \par Neurology-insomnia - Continue  Ambien. Advise risk alternatives benefits and side effects of medication.   Advise patient risk of taking sleep medication secondary to have an obstructive sleep apnea.  \par \par We discussed the importance of healthy lifestyles which include exercise, weight control and good diet.\par Patient's questions were answered in full detail. Advise patient if any other concerns arise to please call office to have a discussion. \par \par Immunization PCV 13  shot given.  Consent form filled out.  Education about the vaccination was provided \par Patient  education  - COVID-19   Counseling and education provided to the patient.  Advised sign and symptoms of the virus.  Advised contact precautions.  Educated patient on proper hand washing and to participate in social distancing. \par Patient is in full awareness of the plan and agrees to it.  All pt question was answered.  \par \par I spent 15  Minutes with the patient, half of which we discussed finding on physical exam  and coordinated care.  As well as reviewed my plans and follow ups. \par

## 2020-12-18 NOTE — REVIEW OF SYSTEMS
[Muscle Pain] : muscle pain [Back Pain] : back pain [Insomnia] : insomnia [Negative] : Heme/Lymph [Fever] : no fever [Chills] : no chills [Fatigue] : no fatigue [Recent Change In Weight] : ~T no recent weight change [Nasal Discharge] : no nasal discharge [Sore Throat] : no sore throat [Chest Pain] : no chest pain [Palpitations] : no palpitations [Shortness Of Breath] : no shortness of breath [Cough] : no cough [Abdominal Pain] : no abdominal pain [Dysuria] : no dysuria [Incontinence] : no incontinence [Joint Stiffness] : no joint stiffness [Itching] : no itching [Mole Changes] : no mole changes [Skin Rash] : no skin rash [Headache] : no headache [Dizziness] : no dizziness [Memory Loss] : no memory loss [de-identified] : stressed

## 2020-12-18 NOTE — PHYSICAL EXAM
[No Acute Distress] : no acute distress [Well Nourished] : well nourished [Well Developed] : well developed [Well-Appearing] : well-appearing [Normal Sclera/Conjunctiva] : normal sclera/conjunctiva [PERRL] : pupils equal round and reactive to light [EOMI] : extraocular movements intact [Normal Outer Ear/Nose] : the outer ears and nose were normal in appearance [Normal Oropharynx] : the oropharynx was normal [Normal TMs] : both tympanic membranes were normal [Normal Nasal Mucosa] : the nasal mucosa was normal [No JVD] : no jugular venous distention [Supple] : supple [No Lymphadenopathy] : no lymphadenopathy [Thyroid Normal, No Nodules] : the thyroid was normal and there were no nodules present [No Respiratory Distress] : no respiratory distress  [Clear to Auscultation] : lungs were clear to auscultation bilaterally [No Accessory Muscle Use] : no accessory muscle use [Normal Rate] : normal rate  [Regular Rhythm] : with a regular rhythm [Normal S1, S2] : normal S1 and S2 [No Carotid Bruits] : no carotid bruits [No Abdominal Bruit] : a ~M bruit was not heard ~T in the abdomen [No Varicosities] : no varicosities [Pedal Pulses Present] : the pedal pulses are present [No Edema] : there was no peripheral edema [No Extremity Clubbing/Cyanosis] : no extremity clubbing/cyanosis [No Palpable Aorta] : no palpable aorta [Soft] : abdomen soft [Non Tender] : non-tender [Non-distended] : non-distended [No Masses] : no abdominal mass palpated [No HSM] : no HSM [Normal Bowel Sounds] : normal bowel sounds [Obese] : obese [Normal Posterior Cervical Nodes] : no posterior cervical lymphadenopathy [Normal Anterior Cervical Nodes] : no anterior cervical lymphadenopathy [No CVA Tenderness] : no CVA  tenderness [No Spinal Tenderness] : no spinal tenderness [No Joint Swelling] : no joint swelling [No Rash] : no rash [Normal Gait] : normal gait [Coordination Grossly Intact] : coordination grossly intact [No Focal Deficits] : no focal deficits [Deep Tendon Reflexes (DTR)] : deep tendon reflexes were 2+ and symmetric [Speech Grossly Normal] : speech grossly normal [Normal Affect] : the affect was normal [Alert and Oriented x3] : oriented to person, place, and time [Normal Mood] : the mood was normal [Normal Insight/Judgement] : insight and judgment were intact [de-identified] : obese  [de-identified] : with murmur  [de-identified] :  to palp of L-s spine    negative SLR

## 2020-12-18 NOTE — HISTORY OF PRESENT ILLNESS
[FreeTextEntry1] : INR check and immunization update  [de-identified] : Ms. CARDOZA is a 65 year  female, who present to the office for INR check and PCV 13.\par States she feels well.  Denies having abnormal bleeding or bruising \par Denies fever or chills.  Denies feeling SOB \par Also want to get a shingle vax

## 2020-12-18 NOTE — HEALTH RISK ASSESSMENT
[16-20] : 16-20 [Yes] : Yes [4 or more  times a week (4 pts)] : 4 or more  times a week (4 points) [1 or 2 (0 pts)] : 1 or 2 (0 points) [Never (0 pts)] : Never (0 points) [No] : In the past 12 months have you used drugs other than those required for medical reasons? No [No falls in past year] : Patient reported no falls in the past year [0] : 1) Little interest or pleasure doing things: Not at all (0) [1] : 2) Feeling down, depressed, or hopeless for several days (1) [Independent] : feeding [] : No [de-identified] : quit [YearQuit] : 2000 [Audit-CScore] : 4 [de-identified] : walking at work [de-identified] : regular [[id number]] : 1

## 2021-01-04 ENCOUNTER — RX RENEWAL (OUTPATIENT)
Age: 66
End: 2021-01-04

## 2021-01-08 ENCOUNTER — APPOINTMENT (OUTPATIENT)
Dept: INTERNAL MEDICINE | Facility: CLINIC | Age: 66
End: 2021-01-08
Payer: MEDICARE

## 2021-01-08 VITALS
TEMPERATURE: 97.1 F | HEIGHT: 65 IN | WEIGHT: 232.56 LBS | BODY MASS INDEX: 38.75 KG/M2 | SYSTOLIC BLOOD PRESSURE: 120 MMHG | HEART RATE: 82 BPM | DIASTOLIC BLOOD PRESSURE: 60 MMHG | OXYGEN SATURATION: 97 % | RESPIRATION RATE: 16 BRPM

## 2021-01-08 DIAGNOSIS — Z29.8 ENCOUNTER FOR OTHER SPECIFIED PROPHYLACTIC MEASURES: ICD-10-CM

## 2021-01-08 PROCEDURE — 36415 COLL VENOUS BLD VENIPUNCTURE: CPT

## 2021-01-08 PROCEDURE — 85610 PROTHROMBIN TIME: CPT | Mod: QW

## 2021-01-08 PROCEDURE — 36416 COLLJ CAPILLARY BLOOD SPEC: CPT

## 2021-01-08 PROCEDURE — 99214 OFFICE O/P EST MOD 30 MIN: CPT | Mod: 25

## 2021-01-08 NOTE — HISTORY OF PRESENT ILLNESS
[FreeTextEntry1] : INR check and medication renewal  [de-identified] : Ms. CARDOZA is a 65 year  female, who present to the office for INR check.  denies any abnormal bleeding.  Denies bruising or having bloody gums when brushing teeth.  states diet has been consistent \par Also needs a renewal of medication for back pain.  States she take on a prn basis with good relief of back and knee pain.  States make the pain more bearable to complete ADL.\par Also take Ambien for insomnia.  Denies any abnormal side effects to the medications.

## 2021-01-08 NOTE — HEALTH RISK ASSESSMENT
[16-20] : 16-20 [Yes] : Yes [4 or more  times a week (4 pts)] : 4 or more  times a week (4 points) [1 or 2 (0 pts)] : 1 or 2 (0 points) [Never (0 pts)] : Never (0 points) [No] : In the past 12 months have you used drugs other than those required for medical reasons? No [No falls in past year] : Patient reported no falls in the past year [0] : 1) Little interest or pleasure doing things: Not at all (0) [1] : 2) Feeling down, depressed, or hopeless for several days (1) [Independent] : feeding [] : No [de-identified] : quit [YearQuit] : 2000 [Audit-CScore] : 4 [de-identified] : walking at work [de-identified] : regular [[id number]] : 1

## 2021-01-08 NOTE — PLAN
[FreeTextEntry1] : Anticardiolipin Ab-  INR is 5.9.   Stop warfarin therapy - check INR on Monday. Take vitamin K1 x one dose.     Monitor for abnormal bleeding  if notice any bruising go to Ed \par \par Cardio - CAD and hyperlipidemia - Continue with current medication. refilled  Zetia daily. \par Follow up with Cardio \par \par Cardio - Hypertension -  Patient was educated about hypertension and the importance of controlling the pressure through lifestyle modification which include low sodium  diet and  aerobic  exercise.  Also discussed the use of prescription medication which included their benefits and their side effects. We discussed the use of ASA 81 mg daily.   Having a BMI less than or equal to 25.  Continue losartan 50  mg   \par \par  Endocrinology  -  Obesity   Patient was educated about the importance of diet and exercise.   We discussed  a goal of a BMI near 25.   [facility] is planning bariatric surgery.  check a1c and TSH\par \par Pulmonary - WILDER -  We discussed cardiovascular, metabolic and other symptomatic ramifications of untreated WILDER.  JACKSON was advised the importance of evaluation, treatment, compliance and follow up appointments.  We also discussed the importance of  diet and exercise programs to control weight.  Advised the importance of sleep hygiene. Advised to avoid alcohol and other sedatives that tend to exacerbate WILDER.Reviewed sleep study with patient. Advised to treatment options. Patient states that she wants to get a dental appliance and [facility]  s not believe CPAP therapy she would be compliant with doing. Still has not made an apt. with a dentist for dental appliance \par \par \par Orthopedic -- spinal stenosis- DJD lumbar spine -degenerative joint disease bilateral knees status post total knee replacement. Advise patient to start PT for LBP - Continue  Vicodin advised to only take for sever pain. \par advised risk of dependancy.  \par \par Neurology-insomnia - Continue  Ambien. Advise risk alternatives benefits and side effects of medication.   Advise patient risk of taking sleep medication secondary to have an obstructive sleep apnea.  \par \par We discussed the importance of healthy lifestyles which include exercise, weight control and good diet.\par Patient's questions were answered in full detail. Advise patient if any other concerns arise to please call office to have a discussion. \par \par Patient  education  - COVID-19   Counseling and education provided to the patient.  Advised sign and symptoms of the virus.  Advised contact precautions.  Educated patient on proper hand washing and to participate in social distancing. Consider covid vax . \par \par Patient is in full awareness of the plan and agrees to it.  All pt question was answered.  \par \par I spent 25 Minutes with the patient, half of which we discussed finding on physical exam  and coordinated care.  As well as reviewed my plans and follow ups. \par

## 2021-01-08 NOTE — REVIEW OF SYSTEMS
[Muscle Pain] : muscle pain [Back Pain] : back pain [Insomnia] : insomnia [Negative] : Heme/Lymph [Fever] : no fever [Chills] : no chills [Fatigue] : no fatigue [Recent Change In Weight] : ~T no recent weight change [Nasal Discharge] : no nasal discharge [Sore Throat] : no sore throat [Chest Pain] : no chest pain [Palpitations] : no palpitations [Shortness Of Breath] : no shortness of breath [Cough] : no cough [Abdominal Pain] : no abdominal pain [Dysuria] : no dysuria [Incontinence] : no incontinence [Joint Stiffness] : no joint stiffness [Itching] : no itching [Mole Changes] : no mole changes [Skin Rash] : no skin rash [Headache] : no headache [Dizziness] : no dizziness [Memory Loss] : no memory loss [de-identified] : stressed

## 2021-01-08 NOTE — COUNSELING
[AUDIT-C Screening administered and reviewed] : AUDIT-C Screening administered and reviewed [Potential consequences of obesity discussed] : Potential consequences of obesity discussed [Encouraged to maintain food diary] : Encouraged to maintain food diary [Encouraged to increase physical activity] : Encouraged to increase physical activity [Good understanding] : Patient has a good understanding of disease, goals and obesity follow-up plan [Weight management counseling provided] : Weight management [Healthy eating counseling provided] : healthy eating [Fall prevention counseling provided] : fall prevention  [Needs reinforcement, provided] : Patient needs reinforcement on understanding of disease, goals and obesity follow-up plan; reinforcement was provided [Low Fat Diet] : Low fat diet [Low Salt Diet] : Low salt diet [Decrease Portions] : Decrease food portions [Patient motivation] : Patient motivation [None] : None [FreeTextEntry2] : 1600 nicole diet  increase exercise  [de-identified] : 1500 nicole diet \par Advised to walk daily for a goal of 10,000 steps \par Advised PT for B/l Knee replacemment

## 2021-01-08 NOTE — PHYSICAL EXAM
[No Acute Distress] : no acute distress [Well Nourished] : well nourished [Well Developed] : well developed [Well-Appearing] : well-appearing [Normal Sclera/Conjunctiva] : normal sclera/conjunctiva [PERRL] : pupils equal round and reactive to light [EOMI] : extraocular movements intact [Normal Outer Ear/Nose] : the outer ears and nose were normal in appearance [Normal Oropharynx] : the oropharynx was normal [Normal TMs] : both tympanic membranes were normal [Normal Nasal Mucosa] : the nasal mucosa was normal [No JVD] : no jugular venous distention [Supple] : supple [No Lymphadenopathy] : no lymphadenopathy [Thyroid Normal, No Nodules] : the thyroid was normal and there were no nodules present [No Respiratory Distress] : no respiratory distress  [Clear to Auscultation] : lungs were clear to auscultation bilaterally [No Accessory Muscle Use] : no accessory muscle use [Normal Rate] : normal rate  [Regular Rhythm] : with a regular rhythm [Normal S1, S2] : normal S1 and S2 [No Carotid Bruits] : no carotid bruits [No Abdominal Bruit] : a ~M bruit was not heard ~T in the abdomen [No Varicosities] : no varicosities [Pedal Pulses Present] : the pedal pulses are present [No Edema] : there was no peripheral edema [No Extremity Clubbing/Cyanosis] : no extremity clubbing/cyanosis [No Palpable Aorta] : no palpable aorta [Soft] : abdomen soft [Non Tender] : non-tender [Non-distended] : non-distended [No Masses] : no abdominal mass palpated [No HSM] : no HSM [Normal Bowel Sounds] : normal bowel sounds [Obese] : obese [Normal Posterior Cervical Nodes] : no posterior cervical lymphadenopathy [Normal Anterior Cervical Nodes] : no anterior cervical lymphadenopathy [No CVA Tenderness] : no CVA  tenderness [No Spinal Tenderness] : no spinal tenderness [No Joint Swelling] : no joint swelling [No Rash] : no rash [Normal Gait] : normal gait [Coordination Grossly Intact] : coordination grossly intact [No Focal Deficits] : no focal deficits [Deep Tendon Reflexes (DTR)] : deep tendon reflexes were 2+ and symmetric [Speech Grossly Normal] : speech grossly normal [Normal Affect] : the affect was normal [Alert and Oriented x3] : oriented to person, place, and time [Normal Mood] : the mood was normal [Normal Insight/Judgement] : insight and judgment were intact [de-identified] : obese  [de-identified] : with murmur  [de-identified] :  to palp of L-s spine    negative SLR [de-identified] : no bruising noted

## 2021-01-11 ENCOUNTER — APPOINTMENT (OUTPATIENT)
Dept: INTERNAL MEDICINE | Facility: CLINIC | Age: 66
End: 2021-01-11
Payer: MEDICARE

## 2021-01-11 VITALS — TEMPERATURE: 97.1 F

## 2021-01-11 VITALS
HEART RATE: 83 BPM | OXYGEN SATURATION: 98 % | BODY MASS INDEX: 38.65 KG/M2 | WEIGHT: 232 LBS | RESPIRATION RATE: 16 BRPM | HEIGHT: 65 IN | DIASTOLIC BLOOD PRESSURE: 62 MMHG | SYSTOLIC BLOOD PRESSURE: 120 MMHG

## 2021-01-11 PROCEDURE — 85610 PROTHROMBIN TIME: CPT | Mod: QW

## 2021-01-11 PROCEDURE — 99212 OFFICE O/P EST SF 10 MIN: CPT | Mod: 25

## 2021-01-11 NOTE — HISTORY OF PRESENT ILLNESS
[FreeTextEntry1] : INR check  [de-identified] : Ms. CARDOZA is a 65 year  female, who present to the office for INR check been off Coumadin since Friday\par Denies abnormal bleeding

## 2021-01-11 NOTE — REVIEW OF SYSTEMS
[Muscle Pain] : muscle pain [Back Pain] : back pain [Insomnia] : insomnia [Negative] : Heme/Lymph [Fever] : no fever [Chills] : no chills [Fatigue] : no fatigue [Recent Change In Weight] : ~T no recent weight change [Nasal Discharge] : no nasal discharge [Sore Throat] : no sore throat [Chest Pain] : no chest pain [Palpitations] : no palpitations [Shortness Of Breath] : no shortness of breath [Cough] : no cough [Abdominal Pain] : no abdominal pain [Dysuria] : no dysuria [Incontinence] : no incontinence [Joint Stiffness] : no joint stiffness [Itching] : no itching [Mole Changes] : no mole changes [Skin Rash] : no skin rash [Headache] : no headache [Dizziness] : no dizziness [Memory Loss] : no memory loss [de-identified] : stressed

## 2021-01-11 NOTE — HEALTH RISK ASSESSMENT
[16-20] : 16-20 [Yes] : Yes [4 or more  times a week (4 pts)] : 4 or more  times a week (4 points) [1 or 2 (0 pts)] : 1 or 2 (0 points) [Never (0 pts)] : Never (0 points) [No] : In the past 12 months have you used drugs other than those required for medical reasons? No [No falls in past year] : Patient reported no falls in the past year [0] : 1) Little interest or pleasure doing things: Not at all (0) [1] : 2) Feeling down, depressed, or hopeless for several days (1) [] : No [de-identified] : quit [YearQuit] : 2000 [Audit-CScore] : 4 [de-identified] : walking at work [de-identified] : regular [[id number]] : 1

## 2021-01-11 NOTE — ASSESSMENT
[FreeTextEntry1] : A  65-year-old female with degenerative joint disease, insomnia, obesity present  to the office for INR check, L

## 2021-01-11 NOTE — PLAN
[FreeTextEntry1] : Anticardiolipin Ab-  INR is 1.5    Restart warfarin 2.5 mg daily except Mondays and Wednesday 5 mg \par - check INR one week   \par \par Cardio - CAD and hyperlipidemia - Continue with current medication. refilled  Zetia daily. \par Follow up with Cardio \par \par Cardio - Hypertension -  Patient was educated about hypertension and the importance of controlling the pressure through lifestyle modification which include low sodium  diet and  aerobic  exercise.  Also discussed the use of prescription medication which included their benefits and their side effects. We discussed the use of ASA 81 mg daily.   Having a BMI less than or equal to 25.  Continue losartan 50  mg   \par \par  Endocrinology  -  Obesity   Patient was educated about the importance of diet and exercise.   We discussed  a goal of a BMI near 25.   [facility] is planning bariatric surgery.  check a1c and TS\par \par \par Orthopedic -- spinal stenosis- DJD lumbar spine -degenerative joint disease bilateral knees status post total knee replacement. Advise patient to start PT for LBP - Continue  Vicodin advised to only take for sever pain. \par advised risk of dependancy.  \par \par Neurology-insomnia - Continue  Ambien. Advise risk alternatives benefits and side effects of medication.   Advise patient risk of taking sleep medication secondary to have an obstructive sleep apnea.  \par \par We discussed the importance of healthy lifestyles which include exercise, weight control and good diet.\par Patient's questions were answered in full detail. Advise patient if any other concerns arise to please call office to have a discussion. \par \par Patient  education  - COVID-19   Counseling and education provided to the patient.  Advised sign and symptoms of the virus.  Advised contact precautions.  Educated patient on proper hand washing and to participate in social distancing. Consider covid vax . \par \par Patient is in full awareness of the plan and agrees to it.  All pt question was answered.  \par \par I spent 13  Minutes with the patient, half of which we discussed finding on physical exam  and coordinated care.  As well as reviewed my plans and follow ups. \par

## 2021-01-11 NOTE — COUNSELING
[AUDIT-C Screening administered and reviewed] : AUDIT-C Screening administered and reviewed [Potential consequences of obesity discussed] : Potential consequences of obesity discussed [Encouraged to maintain food diary] : Encouraged to maintain food diary [Encouraged to increase physical activity] : Encouraged to increase physical activity [Good understanding] : Patient has a good understanding of disease, goals and obesity follow-up plan [Weight management counseling provided] : Weight management [Healthy eating counseling provided] : healthy eating [Needs reinforcement, provided] : Patient needs reinforcement on understanding of disease, goals and obesity follow-up plan; reinforcement was provided [Fall prevention counseling provided] : fall prevention  [Low Fat Diet] : Low fat diet [Decrease Portions] : Decrease food portions [Low Salt Diet] : Low salt diet [Patient motivation] : Patient motivation [None] : None [FreeTextEntry2] : 1600 nicole diet  increase exercise  [de-identified] : 1500 nicole diet \par Advised to walk daily for a goal of 10,000 steps \par Advised PT for B/l Knee replacemment

## 2021-01-11 NOTE — PHYSICAL EXAM
[No Acute Distress] : no acute distress [Well Nourished] : well nourished [Well Developed] : well developed [Well-Appearing] : well-appearing [Normal Sclera/Conjunctiva] : normal sclera/conjunctiva [PERRL] : pupils equal round and reactive to light [EOMI] : extraocular movements intact [Normal Outer Ear/Nose] : the outer ears and nose were normal in appearance [Normal Oropharynx] : the oropharynx was normal [Normal TMs] : both tympanic membranes were normal [Normal Nasal Mucosa] : the nasal mucosa was normal [No JVD] : no jugular venous distention [Supple] : supple [No Lymphadenopathy] : no lymphadenopathy [Thyroid Normal, No Nodules] : the thyroid was normal and there were no nodules present [No Respiratory Distress] : no respiratory distress  [Clear to Auscultation] : lungs were clear to auscultation bilaterally [No Accessory Muscle Use] : no accessory muscle use [Normal Rate] : normal rate  [Regular Rhythm] : with a regular rhythm [Normal S1, S2] : normal S1 and S2 [No Carotid Bruits] : no carotid bruits [No Abdominal Bruit] : a ~M bruit was not heard ~T in the abdomen [No Varicosities] : no varicosities [Pedal Pulses Present] : the pedal pulses are present [No Edema] : there was no peripheral edema [No Extremity Clubbing/Cyanosis] : no extremity clubbing/cyanosis [No Palpable Aorta] : no palpable aorta [Obese] : obese [Normal Posterior Cervical Nodes] : no posterior cervical lymphadenopathy [Normal Anterior Cervical Nodes] : no anterior cervical lymphadenopathy [No CVA Tenderness] : no CVA  tenderness [No Rash] : no rash [Normal Gait] : normal gait [Coordination Grossly Intact] : coordination grossly intact [No Focal Deficits] : no focal deficits [Deep Tendon Reflexes (DTR)] : deep tendon reflexes were 2+ and symmetric [Speech Grossly Normal] : speech grossly normal [Normal Affect] : the affect was normal [Alert and Oriented x3] : oriented to person, place, and time [Normal Mood] : the mood was normal [Normal Insight/Judgement] : insight and judgment were intact [de-identified] : obese  [de-identified] : with murmur  [de-identified] : no bruising noted

## 2021-01-15 ENCOUNTER — RX RENEWAL (OUTPATIENT)
Age: 66
End: 2021-01-15

## 2021-01-21 ENCOUNTER — RX RENEWAL (OUTPATIENT)
Age: 66
End: 2021-01-21

## 2021-02-12 ENCOUNTER — APPOINTMENT (OUTPATIENT)
Dept: INTERNAL MEDICINE | Facility: CLINIC | Age: 66
End: 2021-02-12
Payer: MEDICARE

## 2021-02-12 VITALS
WEIGHT: 234 LBS | TEMPERATURE: 97.7 F | OXYGEN SATURATION: 99 % | SYSTOLIC BLOOD PRESSURE: 130 MMHG | RESPIRATION RATE: 16 BRPM | HEART RATE: 90 BPM | HEIGHT: 65 IN | DIASTOLIC BLOOD PRESSURE: 74 MMHG | BODY MASS INDEX: 38.99 KG/M2

## 2021-02-12 LAB
INR PPP: 2.1 RATIO
POCT-PROTHROMBIN TIME: 25.3 SECS

## 2021-02-12 PROCEDURE — 99214 OFFICE O/P EST MOD 30 MIN: CPT | Mod: 25

## 2021-02-12 PROCEDURE — 36416 COLLJ CAPILLARY BLOOD SPEC: CPT

## 2021-02-12 PROCEDURE — 85610 PROTHROMBIN TIME: CPT | Mod: QW

## 2021-02-12 RX ORDER — AMOXICILLIN 500 MG/1
500 CAPSULE ORAL
Qty: 20 | Refills: 4 | Status: DISCONTINUED | COMMUNITY
Start: 2021-01-08 | End: 2021-02-12

## 2021-02-17 NOTE — HISTORY OF PRESENT ILLNESS
[FreeTextEntry1] : PT/INR check, Medication renewal  [de-identified] : Ms. CARDOZA is a 65 year  female, who present to the office for INR check. Taking all medication as directed \par Denies abnormal bleeding \par requesting a refill on ambien-  states she suffers from insomnia and takes Ambien with good relief and better sleep patterns \par denies day time fatigue\par Still with chronic back pain-  pain radiates down the leg.  No relief with Tylenol - gets relief w/ hydrocodone -

## 2021-02-17 NOTE — HEALTH RISK ASSESSMENT
[16-20] : 16-20 [Yes] : Yes [4 or more  times a week (4 pts)] : 4 or more  times a week (4 points) [1 or 2 (0 pts)] : 1 or 2 (0 points) [Never (0 pts)] : Never (0 points) [No] : In the past 12 months have you used drugs other than those required for medical reasons? No [No falls in past year] : Patient reported no falls in the past year [0] : 1) Little interest or pleasure doing things: Not at all (0) [1] : 2) Feeling down, depressed, or hopeless for several days (1) [] : No [de-identified] : quit [YearQuit] : 2000 [Audit-CScore] : 4 [de-identified] : walking at work [de-identified] : regular [[id number]] : 1

## 2021-02-17 NOTE — REVIEW OF SYSTEMS
[Muscle Pain] : muscle pain [Back Pain] : back pain [Insomnia] : insomnia [Negative] : Heme/Lymph [Fever] : no fever [Chills] : no chills [Fatigue] : no fatigue [Recent Change In Weight] : ~T no recent weight change [Nasal Discharge] : no nasal discharge [Sore Throat] : no sore throat [Chest Pain] : no chest pain [Palpitations] : no palpitations [Shortness Of Breath] : no shortness of breath [Cough] : no cough [Abdominal Pain] : no abdominal pain [Dysuria] : no dysuria [Incontinence] : no incontinence [Joint Stiffness] : no joint stiffness [Itching] : no itching [Mole Changes] : no mole changes [Skin Rash] : no skin rash [Headache] : no headache [Dizziness] : no dizziness [Memory Loss] : no memory loss [de-identified] : stressed

## 2021-02-17 NOTE — PHYSICAL EXAM
[No Acute Distress] : no acute distress [Well Nourished] : well nourished [Well Developed] : well developed [Well-Appearing] : well-appearing [Normal Sclera/Conjunctiva] : normal sclera/conjunctiva [PERRL] : pupils equal round and reactive to light [EOMI] : extraocular movements intact [Normal Outer Ear/Nose] : the outer ears and nose were normal in appearance [Normal Oropharynx] : the oropharynx was normal [Normal TMs] : both tympanic membranes were normal [Normal Nasal Mucosa] : the nasal mucosa was normal [No JVD] : no jugular venous distention [Supple] : supple [No Lymphadenopathy] : no lymphadenopathy [Thyroid Normal, No Nodules] : the thyroid was normal and there were no nodules present [No Respiratory Distress] : no respiratory distress  [Clear to Auscultation] : lungs were clear to auscultation bilaterally [No Accessory Muscle Use] : no accessory muscle use [Normal Rate] : normal rate  [Regular Rhythm] : with a regular rhythm [Normal S1, S2] : normal S1 and S2 [No Carotid Bruits] : no carotid bruits [No Abdominal Bruit] : a ~M bruit was not heard ~T in the abdomen [No Varicosities] : no varicosities [Pedal Pulses Present] : the pedal pulses are present [No Extremity Clubbing/Cyanosis] : no extremity clubbing/cyanosis [No Edema] : there was no peripheral edema [No Palpable Aorta] : no palpable aorta [Obese] : obese [Normal Posterior Cervical Nodes] : no posterior cervical lymphadenopathy [Normal Anterior Cervical Nodes] : no anterior cervical lymphadenopathy [No CVA Tenderness] : no CVA  tenderness [No Rash] : no rash [Normal Gait] : normal gait [Coordination Grossly Intact] : coordination grossly intact [No Focal Deficits] : no focal deficits [Deep Tendon Reflexes (DTR)] : deep tendon reflexes were 2+ and symmetric [Speech Grossly Normal] : speech grossly normal [Normal Affect] : the affect was normal [Alert and Oriented x3] : oriented to person, place, and time [Normal Mood] : the mood was normal [Normal Insight/Judgement] : insight and judgment were intact [de-identified] : obese  [de-identified] : with murmur  [de-identified] : no bruising noted

## 2021-02-17 NOTE — COUNSELING
[AUDIT-C Screening administered and reviewed] : AUDIT-C Screening administered and reviewed [Potential consequences of obesity discussed] : Potential consequences of obesity discussed [Encouraged to maintain food diary] : Encouraged to maintain food diary [Encouraged to increase physical activity] : Encouraged to increase physical activity [Good understanding] : Patient has a good understanding of disease, goals and obesity follow-up plan [Weight management counseling provided] : Weight management [Healthy eating counseling provided] : healthy eating [Fall prevention counseling provided] : fall prevention  [Needs reinforcement, provided] : Patient needs reinforcement on understanding of disease, goals and obesity follow-up plan; reinforcement was provided [Low Fat Diet] : Low fat diet [Low Salt Diet] : Low salt diet [Decrease Portions] : Decrease food portions [Patient motivation] : Patient motivation [None] : None [FreeTextEntry2] : 1600 nicole diet  increase exercise  [de-identified] : 1500 nicole diet \par Advised to walk daily for a goal of 10,000 steps \par Advised PT for B/l Knee replacemment

## 2021-02-17 NOTE — PLAN
[FreeTextEntry1] : Anticardiolipin Ab-  INR is 2.1   Continue  warfarin 2.5 mg daily except Mondays and Wednesday 5 mg \par - check INR one week. monitor for abnormal bleeding \par \par Cardio - CAD and hyperlipidemia - Continue with current medication. refilled  Zetia daily. \par Follow up with Cardio \par \par Cardio - Hypertension -  Patient was educated about hypertension and the importance of controlling the pressure through lifestyle modification which include low sodium  diet and  aerobic  exercise.  Also discussed the use of prescription medication which included their benefits and their side effects. We discussed the use of ASA 81 mg daily.   Having a BMI less than or equal to 25.  Continue losartan 50  mg   \par \par  Endocrinology  -  Obesity   Patient was educated about the importance of diet and exercise.   We discussed  a goal of a BMI near 25.   Alma is planning bariatric surgery.  check a1c and TS\par \par Orthopedic -- spinal stenosis- DJD lumbar spine -degenerative joint disease bilateral knees status post total knee replacement. Advise patient to start PT for LBP - Continue  Vicodin advised to only take for sever pain. \par advised risk of dependancy.  \par \par Neurology-insomnia - Continue  Ambien. Advise risk alternatives benefits and side effects of medication.   Advise patient risk of taking sleep medication secondary to have an obstructive sleep apnea.  \par Advised to read pack insert - Advised not to use alcohol \par \par We discussed the importance of healthy lifestyles which include exercise, weight control and good diet.\par Patient's questions were answered in full detail. Advise patient if any other concerns arise to please call office to have a discussion. \par \par Patient  education  - COVID-19   Counseling and education provided to the patient.  Advised sign and symptoms of the virus.  Advised contact precautions.  Educated patient on proper hand washing and to participate in social distancing. Consider covid vax- Advised to check [facility] of health web site to sign up\par \par Patient is in full awareness of the plan and agrees to it.  All pt question was answered.  \par \par \par

## 2021-02-26 RX ORDER — LOSARTAN POTASSIUM 25 MG/1
25 TABLET, FILM COATED ORAL
Qty: 90 | Refills: 0 | Status: DISCONTINUED | COMMUNITY
Start: 2021-01-04 | End: 2021-02-26

## 2021-03-05 ENCOUNTER — APPOINTMENT (OUTPATIENT)
Dept: INTERNAL MEDICINE | Facility: CLINIC | Age: 66
End: 2021-03-05
Payer: MEDICARE

## 2021-03-05 VITALS
SYSTOLIC BLOOD PRESSURE: 120 MMHG | HEART RATE: 91 BPM | OXYGEN SATURATION: 98 % | BODY MASS INDEX: 39.15 KG/M2 | TEMPERATURE: 98 F | HEIGHT: 65 IN | DIASTOLIC BLOOD PRESSURE: 68 MMHG | WEIGHT: 235 LBS | RESPIRATION RATE: 16 BRPM

## 2021-03-05 DIAGNOSIS — J30.9 ALLERGIC RHINITIS, UNSPECIFIED: ICD-10-CM

## 2021-03-05 PROCEDURE — 99214 OFFICE O/P EST MOD 30 MIN: CPT | Mod: 25

## 2021-03-05 PROCEDURE — 36416 COLLJ CAPILLARY BLOOD SPEC: CPT

## 2021-03-05 PROCEDURE — 85610 PROTHROMBIN TIME: CPT | Mod: QW

## 2021-03-08 LAB
INR PPP: 2.6 RATIO
POCT-PROTHROMBIN TIME: 31 SECS
QUALITY CONTROL: YES

## 2021-03-08 NOTE — ASSESSMENT
[FreeTextEntry1] : A  66-year-old female with degenerative joint disease, insomnia, obesity present  to the office for INR check, and renewal of medications

## 2021-03-08 NOTE — PHYSICAL EXAM
[No Acute Distress] : no acute distress [Well Nourished] : well nourished [Well Developed] : well developed [Well-Appearing] : well-appearing [Normal Sclera/Conjunctiva] : normal sclera/conjunctiva [PERRL] : pupils equal round and reactive to light [EOMI] : extraocular movements intact [Normal Outer Ear/Nose] : the outer ears and nose were normal in appearance [Normal Oropharynx] : the oropharynx was normal [Normal TMs] : both tympanic membranes were normal [Normal Nasal Mucosa] : the nasal mucosa was normal [No JVD] : no jugular venous distention [Supple] : supple [No Lymphadenopathy] : no lymphadenopathy [Thyroid Normal, No Nodules] : the thyroid was normal and there were no nodules present [No Respiratory Distress] : no respiratory distress  [Clear to Auscultation] : lungs were clear to auscultation bilaterally [No Accessory Muscle Use] : no accessory muscle use [Normal Rate] : normal rate  [Regular Rhythm] : with a regular rhythm [Normal S1, S2] : normal S1 and S2 [No Carotid Bruits] : no carotid bruits [No Abdominal Bruit] : a ~M bruit was not heard ~T in the abdomen [No Varicosities] : no varicosities [Pedal Pulses Present] : the pedal pulses are present [No Edema] : there was no peripheral edema [No Extremity Clubbing/Cyanosis] : no extremity clubbing/cyanosis [No Palpable Aorta] : no palpable aorta [Obese] : obese [Normal Posterior Cervical Nodes] : no posterior cervical lymphadenopathy [Normal Anterior Cervical Nodes] : no anterior cervical lymphadenopathy [No CVA Tenderness] : no CVA  tenderness [No Rash] : no rash [Normal Gait] : normal gait [Coordination Grossly Intact] : coordination grossly intact [No Focal Deficits] : no focal deficits [Deep Tendon Reflexes (DTR)] : deep tendon reflexes were 2+ and symmetric [Speech Grossly Normal] : speech grossly normal [Normal Affect] : the affect was normal [Alert and Oriented x3] : oriented to person, place, and time [Normal Mood] : the mood was normal [Normal Insight/Judgement] : insight and judgment were intact [de-identified] : obese  [de-identified] : no nasal lesion noted  [de-identified] : with murmur  [de-identified] : no bruising noted

## 2021-03-08 NOTE — REVIEW OF SYSTEMS
[Muscle Pain] : muscle pain [Back Pain] : back pain [Insomnia] : insomnia [Negative] : Heme/Lymph [Nosebleed] : nosebleed [Postnasal Drip] : postnasal drip [Fever] : no fever [Chills] : no chills [Fatigue] : no fatigue [Recent Change In Weight] : ~T no recent weight change [Nasal Discharge] : no nasal discharge [Sore Throat] : no sore throat [Chest Pain] : no chest pain [Palpitations] : no palpitations [Shortness Of Breath] : no shortness of breath [Cough] : no cough [Abdominal Pain] : no abdominal pain [Dysuria] : no dysuria [Incontinence] : no incontinence [Joint Stiffness] : no joint stiffness [Itching] : no itching [Mole Changes] : no mole changes [Skin Rash] : no skin rash [Headache] : no headache [Dizziness] : no dizziness [Memory Loss] : no memory loss [FreeTextEntry4] : bloody nose  [de-identified] : stressed

## 2021-03-08 NOTE — PLAN
[FreeTextEntry1] : Anticardiolipin Ab-  INR is 2.6 Continue  warfarin 2.5 mg daily except Mondays and Wednesday 5 mg \par - check INR one week. monitor for abnormal bleeding.\par \par ENT - epsatxis - resolved- advised nasal spray \par \par Cardio - CAD and hyperlipidemia - Continue with current medication.   Zetia daily as directed \par Follow up with Cardio \par \par Cardio - Hypertension -  Patient was educated about hypertension and the importance of controlling the pressure through lifestyle modification which include low sodium  diet and  aerobic  exercise.  Also discussed the use of prescription medication which included their benefits and their side effects. We discussed the use of ASA 81 mg daily.   Having a BMI less than or equal to 25.  Continue losartan 50  mg   \par \par  Endocrinology  -  Obesity   Patient was educated about the importance of diet and exercise.   We discussed  a goal of a BMI near 25.   Alma is planning bariatric surgery.  check a1c and TS\par \par Orthopedic -- spinal stenosis- DJD lumbar spine -degenerative joint disease bilateral knees status post total knee replacement. Advise patient to start PT for LBP - Continue  Vicodin advised to only take for sever pain. \par advised risk of dependancy.  \par \par Neurology-insomnia - Continue  Ambien. Advise risk alternatives benefits and side effects of medication.   Advise patient risk of taking sleep medication secondary to have an obstructive sleep apnea.  \par Advised to read pack insert - Advised not to use alcohol \par \par We discussed the importance of healthy lifestyles which include exercise, weight control and good diet.\par Patient's questions were answered in full detail. Advise patient if any other concerns arise to please call office to have a discussion. \par \par Patient  education  - COVID-19   Counseling and education provided to the patient.  Advised sign and symptoms of the virus.  Advised contact precautions.  Educated patient on proper hand washing and to participate in social distancing. HAd one dose schedule in a few weeks for the 2nd dose \par \par Patient is in full awareness of the plan and agrees to it.  All pt question was answered.  \par \par \par

## 2021-03-08 NOTE — HEALTH RISK ASSESSMENT
[16-20] : 16-20 [Yes] : Yes [4 or more  times a week (4 pts)] : 4 or more  times a week (4 points) [1 or 2 (0 pts)] : 1 or 2 (0 points) [Never (0 pts)] : Never (0 points) [No] : In the past 12 months have you used drugs other than those required for medical reasons? No [No falls in past year] : Patient reported no falls in the past year [0] : 1) Little interest or pleasure doing things: Not at all (0) [1] : 2) Feeling down, depressed, or hopeless for several days (1) [] : No [de-identified] : quit [YearQuit] : 2000 [Audit-CScore] : 4 [de-identified] : walking at work [de-identified] : regular [[id number]] : 1

## 2021-03-08 NOTE — HISTORY OF PRESENT ILLNESS
[FreeTextEntry1] : PT/INR check, Medication renewal  [de-identified] : Ms. CARDOZA is a 66 year  female, who present to the office for INR check. Taking all medication as directed \par Denies abnormal bleeding.  did have a few days of a bloody nose but has resolved \par requesting a refill on Ambien-  History of insomnia-  On Ambien with good relief and better sleep patterns \par denies having day time fatigue.\par Still with chronic back pain-  pain radiates down the leg.  No relief with Tylenol - gets relief w/ hydrocodone -  \par Knee pain is about the same.  did not start PT secondary to the pandemic \par \par did get a dose of the covid vax

## 2021-03-08 NOTE — COUNSELING
[AUDIT-C Screening administered and reviewed] : AUDIT-C Screening administered and reviewed [Potential consequences of obesity discussed] : Potential consequences of obesity discussed [Encouraged to maintain food diary] : Encouraged to maintain food diary [Encouraged to increase physical activity] : Encouraged to increase physical activity [Good understanding] : Patient has a good understanding of disease, goals and obesity follow-up plan [Weight management counseling provided] : Weight management [Healthy eating counseling provided] : healthy eating [Fall prevention counseling provided] : fall prevention  [Needs reinforcement, provided] : Patient needs reinforcement on understanding of disease, goals and obesity follow-up plan; reinforcement was provided [Low Fat Diet] : Low fat diet [Low Salt Diet] : Low salt diet [Decrease Portions] : Decrease food portions [Patient motivation] : Patient motivation [None] : None [FreeTextEntry2] : 1600 nicole diet  increase exercise  [de-identified] : 1500 nicole diet \par Advised to walk daily for a goal of 10,000 steps \par Advised PT for B/l Knee replacemment

## 2021-04-09 ENCOUNTER — APPOINTMENT (OUTPATIENT)
Dept: INTERNAL MEDICINE | Facility: CLINIC | Age: 66
End: 2021-04-09
Payer: MEDICARE

## 2021-04-09 VITALS
BODY MASS INDEX: 38.99 KG/M2 | HEART RATE: 76 BPM | SYSTOLIC BLOOD PRESSURE: 140 MMHG | TEMPERATURE: 98 F | RESPIRATION RATE: 16 BRPM | WEIGHT: 234 LBS | HEIGHT: 65 IN | OXYGEN SATURATION: 98 % | DIASTOLIC BLOOD PRESSURE: 80 MMHG

## 2021-04-09 LAB
INR PPP: 2.3 RATIO
POCT-PROTHROMBIN TIME: 27.6 SECS
QUALITY CONTROL: YES

## 2021-04-09 PROCEDURE — 99214 OFFICE O/P EST MOD 30 MIN: CPT | Mod: 25

## 2021-04-09 PROCEDURE — 85610 PROTHROMBIN TIME: CPT | Mod: QW

## 2021-04-12 NOTE — PHYSICAL EXAM
[No Acute Distress] : no acute distress [Well Nourished] : well nourished [Well Developed] : well developed [Well-Appearing] : well-appearing [Normal Sclera/Conjunctiva] : normal sclera/conjunctiva [PERRL] : pupils equal round and reactive to light [EOMI] : extraocular movements intact [Normal Outer Ear/Nose] : the outer ears and nose were normal in appearance [Normal Oropharynx] : the oropharynx was normal [Normal TMs] : both tympanic membranes were normal [Normal Nasal Mucosa] : the nasal mucosa was normal [No JVD] : no jugular venous distention [Supple] : supple [No Lymphadenopathy] : no lymphadenopathy [Thyroid Normal, No Nodules] : the thyroid was normal and there were no nodules present [No Respiratory Distress] : no respiratory distress  [Clear to Auscultation] : lungs were clear to auscultation bilaterally [No Accessory Muscle Use] : no accessory muscle use [Normal Rate] : normal rate  [Regular Rhythm] : with a regular rhythm [Normal S1, S2] : normal S1 and S2 [No Carotid Bruits] : no carotid bruits [No Abdominal Bruit] : a ~M bruit was not heard ~T in the abdomen [No Varicosities] : no varicosities [Pedal Pulses Present] : the pedal pulses are present [No Edema] : there was no peripheral edema [No Extremity Clubbing/Cyanosis] : no extremity clubbing/cyanosis [No Palpable Aorta] : no palpable aorta [Obese] : obese [Normal Posterior Cervical Nodes] : no posterior cervical lymphadenopathy [Normal Anterior Cervical Nodes] : no anterior cervical lymphadenopathy [No CVA Tenderness] : no CVA  tenderness [No Rash] : no rash [Normal Gait] : normal gait [Coordination Grossly Intact] : coordination grossly intact [No Focal Deficits] : no focal deficits [Deep Tendon Reflexes (DTR)] : deep tendon reflexes were 2+ and symmetric [Speech Grossly Normal] : speech grossly normal [Normal Affect] : the affect was normal [Alert and Oriented x3] : oriented to person, place, and time [Normal Mood] : the mood was normal [Normal Insight/Judgement] : insight and judgment were intact [de-identified] : obese  [de-identified] : with murmur  [de-identified] : no bruising noted

## 2021-04-12 NOTE — PHYSICAL EXAM
[No Acute Distress] : no acute distress [Well Nourished] : well nourished [Well Developed] : well developed [Well-Appearing] : well-appearing [Normal Sclera/Conjunctiva] : normal sclera/conjunctiva [PERRL] : pupils equal round and reactive to light [EOMI] : extraocular movements intact [Normal Outer Ear/Nose] : the outer ears and nose were normal in appearance [Normal Oropharynx] : the oropharynx was normal [Normal TMs] : both tympanic membranes were normal [Normal Nasal Mucosa] : the nasal mucosa was normal [No JVD] : no jugular venous distention [Supple] : supple [No Lymphadenopathy] : no lymphadenopathy [Thyroid Normal, No Nodules] : the thyroid was normal and there were no nodules present [No Respiratory Distress] : no respiratory distress  [Clear to Auscultation] : lungs were clear to auscultation bilaterally [No Accessory Muscle Use] : no accessory muscle use [Normal Rate] : normal rate  [Regular Rhythm] : with a regular rhythm [Normal S1, S2] : normal S1 and S2 [No Carotid Bruits] : no carotid bruits [No Abdominal Bruit] : a ~M bruit was not heard ~T in the abdomen [No Varicosities] : no varicosities [Pedal Pulses Present] : the pedal pulses are present [No Edema] : there was no peripheral edema [No Extremity Clubbing/Cyanosis] : no extremity clubbing/cyanosis [No Palpable Aorta] : no palpable aorta [Obese] : obese [Normal Posterior Cervical Nodes] : no posterior cervical lymphadenopathy [Normal Anterior Cervical Nodes] : no anterior cervical lymphadenopathy [No CVA Tenderness] : no CVA  tenderness [No Rash] : no rash [Normal Gait] : normal gait [Coordination Grossly Intact] : coordination grossly intact [No Focal Deficits] : no focal deficits [Deep Tendon Reflexes (DTR)] : deep tendon reflexes were 2+ and symmetric [Speech Grossly Normal] : speech grossly normal [Normal Affect] : the affect was normal [Alert and Oriented x3] : oriented to person, place, and time [Normal Mood] : the mood was normal [Normal Insight/Judgement] : insight and judgment were intact [de-identified] : obese  [de-identified] : with murmur  [de-identified] : no bruising noted

## 2021-04-12 NOTE — COUNSELING
[AUDIT-C Screening administered and reviewed] : AUDIT-C Screening administered and reviewed [Potential consequences of obesity discussed] : Potential consequences of obesity discussed [Encouraged to maintain food diary] : Encouraged to maintain food diary [Encouraged to increase physical activity] : Encouraged to increase physical activity [Good understanding] : Patient has a good understanding of disease, goals and obesity follow-up plan [Weight management counseling provided] : Weight management [Healthy eating counseling provided] : healthy eating [Fall prevention counseling provided] : fall prevention  [Needs reinforcement, provided] : Patient needs reinforcement on understanding of disease, goals and obesity follow-up plan; reinforcement was provided [Low Fat Diet] : Low fat diet [Low Salt Diet] : Low salt diet [Decrease Portions] : Decrease food portions [Patient motivation] : Patient motivation [None] : None [FreeTextEntry2] : 1600 nicole diet  increase exercise  [de-identified] : 1500 nicole diet \par Advised to walk daily for a goal of 10,000 steps \par Advised PT for B/l Knee replacemment

## 2021-04-12 NOTE — CURRENT MEDS
[Takes medication as prescribed] : takes [None] : Patient does not have any barriers to medication adherence [Yes] : Reviewed medication list for presence of high-risk medications. [Benzodiazepines] : benzodiazepines [Opioids] : opioids [Blood Thinners] : blood thinners [Muscle Relaxants] : muscle relaxants [Sedatives] : sedatives [FreeTextEntry1] : reviewed  and medication list with pt

## 2021-04-12 NOTE — HEALTH RISK ASSESSMENT
[16-20] : 16-20 [Yes] : Yes [4 or more  times a week (4 pts)] : 4 or more  times a week (4 points) [1 or 2 (0 pts)] : 1 or 2 (0 points) [Never (0 pts)] : Never (0 points) [No] : In the past 12 months have you used drugs other than those required for medical reasons? No [No falls in past year] : Patient reported no falls in the past year [0] : 1) Little interest or pleasure doing things: Not at all (0) [1] : 2) Feeling down, depressed, or hopeless for several days (1) [] : No [de-identified] : quit [YearQuit] : 2000 [Audit-CScore] : 4 [de-identified] : walking at work [de-identified] : regular [[id number]] : 1

## 2021-04-12 NOTE — PLAN
[FreeTextEntry1] : Psych-  anxiety - depression -  Counseling given .  Start Lexapro 10 mg.  Advised side effects. \par RTO 4-6 weeks.  Any concerns about the medication to call the office \par \par Heme -Anticardiolipin Ab-  INR is 2.3 Continue  warfarin 2.5 mg daily except Mondays and Wednesday 5 mg \par - check INR one week. monitor for abnormal bleeding.\par \par Cardio - CAD and hyperlipidemia - Continue with current medication.   Zetia daily as directed \par Follow up with Cardio \par \par Cardio - Hypertension -  Patient was educated about hypertension and the importance of controlling the pressure through lifestyle modification which include low sodium  diet and  aerobic  exercise.  Also discussed the use of prescription medication which included their benefits and their side effects. We discussed the use of ASA 81 mg daily.   Having a BMI less than or equal to 25.  Continue losartan 50  mg   \par \par  Endocrinology  -  Obesity   Patient was educated about the importance of diet and exercise.   We discussed  a goal of a BMI near 25.   Alma is planning bariatric surgery.  check a1c and TS\par \par Orthopedic -- spinal stenosis- DJD lumbar spine -degenerative joint disease bilateral knees status post total knee replacement. Advise patient to start PT for LBP - Continue  Vicodin advised to only take for sever pain. \par advised risk of dependancy.   Refilled hydrocodone and will start cutting back next month pt aware and agrees with the plan \par \par Neurology-insomnia - Continue  Ambien. Advise risk alternatives benefits and side effects of medication.   Advise patient risk of taking sleep medication secondary to have an obstructive sleep apnea.  \par Advised to read pack insert - Advised not to use alcohol  Refilled Ambien 10 mg \par \par We discussed the importance of healthy lifestyles which include exercise, weight control and good diet.\par Patient's questions were answered in full detail. Advise patient if any other concerns arise to please call office to have a discussion. \par \par Patient  education  - COVID-19   Counseling and education provided to the patient.  Advised sign and symptoms of the virus.  Advised contact precautions.  Educated patient on proper hand washing and to participate in social distancing. Had covid vax x 2 \par \par Patient is in full awareness of the plan and agrees to it.  All pt question was answered.  \par \par Discussed Pneumovax \par

## 2021-04-12 NOTE — REVIEW OF SYSTEMS
[Nosebleed] : nosebleed [Postnasal Drip] : postnasal drip [Muscle Pain] : muscle pain [Back Pain] : back pain [Insomnia] : insomnia [Anxiety] : anxiety [Negative] : ENT [Fever] : no fever [Chills] : no chills [Fatigue] : no fatigue [Recent Change In Weight] : ~T no recent weight change [Nasal Discharge] : no nasal discharge [Sore Throat] : no sore throat [Chest Pain] : no chest pain [Palpitations] : no palpitations [Shortness Of Breath] : no shortness of breath [Cough] : no cough [Abdominal Pain] : no abdominal pain [Dysuria] : no dysuria [Incontinence] : no incontinence [Joint Stiffness] : no joint stiffness [Itching] : no itching [Mole Changes] : no mole changes [Skin Rash] : no skin rash [Headache] : no headache [Dizziness] : no dizziness [Memory Loss] : no memory loss [de-identified] : stressed

## 2021-04-12 NOTE — HEALTH RISK ASSESSMENT
[16-20] : 16-20 [Yes] : Yes [4 or more  times a week (4 pts)] : 4 or more  times a week (4 points) [1 or 2 (0 pts)] : 1 or 2 (0 points) [Never (0 pts)] : Never (0 points) [No] : In the past 12 months have you used drugs other than those required for medical reasons? No [No falls in past year] : Patient reported no falls in the past year [0] : 1) Little interest or pleasure doing things: Not at all (0) [1] : 2) Feeling down, depressed, or hopeless for several days (1) [] : No [de-identified] : quit [YearQuit] : 2000 [Audit-CScore] : 4 [de-identified] : walking at work [de-identified] : regular [[id number]] : 1

## 2021-04-12 NOTE — ASSESSMENT
[FreeTextEntry1] : A  66-year-old female with acute anxiety, degenerative joint disease, JANINA, insomnia, obesity present  to the office for INR check, and renewal of medications.

## 2021-04-12 NOTE — COUNSELING
[AUDIT-C Screening administered and reviewed] : AUDIT-C Screening administered and reviewed [Potential consequences of obesity discussed] : Potential consequences of obesity discussed [Encouraged to maintain food diary] : Encouraged to maintain food diary [Encouraged to increase physical activity] : Encouraged to increase physical activity [Good understanding] : Patient has a good understanding of disease, goals and obesity follow-up plan [Weight management counseling provided] : Weight management [Healthy eating counseling provided] : healthy eating [Fall prevention counseling provided] : fall prevention  [Needs reinforcement, provided] : Patient needs reinforcement on understanding of disease, goals and obesity follow-up plan; reinforcement was provided [Low Fat Diet] : Low fat diet [Low Salt Diet] : Low salt diet [Decrease Portions] : Decrease food portions [Patient motivation] : Patient motivation [None] : None [FreeTextEntry2] : 1600 nicole diet  increase exercise  [de-identified] : 1500 nicole diet \par Advised to walk daily for a goal of 10,000 steps \par Advised PT for B/l Knee replacemment

## 2021-04-12 NOTE — HISTORY OF PRESENT ILLNESS
[Spouse] : spouse [FreeTextEntry1] : PT/INR check, Medication renewal and anxiety  [de-identified] : Mrs. CARDOZA is a 66 year  female, who present to the office for INR check. Taking all medication as directed \par Denies abnormal bleeding.  \par States she been having increase anxiety over the past few months secondary to work related issues.  States she feels anxious and nervous most of the days.  Would like to get on something to help her calm down. \par Requesting a refill on Ambien-  History of insomnia-  On Ambien with good relief and better sleep patterns \par denies having day time fatigue.\par Still with chronic back pain-  pain radiates down the leg.  No relief with Tylenol - gets relief w/ hydrocodone -  \par Knee pain is about the same.  States the pain has improved

## 2021-04-12 NOTE — HISTORY OF PRESENT ILLNESS
[Spouse] : spouse [FreeTextEntry1] : PT/INR check, Medication renewal and anxiety  [de-identified] : Mrs. CARDOZA is a 66 year  female, who present to the office for INR check. Taking all medication as directed \par Denies abnormal bleeding.  \par States she been having increase anxiety over the past few months secondary to work related issues.  States she feels anxious and nervous most of the days.  Would like to get on something to help her calm down. \par Requesting a refill on Ambien-  History of insomnia-  On Ambien with good relief and better sleep patterns \par denies having day time fatigue.\par Still with chronic back pain-  pain radiates down the leg.  No relief with Tylenol - gets relief w/ hydrocodone -  \par Knee pain is about the same.  States the pain has improved

## 2021-04-12 NOTE — REVIEW OF SYSTEMS
[Nosebleed] : nosebleed [Postnasal Drip] : postnasal drip [Muscle Pain] : muscle pain [Back Pain] : back pain [Insomnia] : insomnia [Anxiety] : anxiety [Negative] : ENT [Fever] : no fever [Chills] : no chills [Fatigue] : no fatigue [Recent Change In Weight] : ~T no recent weight change [Nasal Discharge] : no nasal discharge [Sore Throat] : no sore throat [Chest Pain] : no chest pain [Palpitations] : no palpitations [Shortness Of Breath] : no shortness of breath [Cough] : no cough [Abdominal Pain] : no abdominal pain [Dysuria] : no dysuria [Incontinence] : no incontinence [Joint Stiffness] : no joint stiffness [Itching] : no itching [Mole Changes] : no mole changes [Skin Rash] : no skin rash [Headache] : no headache [Dizziness] : no dizziness [Memory Loss] : no memory loss [de-identified] : stressed

## 2021-04-15 ENCOUNTER — RX RENEWAL (OUTPATIENT)
Age: 66
End: 2021-04-15

## 2021-05-14 ENCOUNTER — APPOINTMENT (OUTPATIENT)
Dept: INTERNAL MEDICINE | Facility: CLINIC | Age: 66
End: 2021-05-14
Payer: MEDICARE

## 2021-05-14 VITALS
RESPIRATION RATE: 16 BRPM | BODY MASS INDEX: 38.82 KG/M2 | WEIGHT: 233 LBS | SYSTOLIC BLOOD PRESSURE: 140 MMHG | HEIGHT: 65 IN | HEART RATE: 78 BPM | DIASTOLIC BLOOD PRESSURE: 78 MMHG | OXYGEN SATURATION: 98 % | TEMPERATURE: 98.8 F

## 2021-05-14 LAB
INR PPP: 4.2 RATIO
POCT-PROTHROMBIN TIME: 50.6 SECS
QUALITY CONTROL: YES

## 2021-05-14 PROCEDURE — 85610 PROTHROMBIN TIME: CPT | Mod: QW

## 2021-05-14 PROCEDURE — 99214 OFFICE O/P EST MOD 30 MIN: CPT | Mod: 25

## 2021-05-14 NOTE — REVIEW OF SYSTEMS
[Muscle Pain] : muscle pain [Back Pain] : back pain [Insomnia] : insomnia [Anxiety] : anxiety [Fever] : no fever [Chills] : no chills [Fatigue] : no fatigue [Recent Change In Weight] : ~T no recent weight change [Nosebleed] : no nosebleeds [Nasal Discharge] : no nasal discharge [Sore Throat] : no sore throat [Postnasal Drip] : no postnasal drip [Chest Pain] : no chest pain [Palpitations] : no palpitations [Shortness Of Breath] : no shortness of breath [Cough] : no cough [Abdominal Pain] : no abdominal pain [Dysuria] : no dysuria [Incontinence] : no incontinence [Joint Stiffness] : no joint stiffness [Itching] : no itching [Mole Changes] : no mole changes [Skin Rash] : no skin rash [Headache] : no headache [Dizziness] : no dizziness [Memory Loss] : no memory loss [Negative] : Respiratory [FreeTextEntry9] : right elbow pain  [de-identified] : stressed

## 2021-05-14 NOTE — HEALTH RISK ASSESSMENT
[16-20] : 16-20 [Yes] : Yes [4 or more  times a week (4 pts)] : 4 or more  times a week (4 points) [1 or 2 (0 pts)] : 1 or 2 (0 points) [Never (0 pts)] : Never (0 points) [No] : In the past 12 months have you used drugs other than those required for medical reasons? No [No falls in past year] : Patient reported no falls in the past year [0] : 1) Little interest or pleasure doing things: Not at all (0) [1] : 2) Feeling down, depressed, or hopeless for several days (1) [] : No [de-identified] : quit [YearQuit] : 2000 [Audit-CScore] : 4 [de-identified] : walking at work [de-identified] : regular [[id number]] : 1

## 2021-05-14 NOTE — PHYSICAL EXAM
[No Acute Distress] : no acute distress [Well Nourished] : well nourished [Well Developed] : well developed [Well-Appearing] : well-appearing [Normal Sclera/Conjunctiva] : normal sclera/conjunctiva [PERRL] : pupils equal round and reactive to light [EOMI] : extraocular movements intact [Normal Outer Ear/Nose] : the outer ears and nose were normal in appearance [Normal Oropharynx] : the oropharynx was normal [Normal TMs] : both tympanic membranes were normal [Normal Nasal Mucosa] : the nasal mucosa was normal [No JVD] : no jugular venous distention [Supple] : supple [No Lymphadenopathy] : no lymphadenopathy [Thyroid Normal, No Nodules] : the thyroid was normal and there were no nodules present [No Respiratory Distress] : no respiratory distress  [Clear to Auscultation] : lungs were clear to auscultation bilaterally [No Accessory Muscle Use] : no accessory muscle use [Normal Rate] : normal rate  [Regular Rhythm] : with a regular rhythm [Normal S1, S2] : normal S1 and S2 [No Carotid Bruits] : no carotid bruits [No Abdominal Bruit] : a ~M bruit was not heard ~T in the abdomen [No Varicosities] : no varicosities [Pedal Pulses Present] : the pedal pulses are present [No Edema] : there was no peripheral edema [No Extremity Clubbing/Cyanosis] : no extremity clubbing/cyanosis [No Palpable Aorta] : no palpable aorta [Obese] : obese [Normal Posterior Cervical Nodes] : no posterior cervical lymphadenopathy [Normal Anterior Cervical Nodes] : no anterior cervical lymphadenopathy [No CVA Tenderness] : no CVA  tenderness [No Rash] : no rash [Normal Gait] : normal gait [Coordination Grossly Intact] : coordination grossly intact [No Focal Deficits] : no focal deficits [Deep Tendon Reflexes (DTR)] : deep tendon reflexes were 2+ and symmetric [Speech Grossly Normal] : speech grossly normal [Normal Affect] : the affect was normal [Alert and Oriented x3] : oriented to person, place, and time [Normal Mood] : the mood was normal [Normal Insight/Judgement] : insight and judgment were intact [de-identified] : obese  [de-identified] : with murmur  [de-identified] : no bruising noted

## 2021-05-14 NOTE — PLAN
[FreeTextEntry1] : Psych-  anxiety - depression -  Counseling given. Off Lexapro secondary to side effects.   Consider genetic test for medication compatibility \par \par Heme -Anticardiolipin Ab-  INR is 4.2   hold warfarin x 2 days than  warfarin 2.5 mg daily except Mondays and Wednesday 5 mg \par - check INR one week. monitor for abnormal bleeding.\par \par Cardio - CAD and hyperlipidemia - Continue with current medication.   Zetia daily as directed \par Follow up with Cardio \par \par Cardio - Hypertension -  Patient was educated about hypertension and the importance of controlling the pressure through lifestyle modification which include low sodium  diet and  aerobic  exercise.  Also discussed the use of prescription medication which included their benefits and their side effects. We discussed the use of ASA 81 mg daily.   Having a BMI less than or equal to 25.  Continue losartan 50  mg   \par \par  Endocrinology  -  Obesity   Patient was educated about the importance of diet and exercise.   We discussed  a goal of a BMI near 25.   Alma is planning bariatric surgery.  check a1c and TS\par \par Orthopedic -- spinal stenosis- DJD lumbar spine -degenerative joint disease bilateral knees status post total knee replacement. Advise patient to start PT for LBP - Continue  Vicodin advised to only take for sever pain. \par advised risk of dependancy.   Refilled hydrocodone and will start cutting back next month pt aware and agrees with the plan \par \par Neurology-insomnia - Continue  Ambien. Advise risk alternatives benefits and side effects of medication.   Advise patient risk of taking sleep medication secondary to have an obstructive sleep apnea.  \par Advised to read pack insert - Advised not to use alcohol  Refilled Ambien 10 mg \par \par We discussed the importance of healthy lifestyles which include exercise, weight control and good diet.\par Patient's questions were answered in full detail. Advise patient if any other concerns arise to please call office to have a discussion. \par \par Patient  education  - COVID-19   Counseling and education provided to the patient.  Advised sign and symptoms of the virus.  Advised contact precautions.  Educated patient on proper hand washing and to participate in social distancing. Had covid vax x 2 \par \par

## 2021-05-14 NOTE — COUNSELING
[AUDIT-C Screening administered and reviewed] : AUDIT-C Screening administered and reviewed [Potential consequences of obesity discussed] : Potential consequences of obesity discussed [Encouraged to maintain food diary] : Encouraged to maintain food diary [Encouraged to increase physical activity] : Encouraged to increase physical activity [Good understanding] : Patient has a good understanding of disease, goals and obesity follow-up plan [Weight management counseling provided] : Weight management [Healthy eating counseling provided] : healthy eating [Fall prevention counseling provided] : fall prevention  [Needs reinforcement, provided] : Patient needs reinforcement on understanding of disease, goals and obesity follow-up plan; reinforcement was provided [Low Fat Diet] : Low fat diet [Low Salt Diet] : Low salt diet [Decrease Portions] : Decrease food portions [Patient motivation] : Patient motivation [None] : None [FreeTextEntry2] : 1600 nicole diet  increase exercise  [de-identified] : 1500 nicole diet \par Advised to walk daily for a goal of 10,000 steps \par Advised PT for B/l Knee replacemment

## 2021-05-14 NOTE — HISTORY OF PRESENT ILLNESS
[FreeTextEntry1] : IR check, Medication renewal  [de-identified] : Mrs. CARDOZA is a 66 year  female, who present to the office for medication renewal and INR check and renewal of medication. \par States she stop the Lexapro secondary to making her extremely tired \par Also been having right elbow pain for a few days.  Unaware of any trauma to the area.  Pain is exacerbated with flexion and extension of upper EXT.  \par Denies fever or chills   \par Denies having abnormal bleeding \par \par

## 2021-05-23 ENCOUNTER — RX RENEWAL (OUTPATIENT)
Age: 66
End: 2021-05-23

## 2021-06-10 ENCOUNTER — MED ADMIN CHARGE (OUTPATIENT)
Age: 66
End: 2021-06-10

## 2021-06-11 ENCOUNTER — APPOINTMENT (OUTPATIENT)
Dept: INTERNAL MEDICINE | Facility: CLINIC | Age: 66
End: 2021-06-11
Payer: MEDICARE

## 2021-06-11 VITALS
DIASTOLIC BLOOD PRESSURE: 82 MMHG | BODY MASS INDEX: 38.62 KG/M2 | WEIGHT: 231.8 LBS | HEIGHT: 65 IN | OXYGEN SATURATION: 97 % | RESPIRATION RATE: 16 BRPM | HEART RATE: 82 BPM | TEMPERATURE: 97.3 F | SYSTOLIC BLOOD PRESSURE: 152 MMHG

## 2021-06-11 LAB
INR PPP: 2.7 RATIO
POCT-PROTHROMBIN TIME: 32 SECS

## 2021-06-11 PROCEDURE — 85610 PROTHROMBIN TIME: CPT | Mod: QW

## 2021-06-11 PROCEDURE — 90732 PPSV23 VACC 2 YRS+ SUBQ/IM: CPT

## 2021-06-11 PROCEDURE — 36416 COLLJ CAPILLARY BLOOD SPEC: CPT

## 2021-06-11 PROCEDURE — 99214 OFFICE O/P EST MOD 30 MIN: CPT | Mod: 25

## 2021-06-11 PROCEDURE — G0009: CPT

## 2021-06-11 RX ORDER — PHYTONADIONE (VIT K1) 100 MCG
100 TABLET ORAL DAILY
Qty: 10 | Refills: 0 | Status: DISCONTINUED | COMMUNITY
Start: 2021-01-08 | End: 2021-06-11

## 2021-06-12 ENCOUNTER — RX RENEWAL (OUTPATIENT)
Age: 66
End: 2021-06-12

## 2021-06-13 NOTE — REVIEW OF SYSTEMS
[Muscle Pain] : muscle pain [Back Pain] : back pain [Insomnia] : insomnia [Anxiety] : anxiety [Fever] : no fever [Chills] : no chills [Fatigue] : no fatigue [Recent Change In Weight] : ~T no recent weight change [Nosebleed] : no nosebleeds [Nasal Discharge] : no nasal discharge [Sore Throat] : no sore throat [Postnasal Drip] : no postnasal drip [Chest Pain] : no chest pain [Palpitations] : no palpitations [Shortness Of Breath] : no shortness of breath [Cough] : no cough [Abdominal Pain] : no abdominal pain [Dysuria] : no dysuria [Incontinence] : no incontinence [Joint Pain] : joint pain [Joint Stiffness] : no joint stiffness [Itching] : no itching [Mole Changes] : no mole changes [Skin Rash] : no skin rash [Headache] : no headache [Dizziness] : no dizziness [Memory Loss] : no memory loss [Negative] : Integumentary [de-identified] : stressed

## 2021-06-13 NOTE — PLAN
[FreeTextEntry1] : Psych-  anxiety - depression -  Counseling given. \par \par Heme -Anticardiolipin Ab-  INR  2.7 - Continue with current dose.  Chec   hold warfarin x 2 days than  warfarin 2.5 mg daily except Mondays and Wednesday 5 mg \par - check INR one week. monitor for abnormal bleeding.\par \par Cardio - CAD and hyperlipidemia - Continue with current medication.   Zetia daily as directed \par Follow up with Cardio \par check INR in 2 weeks \par \par Cardio - Hypertension -  Patient was educated about hypertension and the importance of controlling the pressure through lifestyle modification which include low sodium  diet and  aerobic  exercise.  Also discussed the use of prescription medication which included their benefits and their side effects. We discussed the use of ASA 81 mg daily.   Having a BMI less than or equal to 25.  Continue losartan 50  mg   \par \par  Endocrinology  -  Obesity   Patient was educated about the importance of diet and exercise.   We discussed  a goal of a BMI near 25.   \par \par Orthopedic -- spinal stenosis- DJD lumbar spine -degenerative joint disease bilateral knees status post total knee replacement.  Doing better with pain - Continue with   Vicodin  but decrease dispense amount advised to only take for sever pain. \par advised risk of dependancy.   Refilled hydrocodone and will start cutting back next month pt aware and agrees with the plan \par \par Neurology-insomnia - Continue  Ambien. Advise risk alternatives benefits and side effects of medication.   Advise patient risk of taking sleep medication secondary to have an obstructive sleep apnea.  \par Advised to read pack insert - Advised not to use alcohol  Refilled Ambien 10 mg \par \par We discussed the importance of healthy lifestyles which include exercise, weight control and good diet.\par Patient's questions were answered in full detail. Advise patient if any other concerns arise to please call office to have a discussion. \par \par Patient  education  - COVID-19   Counseling and education provided to the patient.  Advised sign and symptoms of the virus.  Advised contact precautions.  Educated patient on proper hand washing and to participate in social distancing. Had covid vax x 2 \par \par

## 2021-06-13 NOTE — HISTORY OF PRESENT ILLNESS
[FreeTextEntry1] : INR check, Medication renewal and immunization  update [de-identified] : Mrs. CARDOZA is a 66 year  female, who present to the office for medication renewal and INR check and renewal of medication. \par States she is doing better- Planing on retiring from her current Job\par Pain in knee and back or doing better \par Denies fever or chills   \par Denies having abnormal bleeding \par \par

## 2021-06-13 NOTE — PHYSICAL EXAM
[No Acute Distress] : no acute distress [Well Nourished] : well nourished [Well Developed] : well developed [Well-Appearing] : well-appearing [Normal Sclera/Conjunctiva] : normal sclera/conjunctiva [PERRL] : pupils equal round and reactive to light [EOMI] : extraocular movements intact [Normal Outer Ear/Nose] : the outer ears and nose were normal in appearance [Normal Oropharynx] : the oropharynx was normal [Normal TMs] : both tympanic membranes were normal [Normal Nasal Mucosa] : the nasal mucosa was normal [No JVD] : no jugular venous distention [Supple] : supple [No Lymphadenopathy] : no lymphadenopathy [Thyroid Normal, No Nodules] : the thyroid was normal and there were no nodules present [No Respiratory Distress] : no respiratory distress  [Clear to Auscultation] : lungs were clear to auscultation bilaterally [No Accessory Muscle Use] : no accessory muscle use [Normal Rate] : normal rate  [Regular Rhythm] : with a regular rhythm [Normal S1, S2] : normal S1 and S2 [No Carotid Bruits] : no carotid bruits [No Abdominal Bruit] : a ~M bruit was not heard ~T in the abdomen [No Varicosities] : no varicosities [Pedal Pulses Present] : the pedal pulses are present [No Edema] : there was no peripheral edema [No Extremity Clubbing/Cyanosis] : no extremity clubbing/cyanosis [No Palpable Aorta] : no palpable aorta [Obese] : obese [Normal Posterior Cervical Nodes] : no posterior cervical lymphadenopathy [Normal Anterior Cervical Nodes] : no anterior cervical lymphadenopathy [No CVA Tenderness] : no CVA  tenderness [No Rash] : no rash [Normal Gait] : normal gait [Coordination Grossly Intact] : coordination grossly intact [No Focal Deficits] : no focal deficits [Deep Tendon Reflexes (DTR)] : deep tendon reflexes were 2+ and symmetric [Speech Grossly Normal] : speech grossly normal [Normal Affect] : the affect was normal [Alert and Oriented x3] : oriented to person, place, and time [Normal Mood] : the mood was normal [Normal Insight/Judgement] : insight and judgment were intact [de-identified] : obese  [de-identified] : with murmur  [de-identified] : no bruising noted

## 2021-06-13 NOTE — ASSESSMENT
[FreeTextEntry1] : A  66-year-old female with acute anxiety, degenerative joint disease, JANINA, insomnia, obesity present  to the office for INR check, and renewal of medications. As well as an update on pneumonia vax

## 2021-06-13 NOTE — COUNSELING
[AUDIT-C Screening administered and reviewed] : AUDIT-C Screening administered and reviewed [Potential consequences of obesity discussed] : Potential consequences of obesity discussed [Encouraged to maintain food diary] : Encouraged to maintain food diary [Encouraged to increase physical activity] : Encouraged to increase physical activity [Good understanding] : Patient has a good understanding of disease, goals and obesity follow-up plan [Weight management counseling provided] : Weight management [Healthy eating counseling provided] : healthy eating [Fall prevention counseling provided] : fall prevention  [Needs reinforcement, provided] : Patient needs reinforcement on understanding of disease, goals and obesity follow-up plan; reinforcement was provided [Low Fat Diet] : Low fat diet [Low Salt Diet] : Low salt diet [Decrease Portions] : Decrease food portions [Patient motivation] : Patient motivation [None] : None [FreeTextEntry2] : 1600 nicole diet  increase exercise  [de-identified] : 1500 nicole diet \par Advised to walk daily for a goal of 10,000 steps \par Advised PT for B/l Knee replacemment

## 2021-06-13 NOTE — HEALTH RISK ASSESSMENT
[16-20] : 16-20 [Yes] : Yes [4 or more  times a week (4 pts)] : 4 or more  times a week (4 points) [1 or 2 (0 pts)] : 1 or 2 (0 points) [Never (0 pts)] : Never (0 points) [No] : In the past 12 months have you used drugs other than those required for medical reasons? No [No falls in past year] : Patient reported no falls in the past year [0] : 1) Little interest or pleasure doing things: Not at all (0) [1] : 2) Feeling down, depressed, or hopeless for several days (1) [] : No [de-identified] : quit [YearQuit] : 2000 [Audit-CScore] : 4 [de-identified] : walking at work [de-identified] : regular [[id number]] : 1

## 2021-06-28 ENCOUNTER — RX RENEWAL (OUTPATIENT)
Age: 66
End: 2021-06-28

## 2021-07-09 ENCOUNTER — APPOINTMENT (OUTPATIENT)
Dept: INTERNAL MEDICINE | Facility: CLINIC | Age: 66
End: 2021-07-09
Payer: MEDICARE

## 2021-07-09 VITALS
HEART RATE: 79 BPM | RESPIRATION RATE: 16 BRPM | WEIGHT: 235 LBS | OXYGEN SATURATION: 98 % | DIASTOLIC BLOOD PRESSURE: 78 MMHG | TEMPERATURE: 97.3 F | SYSTOLIC BLOOD PRESSURE: 142 MMHG | HEIGHT: 65 IN | BODY MASS INDEX: 39.15 KG/M2

## 2021-07-09 LAB
INR PPP: 3 RATIO
POCT-PROTHROMBIN TIME: 35.8 SECS

## 2021-07-09 PROCEDURE — 85610 PROTHROMBIN TIME: CPT | Mod: QW

## 2021-07-09 PROCEDURE — 36416 COLLJ CAPILLARY BLOOD SPEC: CPT

## 2021-07-09 PROCEDURE — 99214 OFFICE O/P EST MOD 30 MIN: CPT | Mod: 25

## 2021-07-09 RX ORDER — ESCITALOPRAM OXALATE 10 MG/1
10 TABLET ORAL
Qty: 90 | Refills: 0 | Status: DISCONTINUED | COMMUNITY
Start: 2021-04-09 | End: 2021-07-09

## 2021-07-09 RX ORDER — MOMETASONE 50 UG/1
50 SPRAY, METERED NASAL DAILY
Qty: 1 | Refills: 0 | Status: DISCONTINUED | COMMUNITY
Start: 2021-03-05 | End: 2021-07-09

## 2021-07-12 NOTE — HISTORY OF PRESENT ILLNESS
[FreeTextEntry1] : INR check, Medication renewal [de-identified] : Mrs. CARDOZA is a 66 year  female, who present to the office for medication renewal and INR check and renewal of medication. \par States she is doing better- \par Pain in knee and back or doing better \par Denies fever or chills   \par Denies having abnormal bleeding -Continue to take warfarin as directed daily \par \par

## 2021-07-12 NOTE — COUNSELING
[AUDIT-C Screening administered and reviewed] : AUDIT-C Screening administered and reviewed [Potential consequences of obesity discussed] : Potential consequences of obesity discussed [Encouraged to maintain food diary] : Encouraged to maintain food diary [Encouraged to increase physical activity] : Encouraged to increase physical activity [Good understanding] : Patient has a good understanding of disease, goals and obesity follow-up plan [Weight management counseling provided] : Weight management [Healthy eating counseling provided] : healthy eating [Fall prevention counseling provided] : fall prevention  [Needs reinforcement, provided] : Patient needs reinforcement on understanding of disease, goals and obesity follow-up plan; reinforcement was provided [Low Fat Diet] : Low fat diet [Low Salt Diet] : Low salt diet [Decrease Portions] : Decrease food portions [Patient motivation] : Patient motivation [None] : None [FreeTextEntry2] : 1600 nicole diet  increase exercise  [de-identified] : 1500 nicole diet \par Advised to walk daily for a goal of 10,000 steps \par Advised PT for B/l Knee replacemment

## 2021-07-12 NOTE — PHYSICAL EXAM
[No Acute Distress] : no acute distress [Well Nourished] : well nourished [Well Developed] : well developed [Well-Appearing] : well-appearing [Normal Sclera/Conjunctiva] : normal sclera/conjunctiva [PERRL] : pupils equal round and reactive to light [EOMI] : extraocular movements intact [Normal Outer Ear/Nose] : the outer ears and nose were normal in appearance [Normal Oropharynx] : the oropharynx was normal [Normal TMs] : both tympanic membranes were normal [Normal Nasal Mucosa] : the nasal mucosa was normal [No JVD] : no jugular venous distention [Supple] : supple [No Lymphadenopathy] : no lymphadenopathy [Thyroid Normal, No Nodules] : the thyroid was normal and there were no nodules present [No Respiratory Distress] : no respiratory distress  [Clear to Auscultation] : lungs were clear to auscultation bilaterally [No Accessory Muscle Use] : no accessory muscle use [Normal Rate] : normal rate  [Regular Rhythm] : with a regular rhythm [Normal S1, S2] : normal S1 and S2 [No Carotid Bruits] : no carotid bruits [No Abdominal Bruit] : a ~M bruit was not heard ~T in the abdomen [No Varicosities] : no varicosities [Pedal Pulses Present] : the pedal pulses are present [No Edema] : there was no peripheral edema [No Extremity Clubbing/Cyanosis] : no extremity clubbing/cyanosis [No Palpable Aorta] : no palpable aorta [Obese] : obese [Normal Posterior Cervical Nodes] : no posterior cervical lymphadenopathy [Normal Anterior Cervical Nodes] : no anterior cervical lymphadenopathy [No CVA Tenderness] : no CVA  tenderness [No Rash] : no rash [Normal Gait] : normal gait [Coordination Grossly Intact] : coordination grossly intact [No Focal Deficits] : no focal deficits [Deep Tendon Reflexes (DTR)] : deep tendon reflexes were 2+ and symmetric [Speech Grossly Normal] : speech grossly normal [Normal Affect] : the affect was normal [Alert and Oriented x3] : oriented to person, place, and time [Normal Mood] : the mood was normal [Normal Insight/Judgement] : insight and judgment were intact [de-identified] : obese  [de-identified] : with murmur  [de-identified] : no bruising noted

## 2021-07-12 NOTE — PLAN
[FreeTextEntry1] : Psych-  anxiety - depression -  Counseling given. Advised to consider therapy \par \par Heme -Anticardiolipin Ab-  INR  3.0  - Continue with current dose.  warfarin 2.5 mg daily except Mondays and Wednesday 5 mg \par - check INR  2-week. monitor for abnormal bleeding.\par \par Cardio - CAD and hyperlipidemia - Continue with current medication.   Zetia daily as directed \par Follow up with Cardio \par check INR in 2 weeks \par \par Cardio - Hypertension -  Patient was educated about hypertension and the importance of controlling the pressure through lifestyle modification which include low sodium  diet and  aerobic  exercise.  Also discussed the use of prescription medication which included their benefits and their side effects. We discussed the use of ASA 81 mg daily.   Having a BMI less than or equal to 25.  Continue losartan 50  mg   \par \par  Endocrinology  -  Obesity   Patient was educated about the importance of diet and exercise.   We discussed  a goal of a BMI near 25.   \par \par Orthopedic -- spinal stenosis- DJD lumbar spine -degenerative joint disease bilateral knees status post total knee replacement.  Doing better with pain - Continue with   Vicodin  but decrease dispense amount advised to only take for sever pain. \par advised risk of dependancy.   Refilled hydrocodone - Decrease dispense amount will continue to decrease amount pending pain level \par \par Neurology-insomnia - Continue  Ambien. Advise risk alternatives benefits and side effects of medication.   Advise patient risk of taking sleep medication secondary to have an obstructive sleep apnea.  \par Advised to read pack insert - Advised not to use alcohol  Refilled Ambien 10 mg \par \par We discussed the importance of healthy lifestyles which include exercise, weight control and good diet.\par Patient's questions were answered in full detail. Advise patient if any other concerns arise to please call office to have a discussion. \par \par Patient  education  - COVID-19   Counseling and education provided to the patient.  Advised sign and symptoms of the virus.  Advised contact precautions.  Educated patient on proper hand washing and to participate in social distancing. Had covid vax x 2 \par \par

## 2021-07-12 NOTE — HEALTH RISK ASSESSMENT
[Yes] : Yes [4 or more  times a week (4 pts)] : 4 or more  times a week (4 points) [1 or 2 (0 pts)] : 1 or 2 (0 points) [Never (0 pts)] : Never (0 points) [No] : In the past 12 months have you used drugs other than those required for medical reasons? No [No falls in past year] : Patient reported no falls in the past year [0] : 1) Little interest or pleasure doing things: Not at all (0) [1] : 2) Feeling down, depressed, or hopeless for several days (1) [] : No [de-identified] : quit [YearQuit] : 2000 [Audit-CScore] : 4 [de-identified] : walking at work [de-identified] : regular [[id number]] : 1

## 2021-07-12 NOTE — REVIEW OF SYSTEMS
[Joint Pain] : joint pain [Muscle Pain] : muscle pain [Back Pain] : back pain [Insomnia] : insomnia [Anxiety] : anxiety [Negative] : Heme/Lymph [Fever] : no fever [Chills] : no chills [Fatigue] : no fatigue [Recent Change In Weight] : ~T no recent weight change [Nosebleed] : no nosebleeds [Nasal Discharge] : no nasal discharge [Sore Throat] : no sore throat [Postnasal Drip] : no postnasal drip [Chest Pain] : no chest pain [Palpitations] : no palpitations [Shortness Of Breath] : no shortness of breath [Cough] : no cough [Abdominal Pain] : no abdominal pain [Dysuria] : no dysuria [Incontinence] : no incontinence [Joint Stiffness] : no joint stiffness [Itching] : no itching [Mole Changes] : no mole changes [Skin Rash] : no skin rash [Headache] : no headache [Dizziness] : no dizziness [Memory Loss] : no memory loss [de-identified] : stressed

## 2021-07-15 DIAGNOSIS — N60.09 SOLITARY CYST OF UNSPECIFIED BREAST: ICD-10-CM

## 2021-07-30 ENCOUNTER — NON-APPOINTMENT (OUTPATIENT)
Age: 66
End: 2021-07-30

## 2021-08-13 ENCOUNTER — APPOINTMENT (OUTPATIENT)
Dept: INTERNAL MEDICINE | Facility: CLINIC | Age: 66
End: 2021-08-13

## 2021-08-23 NOTE — ED ADULT NURSE NOTE - PAIN: PRECIPITATING/AGGRAVATING FACTORS
movement Cheek Interpolation Flap Text: A decision was made to reconstruct the defect utilizing an interpolation axial flap and a staged reconstruction.  A telfa template was made of the defect.  This telfa template was then used to outline the Cheek Interpolation flap.  The donor area for the pedicle flap was then injected with anesthesia.  The flap was excised through the skin and subcutaneous tissue down to the layer of the underlying musculature.  The interpolation flap was carefully excised within this deep plane to maintain its blood supply.  The edges of the donor site were undermined.   The donor site was closed in a primary fashion.  The pedicle was then rotated into position and sutured.  Once the tube was sutured into place, adequate blood supply was confirmed with blanching and refill.  The pedicle was then wrapped with xeroform gauze and dressed appropriately with a telfa and gauze bandage to ensure continued blood supply and protect the attached pedicle.

## 2021-09-03 ENCOUNTER — APPOINTMENT (OUTPATIENT)
Dept: INTERNAL MEDICINE | Facility: CLINIC | Age: 66
End: 2021-09-03
Payer: MEDICARE

## 2021-09-03 VITALS
HEIGHT: 65 IN | DIASTOLIC BLOOD PRESSURE: 80 MMHG | WEIGHT: 231.6 LBS | RESPIRATION RATE: 16 BRPM | BODY MASS INDEX: 38.59 KG/M2 | SYSTOLIC BLOOD PRESSURE: 150 MMHG | TEMPERATURE: 97.6 F | HEART RATE: 89 BPM | OXYGEN SATURATION: 98 %

## 2021-09-03 LAB
INR PPP: 2.9 RATIO
POCT-PROTHROMBIN TIME: 34.5 SECS

## 2021-09-03 PROCEDURE — 99213 OFFICE O/P EST LOW 20 MIN: CPT | Mod: 25

## 2021-09-03 PROCEDURE — 85610 PROTHROMBIN TIME: CPT | Mod: QW

## 2021-09-03 PROCEDURE — 36416 COLLJ CAPILLARY BLOOD SPEC: CPT

## 2021-09-03 NOTE — PLAN
none
[FreeTextEntry1] : Heme -Anticardiolipin Ab-  INR  2.9   - Continue with current dose.  Warfarin 2.5 mg daily except Mondays and Wednesday 5 mg. \par - check INR  2-week. monitor for abnormal bleeding.\par \par Cardio - CAD and hyperlipidemia - Continue with current medication.   Zetia daily as directed \par Follow up with Cardio \par Check INR in 2 weeks \par \par Cardio - Hypertension -  Patient was educated about hypertension and the importance of controlling the pressure through lifestyle modification which include low sodium  diet and  aerobic  exercise.  Also discussed the use of prescription medication which included their benefits and their side effects. We discussed the use of ASA 81 mg daily.   Having a BMI less than or equal to 25.  Continue losartan 50  mg   Check bp in 3 weeks still elevated will increase losartan \par \par  Endocrinology  -  Obesity   Patient was educated about the importance of diet and exercise.   We discussed  a goal of a BMI near 25.   \par \par Orthopedic -- spinal stenosis- DJD lumbar spine -degenerative joint disease bilateral knees status post total knee replacement.  Doing better with pain - Continue with   Vicodin  but decrease dispense amount advised to only take for sever pain. \par advised risk of dependancy.   Continue hydrocodone -will decrease sig next OV \par \par Neurology-insomnia - Continue  Ambien. Advise risk alternatives benefits and side effects of medication.   Advise patient risk of taking sleep medication secondary to have an obstructive sleep apnea.  \par Advised to read pack insert - Advised not to use alcohol  \par \par We discussed the importance of healthy lifestyles which include exercise, weight control and good diet.\par Patient's questions were answered in full detail. Advise patient if any other concerns arise to please call office to have a discussion. \par \par Patient  education  - COVID-19   Counseling and education provided to the patient.  Advised sign and symptoms of the virus.  Advised contact precautions.  Educated patient on proper hand washing and to participate in social distancing. Had covid vax x 2 \par \par

## 2021-09-03 NOTE — REVIEW OF SYSTEMS
[Joint Pain] : joint pain [Muscle Pain] : muscle pain [Back Pain] : back pain [Insomnia] : insomnia [Anxiety] : anxiety [Negative] : Heme/Lymph [Fever] : no fever [Chills] : no chills [Fatigue] : no fatigue [Recent Change In Weight] : ~T no recent weight change [Nosebleed] : no nosebleeds [Nasal Discharge] : no nasal discharge [Sore Throat] : no sore throat [Postnasal Drip] : no postnasal drip [Chest Pain] : no chest pain [Palpitations] : no palpitations [Shortness Of Breath] : no shortness of breath [Cough] : no cough [Abdominal Pain] : no abdominal pain [Dysuria] : no dysuria [Incontinence] : no incontinence [Joint Stiffness] : no joint stiffness [Itching] : no itching [Mole Changes] : no mole changes [Skin Rash] : no skin rash [Headache] : no headache [Dizziness] : no dizziness [Memory Loss] : no memory loss [FreeTextEntry9] : knee pain   [de-identified] : stressed

## 2021-09-03 NOTE — HEALTH RISK ASSESSMENT
[Yes] : Yes [4 or more  times a week (4 pts)] : 4 or more  times a week (4 points) [1 or 2 (0 pts)] : 1 or 2 (0 points) [Never (0 pts)] : Never (0 points) [No] : In the past 12 months have you used drugs other than those required for medical reasons? No [No falls in past year] : Patient reported no falls in the past year [0] : 1) Little interest or pleasure doing things: Not at all (0) [1] : 2) Feeling down, depressed, or hopeless for several days (1) [] : No [de-identified] : quit [YearQuit] : 2000 [Audit-CScore] : 4 [de-identified] : walking at work [de-identified] : regular [[id number]] : 1

## 2021-09-03 NOTE — PHYSICAL EXAM
[No Acute Distress] : no acute distress [Well Nourished] : well nourished [Well Developed] : well developed [Well-Appearing] : well-appearing [Normal Sclera/Conjunctiva] : normal sclera/conjunctiva [PERRL] : pupils equal round and reactive to light [EOMI] : extraocular movements intact [Normal Outer Ear/Nose] : the outer ears and nose were normal in appearance [Normal Oropharynx] : the oropharynx was normal [Normal TMs] : both tympanic membranes were normal [Normal Nasal Mucosa] : the nasal mucosa was normal [No JVD] : no jugular venous distention [Supple] : supple [No Lymphadenopathy] : no lymphadenopathy [Thyroid Normal, No Nodules] : the thyroid was normal and there were no nodules present [No Respiratory Distress] : no respiratory distress  [Clear to Auscultation] : lungs were clear to auscultation bilaterally [No Accessory Muscle Use] : no accessory muscle use [Normal Rate] : normal rate  [Regular Rhythm] : with a regular rhythm [Normal S1, S2] : normal S1 and S2 [No Carotid Bruits] : no carotid bruits [No Abdominal Bruit] : a ~M bruit was not heard ~T in the abdomen [No Varicosities] : no varicosities [Pedal Pulses Present] : the pedal pulses are present [No Edema] : there was no peripheral edema [No Extremity Clubbing/Cyanosis] : no extremity clubbing/cyanosis [No Palpable Aorta] : no palpable aorta [Obese] : obese [Normal Posterior Cervical Nodes] : no posterior cervical lymphadenopathy [Normal Anterior Cervical Nodes] : no anterior cervical lymphadenopathy [No CVA Tenderness] : no CVA  tenderness [No Rash] : no rash [Normal Gait] : normal gait [Coordination Grossly Intact] : coordination grossly intact [No Focal Deficits] : no focal deficits [Deep Tendon Reflexes (DTR)] : deep tendon reflexes were 2+ and symmetric [Speech Grossly Normal] : speech grossly normal [Normal Affect] : the affect was normal [Alert and Oriented x3] : oriented to person, place, and time [Normal Mood] : the mood was normal [Normal Insight/Judgement] : insight and judgment were intact [de-identified] : obese  [de-identified] : with murmur  [de-identified] : no bruising noted

## 2021-09-03 NOTE — COUNSELING
[AUDIT-C Screening administered and reviewed] : AUDIT-C Screening administered and reviewed [Potential consequences of obesity discussed] : Potential consequences of obesity discussed [Encouraged to maintain food diary] : Encouraged to maintain food diary [Encouraged to increase physical activity] : Encouraged to increase physical activity [Good understanding] : Patient has a good understanding of disease, goals and obesity follow-up plan [Weight management counseling provided] : Weight management [Healthy eating counseling provided] : healthy eating [Fall prevention counseling provided] : fall prevention  [Needs reinforcement, provided] : Patient needs reinforcement on understanding of disease, goals and obesity follow-up plan; reinforcement was provided [Low Fat Diet] : Low fat diet [Low Salt Diet] : Low salt diet [Decrease Portions] : Decrease food portions [Patient motivation] : Patient motivation [None] : None [FreeTextEntry2] : 1600 nicole diet  increase exercise  [de-identified] : 1500 nicole diet \par Advised to walk daily for a goal of 10,000 steps \par Advised PT for B/l Knee replacemment

## 2021-09-03 NOTE — HISTORY OF PRESENT ILLNESS
[FreeTextEntry1] : INR check and medication renewal.  [de-identified] : Mrs. CARDOZA is a 66 year  female, who present to the office for medication renewal for warfarin  and INR check and renewal of medication. \par Denies fever or chills   \par Denies having abnormal bleeding -Continue to take warfarin as directed daily. \par \par \par

## 2021-09-14 ENCOUNTER — APPOINTMENT (OUTPATIENT)
Dept: INTERNAL MEDICINE | Facility: CLINIC | Age: 66
End: 2021-09-14
Payer: MEDICARE

## 2021-09-14 VITALS
RESPIRATION RATE: 16 BRPM | TEMPERATURE: 98.2 F | HEART RATE: 75 BPM | HEIGHT: 65 IN | SYSTOLIC BLOOD PRESSURE: 132 MMHG | DIASTOLIC BLOOD PRESSURE: 86 MMHG | OXYGEN SATURATION: 98 % | WEIGHT: 231 LBS | BODY MASS INDEX: 38.49 KG/M2

## 2021-09-14 DIAGNOSIS — J02.0 STREPTOCOCCAL PHARYNGITIS: ICD-10-CM

## 2021-09-14 DIAGNOSIS — J02.9 ACUTE PHARYNGITIS, UNSPECIFIED: ICD-10-CM

## 2021-09-14 LAB
INR PPP: 2.5 RATIO
S PYO AG SPEC QL IA: POSITIVE

## 2021-09-14 PROCEDURE — 99214 OFFICE O/P EST MOD 30 MIN: CPT | Mod: 25

## 2021-09-14 PROCEDURE — 85610 PROTHROMBIN TIME: CPT | Mod: QW

## 2021-09-14 PROCEDURE — 87880 STREP A ASSAY W/OPTIC: CPT | Mod: QW

## 2021-09-14 PROCEDURE — 36416 COLLJ CAPILLARY BLOOD SPEC: CPT

## 2021-09-14 NOTE — COUNSELING
[AUDIT-C Screening administered and reviewed] : AUDIT-C Screening administered and reviewed [Potential consequences of obesity discussed] : Potential consequences of obesity discussed [Encouraged to maintain food diary] : Encouraged to maintain food diary [Encouraged to increase physical activity] : Encouraged to increase physical activity [Good understanding] : Patient has a good understanding of disease, goals and obesity follow-up plan [Weight management counseling provided] : Weight management [Healthy eating counseling provided] : healthy eating [Fall prevention counseling provided] : fall prevention  [Needs reinforcement, provided] : Patient needs reinforcement on understanding of disease, goals and obesity follow-up plan; reinforcement was provided [Low Fat Diet] : Low fat diet [Low Salt Diet] : Low salt diet [Decrease Portions] : Decrease food portions [Patient motivation] : Patient motivation [None] : None [FreeTextEntry2] : 1600 nicole diet  increase exercise  [de-identified] : 1500 nicole diet \par Advised to walk daily for a goal of 10,000 steps \par Advised PT for B/l Knee replacemment

## 2021-09-14 NOTE — PHYSICAL EXAM
[No Acute Distress] : no acute distress [Well Nourished] : well nourished [Well Developed] : well developed [Well-Appearing] : well-appearing [Normal Sclera/Conjunctiva] : normal sclera/conjunctiva [PERRL] : pupils equal round and reactive to light [EOMI] : extraocular movements intact [Normal Outer Ear/Nose] : the outer ears and nose were normal in appearance [Normal TMs] : both tympanic membranes were normal [Normal Nasal Mucosa] : the nasal mucosa was normal [No JVD] : no jugular venous distention [Supple] : supple [No Lymphadenopathy] : no lymphadenopathy [Thyroid Normal, No Nodules] : the thyroid was normal and there were no nodules present [No Respiratory Distress] : no respiratory distress  [Clear to Auscultation] : lungs were clear to auscultation bilaterally [No Accessory Muscle Use] : no accessory muscle use [Normal Rate] : normal rate  [Regular Rhythm] : with a regular rhythm [Normal S1, S2] : normal S1 and S2 [No Carotid Bruits] : no carotid bruits [No Abdominal Bruit] : a ~M bruit was not heard ~T in the abdomen [No Varicosities] : no varicosities [Pedal Pulses Present] : the pedal pulses are present [No Edema] : there was no peripheral edema [No Extremity Clubbing/Cyanosis] : no extremity clubbing/cyanosis [No Palpable Aorta] : no palpable aorta [Obese] : obese [No CVA Tenderness] : no CVA  tenderness [No Rash] : no rash [Normal Gait] : normal gait [Coordination Grossly Intact] : coordination grossly intact [No Focal Deficits] : no focal deficits [Deep Tendon Reflexes (DTR)] : deep tendon reflexes were 2+ and symmetric [Speech Grossly Normal] : speech grossly normal [Normal Affect] : the affect was normal [Alert and Oriented x3] : oriented to person, place, and time [Normal Mood] : the mood was normal [Normal Insight/Judgement] : insight and judgment were intact [de-identified] : obese  [de-identified] : mild erythema - positive post nasal drip  [de-identified] : positive cough  [de-identified] : with murmur  [de-identified] : positive left tonsillar  node  [de-identified] : no bruising noted

## 2021-09-14 NOTE — HEALTH RISK ASSESSMENT
[Yes] : Yes [4 or more  times a week (4 pts)] : 4 or more  times a week (4 points) [1 or 2 (0 pts)] : 1 or 2 (0 points) [Never (0 pts)] : Never (0 points) [No] : In the past 12 months have you used drugs other than those required for medical reasons? No [No falls in past year] : Patient reported no falls in the past year [0] : 1) Little interest or pleasure doing things: Not at all (0) [1] : 2) Feeling down, depressed, or hopeless for several days (1) [] : No [de-identified] : quit [YearQuit] : 2000 [Audit-CScore] : 4 [de-identified] : walking at work [de-identified] : regular [[id number]] : 1

## 2021-09-14 NOTE — PLAN
[FreeTextEntry1] : ENT -strep Pharyngitis - Check rapid throat culture which was positive .  Advised to increase fluids, Tylenol or Motrin for  fever and pain.  Start zpack- advised the need to monitor INR levels.  Also can use throat lozenges as needed. \par \par Pulm - cough -  Hycodan prn cough at night only and advised not to take Ambien or pain medication while on the cough medication.\par \par Heme -Anticardiolipin Ab-  INR  2.5  - Continue with current dose.  Warfarin 2.5 mg daily except Mondays and Wednesday 5 mg. \par - check INR  3-5 days.  -  Monitor for abnormal bleeding.\par \par Cardio - CAD and hyperlipidemia - Continue with current medication.   Zetia daily as directed \par Follow up with Cardio \par \par \par Cardio - Hypertension -  Patient was educated about hypertension and the importance of controlling the pressure through lifestyle modification which include low sodium  diet and  aerobic  exercise.  Also discussed the use of prescription medication which included their benefits and their side effects. We discussed the use of ASA 81 mg daily.   Having a BMI less than or equal to 25.  Continue losartan 50  mg   Check bp in 3 weeks still elevated will increase losartan.  Refilled losartan \par \par  Endocrinology  -  Obesity   Patient was educated about the importance of diet and exercise.   We discussed  a goal of a BMI near 25.   \par \par Orthopedic -- spinal stenosis- DJD lumbar spine -degenerative joint disease bilateral knees status post total knee replacement.  Doing better with pain - Continue with   Vicodin  but decrease dispense amount advised to only take for sever pain. \par advised risk of dependancy.   Continue hydrocodone -will decrease sig next OV \par \par Neurology-insomnia - Continue  Ambien. Advise risk alternatives benefits and side effects of medication.   Advise patient risk of taking sleep medication secondary to have an obstructive sleep apnea.  \par Advised to read pack insert - Advised not to use alcohol  \par REviewed   refilled medication \par We discussed the importance of healthy lifestyles which include exercise, weight control and good diet.\par Patient's questions were answered in full detail. Advise patient if any other concerns arise to please call office to have a discussion. \par \par Patient  education  - COVID-19   Counseling and education provided to the patient.  Advised sign and symptoms of the virus.  Advised contact precautions.  Educated patient on proper hand washing and to participate in social distancing. Had covid vax x 2 \par \par

## 2021-09-14 NOTE — REVIEW OF SYSTEMS
[Sore Throat] : sore throat [Postnasal Drip] : postnasal drip [Cough] : cough [Joint Pain] : joint pain [Muscle Pain] : muscle pain [Back Pain] : back pain [Insomnia] : insomnia [Anxiety] : anxiety [Negative] : Heme/Lymph [Chills] : no chills [Fever] : no fever [Fatigue] : no fatigue [Recent Change In Weight] : ~T no recent weight change [Nosebleed] : no nosebleeds [Nasal Discharge] : no nasal discharge [Chest Pain] : no chest pain [Palpitations] : no palpitations [Shortness Of Breath] : no shortness of breath [Abdominal Pain] : no abdominal pain [Dysuria] : no dysuria [Incontinence] : no incontinence [Joint Stiffness] : no joint stiffness [Itching] : no itching [Mole Changes] : no mole changes [Skin Rash] : no skin rash [Headache] : no headache [Dizziness] : no dizziness [Memory Loss] : no memory loss [FreeTextEntry9] : knee pain   [de-identified] : stressed

## 2021-10-07 ENCOUNTER — RX RENEWAL (OUTPATIENT)
Age: 66
End: 2021-10-07

## 2021-10-22 ENCOUNTER — APPOINTMENT (OUTPATIENT)
Dept: INTERNAL MEDICINE | Facility: CLINIC | Age: 66
End: 2021-10-22
Payer: MEDICARE

## 2021-10-22 ENCOUNTER — MED ADMIN CHARGE (OUTPATIENT)
Age: 66
End: 2021-10-22

## 2021-10-22 VITALS
RESPIRATION RATE: 16 BRPM | HEIGHT: 65 IN | OXYGEN SATURATION: 97 % | BODY MASS INDEX: 38.65 KG/M2 | WEIGHT: 232 LBS | TEMPERATURE: 97.1 F | HEART RATE: 78 BPM | DIASTOLIC BLOOD PRESSURE: 80 MMHG | SYSTOLIC BLOOD PRESSURE: 142 MMHG

## 2021-10-22 LAB
INR PPP: 3.4 RATIO
POCT-PROTHROMBIN TIME: 40.4 SECS

## 2021-10-22 PROCEDURE — 99214 OFFICE O/P EST MOD 30 MIN: CPT | Mod: 25

## 2021-10-22 PROCEDURE — 85610 PROTHROMBIN TIME: CPT | Mod: QW

## 2021-10-22 PROCEDURE — 90662 IIV NO PRSV INCREASED AG IM: CPT

## 2021-10-22 PROCEDURE — G0008: CPT

## 2021-10-22 PROCEDURE — 36416 COLLJ CAPILLARY BLOOD SPEC: CPT

## 2021-10-22 RX ORDER — AZITHROMYCIN 250 MG/1
250 TABLET, FILM COATED ORAL
Qty: 1 | Refills: 0 | Status: DISCONTINUED | COMMUNITY
Start: 2021-09-13 | End: 2021-10-22

## 2021-10-22 NOTE — COUNSELING
[AUDIT-C Screening administered and reviewed] : AUDIT-C Screening administered and reviewed [Potential consequences of obesity discussed] : Potential consequences of obesity discussed [Encouraged to maintain food diary] : Encouraged to maintain food diary [Encouraged to increase physical activity] : Encouraged to increase physical activity [Good understanding] : Patient has a good understanding of disease, goals and obesity follow-up plan [Weight management counseling provided] : Weight management [Healthy eating counseling provided] : healthy eating [Fall prevention counseling provided] : fall prevention  [Needs reinforcement, provided] : Patient needs reinforcement on understanding of disease, goals and obesity follow-up plan; reinforcement was provided [Low Fat Diet] : Low fat diet [Low Salt Diet] : Low salt diet [Decrease Portions] : Decrease food portions [Patient motivation] : Patient motivation [None] : None [FreeTextEntry2] : 1600 nicole diet  increase exercise  [de-identified] : 1500 nicole diet \par Advised to walk daily for a goal of 10,000 steps \par Advised PT for B/l Knee replacemment

## 2021-10-22 NOTE — PLAN
[FreeTextEntry1] : ENT -strep Pharyngitis -resolved \par \par Pulm - cough -  resolved \par \par Heme -Anticardiolipin Ab-  INR  3.4  - Hold warfarin x 2 days  - Continue with current dose.  Warfarin 2.5 mg daily except Mondays and Wednesday 5 mg. \par - check INR  3-5 days.  -  Monitor for abnormal bleeding.\par \par Cardio - CAD and hyperlipidemia - Continue with current medication.   Zetia daily as directed \par Follow up with Cardio \par \par Cardio - Hypertension -  Patient was educated about hypertension and the importance of controlling the pressure through lifestyle modification which include low sodium  diet and  aerobic  exercise.  Also discussed the use of prescription medication which included their benefits and their side effects. We discussed the use of ASA 81 mg daily.   Having a BMI less than or equal to 25.  Continue losartan 50  mg   Check bp in 3 weeks still elevated will increase losartan.  Refilled losartan \par \par  Endocrinology  -  Obesity   Patient was educated about the importance of diet and exercise.   We discussed  a goal of a BMI near 25.   \par \par Orthopedic -- spinal stenosis- DJD lumbar spine -degenerative joint disease bilateral knees status post total knee replacement.  Doing better with pain - Continue with   Vicodin  but decrease dispense amount advised to only take for sever pain. \par advised risk of dependancy.   Continue hydrocodone -decrease dispensed amount \par \par Neurology-insomnia - Continue  Ambien. Advise risk alternatives benefits and side effects of medication.   Advise patient risk of taking sleep medication secondary to have an obstructive sleep apnea.  \par Advised to read pack insert - Advised not to use alcohol  \par Reviewed   refilled medication \par We discussed the importance of healthy lifestyles which include exercise, weight control and good diet.\par Patient's questions were answered in full detail. Advise patient if any other concerns arise to please call office to have a discussion. \par \par Patient  education  - COVID-19   Counseling and education provided to the patient.  Advised sign and symptoms of the virus.  Advised contact precautions.  Educated patient on proper hand washing and to participate in social distancing. Had covid vax x 2 - advised booster \par \par  Immunization-  Influenza vaccination  discussed,  Education given, Consent formed signed-  Flu vax was administered. \par \par Dental filled out form advised to RTO 7 days prito to the dental procedure for an INR check. Advised risk of abnormal bleeding and clots

## 2021-10-22 NOTE — REVIEW OF SYSTEMS
[Joint Pain] : joint pain [Back Pain] : back pain [Insomnia] : insomnia [Anxiety] : anxiety [Negative] : Heme/Lymph [Fever] : no fever [Chills] : no chills [Fatigue] : no fatigue [Recent Change In Weight] : ~T no recent weight change [Nosebleed] : no nosebleeds [Nasal Discharge] : no nasal discharge [Sore Throat] : no sore throat [Postnasal Drip] : no postnasal drip [Chest Pain] : no chest pain [Palpitations] : no palpitations [Shortness Of Breath] : no shortness of breath [Cough] : no cough [Abdominal Pain] : no abdominal pain [Dysuria] : no dysuria [Incontinence] : no incontinence [Joint Stiffness] : no joint stiffness [Muscle Pain] : no muscle pain [Itching] : no itching [Mole Changes] : no mole changes [Skin Rash] : no skin rash [Headache] : no headache [Dizziness] : no dizziness [Memory Loss] : no memory loss [FreeTextEntry9] : knee pain   [de-identified] : stressed

## 2021-10-22 NOTE — HISTORY OF PRESENT ILLNESS
[FreeTextEntry1] : INR check and medication renewal  [de-identified] : Mrs. CARDOZA is a 66 year  female, who present to the office for medication renewal, INR check , Flu shot and discussion on dental procedure.\par General feels well.  Denies any new medical  issues since her last office visit.  Needs a dental form to be filled out.  States she needs dental implants.  \par Denies any abnormal bleeding.  - Denies chest pain, SOB, MIRELES.\par Taking Ambien for insomnia which works.  Denies any side effects or day time drowsiness \par Been cutting down on Vicodin  pain is still management \par Since last visit cough and sore throat resolved.  Finished abx without any side effects

## 2021-10-22 NOTE — PHYSICAL EXAM
[No Acute Distress] : no acute distress [Well Nourished] : well nourished [Well Developed] : well developed [Well-Appearing] : well-appearing [Normal Sclera/Conjunctiva] : normal sclera/conjunctiva [PERRL] : pupils equal round and reactive to light [EOMI] : extraocular movements intact [Normal Outer Ear/Nose] : the outer ears and nose were normal in appearance [Normal TMs] : both tympanic membranes were normal [Normal Nasal Mucosa] : the nasal mucosa was normal [No JVD] : no jugular venous distention [Supple] : supple [No Lymphadenopathy] : no lymphadenopathy [Thyroid Normal, No Nodules] : the thyroid was normal and there were no nodules present [No Respiratory Distress] : no respiratory distress  [Clear to Auscultation] : lungs were clear to auscultation bilaterally [No Accessory Muscle Use] : no accessory muscle use [Normal Rate] : normal rate  [Regular Rhythm] : with a regular rhythm [Normal S1, S2] : normal S1 and S2 [No Carotid Bruits] : no carotid bruits [No Abdominal Bruit] : a ~M bruit was not heard ~T in the abdomen [Pedal Pulses Present] : the pedal pulses are present [No Edema] : there was no peripheral edema [No Extremity Clubbing/Cyanosis] : no extremity clubbing/cyanosis [No Palpable Aorta] : no palpable aorta [Obese] : obese [No CVA Tenderness] : no CVA  tenderness [No Rash] : no rash [Normal Gait] : normal gait [Coordination Grossly Intact] : coordination grossly intact [No Focal Deficits] : no focal deficits [Deep Tendon Reflexes (DTR)] : deep tendon reflexes were 2+ and symmetric [Speech Grossly Normal] : speech grossly normal [Normal Affect] : the affect was normal [Alert and Oriented x3] : oriented to person, place, and time [Normal Mood] : the mood was normal [Normal Insight/Judgement] : insight and judgment were intact [Normal Oropharynx] : the oropharynx was normal [de-identified] : obese  [de-identified] : with murmur  [de-identified] : positive left tonsillar  node  [de-identified] : no bruising noted

## 2021-10-22 NOTE — HEALTH RISK ASSESSMENT
[Yes] : Yes [4 or more  times a week (4 pts)] : 4 or more  times a week (4 points) [1 or 2 (0 pts)] : 1 or 2 (0 points) [Never (0 pts)] : Never (0 points) [No] : In the past 12 months have you used drugs other than those required for medical reasons? No [No falls in past year] : Patient reported no falls in the past year [0] : 1) Little interest or pleasure doing things: Not at all (0) [1] : 2) Feeling down, depressed, or hopeless for several days (1) [] : No [de-identified] : quit [YearQuit] : 2000 [Audit-CScore] : 4 [de-identified] : walking at work [de-identified] : regular [[id number]] : 1

## 2021-11-19 ENCOUNTER — APPOINTMENT (OUTPATIENT)
Dept: INTERNAL MEDICINE | Facility: CLINIC | Age: 66
End: 2021-11-19
Payer: MEDICARE

## 2021-11-19 VITALS
DIASTOLIC BLOOD PRESSURE: 80 MMHG | WEIGHT: 232.13 LBS | SYSTOLIC BLOOD PRESSURE: 140 MMHG | TEMPERATURE: 97.3 F | HEIGHT: 65 IN | BODY MASS INDEX: 38.67 KG/M2 | OXYGEN SATURATION: 98 % | HEART RATE: 78 BPM | RESPIRATION RATE: 16 BRPM

## 2021-11-19 LAB
INR PPP: 3.3 RATIO
POCT-PROTHROMBIN TIME: 40.1 SECS

## 2021-11-19 PROCEDURE — 36416 COLLJ CAPILLARY BLOOD SPEC: CPT

## 2021-11-19 PROCEDURE — 85610 PROTHROMBIN TIME: CPT | Mod: QW

## 2021-11-19 PROCEDURE — 99214 OFFICE O/P EST MOD 30 MIN: CPT | Mod: 25

## 2021-11-23 NOTE — HEALTH RISK ASSESSMENT
[Yes] : Yes [4 or more  times a week (4 pts)] : 4 or more  times a week (4 points) [1 or 2 (0 pts)] : 1 or 2 (0 points) [Never (0 pts)] : Never (0 points) [No] : In the past 12 months have you used drugs other than those required for medical reasons? No [No falls in past year] : Patient reported no falls in the past year [0] : 1) Little interest or pleasure doing things: Not at all (0) [1] : 2) Feeling down, depressed, or hopeless for several days (1) [] : No [de-identified] : quit [YearQuit] : 2000 [Audit-CScore] : 4 [de-identified] : walking at work [de-identified] : regular [[id number]] : 1

## 2021-11-23 NOTE — COUNSELING
[AUDIT-C Screening administered and reviewed] : AUDIT-C Screening administered and reviewed [Potential consequences of obesity discussed] : Potential consequences of obesity discussed [Encouraged to maintain food diary] : Encouraged to maintain food diary [Encouraged to increase physical activity] : Encouraged to increase physical activity [Good understanding] : Patient has a good understanding of disease, goals and obesity follow-up plan [Weight management counseling provided] : Weight management [Healthy eating counseling provided] : healthy eating [Fall prevention counseling provided] : fall prevention  [Needs reinforcement, provided] : Patient needs reinforcement on understanding of disease, goals and obesity follow-up plan; reinforcement was provided [Low Fat Diet] : Low fat diet [Low Salt Diet] : Low salt diet [Decrease Portions] : Decrease food portions [Patient motivation] : Patient motivation [None] : None [FreeTextEntry2] : 1600 nicole diet  increase exercise  [de-identified] : 1500 nicole diet \par Advised to walk daily for a goal of 10,000 steps \par Advised PT for B/l Knee replacemment

## 2021-11-23 NOTE — HISTORY OF PRESENT ILLNESS
[FreeTextEntry1] : INR check and medication renewal  [de-identified] : Mrs. CARDOZA is a 66 year  female, who present to the office for medication renewal, INR check.\par General feels well.  Denies any new medical  issues since her last office visit.  \par Denies any abnormal bleeding.  - Denies chest pain, SOB, MIRELES.\par Taking Ambien for insomnia which works.  Denies any side effects or day time drowsiness \par Been cutting down on Vicodin . Still with chronic back pain and b/l knee pain   -  So far pain   is persistent  but able to complete ADL at current sig. without difficultly \par

## 2021-11-23 NOTE — PLAN
[FreeTextEntry1] : Heme -Anticardiolipin Ab-  INR  3.3  - Hold warfarin x 2 days  - Continue with current dose.  Warfarin 2.5 mg daily except Mondays and Wednesday 5 mg.  If remains elevated next inr check will go to warfarin 2.5 mg daily  \par - check INR 7 days .  -  Monitor for abnormal bleeding.\par \par Cardio - CAD and hyperlipidemia - Continue with current medication.   Zetia daily as directed \par Follow up with Cardio \par \par Cardio - Hypertension -  Patient was educated about hypertension and the importance of controlling the pressure through lifestyle modification which include low sodium  diet and  aerobic  exercise.  Also discussed the use of prescription medication which included their benefits and their side effects. We discussed the use of ASA 81 mg daily.   Having a BMI less than or equal to 25.  Continue losartan 50  mg   Check bp in 3 weeks still elevated will increase losartan.  Refilled losartan \par \par  Endocrinology  -  Obesity   Patient was educated about the importance of diet and exercise.   We discussed  a goal of a BMI near 25.   \par \par Orthopedic -- spinal stenosis- DJD lumbar spine -degenerative joint disease bilateral knees status post total knee replacement.  Doing better with pain - Continue with   Vicodin  but decrease dispense amount advised to only take for sever pain. \par advised risk of dependancy.   Continue hydrocodone -decrease dispensed amount \par \par Neurology-insomnia - Continue  Ambien. Advise risk alternatives benefits and side effects of medication.   Advise patient risk of taking sleep medication secondary to have an obstructive sleep apnea.  \par Advised to read pack insert - Advised not to use alcohol  \par Reviewed   refilled medication \par \par Patient  education  - COVID-19   Counseling and education provided to the patient.  Advised sign and symptoms of the virus.  Advised contact precautions.  Educated patient on proper hand washing and to participate in social distancing. Had covid vax x 2 -and booster \par \par

## 2021-11-23 NOTE — REVIEW OF SYSTEMS
[Joint Pain] : joint pain [Back Pain] : back pain [Insomnia] : insomnia [Anxiety] : anxiety [Negative] : Heme/Lymph [Fever] : no fever [Chills] : no chills [Fatigue] : no fatigue [Recent Change In Weight] : ~T no recent weight change [Nosebleed] : no nosebleeds [Nasal Discharge] : no nasal discharge [Sore Throat] : no sore throat [Postnasal Drip] : no postnasal drip [Chest Pain] : no chest pain [Palpitations] : no palpitations [Shortness Of Breath] : no shortness of breath [Cough] : no cough [Abdominal Pain] : no abdominal pain [Dysuria] : no dysuria [Incontinence] : no incontinence [Joint Stiffness] : no joint stiffness [Muscle Pain] : no muscle pain [Itching] : no itching [Mole Changes] : no mole changes [Skin Rash] : no skin rash [Headache] : no headache [Dizziness] : no dizziness [Memory Loss] : no memory loss [FreeTextEntry9] : knee pain   [de-identified] : stressed

## 2021-11-23 NOTE — PHYSICAL EXAM
[No Acute Distress] : no acute distress [Well Nourished] : well nourished [Well Developed] : well developed [Well-Appearing] : well-appearing [Normal Sclera/Conjunctiva] : normal sclera/conjunctiva [PERRL] : pupils equal round and reactive to light [EOMI] : extraocular movements intact [Normal Outer Ear/Nose] : the outer ears and nose were normal in appearance [Normal Oropharynx] : the oropharynx was normal [Normal TMs] : both tympanic membranes were normal [Normal Nasal Mucosa] : the nasal mucosa was normal [No JVD] : no jugular venous distention [Supple] : supple [No Lymphadenopathy] : no lymphadenopathy [Thyroid Normal, No Nodules] : the thyroid was normal and there were no nodules present [No Respiratory Distress] : no respiratory distress  [Clear to Auscultation] : lungs were clear to auscultation bilaterally [No Accessory Muscle Use] : no accessory muscle use [Normal Rate] : normal rate  [Regular Rhythm] : with a regular rhythm [Normal S1, S2] : normal S1 and S2 [No Carotid Bruits] : no carotid bruits [No Abdominal Bruit] : a ~M bruit was not heard ~T in the abdomen [Pedal Pulses Present] : the pedal pulses are present [No Edema] : there was no peripheral edema [No Extremity Clubbing/Cyanosis] : no extremity clubbing/cyanosis [No Palpable Aorta] : no palpable aorta [Obese] : obese [No CVA Tenderness] : no CVA  tenderness [No Rash] : no rash [Normal Gait] : normal gait [Coordination Grossly Intact] : coordination grossly intact [No Focal Deficits] : no focal deficits [Deep Tendon Reflexes (DTR)] : deep tendon reflexes were 2+ and symmetric [Speech Grossly Normal] : speech grossly normal [Normal Affect] : the affect was normal [Alert and Oriented x3] : oriented to person, place, and time [Normal Mood] : the mood was normal [Normal Insight/Judgement] : insight and judgment were intact [de-identified] : obese  [de-identified] : with murmur  [de-identified] : positive left tonsillar  node  [de-identified] : no bruising noted

## 2021-12-03 ENCOUNTER — NON-APPOINTMENT (OUTPATIENT)
Age: 66
End: 2021-12-03

## 2021-12-03 ENCOUNTER — APPOINTMENT (OUTPATIENT)
Dept: INTERNAL MEDICINE | Facility: CLINIC | Age: 66
End: 2021-12-03
Payer: MEDICARE

## 2021-12-03 VITALS
HEART RATE: 72 BPM | BODY MASS INDEX: 38.65 KG/M2 | SYSTOLIC BLOOD PRESSURE: 144 MMHG | OXYGEN SATURATION: 97 % | HEIGHT: 65 IN | WEIGHT: 232 LBS | TEMPERATURE: 97.9 F | RESPIRATION RATE: 16 BRPM | DIASTOLIC BLOOD PRESSURE: 92 MMHG

## 2021-12-03 DIAGNOSIS — R03.0 ELEVATED BLOOD-PRESSURE READING, W/OUT DIAGNOSIS OF HYPERTENSION: ICD-10-CM

## 2021-12-03 DIAGNOSIS — R82.90 UNSPECIFIED ABNORMAL FINDINGS IN URINE: ICD-10-CM

## 2021-12-03 DIAGNOSIS — Z12.39 ENCOUNTER FOR OTHER SCREENING FOR MALIGNANT NEOPLASM OF BREAST: ICD-10-CM

## 2021-12-03 DIAGNOSIS — S02.5XXA FRACTURE OF TOOTH (TRAUMATIC), INITIAL ENCOUNTER FOR CLOSED FRACTURE: ICD-10-CM

## 2021-12-03 DIAGNOSIS — M54.50 LOW BACK PAIN, UNSPECIFIED: ICD-10-CM

## 2021-12-03 LAB
INR PPP: 2.3 RATIO
POCT-PROTHROMBIN TIME: 27.7 SECS

## 2021-12-03 PROCEDURE — 36416 COLLJ CAPILLARY BLOOD SPEC: CPT

## 2021-12-03 PROCEDURE — 99214 OFFICE O/P EST MOD 30 MIN: CPT | Mod: 25

## 2021-12-03 PROCEDURE — 93000 ELECTROCARDIOGRAM COMPLETE: CPT | Mod: 59

## 2021-12-03 PROCEDURE — 85610 PROTHROMBIN TIME: CPT | Mod: QW

## 2021-12-03 NOTE — PHYSICAL EXAM
[No Acute Distress] : no acute distress [Well Nourished] : well nourished [Well Developed] : well developed [Well-Appearing] : well-appearing [Normal Sclera/Conjunctiva] : normal sclera/conjunctiva [PERRL] : pupils equal round and reactive to light [EOMI] : extraocular movements intact [Normal Outer Ear/Nose] : the outer ears and nose were normal in appearance [Normal TMs] : both tympanic membranes were normal [No JVD] : no jugular venous distention [No Lymphadenopathy] : no lymphadenopathy [Supple] : supple [Thyroid Normal, No Nodules] : the thyroid was normal and there were no nodules present [No Respiratory Distress] : no respiratory distress  [No Accessory Muscle Use] : no accessory muscle use [Clear to Auscultation] : lungs were clear to auscultation bilaterally [Normal Rate] : normal rate  [Regular Rhythm] : with a regular rhythm [Normal S1, S2] : normal S1 and S2 [No Carotid Bruits] : no carotid bruits [No Abdominal Bruit] : a ~M bruit was not heard ~T in the abdomen [Pedal Pulses Present] : the pedal pulses are present [No Edema] : there was no peripheral edema [No Palpable Aorta] : no palpable aorta [No Extremity Clubbing/Cyanosis] : no extremity clubbing/cyanosis [Soft] : abdomen soft [Non Tender] : non-tender [Non-distended] : non-distended [No Masses] : no abdominal mass palpated [No HSM] : no HSM [Normal Bowel Sounds] : normal bowel sounds [Obese] : obese [Normal Posterior Cervical Nodes] : no posterior cervical lymphadenopathy [Normal Anterior Cervical Nodes] : no anterior cervical lymphadenopathy [No CVA Tenderness] : no CVA  tenderness [No Spinal Tenderness] : no spinal tenderness [No Joint Swelling] : no joint swelling [Grossly Normal Strength/Tone] : grossly normal strength/tone [No Rash] : no rash [Coordination Grossly Intact] : coordination grossly intact [No Focal Deficits] : no focal deficits [Normal Gait] : normal gait [Deep Tendon Reflexes (DTR)] : deep tendon reflexes were 2+ and symmetric [Speech Grossly Normal] : speech grossly normal [Normal Affect] : the affect was normal [Alert and Oriented x3] : oriented to person, place, and time [Normal Mood] : the mood was normal [Normal Insight/Judgement] : insight and judgment were intact [de-identified] : left upper molar broken  [de-identified] : with murmur  [de-identified] : as per gyn

## 2021-12-03 NOTE — ASSESSMENT
[Patient Optimized for Surgery] : Patient optimized for surgery [Modify anticoagulant treatment prior to procedure] : Modify anticoagulant treatment prior to procedure [FreeTextEntry4] : Mrs. CARDOZA is a 66 year  female, who present to the office for medical clearance  for tooth extraction  [FreeTextEntry5] : hold Warfarin starting  12/04/21

## 2021-12-03 NOTE — REVIEW OF SYSTEMS
[Chills] : chills [Insomnia] : insomnia [Negative] : Heme/Lymph [Fever] : no fever [Fatigue] : no fatigue [Earache] : no earache [Nosebleed] : no nosebleeds [Sore Throat] : no sore throat [Chest Pain] : no chest pain [Palpitations] : no palpitations [Lower Ext Edema] : no lower extremity edema [Shortness Of Breath] : no shortness of breath [Cough] : no cough [Abdominal Pain] : no abdominal pain [Nausea] : no nausea [Vomiting] : no vomiting [Heartburn] : no heartburn [Dysuria] : no dysuria [Hematuria] : no hematuria [Joint Pain] : no joint pain [Headache] : no headache [Memory Loss] : no memory loss [Easy Bleeding] : no easy bleeding [Easy Bruising] : no easy bruising [Swollen Glands] : no swollen glands

## 2021-12-03 NOTE — HISTORY OF PRESENT ILLNESS
[(Patient denies any chest pain, claudication, dyspnea on exertion, orthopnea, palpitations or syncope)] : Patient denies any chest pain, claudication, dyspnea on exertion, orthopnea, palpitations or syncope [Coronary Artery Disease] : coronary artery disease [Aortic Stenosis] : no aortic stenosis [Atrial Fibrillation] : no atrial fibrillation [Recent Myocardial Infarction] : no recent myocardial infarction [Implantable Device/Pacemaker] : no implantable device/pacemaker [Asthma] : no asthma [COPD] : no COPD [Sleep Apnea] : no sleep apnea [Smoker] : not a smoker [Family Member] : no family member with adverse anesthesia reaction/sudden death [Self] : no previous adverse anesthesia reaction [Chronic Anticoagulation] : no chronic anticoagulation [Chronic Kidney Disease] : no chronic kidney disease [Diabetes] : no diabetes [FreeTextEntry1] : Removal upper left molars  [FreeTextEntry2] : 12/08/21 [FreeTextEntry3] : Dr. Malone [FreeTextEntry4] : Mrs. CARDOZA is a 66 year  female, who present to the office for medical clearance for teeth extraction.\par General feels well.  Now new compliant\par Denies fever, chills, recent infections  [FreeTextEntry5] : CT scan showed coronary calcification

## 2021-12-03 NOTE — PLAN
[FreeTextEntry1] : Patient history, physical and ancillary testing was reviewed by  Dr. Tejas Liu. \par JACKSON CARDOZA  was advised not to take any NSAIDs, ASA, Vitamin E, omega 3, ginkgo biloba or MVI 7 days prior to the surgery. \par \par Patient was advised to take  losartan 75 mg  the morning of her surgery with a sip of water.\par Hold warfarin starting 12/04/21 \par SBE prophylaxis  as directed one hour rior to dental procedure \par Patient in optimal condition for proposed procedure and anesthesia \par

## 2021-12-17 ENCOUNTER — APPOINTMENT (OUTPATIENT)
Dept: INTERNAL MEDICINE | Facility: CLINIC | Age: 66
End: 2021-12-17
Payer: MEDICARE

## 2021-12-17 VITALS
RESPIRATION RATE: 16 BRPM | TEMPERATURE: 35.8 F | SYSTOLIC BLOOD PRESSURE: 130 MMHG | OXYGEN SATURATION: 98 % | WEIGHT: 234 LBS | HEART RATE: 89 BPM | BODY MASS INDEX: 38.99 KG/M2 | HEIGHT: 65 IN | DIASTOLIC BLOOD PRESSURE: 80 MMHG

## 2021-12-17 PROCEDURE — 85610 PROTHROMBIN TIME: CPT | Mod: QW

## 2021-12-17 PROCEDURE — 99214 OFFICE O/P EST MOD 30 MIN: CPT | Mod: 25

## 2021-12-17 PROCEDURE — 36416 COLLJ CAPILLARY BLOOD SPEC: CPT

## 2021-12-20 ENCOUNTER — RESULT CHARGE (OUTPATIENT)
Age: 66
End: 2021-12-20

## 2021-12-20 LAB
INR PPP: 2.5 RATIO
QUALITY CONTROL: YES

## 2021-12-20 NOTE — REVIEW OF SYSTEMS
[Joint Pain] : joint pain [Back Pain] : back pain [Insomnia] : insomnia [Anxiety] : anxiety [Negative] : Heme/Lymph [Fever] : no fever [Chills] : no chills [Fatigue] : no fatigue [Recent Change In Weight] : ~T no recent weight change [Nosebleed] : no nosebleeds [Nasal Discharge] : no nasal discharge [Sore Throat] : no sore throat [Postnasal Drip] : no postnasal drip [Chest Pain] : no chest pain [Palpitations] : no palpitations [Shortness Of Breath] : no shortness of breath [Cough] : no cough [Abdominal Pain] : no abdominal pain [Dysuria] : no dysuria [Incontinence] : no incontinence [Joint Stiffness] : no joint stiffness [Muscle Pain] : no muscle pain [Itching] : no itching [Mole Changes] : no mole changes [Skin Rash] : no skin rash [Headache] : no headache [Dizziness] : no dizziness [Memory Loss] : no memory loss [FreeTextEntry9] : knee pain   [de-identified] : stressed

## 2021-12-20 NOTE — COUNSELING
[AUDIT-C Screening administered and reviewed] : AUDIT-C Screening administered and reviewed [Potential consequences of obesity discussed] : Potential consequences of obesity discussed [Encouraged to maintain food diary] : Encouraged to maintain food diary [Encouraged to increase physical activity] : Encouraged to increase physical activity [Good understanding] : Patient has a good understanding of disease, goals and obesity follow-up plan [Weight management counseling provided] : Weight management [Healthy eating counseling provided] : healthy eating [Fall prevention counseling provided] : fall prevention  [Needs reinforcement, provided] : Patient needs reinforcement on understanding of disease, goals and obesity follow-up plan; reinforcement was provided [Low Fat Diet] : Low fat diet [Low Salt Diet] : Low salt diet [Decrease Portions] : Decrease food portions [Patient motivation] : Patient motivation [None] : None [FreeTextEntry2] : 1600 nicole diet  increase exercise  [de-identified] : 1500 nicole diet \par Advised to walk daily for a goal of 10,000 steps \par Advised PT for B/l Knee replacemment

## 2021-12-20 NOTE — HEALTH RISK ASSESSMENT
[Yes] : Yes [4 or more  times a week (4 pts)] : 4 or more  times a week (4 points) [1 or 2 (0 pts)] : 1 or 2 (0 points) [Never (0 pts)] : Never (0 points) [No] : In the past 12 months have you used drugs other than those required for medical reasons? No [No falls in past year] : Patient reported no falls in the past year [0] : 1) Little interest or pleasure doing things: Not at all (0) [1] : 2) Feeling down, depressed, or hopeless for several days (1) [de-identified] : quit [YearQuit] : 2000 [Audit-CScore] : 4 [de-identified] : walking at work [de-identified] : regular [[id number]] : 1

## 2021-12-20 NOTE — PLAN
[FreeTextEntry1] : Heme -Anticardiolipin Ab-  INR  2.5    - Continue with current dose.  Warfarin 2.5 mg daily except Mondays and Wednesday 5 mg.  If remains elevated next inr check will go to warfarin 2.5 mg daily  \par - check INR 14 days .  -  Monitor for abnormal bleeding.\par \par Cardio - CAD and hyperlipidemia - Continue with current medication.   Zetia daily as directed \par Follow up with Cardio \par \par Cardio - Hypertension -  Patient was educated about hypertension and the importance of controlling the pressure through lifestyle modification which include low sodium  diet and  aerobic  exercise.  Also discussed the use of prescription medication which included their benefits and their side effects. We discussed the use of ASA 81 mg daily.   Having a BMI less than or equal to 25.  Continue losartan 50  mg   Check bp in 3 weeks still elevated will increase losartan.  Refilled losartan 75 mg daily \par \par  Endocrinology  -  Obesity   Patient was educated about the importance of diet and exercise.   We discussed  a goal of a BMI near 25.   \par \par Orthopedic -- spinal stenosis- DJD lumbar spine -degenerative joint disease bilateral knees status post total knee replacement.  Doing better with pain - Continue with   Vicodin  but decrease dispense amount advised to only take for sever pain. \par advised risk of dependancy.   Continue hydrocodone -decrease dispensed amount \par \par Neurology-insomnia - Continue  Ambien. Advise risk alternatives benefits and side effects of medication.   Advise patient risk of taking sleep medication secondary to have an obstructive sleep apnea.  \par Advised to read pack insert - Advised not to use alcohol  \par Reviewed   refilled medication \par \par Patient  education  - COVID-19   Counseling and education provided to the patient.  Advised sign and symptoms of the virus.  Advised contact precautions.  Educated patient on proper hand washing and to participate in social distancing. Had covid vax x 2 -and booster \par \par Call office next week if going for dental procedure

## 2021-12-20 NOTE — PHYSICAL EXAM
[No Acute Distress] : no acute distress [Well Nourished] : well nourished [Well Developed] : well developed [Well-Appearing] : well-appearing [Normal Sclera/Conjunctiva] : normal sclera/conjunctiva [PERRL] : pupils equal round and reactive to light [EOMI] : extraocular movements intact [Normal Outer Ear/Nose] : the outer ears and nose were normal in appearance [Normal Oropharynx] : the oropharynx was normal [Normal TMs] : both tympanic membranes were normal [Normal Nasal Mucosa] : the nasal mucosa was normal [No JVD] : no jugular venous distention [Supple] : supple [No Lymphadenopathy] : no lymphadenopathy [Thyroid Normal, No Nodules] : the thyroid was normal and there were no nodules present [No Respiratory Distress] : no respiratory distress  [Clear to Auscultation] : lungs were clear to auscultation bilaterally [No Accessory Muscle Use] : no accessory muscle use [Normal Rate] : normal rate  [Regular Rhythm] : with a regular rhythm [Normal S1, S2] : normal S1 and S2 [No Carotid Bruits] : no carotid bruits [No Abdominal Bruit] : a ~M bruit was not heard ~T in the abdomen [Pedal Pulses Present] : the pedal pulses are present [No Edema] : there was no peripheral edema [No Extremity Clubbing/Cyanosis] : no extremity clubbing/cyanosis [No Palpable Aorta] : no palpable aorta [Obese] : obese [No CVA Tenderness] : no CVA  tenderness [No Rash] : no rash [Normal Gait] : normal gait [Coordination Grossly Intact] : coordination grossly intact [No Focal Deficits] : no focal deficits [Deep Tendon Reflexes (DTR)] : deep tendon reflexes were 2+ and symmetric [Speech Grossly Normal] : speech grossly normal [Normal Affect] : the affect was normal [Alert and Oriented x3] : oriented to person, place, and time [Normal Mood] : the mood was normal [Normal Insight/Judgement] : insight and judgment were intact [de-identified] : obese  [de-identified] : with murmur  [de-identified] : positive left tonsillar  node  [de-identified] : no bruising noted

## 2021-12-20 NOTE — HISTORY OF PRESENT ILLNESS
[FreeTextEntry1] : INR check and medication renewal  [de-identified] : Mrs. CARDOZA is a 66 year  female, who present to the office for medication renewal, INR check.\par General feels well.  Denies any new medical  issues since her last office visit.  \par Denies any abnormal bleeding.  - Denies chest pain, SOB, MIRELES.\par Taking Ambien for insomnia which works.  Denies any side effects or day time drowsiness \par Knee and back pain are stable -  No new exacerbation\par Also did not go for dental surgery as discussed - Dentist had to cancel secondary to illness \par \par

## 2022-01-14 ENCOUNTER — APPOINTMENT (OUTPATIENT)
Dept: INTERNAL MEDICINE | Facility: CLINIC | Age: 67
End: 2022-01-14
Payer: MEDICARE

## 2022-01-14 VITALS
SYSTOLIC BLOOD PRESSURE: 130 MMHG | HEIGHT: 65 IN | DIASTOLIC BLOOD PRESSURE: 80 MMHG | HEART RATE: 98 BPM | BODY MASS INDEX: 38.99 KG/M2 | OXYGEN SATURATION: 98 % | RESPIRATION RATE: 16 BRPM | WEIGHT: 234 LBS

## 2022-01-14 LAB
INR PPP: 1.5 RATIO
POCT-PROTHROMBIN TIME: 18.2 SECS

## 2022-01-14 PROCEDURE — 36416 COLLJ CAPILLARY BLOOD SPEC: CPT

## 2022-01-14 PROCEDURE — 99214 OFFICE O/P EST MOD 30 MIN: CPT | Mod: 25

## 2022-01-14 PROCEDURE — 85610 PROTHROMBIN TIME: CPT | Mod: QW

## 2022-01-14 NOTE — COUNSELING
[AUDIT-C Screening administered and reviewed] : AUDIT-C Screening administered and reviewed [Potential consequences of obesity discussed] : Potential consequences of obesity discussed [Encouraged to maintain food diary] : Encouraged to maintain food diary [Encouraged to increase physical activity] : Encouraged to increase physical activity [Good understanding] : Patient has a good understanding of disease, goals and obesity follow-up plan [Weight management counseling provided] : Weight management [Healthy eating counseling provided] : healthy eating [Fall prevention counseling provided] : fall prevention  [Needs reinforcement, provided] : Patient needs reinforcement on understanding of disease, goals and obesity follow-up plan; reinforcement was provided [Low Fat Diet] : Low fat diet [Low Salt Diet] : Low salt diet [Decrease Portions] : Decrease food portions [Patient motivation] : Patient motivation [None] : None [FreeTextEntry2] : 1600 nicole diet  increase exercise  [de-identified] : 1500 nicole diet \par Advised to walk daily for a goal of 10,000 steps \par Advised PT for B/l Knee replacemment

## 2022-01-14 NOTE — HISTORY OF PRESENT ILLNESS
[FreeTextEntry1] : INR check and medication renewal  [de-identified] : Mrs. CARDOZA is a 66 year  female, who present to the office for medication renewal, INR check.\par General feels well.  Denies any new medical  issues since her last office visit.  \par Denies any abnormal bleeding.  - Denies chest pain, SOB, MIRELES.\par Taking Ambien for insomnia which works.  Denies any side effects or day time drowsiness \par Knee and back pain are stable -  No new exacerbation\par HAd dental surgery went well without any complication - Did have post op pain   \par \par

## 2022-01-14 NOTE — PLAN
[FreeTextEntry1] : Heme -Anticardiolipin Ab-  INR  1.5    - Advised take extra 5 mg tonight  than resume   Warfarin 2.5 mg daily except Mondays and Wednesday 5 mg.  Advised low K diet \par - check INR 14 days .  -  Monitor for abnormal bleeding.\par \par Cardio - CAD and hyperlipidemia - Continue with current medication.   Zetia daily as directed \par Follow up with Cardio \par \par Cardio - Hypertension -  Patient was educated about hypertension and the importance of controlling the pressure through lifestyle modification which include low sodium  diet and  aerobic  exercise.  Also discussed the use of prescription medication which included their benefits and their side effects. We discussed the use of ASA 81 mg daily.   Having a BMI less than or equal to 25.  Continue losartan 50  mg   Check bp in 3 weeks still elevated will increase losartan.  Continue losartan 75 mg daily \par \par  Endocrinology  -  Obesity   Patient was educated about the importance of diet and exercise.   We discussed  a goal of a BMI near 25.   \par \par Orthopedic -- spinal stenosis- DJD lumbar spine -degenerative joint disease bilateral knees status post total knee replacement.  Doing better with pain - Continue with   Vicodin  Advised to only take for sever pain. \par Advised risk of dependancy.   Continue hydrocodone - Try decrease dispensed amount  and continue to nathanael off \par \par Neurology-insomnia - Continue  Ambien. Advise risk alternatives benefits and side effects of medication.   Advise patient risk of taking sleep medication secondary to have an obstructive sleep apnea.  \par Advised to read pack insert - Advised not to use alcohol  \par Reviewed   refilled medication \par \par Patient  education  - COVID-19   Counseling and education provided to the patient.  Advised sign and symptoms of the virus.  Advised contact precautions.  Educated patient on proper hand washing and to participate in social distancing. Had covid vax x 2 -and booster \par \par

## 2022-01-14 NOTE — HEALTH RISK ASSESSMENT
[Yes] : Yes [4 or more  times a week (4 pts)] : 4 or more  times a week (4 points) [1 or 2 (0 pts)] : 1 or 2 (0 points) [Never (0 pts)] : Never (0 points) [No] : In the past 12 months have you used drugs other than those required for medical reasons? No [No falls in past year] : Patient reported no falls in the past year [0] : 1) Little interest or pleasure doing things: Not at all (0) [1] : 2) Feeling down, depressed, or hopeless for several days (1) [de-identified] : quit [YearQuit] : 2000 [Audit-CScore] : 4 [de-identified] : walking at work [de-identified] : regular [[id number]] : 1

## 2022-01-14 NOTE — PHYSICAL EXAM
[No Acute Distress] : no acute distress [Well Nourished] : well nourished [Well Developed] : well developed [Well-Appearing] : well-appearing [Normal Sclera/Conjunctiva] : normal sclera/conjunctiva [PERRL] : pupils equal round and reactive to light [EOMI] : extraocular movements intact [Normal Outer Ear/Nose] : the outer ears and nose were normal in appearance [Normal Oropharynx] : the oropharynx was normal [Normal TMs] : both tympanic membranes were normal [Normal Nasal Mucosa] : the nasal mucosa was normal [No JVD] : no jugular venous distention [Supple] : supple [No Lymphadenopathy] : no lymphadenopathy [Thyroid Normal, No Nodules] : the thyroid was normal and there were no nodules present [No Respiratory Distress] : no respiratory distress  [Clear to Auscultation] : lungs were clear to auscultation bilaterally [No Accessory Muscle Use] : no accessory muscle use [Normal Rate] : normal rate  [Regular Rhythm] : with a regular rhythm [Normal S1, S2] : normal S1 and S2 [No Carotid Bruits] : no carotid bruits [No Abdominal Bruit] : a ~M bruit was not heard ~T in the abdomen [Pedal Pulses Present] : the pedal pulses are present [No Edema] : there was no peripheral edema [No Extremity Clubbing/Cyanosis] : no extremity clubbing/cyanosis [No Palpable Aorta] : no palpable aorta [Obese] : obese [No CVA Tenderness] : no CVA  tenderness [No Rash] : no rash [Normal Gait] : normal gait [Coordination Grossly Intact] : coordination grossly intact [No Focal Deficits] : no focal deficits [Deep Tendon Reflexes (DTR)] : deep tendon reflexes were 2+ and symmetric [Speech Grossly Normal] : speech grossly normal [Normal Affect] : the affect was normal [Alert and Oriented x3] : oriented to person, place, and time [Normal Mood] : the mood was normal [Normal Insight/Judgement] : insight and judgment were intact [de-identified] : obese  [de-identified] : with murmur  [de-identified] : positive left tonsillar  node  [de-identified] : no bruising noted

## 2022-01-14 NOTE — REVIEW OF SYSTEMS
[Joint Pain] : joint pain [Back Pain] : back pain [Insomnia] : insomnia [Anxiety] : anxiety [Negative] : Heme/Lymph [Fever] : no fever [Chills] : no chills [Fatigue] : no fatigue [Recent Change In Weight] : ~T no recent weight change [Nosebleed] : no nosebleeds [Nasal Discharge] : no nasal discharge [Sore Throat] : no sore throat [Postnasal Drip] : no postnasal drip [Chest Pain] : no chest pain [Palpitations] : no palpitations [Shortness Of Breath] : no shortness of breath [Cough] : no cough [Abdominal Pain] : no abdominal pain [Dysuria] : no dysuria [Incontinence] : no incontinence [Joint Stiffness] : no joint stiffness [Muscle Pain] : no muscle pain [Itching] : no itching [Mole Changes] : no mole changes [Skin Rash] : no skin rash [Headache] : no headache [Dizziness] : no dizziness [Memory Loss] : no memory loss [FreeTextEntry9] : knee pain   [de-identified] : stressed

## 2022-02-09 ENCOUNTER — RESULT CHARGE (OUTPATIENT)
Age: 67
End: 2022-02-09

## 2022-02-10 ENCOUNTER — APPOINTMENT (OUTPATIENT)
Dept: INTERNAL MEDICINE | Facility: CLINIC | Age: 67
End: 2022-02-10
Payer: MEDICARE

## 2022-02-10 ENCOUNTER — NON-APPOINTMENT (OUTPATIENT)
Age: 67
End: 2022-02-10

## 2022-02-10 VITALS
HEIGHT: 65 IN | DIASTOLIC BLOOD PRESSURE: 80 MMHG | HEART RATE: 97 BPM | RESPIRATION RATE: 16 BRPM | OXYGEN SATURATION: 98 % | BODY MASS INDEX: 38.82 KG/M2 | TEMPERATURE: 98.6 F | WEIGHT: 233 LBS | SYSTOLIC BLOOD PRESSURE: 138 MMHG

## 2022-02-10 PROCEDURE — 36416 COLLJ CAPILLARY BLOOD SPEC: CPT

## 2022-02-10 PROCEDURE — 93000 ELECTROCARDIOGRAM COMPLETE: CPT | Mod: 59

## 2022-02-10 PROCEDURE — 85610 PROTHROMBIN TIME: CPT | Mod: 59,QW

## 2022-02-10 PROCEDURE — 99214 OFFICE O/P EST MOD 30 MIN: CPT | Mod: 25

## 2022-02-11 LAB
POCT-PROTHROMBIN TIME: 1.4 SECS
QUALITY CONTROL: YES

## 2022-02-11 NOTE — PLAN
[FreeTextEntry1] : Heme -Anticardiolipin Ab-  INR  21.4    - Take an extra 2.5 mg tonight  than .  Warfarin 2.5 mg daily except Mondays and Wednesday 5 mg.   Refilled Coumadin \par - check INR 14 days .  -  Monitor for abnormal bleeding.\par \par Cardio - CAD and hyperlipidemia -  reviewed EKG -Continue with current medication.   Zetia daily as directed \par Follow up with Cardio \par \par Cardio - Hypertension -  Patient was educated about hypertension and the importance of controlling the pressure through lifestyle modification which include low sodium  diet and  aerobic  exercise.  Also discussed the use of prescription medication which included their benefits and their side effects. We discussed the use of ASA 81 mg daily.   Having a BMI less than or equal to 25.  Continue losartan 75 mg daily \par \par  Endocrinology  -  Obesity   Patient was educated about the importance of diet and exercise.   We discussed  a goal of a BMI near 25.   \par \par Orthopedic -- spinal stenosis- DJD lumbar spine -degenerative joint disease bilateral knees status post total knee replacement.  Doing better with pain - Continue with   Vicodin  but decrease dispense amount advised to only take for sever pain. \par advised risk of dependancy.   Continue hydrocodone -decrease dispensed amount \par \par Neurology-insomnia - Continue  Ambien. Advise risk alternatives benefits and side effects of medication.   Advise patient risk of taking sleep medication secondary to have an obstructive sleep apnea.  \par Advised to read pack insert - Advised not to use alcohol  \par Reviewed   refilled medication \par \par New onset  dizziness - Advised VT and EKG was normal and no acute changes was noted.  Discussed stress reduction.  Advised if symptoms are getting worse to call the office or go to the ed\par \par Patient  education  - COVID-19   Counseling and education provided to the patient.  Advised sign and symptoms of the virus.  Advised contact precautions.  Educated patient on proper hand washing and to participate in social distancing. Had covid vax x 2 -and booster \par \par I spent 31  Minutes with the patient, half of which we discussed finding on physical exam  and coordinated care.  As well as reviewed my plans and follow ups.

## 2022-02-11 NOTE — HISTORY OF PRESENT ILLNESS
[Spouse] : spouse [FreeTextEntry1] : Medication renewal - dizziness x 2 days  [de-identified] : Mrs. CARDOZA is a 67 year  female, who present to the office for medication renewal, INR check.\par States over the past two days gets bouts of dizziness - States it last few seconds and goes away-  Denies having any headaches, change of vision, SOB, N/V or recent head trauma.  coordination  is the same - Denies having a loss of words States recently been stressed at work.     \par Denies any abnormal bleeding.  - Denies chest pain, SOB, MIRELES.\par Taking Ambien for insomnia which works.  Denies any side effects or day time drowsiness \par Knee and back pain are stable -  No new exacerbation\par \par \par

## 2022-02-11 NOTE — HEALTH RISK ASSESSMENT
[Yes] : Yes [4 or more  times a week (4 pts)] : 4 or more  times a week (4 points) [1 or 2 (0 pts)] : 1 or 2 (0 points) [Never (0 pts)] : Never (0 points) [No] : In the past 12 months have you used drugs other than those required for medical reasons? No [No falls in past year] : Patient reported no falls in the past year [0] : 1) Little interest or pleasure doing things: Not at all (0) [1] : 2) Feeling down, depressed, or hopeless for several days (1) [de-identified] : quit [YearQuit] : 2000 [Audit-CScore] : 4 [de-identified] : walking at work [de-identified] : regular [[id number]] : 1

## 2022-02-11 NOTE — PHYSICAL EXAM
[No Acute Distress] : no acute distress [Well Nourished] : well nourished [Well Developed] : well developed [Well-Appearing] : well-appearing [Normal Sclera/Conjunctiva] : normal sclera/conjunctiva [PERRL] : pupils equal round and reactive to light [EOMI] : extraocular movements intact [Normal Outer Ear/Nose] : the outer ears and nose were normal in appearance [Normal Oropharynx] : the oropharynx was normal [Normal TMs] : both tympanic membranes were normal [Normal Nasal Mucosa] : the nasal mucosa was normal [No JVD] : no jugular venous distention [Supple] : supple [No Lymphadenopathy] : no lymphadenopathy [Thyroid Normal, No Nodules] : the thyroid was normal and there were no nodules present [No Respiratory Distress] : no respiratory distress  [Clear to Auscultation] : lungs were clear to auscultation bilaterally [No Accessory Muscle Use] : no accessory muscle use [Normal Rate] : normal rate  [Regular Rhythm] : with a regular rhythm [Normal S1, S2] : normal S1 and S2 [No Carotid Bruits] : no carotid bruits [No Abdominal Bruit] : a ~M bruit was not heard ~T in the abdomen [Pedal Pulses Present] : the pedal pulses are present [No Edema] : there was no peripheral edema [No Extremity Clubbing/Cyanosis] : no extremity clubbing/cyanosis [No Palpable Aorta] : no palpable aorta [Obese] : obese [No CVA Tenderness] : no CVA  tenderness [No Rash] : no rash [Normal Gait] : normal gait [Coordination Grossly Intact] : coordination grossly intact [No Focal Deficits] : no focal deficits [Deep Tendon Reflexes (DTR)] : deep tendon reflexes were 2+ and symmetric [Speech Grossly Normal] : speech grossly normal [Normal Affect] : the affect was normal [Alert and Oriented x3] : oriented to person, place, and time [Normal Mood] : the mood was normal [Normal Insight/Judgement] : insight and judgment were intact [Memory Grossly Normal] : memory grossly normal [de-identified] : obese  [de-identified] : with murmur  [de-identified] : positive left tonsillar  node  [de-identified] : no bruising noted  [de-identified] : negative romberg

## 2022-02-11 NOTE — REVIEW OF SYSTEMS
[Joint Pain] : joint pain [Back Pain] : back pain [Insomnia] : insomnia [Anxiety] : anxiety [Negative] : Heme/Lymph [Dizziness] : dizziness [Fever] : no fever [Chills] : no chills [Fatigue] : no fatigue [Recent Change In Weight] : ~T no recent weight change [Nosebleed] : no nosebleeds [Nasal Discharge] : no nasal discharge [Sore Throat] : no sore throat [Postnasal Drip] : no postnasal drip [Chest Pain] : no chest pain [Palpitations] : no palpitations [Shortness Of Breath] : no shortness of breath [Cough] : no cough [Abdominal Pain] : no abdominal pain [Dysuria] : no dysuria [Incontinence] : no incontinence [Joint Stiffness] : no joint stiffness [Muscle Pain] : no muscle pain [Itching] : no itching [Mole Changes] : no mole changes [Skin Rash] : no skin rash [Headache] : no headache [Memory Loss] : no memory loss [FreeTextEntry9] : knee pain   [de-identified] : stressed

## 2022-02-11 NOTE — COUNSELING
[AUDIT-C Screening administered and reviewed] : AUDIT-C Screening administered and reviewed [Potential consequences of obesity discussed] : Potential consequences of obesity discussed [Encouraged to maintain food diary] : Encouraged to maintain food diary [Encouraged to increase physical activity] : Encouraged to increase physical activity [Good understanding] : Patient has a good understanding of disease, goals and obesity follow-up plan [Weight management counseling provided] : Weight management [Healthy eating counseling provided] : healthy eating [Fall prevention counseling provided] : fall prevention  [Needs reinforcement, provided] : Patient needs reinforcement on understanding of disease, goals and obesity follow-up plan; reinforcement was provided [Low Fat Diet] : Low fat diet [Low Salt Diet] : Low salt diet [Decrease Portions] : Decrease food portions [Patient motivation] : Patient motivation [None] : None [Behavioral health counseling provided] : Behavioral health counseling provided [Engage in a relaxing activity] : Engage in a relaxing activity [FreeTextEntry2] : 1600 nicole diet  increase exercise  [de-identified] : 1500 nicole diet \par Advised to walk daily for a goal of 10,000 steps \par Advised PT for B/l Knee replacemment

## 2022-02-11 NOTE — ASSESSMENT
[FreeTextEntry1] : A  67-year-old female with acute anxiety, degenerative joint disease, JANINA, insomnia, obesity present  to the office for INR check, and renewal of medications and new onset of dizziness

## 2022-02-25 NOTE — HISTORY OF PRESENT ILLNESS
Walk in [FreeTextEntry1] : INR check, and  refill on medication for insomnia  [de-identified] : Ms. CARDOZA is a 64 year  female, who present to the office for INR check and refill on medication. \par Denies any abnormal bleeding.  Also needs a refill on Crestor.   \par \par Doing well on current medication regimen Denies any side effects.  States her knee pain has been better and has not taking any pain medication for a few weeks.  \par \par Has to follow back up with cardiologist

## 2022-03-14 ENCOUNTER — RX RENEWAL (OUTPATIENT)
Age: 67
End: 2022-03-14

## 2022-03-18 ENCOUNTER — APPOINTMENT (OUTPATIENT)
Dept: INTERNAL MEDICINE | Facility: CLINIC | Age: 67
End: 2022-03-18
Payer: MEDICARE

## 2022-03-18 VITALS
BODY MASS INDEX: 39.15 KG/M2 | RESPIRATION RATE: 16 BRPM | OXYGEN SATURATION: 98 % | HEART RATE: 74 BPM | HEIGHT: 65 IN | TEMPERATURE: 95.7 F | WEIGHT: 235 LBS

## 2022-03-18 PROCEDURE — 99213 OFFICE O/P EST LOW 20 MIN: CPT | Mod: 25

## 2022-03-18 PROCEDURE — 85610 PROTHROMBIN TIME: CPT | Mod: QW

## 2022-03-21 LAB
INR PPP: 2.5 RATIO
POCT-PROTHROMBIN TIME: 30.4 SECS
QUALITY CONTROL: YES

## 2022-03-21 NOTE — COUNSELING
[Behavioral health counseling provided] : Behavioral health counseling provided [Engage in a relaxing activity] : Engage in a relaxing activity [AUDIT-C Screening administered and reviewed] : AUDIT-C Screening administered and reviewed [Potential consequences of obesity discussed] : Potential consequences of obesity discussed [Encouraged to maintain food diary] : Encouraged to maintain food diary [Encouraged to increase physical activity] : Encouraged to increase physical activity [Good understanding] : Patient has a good understanding of disease, goals and obesity follow-up plan [Weight management counseling provided] : Weight management [Healthy eating counseling provided] : healthy eating [Fall prevention counseling provided] : fall prevention  [Needs reinforcement, provided] : Patient needs reinforcement on understanding of disease, goals and obesity follow-up plan; reinforcement was provided [Low Fat Diet] : Low fat diet [Low Salt Diet] : Low salt diet [Decrease Portions] : Decrease food portions [Patient motivation] : Patient motivation [None] : None [FreeTextEntry2] : 1600 nicole diet  increase exercise  [de-identified] : 1500 nicole diet \par Advised to walk daily for a goal of 10,000 steps \par Advised PT for B/l Knee replacemment

## 2022-03-21 NOTE — HEALTH RISK ASSESSMENT
[Yes] : Yes [4 or more  times a week (4 pts)] : 4 or more  times a week (4 points) [1 or 2 (0 pts)] : 1 or 2 (0 points) [Never (0 pts)] : Never (0 points) [No] : In the past 12 months have you used drugs other than those required for medical reasons? No [No falls in past year] : Patient reported no falls in the past year [0] : 1) Little interest or pleasure doing things: Not at all (0) [1] : 2) Feeling down, depressed, or hopeless for several days (1) [de-identified] : quit [YearQuit] : 2000 [Audit-CScore] : 4 [de-identified] : walking at work [de-identified] : regular [[id number]] : 1

## 2022-03-21 NOTE — PHYSICAL EXAM
[No Acute Distress] : no acute distress [Well Nourished] : well nourished [Well Developed] : well developed [Well-Appearing] : well-appearing [Normal Sclera/Conjunctiva] : normal sclera/conjunctiva [PERRL] : pupils equal round and reactive to light [EOMI] : extraocular movements intact [Normal Outer Ear/Nose] : the outer ears and nose were normal in appearance [Normal Oropharynx] : the oropharynx was normal [Normal TMs] : both tympanic membranes were normal [Normal Nasal Mucosa] : the nasal mucosa was normal [No JVD] : no jugular venous distention [Supple] : supple [No Lymphadenopathy] : no lymphadenopathy [Thyroid Normal, No Nodules] : the thyroid was normal and there were no nodules present [No Respiratory Distress] : no respiratory distress  [Clear to Auscultation] : lungs were clear to auscultation bilaterally [No Accessory Muscle Use] : no accessory muscle use [Normal Rate] : normal rate  [Regular Rhythm] : with a regular rhythm [Normal S1, S2] : normal S1 and S2 [No Carotid Bruits] : no carotid bruits [No Abdominal Bruit] : a ~M bruit was not heard ~T in the abdomen [Pedal Pulses Present] : the pedal pulses are present [No Edema] : there was no peripheral edema [No Extremity Clubbing/Cyanosis] : no extremity clubbing/cyanosis [No Palpable Aorta] : no palpable aorta [Obese] : obese [No CVA Tenderness] : no CVA  tenderness [No Rash] : no rash [Normal Gait] : normal gait [Coordination Grossly Intact] : coordination grossly intact [No Focal Deficits] : no focal deficits [Deep Tendon Reflexes (DTR)] : deep tendon reflexes were 2+ and symmetric [Speech Grossly Normal] : speech grossly normal [Memory Grossly Normal] : memory grossly normal [Normal Affect] : the affect was normal [Alert and Oriented x3] : oriented to person, place, and time [Normal Mood] : the mood was normal [Normal Insight/Judgement] : insight and judgment were intact [de-identified] : obese  [de-identified] : with murmur  [de-identified] : no bruising noted  [de-identified] : negative romberg

## 2022-03-21 NOTE — HISTORY OF PRESENT ILLNESS
[Spouse] : spouse [FreeTextEntry1] : Medication renewal - and INR check  [de-identified] : Mrs. CARDOZA is a 67 year  female, who present to the office for medication renewal, INR check.\par States since last office visit dizziness stopped \par Denies any abnormal bleeding.  - Denies chest pain, SOB, MIRELES.\par Taking Ambien for insomnia which works.  Denies any side effects or day time drowsiness \par Knee and back pain are stable -  No new exacerbation\par States she planning to retire in the next few months so stress is better \par \par

## 2022-03-21 NOTE — REVIEW OF SYSTEMS
[Joint Pain] : joint pain [Back Pain] : back pain [Insomnia] : insomnia [Anxiety] : anxiety [Fever] : no fever [Chills] : no chills [Fatigue] : no fatigue [Recent Change In Weight] : ~T no recent weight change [Nosebleed] : no nosebleeds [Nasal Discharge] : no nasal discharge [Sore Throat] : no sore throat [Postnasal Drip] : no postnasal drip [Chest Pain] : no chest pain [Palpitations] : no palpitations [Shortness Of Breath] : no shortness of breath [Cough] : no cough [Abdominal Pain] : no abdominal pain [Dysuria] : no dysuria [Incontinence] : no incontinence [Joint Stiffness] : no joint stiffness [Muscle Pain] : no muscle pain [Itching] : no itching [Mole Changes] : no mole changes [Skin Rash] : no skin rash [Headache] : no headache [Dizziness] : no dizziness [Memory Loss] : no memory loss [Negative] : Neurological [FreeTextEntry9] : knee pain   [de-identified] : stressed

## 2022-03-21 NOTE — ASSESSMENT
[FreeTextEntry1] : A  67-year-old female with acute anxiety, degenerative joint disease, JANINA, insomnia, obesity present  to the office for INR check, and renewal of medications

## 2022-04-15 ENCOUNTER — APPOINTMENT (OUTPATIENT)
Dept: INTERNAL MEDICINE | Facility: CLINIC | Age: 67
End: 2022-04-15
Payer: MEDICARE

## 2022-04-15 VITALS
TEMPERATURE: 96.3 F | HEART RATE: 83 BPM | HEIGHT: 65 IN | SYSTOLIC BLOOD PRESSURE: 152 MMHG | WEIGHT: 238.06 LBS | BODY MASS INDEX: 39.66 KG/M2 | RESPIRATION RATE: 16 BRPM | OXYGEN SATURATION: 98 % | DIASTOLIC BLOOD PRESSURE: 80 MMHG

## 2022-04-15 VITALS — DIASTOLIC BLOOD PRESSURE: 80 MMHG | SYSTOLIC BLOOD PRESSURE: 130 MMHG

## 2022-04-15 LAB
INR PPP: 1.7 RATIO
POCT-PROTHROMBIN TIME: 20.1 SECS

## 2022-04-15 PROCEDURE — 36416 COLLJ CAPILLARY BLOOD SPEC: CPT

## 2022-04-15 PROCEDURE — 85610 PROTHROMBIN TIME: CPT | Mod: QW

## 2022-04-15 PROCEDURE — 99213 OFFICE O/P EST LOW 20 MIN: CPT | Mod: 25

## 2022-04-15 NOTE — PHYSICAL EXAM
[No Acute Distress] : no acute distress [Well Nourished] : well nourished [Well Developed] : well developed [Well-Appearing] : well-appearing [Normal Sclera/Conjunctiva] : normal sclera/conjunctiva [PERRL] : pupils equal round and reactive to light [EOMI] : extraocular movements intact [Normal Outer Ear/Nose] : the outer ears and nose were normal in appearance [Normal Oropharynx] : the oropharynx was normal [Normal TMs] : both tympanic membranes were normal [Normal Nasal Mucosa] : the nasal mucosa was normal [No JVD] : no jugular venous distention [Supple] : supple [No Lymphadenopathy] : no lymphadenopathy [Thyroid Normal, No Nodules] : the thyroid was normal and there were no nodules present [No Respiratory Distress] : no respiratory distress  [Clear to Auscultation] : lungs were clear to auscultation bilaterally [No Accessory Muscle Use] : no accessory muscle use [Normal Rate] : normal rate  [Regular Rhythm] : with a regular rhythm [Normal S1, S2] : normal S1 and S2 [No Carotid Bruits] : no carotid bruits [No Abdominal Bruit] : a ~M bruit was not heard ~T in the abdomen [Pedal Pulses Present] : the pedal pulses are present [No Edema] : there was no peripheral edema [No Extremity Clubbing/Cyanosis] : no extremity clubbing/cyanosis [No Palpable Aorta] : no palpable aorta [Obese] : obese [No CVA Tenderness] : no CVA  tenderness [No Rash] : no rash [Normal Gait] : normal gait [Coordination Grossly Intact] : coordination grossly intact [No Focal Deficits] : no focal deficits [Speech Grossly Normal] : speech grossly normal [Deep Tendon Reflexes (DTR)] : deep tendon reflexes were 2+ and symmetric [Memory Grossly Normal] : memory grossly normal [Normal Affect] : the affect was normal [Alert and Oriented x3] : oriented to person, place, and time [Normal Mood] : the mood was normal [Normal Insight/Judgement] : insight and judgment were intact [de-identified] : obese  [de-identified] : with murmur  [de-identified] : negative romberg  [de-identified] : no bruising noted

## 2022-04-15 NOTE — HEALTH RISK ASSESSMENT
[Yes] : Yes [4 or more  times a week (4 pts)] : 4 or more  times a week (4 points) [1 or 2 (0 pts)] : 1 or 2 (0 points) [Never (0 pts)] : Never (0 points) [No] : In the past 12 months have you used drugs other than those required for medical reasons? No [No falls in past year] : Patient reported no falls in the past year [0] : 1) Little interest or pleasure doing things: Not at all (0) [1] : 2) Feeling down, depressed, or hopeless for several days (1) [YearQuit] : 2000 [de-identified] : quit [Audit-CScore] : 4 [de-identified] : walking at work [[id number]] : 1 [de-identified] : regular

## 2022-04-15 NOTE — HISTORY OF PRESENT ILLNESS
[Spouse] : spouse [FreeTextEntry1] : Medication renewal - and INR check  [de-identified] : Mrs. CARDOZA is a 67 year  female, who present to the office for medication renewal, INR check.\par Had a covid booster given today - Denies any side effect so far  \par Denies any abnormal bleeding.  - Denies chest pain, SOB, MIRELES.\par \par Taking Ambien for insomnia which works.  Denies any side effects or day time drowsiness \par Knee and back pain are stable -  No new exacerbation\par \par \par

## 2022-04-15 NOTE — REVIEW OF SYSTEMS
[Joint Pain] : joint pain [Back Pain] : back pain [Insomnia] : insomnia [Anxiety] : anxiety [Negative] : Heme/Lymph [Fever] : no fever [Chills] : no chills [Fatigue] : no fatigue [Recent Change In Weight] : ~T no recent weight change [Nosebleed] : no nosebleeds [Sore Throat] : no sore throat [Nasal Discharge] : no nasal discharge [Postnasal Drip] : no postnasal drip [Chest Pain] : no chest pain [Palpitations] : no palpitations [Shortness Of Breath] : no shortness of breath [Cough] : no cough [Abdominal Pain] : no abdominal pain [Dysuria] : no dysuria [Incontinence] : no incontinence [Joint Stiffness] : no joint stiffness [Muscle Pain] : no muscle pain [Itching] : no itching [Mole Changes] : no mole changes [Skin Rash] : no skin rash [Dizziness] : no dizziness [Headache] : no headache [Memory Loss] : no memory loss [FreeTextEntry9] : knee pain   [de-identified] : stressed

## 2022-04-15 NOTE — PLAN
[FreeTextEntry1] : Heme -Anticardiolipin Ab-  INR  1.7    - Advised to take an extra 1/2 warfarin tonight than  Continue with   Warfarin 2.5 mg daily except Mondays and Wednesday 5 mg.    check INR 14 days .  -  Monitor for abnormal bleeding.\par \par Cardio - CAD and hyperlipidemia --Continue with current medication. Discussed the importance of stress reduction and weight loss \par \par Cardio - Hypertension -  Patient was educated about hypertension and the importance of controlling the pressure through lifestyle modification which include low sodium  diet and  aerobic  exercise.  Also discussed the use of prescription medication which included their benefits and their side effects. We discussed the use of ASA 81 mg daily.   Having a BMI less than or equal to 25.  Continue losartan 75 mg daily \par \par  Endocrinology  -  Obesity   Patient was educated about the importance of diet and exercise.   We discussed  a goal of a BMI near 25.   \par \par Orthopedic -- spinal stenosis- DJD lumbar spine -degenerative joint disease bilateral knees status post total knee replacement.  Doing better with pain - Continue with   Vicodin  but decrease dispense amount advised to only take for sever pain. \par advised risk of dependancy.   Continue hydrocodone - reviewed \par Advised not to share the medication  Advised risk of dependancy and abuse \par \par Neurology-insomnia - Continue  Ambien. Advise risk alternatives benefits and side effects of medication.   Advise patient risk of taking sleep medication secondary to have an obstructive sleep apnea.  \par Advised to read pack insert - Advised not to use alcohol  \par Reviewed   refilled medication \par \par Patient  education  - COVID-19   Counseling and education provided to the patient.  Advised sign and symptoms of the virus.  Advised contact precautions.  Educated patient on proper hand washing and to participate in social distancing. Had covid vax x 2 -and  2 booster \par \par I spent 21 Minutes with the patient, half of which we discussed finding on physical exam  and coordinated care.  As well as reviewed my plans and follow ups.

## 2022-05-13 ENCOUNTER — APPOINTMENT (OUTPATIENT)
Dept: INTERNAL MEDICINE | Facility: CLINIC | Age: 67
End: 2022-05-13
Payer: MEDICARE

## 2022-05-13 VITALS
BODY MASS INDEX: 38.61 KG/M2 | SYSTOLIC BLOOD PRESSURE: 130 MMHG | OXYGEN SATURATION: 99 % | DIASTOLIC BLOOD PRESSURE: 72 MMHG | TEMPERATURE: 98.7 F | HEART RATE: 83 BPM | RESPIRATION RATE: 16 BRPM | WEIGHT: 232 LBS

## 2022-05-13 LAB
INR PPP: 3 RATIO
POCT-PROTHROMBIN TIME: 36 SECS

## 2022-05-13 PROCEDURE — 99214 OFFICE O/P EST MOD 30 MIN: CPT | Mod: 25

## 2022-05-13 PROCEDURE — 36416 COLLJ CAPILLARY BLOOD SPEC: CPT

## 2022-05-13 PROCEDURE — 85610 PROTHROMBIN TIME: CPT | Mod: QW

## 2022-05-18 NOTE — PHYSICAL EXAM
[No Acute Distress] : no acute distress [Well Nourished] : well nourished [Well Developed] : well developed [Well-Appearing] : well-appearing [Normal Sclera/Conjunctiva] : normal sclera/conjunctiva [PERRL] : pupils equal round and reactive to light [EOMI] : extraocular movements intact [Normal Outer Ear/Nose] : the outer ears and nose were normal in appearance [Normal Oropharynx] : the oropharynx was normal [Normal TMs] : both tympanic membranes were normal [Normal Nasal Mucosa] : the nasal mucosa was normal [No JVD] : no jugular venous distention [Supple] : supple [No Lymphadenopathy] : no lymphadenopathy [Thyroid Normal, No Nodules] : the thyroid was normal and there were no nodules present [No Respiratory Distress] : no respiratory distress  [Clear to Auscultation] : lungs were clear to auscultation bilaterally [No Accessory Muscle Use] : no accessory muscle use [Normal Rate] : normal rate  [Regular Rhythm] : with a regular rhythm [Normal S1, S2] : normal S1 and S2 [No Carotid Bruits] : no carotid bruits [No Abdominal Bruit] : a ~M bruit was not heard ~T in the abdomen [Pedal Pulses Present] : the pedal pulses are present [No Edema] : there was no peripheral edema [No Extremity Clubbing/Cyanosis] : no extremity clubbing/cyanosis [No Palpable Aorta] : no palpable aorta [Obese] : obese [No CVA Tenderness] : no CVA  tenderness [No Rash] : no rash [Normal Gait] : normal gait [Coordination Grossly Intact] : coordination grossly intact [No Focal Deficits] : no focal deficits [Deep Tendon Reflexes (DTR)] : deep tendon reflexes were 2+ and symmetric [Speech Grossly Normal] : speech grossly normal [Memory Grossly Normal] : memory grossly normal [Normal Affect] : the affect was normal [Alert and Oriented x3] : oriented to person, place, and time [Normal Mood] : the mood was normal [Normal Insight/Judgement] : insight and judgment were intact [de-identified] : obese  [de-identified] : with murmur  [de-identified] : left knee tenderness  [de-identified] : no bruising noted

## 2022-05-18 NOTE — HEALTH RISK ASSESSMENT
[Yes] : Yes [4 or more  times a week (4 pts)] : 4 or more  times a week (4 points) [1 or 2 (0 pts)] : 1 or 2 (0 points) [Never (0 pts)] : Never (0 points) [No] : In the past 12 months have you used drugs other than those required for medical reasons? No [No falls in past year] : Patient reported no falls in the past year [0] : 1) Little interest or pleasure doing things: Not at all (0) [1] : 2) Feeling down, depressed, or hopeless for several days (1) [de-identified] : quit [YearQuit] : 2000 [Audit-CScore] : 4 [de-identified] : walking at work [de-identified] : regular [[id number]] : 1

## 2022-05-18 NOTE — REVIEW OF SYSTEMS
[Joint Pain] : joint pain [Back Pain] : back pain [Insomnia] : insomnia [Anxiety] : anxiety [Negative] : Heme/Lymph [Fever] : no fever [Chills] : no chills [Fatigue] : no fatigue [Recent Change In Weight] : ~T no recent weight change [Nosebleed] : no nosebleeds [Nasal Discharge] : no nasal discharge [Sore Throat] : no sore throat [Postnasal Drip] : no postnasal drip [Chest Pain] : no chest pain [Palpitations] : no palpitations [Shortness Of Breath] : no shortness of breath [Cough] : no cough [Abdominal Pain] : no abdominal pain [Dysuria] : no dysuria [Incontinence] : no incontinence [Joint Stiffness] : no joint stiffness [Muscle Pain] : no muscle pain [Itching] : no itching [Mole Changes] : no mole changes [Skin Rash] : no skin rash [Headache] : no headache [Dizziness] : no dizziness [Memory Loss] : no memory loss [FreeTextEntry9] : knee pain   [de-identified] : stressed

## 2022-05-18 NOTE — PLAN
[FreeTextEntry1] : Heme -Anticardiolipin Ab-  INR  3.0    - Advised to take an  1/2 warfarin tonight than  Continue with   Warfarin 2.5 mg daily except Mondays and Wednesday 5 mg.    check INR 14 days .  -  Monitor for abnormal bleeding.\par \par Cardio - CAD and hyperlipidemia --Continue with current medication. Discussed the importance of stress reduction and weight loss.  \par \par Cardio - Hypertension -  Patient was educated about hypertension and the importance of controlling the pressure through lifestyle modification which include low sodium  diet and  aerobic  exercise.  Also discussed the use of prescription medication which included their benefits and their side effects. We discussed the use of ASA 81 mg daily.   Having a BMI less than or equal to 25.  Continue losartan 75 mg daily \par \par  Endocrinology  -  Obesity   Patient was educated about the importance of diet and exercise.   We discussed  a goal of a BMI near 25.   Advised weight loss would help the knee and back pain \par \par Orthopedic -- spinal stenosis- DJD lumbar spine -degenerative joint disease bilateral knees status post total knee replacement.  - Recent exacerbation of pain- Continue with   Vicodin  but increase  dispense amount advised to only take for sever pain. \par advised risk of dependancy.   Continue hydrocodone - reviewed \par Advised not to share the medication  Advised risk of dependancy and abuse \par \par Neurology-insomnia - Continue  Ambien. Advise risk alternatives benefits and side effects of medication.   Advise patient risk of taking sleep medication secondary to have an obstructive sleep apnea.  \par Advised to read pack insert - Advised not to use alcohol  \par Reviewed   refilled medication \par \par Patient  education  - COVID-19   Counseling and education provided to the patient.  Advised sign and symptoms of the virus.  Advised contact precautions.  Educated patient on proper hand washing and to participate in social distancing. Had covid vax x 2 -and  2 booster \par \par I spent  30  Minutes with the patient, half of which we discussed finding on physical exam  and coordinated care.  As well as reviewed my plans and follow ups.

## 2022-05-18 NOTE — HISTORY OF PRESENT ILLNESS
[Spouse] : spouse [FreeTextEntry1] : Medication renewal - and INR check  [de-identified] : Mrs. CARDOZA is a 67 year  female, who present to the office for medication renewal, INR check.\par Pt denies any abnormal bleeding.  - Denies chest pain, SOB, MIRELES.\par \par Taking Ambien for insomnia which works.  Denies any side effects or day time drowsiness \par Knee and back pain over the last few weeks has gotten worse-  States some days cant complete some task secondary to the knee pain \par Denies having side effects to the pain medication.  States ran out of the medication this week  secondary to the tapering off the medication. Therefore was in a lot of pain \par \par

## 2022-05-18 NOTE — COUNSELING
[Behavioral health counseling provided] : Behavioral health counseling provided [Engage in a relaxing activity] : Engage in a relaxing activity [AUDIT-C Screening administered and reviewed] : AUDIT-C Screening administered and reviewed [Potential consequences of obesity discussed] : Potential consequences of obesity discussed [Encouraged to maintain food diary] : Encouraged to maintain food diary [Encouraged to increase physical activity] : Encouraged to increase physical activity [Good understanding] : Patient has a good understanding of disease, goals and obesity follow-up plan [Weight management counseling provided] : Weight management [Healthy eating counseling provided] : healthy eating [Fall prevention counseling provided] : fall prevention  [Needs reinforcement, provided] : Patient needs reinforcement on understanding of disease, goals and obesity follow-up plan; reinforcement was provided [Low Fat Diet] : Low fat diet [Low Salt Diet] : Low salt diet [Decrease Portions] : Decrease food portions [Patient motivation] : Patient motivation [None] : None [FreeTextEntry2] : 1600 nicole diet  increase exercise  [de-identified] : 1500 nicole diet \par Advised to walk daily for a goal of 10,000 steps \par Advised PT for B/l Knee replacemment

## 2022-05-23 ENCOUNTER — RX RENEWAL (OUTPATIENT)
Age: 67
End: 2022-05-23

## 2022-06-09 ENCOUNTER — NON-APPOINTMENT (OUTPATIENT)
Age: 67
End: 2022-06-09

## 2022-06-24 ENCOUNTER — APPOINTMENT (OUTPATIENT)
Dept: INTERNAL MEDICINE | Facility: CLINIC | Age: 67
End: 2022-06-24

## 2022-06-24 VITALS
DIASTOLIC BLOOD PRESSURE: 70 MMHG | HEIGHT: 65 IN | SYSTOLIC BLOOD PRESSURE: 130 MMHG | HEART RATE: 78 BPM | RESPIRATION RATE: 14 BRPM | WEIGHT: 230 LBS | OXYGEN SATURATION: 99 % | TEMPERATURE: 97.9 F | BODY MASS INDEX: 38.32 KG/M2

## 2022-06-24 DIAGNOSIS — Z87.39 PERSONAL HISTORY OF OTHER DISEASES OF THE MUSCULOSKELETAL SYSTEM AND CONNECTIVE TISSUE: ICD-10-CM

## 2022-06-24 DIAGNOSIS — M48.061 SPINAL STENOSIS, LUMBAR REGION WITHOUT NEUROGENIC CLAUDICATION: ICD-10-CM

## 2022-06-24 PROCEDURE — 99214 OFFICE O/P EST MOD 30 MIN: CPT | Mod: 25

## 2022-06-24 PROCEDURE — 36415 COLL VENOUS BLD VENIPUNCTURE: CPT

## 2022-06-26 PROBLEM — Z87.39 HISTORY OF LOW BACK PAIN: Status: RESOLVED | Noted: 2019-02-12 | Resolved: 2021-12-03

## 2022-06-26 PROBLEM — M48.061 SPINAL STENOSIS OF LUMBAR REGION WITHOUT NEUROGENIC CLAUDICATION: Status: ACTIVE | Noted: 2020-09-04

## 2022-06-26 NOTE — HEALTH RISK ASSESSMENT
[Former] : Former [Yes] : Yes [4 or more  times a week (4 pts)] : 4 or more  times a week (4 points) [1 or 2 (0 pts)] : 1 or 2 (0 points) [Never (0 pts)] : Never (0 points) [No] : In the past 12 months have you used drugs other than those required for medical reasons? No [No falls in past year] : Patient reported no falls in the past year [0] : 1) Little interest or pleasure doing things: Not at all (0) [1] : 2) Feeling down, depressed, or hopeless for several days (1) [PHQ-2 Negative - No further assessment needed] : PHQ-2 Negative - No further assessment needed [YearQuit] : 2000 [Audit-CScore] : 4 [de-identified] : Walking at work. [de-identified] : Regular.  [ENA4Wcjrg] : 1

## 2022-06-26 NOTE — HISTORY OF PRESENT ILLNESS
[FreeTextEntry1] : blood work, Rx refill, and general follow-up [de-identified] : Patient is a 67 year old female with a past medical history as below who presents for blood work, Rx refill, and general follow-up. Patient states she is taking all medications as prescribed and denies any adverse reactions or side effects. She denies lightheadedness or dizziness on Losartan Potassium. She denies any new arthralgias or myalgias on Rosuvastatin Calcium. Patient had been considering the sleeve gastrectomy, but noted difficulty getting the procedure approved. She inquires about other treatment options for weight loss. Patient requests Rx refill for Zolpidem Tartrate which is helping with insomnia. Patient also requests Rx refill for Hydrocodone-Acetaminophen which is helping with lower back pain.

## 2022-06-26 NOTE — PLAN
[FreeTextEntry1] : Cardiology\par hypertension - continue Losartan Potassium 50mg p.o.q.d. as directed - continue low sodium diet and weight loss; will continue to monitor BP\par hyperlipidemia - continue Rosuvastatin Calcium 10mg p.o.q.d. as directed and Ezetimibe (Zetia) 10mg p.o.q.d. as directed - continue low cholesterol/low fat diet \par CAD - continue Rosuvastatin Calcium 10mg p.o.q.d. as directed and Ezetimibe (Zetia) 10mg p.o.q.d. as directed \par Endocrinology\par obesity - discussed starting Qsymia or Ozempic to help aid in weight loss, patient to consider and follow up - continue low carbohydrate diet; recommended increasing CV exercise\par Hematology\par antiphospholipid antibody syndrome/history of DVT/PE- continue Warfarin Sodium 5mg 1 tablet 2 days per week and 1/2 tablet 5 days per week as directed - check PT/INR\par Psychiatry\par insomnia - continue Zolpidem Tartrate 10mg p.o.q.h.s. p.r.n. as directed, Rx filled,  reviewed; Reference Number: [id number]; discussed starting Zolpidem Tartrate ER (Ambien CR) in the future if she finds Zolpidem Tartrate 10mg becomes ineffective in treating insomnia \par Musculoskeletal\par history of lower back pain - continue Hydrocodone-Acetaminophen 7.5-300mg p.o. p.r.n. as directed, Rx filled,  reviewed; Reference Number: [id number]\par \par check PT/INR

## 2022-06-26 NOTE — PHYSICAL EXAM
[No Acute Distress] : no acute distress [Well Nourished] : well nourished [Well Developed] : well developed [Well-Appearing] : well-appearing [Normal Sclera/Conjunctiva] : normal sclera/conjunctiva [PERRL] : pupils equal round and reactive to light [EOMI] : extraocular movements intact [Normal Outer Ear/Nose] : the outer ears and nose were normal in appearance [Normal Oropharynx] : the oropharynx was normal [No JVD] : no jugular venous distention [No Lymphadenopathy] : no lymphadenopathy [Supple] : supple [Thyroid Normal, No Nodules] : the thyroid was normal and there were no nodules present [No Respiratory Distress] : no respiratory distress  [No Accessory Muscle Use] : no accessory muscle use [Clear to Auscultation] : lungs were clear to auscultation bilaterally [Normal Rate] : normal rate  [Regular Rhythm] : with a regular rhythm [Normal S1, S2] : normal S1 and S2 [No Murmur] : no murmur heard [No Carotid Bruits] : no carotid bruits [No Abdominal Bruit] : a ~M bruit was not heard ~T in the abdomen [No Edema] : there was no peripheral edema [No Palpable Aorta] : no palpable aorta [Soft] : abdomen soft [Non Tender] : non-tender [Non-distended] : non-distended [No Masses] : no abdominal mass palpated [No HSM] : no HSM [Normal Bowel Sounds] : normal bowel sounds [Normal Posterior Cervical Nodes] : no posterior cervical lymphadenopathy [Normal Anterior Cervical Nodes] : no anterior cervical lymphadenopathy [No CVA Tenderness] : no CVA  tenderness [No Spinal Tenderness] : no spinal tenderness [No Joint Swelling] : no joint swelling [Grossly Normal Strength/Tone] : grossly normal strength/tone [No Rash] : no rash [Coordination Grossly Intact] : coordination grossly intact [No Focal Deficits] : no focal deficits [Normal Gait] : normal gait [Deep Tendon Reflexes (DTR)] : deep tendon reflexes were 2+ and symmetric [Normal Affect] : the affect was normal [Normal Insight/Judgement] : insight and judgment were intact [Normal Voice/Communication] : normal voice/communication [Normal TMs] : both tympanic membranes were normal [Normal Supraclavicular Nodes] : no supraclavicular lymphadenopathy [Speech Grossly Normal] : speech grossly normal [Memory Grossly Normal] : memory grossly normal [Alert and Oriented x3] : oriented to person, place, and time [Normal Mood] : the mood was normal [de-identified] : obese

## 2022-06-26 NOTE — ADDENDUM
[FreeTextEntry1] : I, Nathaniel Carrillo, acted solely as scribe for Dr. Tejas Liu DO on this date 06/24/2022  9:10AM .\par \par All medical record entries made by the Scribe were at my, Dr. Tejas Liu DO direction and personally dictated by me on 06/24/2022  9:10AM. I have reviewed the chart and agree that the record accurately reflects my personal performance of the history, physical exam, assessment and plan. I have also personally directed, reviewed and agreed with the chart.

## 2022-06-26 NOTE — ASSESSMENT
[FreeTextEntry1] : Patient is a 67 year old female with a past medical history as above who presents for blood work, Rx refill, and general follow-up.

## 2022-07-22 ENCOUNTER — NON-APPOINTMENT (OUTPATIENT)
Age: 67
End: 2022-07-22

## 2022-07-22 ENCOUNTER — APPOINTMENT (OUTPATIENT)
Dept: INTERNAL MEDICINE | Facility: CLINIC | Age: 67
End: 2022-07-22

## 2022-07-23 ENCOUNTER — TRANSCRIPTION ENCOUNTER (OUTPATIENT)
Age: 67
End: 2022-07-23

## 2022-07-29 ENCOUNTER — APPOINTMENT (OUTPATIENT)
Dept: INTERNAL MEDICINE | Facility: CLINIC | Age: 67
End: 2022-07-29

## 2022-08-12 ENCOUNTER — APPOINTMENT (OUTPATIENT)
Dept: INTERNAL MEDICINE | Facility: CLINIC | Age: 67
End: 2022-08-12

## 2022-08-19 ENCOUNTER — RX RENEWAL (OUTPATIENT)
Age: 67
End: 2022-08-19

## 2022-08-19 ENCOUNTER — APPOINTMENT (OUTPATIENT)
Dept: INTERNAL MEDICINE | Facility: CLINIC | Age: 67
End: 2022-08-19

## 2022-08-19 VITALS
HEART RATE: 77 BPM | OXYGEN SATURATION: 98 % | TEMPERATURE: 96.3 F | BODY MASS INDEX: 38.32 KG/M2 | SYSTOLIC BLOOD PRESSURE: 120 MMHG | WEIGHT: 230 LBS | HEIGHT: 65 IN | DIASTOLIC BLOOD PRESSURE: 80 MMHG

## 2022-08-19 LAB
BILIRUB UR QL STRIP: NEGATIVE
GLUCOSE UR-MCNC: NEGATIVE
HCG UR QL: 0.2 EU/DL
HGB UR QL STRIP.AUTO: NORMAL
INR PPP: 1.4 RATIO
KETONES UR-MCNC: NEGATIVE
LEUKOCYTE ESTERASE UR QL STRIP: NEGATIVE
NITRITE UR QL STRIP: POSITIVE
PH UR STRIP: 5
POCT-PROTHROMBIN TIME: 17 SECS
PROT UR STRIP-MCNC: NEGATIVE
QUALITY CONTROL: YES
SP GR UR STRIP: >=1.03

## 2022-08-19 PROCEDURE — 99214 OFFICE O/P EST MOD 30 MIN: CPT | Mod: 25

## 2022-08-19 PROCEDURE — 85610 PROTHROMBIN TIME: CPT | Mod: QW

## 2022-08-19 PROCEDURE — 81003 URINALYSIS AUTO W/O SCOPE: CPT | Mod: QW

## 2022-08-19 NOTE — HEALTH RISK ASSESSMENT
[Good] : ~his/her~  mood as  good [Former] : Former [Yes] : Yes [4 or more  times a week (4 pts)] : 4 or more  times a week (4 points) [1 or 2 (0 pts)] : 1 or 2 (0 points) [Never (0 pts)] : Never (0 points) [No] : In the past 12 months have you used drugs other than those required for medical reasons? No [0] : 2) Feeling down, depressed, or hopeless: Not at all (0) [PHQ-2 Negative - No further assessment needed] : PHQ-2 Negative - No further assessment needed [de-identified] : less 1/2 PPD  [YearQuit] : 2002 [[id number]] : 0

## 2022-08-19 NOTE — PLAN
[FreeTextEntry1] : Continue medications \par INR 1.4  Warfarin 7.5 mg tonight the 5 mg for 3 days the 2.mg 5 2.5 repeat 1 week \par start Macrobid

## 2022-08-19 NOTE — HISTORY OF PRESENT ILLNESS
[FreeTextEntry1] : new patient here to establish care  [de-identified] : JACKSON CARDOZA is a 67 year old F who presents today for frequency dysuria \par needs INR \par has HTN

## 2022-08-20 LAB
APPEARANCE: ABNORMAL
BACTERIA: ABNORMAL
BILIRUBIN URINE: NEGATIVE
BLOOD URINE: NEGATIVE
COLOR: YELLOW
GLUCOSE QUALITATIVE U: NEGATIVE
HYALINE CASTS: 0 /LPF
KETONES URINE: NEGATIVE
LEUKOCYTE ESTERASE URINE: NEGATIVE
MICROSCOPIC-UA: NORMAL
NITRITE URINE: POSITIVE
PH URINE: 5
PROTEIN URINE: NEGATIVE
RED BLOOD CELLS URINE: 1 /HPF
SPECIFIC GRAVITY URINE: 1.02
SQUAMOUS EPITHELIAL CELLS: 8 /HPF
UROBILINOGEN URINE: NORMAL
WHITE BLOOD CELLS URINE: 5 /HPF

## 2022-08-24 NOTE — ASSESSMENT
The physician was paged.   [FreeTextEntry1] : A 64-year-old female with degenerative joint disease, insomnia, obesity and sleep apnea presents to the office for INR check

## 2022-08-26 ENCOUNTER — APPOINTMENT (OUTPATIENT)
Dept: INTERNAL MEDICINE | Facility: CLINIC | Age: 67
End: 2022-08-26

## 2022-08-30 RX ORDER — BENZONATATE 200 MG/1
200 CAPSULE ORAL 3 TIMES DAILY
Qty: 30 | Refills: 0 | Status: COMPLETED | COMMUNITY
Start: 2022-08-30 | End: 2022-09-09

## 2022-08-30 RX ORDER — HYDROCODONE BITARTRATE AND HOMATROPINE METHYLBROMIDE 5; 1.5 MG/5ML; MG/5ML
5-1.5 SYRUP ORAL
Qty: 150 | Refills: 0 | Status: COMPLETED | COMMUNITY
Start: 2017-05-05 | End: 2021-10-22

## 2022-09-02 ENCOUNTER — APPOINTMENT (OUTPATIENT)
Dept: INTERNAL MEDICINE | Facility: CLINIC | Age: 67
End: 2022-09-02

## 2022-09-02 VITALS
TEMPERATURE: 98.9 F | WEIGHT: 225 LBS | OXYGEN SATURATION: 96 % | DIASTOLIC BLOOD PRESSURE: 76 MMHG | HEART RATE: 75 BPM | RESPIRATION RATE: 16 BRPM | HEIGHT: 65 IN | SYSTOLIC BLOOD PRESSURE: 118 MMHG | BODY MASS INDEX: 37.49 KG/M2

## 2022-09-02 DIAGNOSIS — U07.1 COVID-19: ICD-10-CM

## 2022-09-02 LAB
INR PPP: 1.9 RATIO
POCT-PROTHROMBIN TIME: 22.4 SECS

## 2022-09-02 PROCEDURE — 99212 OFFICE O/P EST SF 10 MIN: CPT | Mod: CS,25

## 2022-09-02 PROCEDURE — 85610 PROTHROMBIN TIME: CPT | Mod: QW

## 2022-09-05 NOTE — ADDENDUM
[FreeTextEntry1] : Physical exam \par Head:  Atraumatic normal cephalic\par Eyes; PERRLA negative icterus\par Neck: Supple negative JVD\par Speech with normal no respiratory distress noted.\par Heart: S1-S2 regular\par Lungs; clear to auscultation without wheezing rhonchi or rales.  No use of  muscles\par Extremity negative edema\par \par plan \par COVID 1 9 infection advised patient to continue with Tylenol as needed for fever and chills.\par Continue with Tessalon Perls 1 tab 3 times daily as needed cough.\par Continue with Paxvoild\par Return to office next week for an INR check Continue with current  warfarin dose \par Advised patient if feeling short of breath respiratory distress chest pain feel like she is going to pass out to go to the emergency room immediately.\par

## 2022-09-05 NOTE — HISTORY OF PRESENT ILLNESS
[Patient presents to the office today for COVID-19 evaluation and testing.] : Patient presents to the office today for COVID-19 evaluation and testing. [FreeTextEntry1] : A 67-year-old female, who presents to the office today for follow-up of COVID 1 9 infection.  Patient states she went to an urgent care center over the weekend  and was diagnosed with COVID 1 9.  Started Paxvolid 3 days ago and is here for INR check.  States she is not taking her atorvastatin as discussed prior to statin the antiviral medication.  Patient completed COVID-vaccine

## 2022-09-09 ENCOUNTER — APPOINTMENT (OUTPATIENT)
Dept: INTERNAL MEDICINE | Facility: CLINIC | Age: 67
End: 2022-09-09

## 2022-09-09 ENCOUNTER — RESULT REVIEW (OUTPATIENT)
Age: 67
End: 2022-09-09

## 2022-09-09 VITALS
TEMPERATURE: 97.9 F | BODY MASS INDEX: 44.17 KG/M2 | WEIGHT: 225 LBS | SYSTOLIC BLOOD PRESSURE: 138 MMHG | HEIGHT: 60 IN | DIASTOLIC BLOOD PRESSURE: 83 MMHG

## 2022-09-09 PROCEDURE — 36415 COLL VENOUS BLD VENIPUNCTURE: CPT

## 2022-09-09 PROCEDURE — 99214 OFFICE O/P EST MOD 30 MIN: CPT | Mod: 25

## 2022-09-11 NOTE — COUNSELING
[Behavioral health counseling provided] : Behavioral health counseling provided [Engage in a relaxing activity] : Engage in a relaxing activity [AUDIT-C Screening administered and reviewed] : AUDIT-C Screening administered and reviewed [Potential consequences of obesity discussed] : Potential consequences of obesity discussed [Encouraged to maintain food diary] : Encouraged to maintain food diary [Encouraged to increase physical activity] : Encouraged to increase physical activity [Good understanding] : Patient has a good understanding of disease, goals and obesity follow-up plan [Weight management counseling provided] : Weight management [Healthy eating counseling provided] : healthy eating [Fall prevention counseling provided] : fall prevention  [Needs reinforcement, provided] : Patient needs reinforcement on understanding of disease, goals and obesity follow-up plan; reinforcement was provided [Low Fat Diet] : Low fat diet [Low Salt Diet] : Low salt diet [Decrease Portions] : Decrease food portions [Patient motivation] : Patient motivation [None] : None [FreeTextEntry2] : 1600 nicole diet  increase exercise  [de-identified] : 1500 nicole diet \par Advised to walk daily for a goal of 10,000 steps \par Advised PT for B/l Knee replacemment

## 2022-09-11 NOTE — HISTORY OF PRESENT ILLNESS
[FreeTextEntry1] : LLQ abd pain,  medication renewal, COVID evaluation [de-identified] : A 67-year-old female, who presents to the office for medication renewal, repeat INR, follow-up secondary to recent COVID-19 infection.  And left lower  quadrant pain.\par  patient states she has been having left lower quadrant pain.  Thinks it started back on August 19 when she saw Dr. Shepherd.   patient states she was diagnosed with a urinary tract infection.  Was given antibiotics and seem to help.  Few days ago noticed the pain restarting.  Denies pain with urination.  Does have left lower quadrant discomfort.  Patient states her bowel habits are normal.  Have not noticed any blood in her stools.  Denies any change in her appetite.\par   Patient also presents for reevaluation of her COVID 19.  Did complete the course of Paxvolid  which seems to help.    Denies any recent fever, or cough.  Did return back to work.    Restarted her medication.  \par   Also need a repeat PT/INR.\par   Requesting renewal of her pain medication    For knee and back pain.    Also noted help with the left   sided abdominal pain medication.    denies any side effects to current medication regimen.  Does not share her medication.  \par   Also requesting a renewal of Ambien which she uses for chronic insomnia.    Patient denies any early  morning somnolence.

## 2022-09-11 NOTE — HEALTH RISK ASSESSMENT
[Yes] : Yes [4 or more  times a week (4 pts)] : 4 or more  times a week (4 points) [1 or 2 (0 pts)] : 1 or 2 (0 points) [Never (0 pts)] : Never (0 points) [No] : In the past 12 months have you used drugs other than those required for medical reasons? No [No falls in past year] : Patient reported no falls in the past year [0] : 1) Little interest or pleasure doing things: Not at all (0) [1] : 2) Feeling down, depressed, or hopeless for several days (1) [de-identified] : quit [YearQuit] : 2000 [Audit-CScore] : 4 [de-identified] : walking at work [de-identified] : regular [[id number]] : 1

## 2022-09-11 NOTE — REVIEW OF SYSTEMS
[Joint Pain] : joint pain [Back Pain] : back pain [Insomnia] : insomnia [Anxiety] : anxiety [Negative] : Heme/Lymph [Fever] : no fever [Chills] : no chills [Fatigue] : no fatigue [Recent Change In Weight] : ~T no recent weight change [Nosebleed] : no nosebleeds [Nasal Discharge] : no nasal discharge [Sore Throat] : no sore throat [Postnasal Drip] : no postnasal drip [Chest Pain] : no chest pain [Palpitations] : no palpitations [Shortness Of Breath] : no shortness of breath [Cough] : no cough [Abdominal Pain] : abdominal pain [Nausea] : no nausea [Constipation] : no constipation [Diarrhea] : diarrhea [Vomiting] : no vomiting [Heartburn] : no heartburn [Melena] : no melena [Dysuria] : no dysuria [Incontinence] : no incontinence [Joint Stiffness] : no joint stiffness [Muscle Pain] : no muscle pain [Itching] : no itching [Mole Changes] : no mole changes [Skin Rash] : no skin rash [Headache] : no headache [Dizziness] : no dizziness [Memory Loss] : no memory loss [FreeTextEntry7] :   Abdominal discomfort left lower quadrant [FreeTextEntry9] : knee pain   [de-identified] : stressed

## 2022-09-11 NOTE — PLAN
[FreeTextEntry1] :  gastro-left lower quadrant pain.  Check CBC, comprehensive metabolic and urine analysis.  Advised patient to get a CT scan of her abdomen and pelvis.  Start Augmentin as directed.  Advised patient if she develops fever or abdominal pain gets worse to go to the emergency room.  Also advised patient clear liquid diet\par \par Heme -Anticardiolipin Ab-  INR  2.0    - Advised to take an  1/2 warfarin tonight than  Continue with   Warfarin 2.5 mg daily except Mondays and Wednesday 5 mg.    check INR 14 days .  -  Monitor for abnormal bleeding.\par \par I&D  -  1 9 infection.  Patient status post quarantine.  Asymptomatic after 7 days.\par \par Cardio - CAD and hyperlipidemia --Continue with current medication. Discussed the importance of stress reduction and weight loss.  \par \par Cardio - Hypertension -  Patient was educated about hypertension and the importance of controlling the pressure through lifestyle modification which include low sodium  diet and  aerobic  exercise.  Also discussed the use of prescription medication which included their benefits and their side effects. We discussed the use of ASA 81 mg daily.   Having a BMI less than or equal to 25.  Continue losartan 75 mg daily \par \par  Endocrinology  -  Obesity   Patient was educated about the importance of diet and exercise.   We discussed  a goal of a BMI near 25.   Advised weight loss would help the knee and back pain \par \par Orthopedic -- spinal stenosis- DJD lumbar spine -degenerative joint disease bilateral knees status post total knee replacement.  - Recent exacerbation of pain- Continue with   Vicodin  but increase  dispense amount advised to only take for sever pain. \par advised risk of dependancy.   Continue hydrocodone - reviewed \par Advised not to share the medication  Advised risk of dependancy and abuse \par \par Neurology-insomnia - Continue  Ambien. Advise risk alternatives benefits and side effects of medication.   Advise patient risk of taking sleep medication secondary to have an obstructive sleep apnea.  \par Advised to read pack insert - Advised not to use alcohol  \par Reviewed   refilled medication \par \par I spent  30  Minutes with the patient, half of which we discussed finding on physical exam  and coordinated care.  As well as reviewed my plans and follow ups.

## 2022-09-11 NOTE — PHYSICAL EXAM
[No Acute Distress] : no acute distress [Well Nourished] : well nourished [Well Developed] : well developed [Well-Appearing] : well-appearing [Normal Sclera/Conjunctiva] : normal sclera/conjunctiva [PERRL] : pupils equal round and reactive to light [EOMI] : extraocular movements intact [Normal Outer Ear/Nose] : the outer ears and nose were normal in appearance [Normal Oropharynx] : the oropharynx was normal [Normal TMs] : both tympanic membranes were normal [Normal Nasal Mucosa] : the nasal mucosa was normal [No JVD] : no jugular venous distention [Supple] : supple [No Lymphadenopathy] : no lymphadenopathy [Thyroid Normal, No Nodules] : the thyroid was normal and there were no nodules present [No Respiratory Distress] : no respiratory distress  [Clear to Auscultation] : lungs were clear to auscultation bilaterally [No Accessory Muscle Use] : no accessory muscle use [Normal Rate] : normal rate  [Regular Rhythm] : with a regular rhythm [Normal S1, S2] : normal S1 and S2 [No Carotid Bruits] : no carotid bruits [No Abdominal Bruit] : a ~M bruit was not heard ~T in the abdomen [Pedal Pulses Present] : the pedal pulses are present [No Edema] : there was no peripheral edema [No Extremity Clubbing/Cyanosis] : no extremity clubbing/cyanosis [No Palpable Aorta] : no palpable aorta [Obese] : obese [No CVA Tenderness] : no CVA  tenderness [No Rash] : no rash [Normal Gait] : normal gait [Coordination Grossly Intact] : coordination grossly intact [No Focal Deficits] : no focal deficits [Deep Tendon Reflexes (DTR)] : deep tendon reflexes were 2+ and symmetric [Speech Grossly Normal] : speech grossly normal [Memory Grossly Normal] : memory grossly normal [Normal Affect] : the affect was normal [Alert and Oriented x3] : oriented to person, place, and time [Normal Mood] : the mood was normal [Normal Insight/Judgement] : insight and judgment were intact [Non-distended] : non-distended [No Masses] : no abdominal mass palpated [No HSM] : no HSM [Normal Bowel Sounds] : normal bowel sounds [Declined Rectal Exam] : declined rectal exam [de-identified] : obese  [de-identified] : with murmur  [de-identified] :  left lower quadrant discomfort.  Nondistended no rebound tenderness noted [de-identified] : left knee tenderness  [de-identified] : no bruising noted

## 2022-09-11 NOTE — ASSESSMENT
[FreeTextEntry1] : A  67-year-old female with acute anxiety, degenerative joint disease, JANINA, insomnia, obesity present  to the office for INR check, and renewal of medications  and evaluation of lower abdominal pain

## 2022-09-12 ENCOUNTER — NON-APPOINTMENT (OUTPATIENT)
Age: 67
End: 2022-09-12

## 2022-09-12 LAB
ALBUMIN SERPL ELPH-MCNC: 4.8 G/DL
ALP BLD-CCNC: 82 U/L
ALT SERPL-CCNC: 17 U/L
ANION GAP SERPL CALC-SCNC: 13 MMOL/L
APPEARANCE: CLEAR
AST SERPL-CCNC: 22 U/L
BASOPHILS # BLD AUTO: 0.07 K/UL
BASOPHILS NFR BLD AUTO: 0.7 %
BILIRUB SERPL-MCNC: 0.4 MG/DL
BILIRUBIN URINE: NEGATIVE
BLOOD URINE: NEGATIVE
BUN SERPL-MCNC: 19 MG/DL
CALCIUM SERPL-MCNC: 9.7 MG/DL
CHLORIDE SERPL-SCNC: 101 MMOL/L
CO2 SERPL-SCNC: 24 MMOL/L
COLOR: YELLOW
CREAT SERPL-MCNC: 0.81 MG/DL
EGFR: 80 ML/MIN/1.73M2
EOSINOPHIL # BLD AUTO: 0.17 K/UL
EOSINOPHIL NFR BLD AUTO: 1.8 %
GLUCOSE QUALITATIVE U: NEGATIVE
GLUCOSE SERPL-MCNC: 99 MG/DL
HCT VFR BLD CALC: 40.3 %
HGB BLD-MCNC: 12.4 G/DL
IMM GRANULOCYTES NFR BLD AUTO: 0.2 %
KETONES URINE: NEGATIVE
LEUKOCYTE ESTERASE URINE: NEGATIVE
LYMPHOCYTES # BLD AUTO: 2.94 K/UL
LYMPHOCYTES NFR BLD AUTO: 31.3 %
MAN DIFF?: NORMAL
MCHC RBC-ENTMCNC: 30.4 PG
MCHC RBC-ENTMCNC: 30.8 GM/DL
MCV RBC AUTO: 98.8 FL
MONOCYTES # BLD AUTO: 0.97 K/UL
MONOCYTES NFR BLD AUTO: 10.3 %
NEUTROPHILS # BLD AUTO: 5.21 K/UL
NEUTROPHILS NFR BLD AUTO: 55.7 %
NITRITE URINE: NEGATIVE
PH URINE: 6
PLATELET # BLD AUTO: 309 K/UL
POTASSIUM SERPL-SCNC: 4.3 MMOL/L
PROT SERPL-MCNC: 7.5 G/DL
PROTEIN URINE: NEGATIVE
RBC # BLD: 4.08 M/UL
RBC # FLD: 12 %
SODIUM SERPL-SCNC: 138 MMOL/L
SPECIFIC GRAVITY URINE: >=1.03
UROBILINOGEN URINE: NORMAL
WBC # FLD AUTO: 9.38 K/UL

## 2022-09-13 ENCOUNTER — APPOINTMENT (OUTPATIENT)
Dept: CT IMAGING | Facility: CLINIC | Age: 67
End: 2022-09-13

## 2022-09-13 PROCEDURE — 74176 CT ABD & PELVIS W/O CONTRAST: CPT

## 2022-09-16 ENCOUNTER — NON-APPOINTMENT (OUTPATIENT)
Age: 67
End: 2022-09-16

## 2022-09-30 ENCOUNTER — NON-APPOINTMENT (OUTPATIENT)
Age: 67
End: 2022-09-30

## 2022-10-04 ENCOUNTER — INPATIENT (INPATIENT)
Facility: HOSPITAL | Age: 67
LOS: 2 days | Discharge: HOME | DRG: 392 | End: 2022-10-07
Attending: SURGERY | Admitting: SURGERY
Payer: MEDICARE

## 2022-10-04 ENCOUNTER — APPOINTMENT (OUTPATIENT)
Dept: SURGERY | Facility: CLINIC | Age: 67
End: 2022-10-04

## 2022-10-04 VITALS
RESPIRATION RATE: 18 BRPM | HEART RATE: 86 BPM | TEMPERATURE: 97 F | HEIGHT: 65 IN | WEIGHT: 199.96 LBS | DIASTOLIC BLOOD PRESSURE: 98 MMHG | SYSTOLIC BLOOD PRESSURE: 163 MMHG | OXYGEN SATURATION: 97 %

## 2022-10-04 VITALS
OXYGEN SATURATION: 99 % | DIASTOLIC BLOOD PRESSURE: 98 MMHG | HEART RATE: 98 BPM | SYSTOLIC BLOOD PRESSURE: 158 MMHG | BODY MASS INDEX: 37.49 KG/M2 | WEIGHT: 225 LBS | TEMPERATURE: 98 F | HEIGHT: 65 IN

## 2022-10-04 DIAGNOSIS — Z90.710 ACQUIRED ABSENCE OF BOTH CERVIX AND UTERUS: Chronic | ICD-10-CM

## 2022-10-04 DIAGNOSIS — Z98.890 OTHER SPECIFIED POSTPROCEDURAL STATES: Chronic | ICD-10-CM

## 2022-10-04 DIAGNOSIS — K57.92 DIVERTICULITIS OF INTESTINE, PART UNSPECIFIED, WITHOUT PERFORATION OR ABSCESS WITHOUT BLEEDING: ICD-10-CM

## 2022-10-04 DIAGNOSIS — Z96.659 PRESENCE OF UNSPECIFIED ARTIFICIAL KNEE JOINT: Chronic | ICD-10-CM

## 2022-10-04 DIAGNOSIS — K43.9 VENTRAL HERNIA W/OUT OBSTRUCTION OR GANGRENE: ICD-10-CM

## 2022-10-04 DIAGNOSIS — Z96.651 PRESENCE OF RIGHT ARTIFICIAL KNEE JOINT: Chronic | ICD-10-CM

## 2022-10-04 LAB
ALBUMIN SERPL ELPH-MCNC: 3.9 G/DL — SIGNIFICANT CHANGE UP (ref 3.3–5)
ALP SERPL-CCNC: 94 U/L — SIGNIFICANT CHANGE UP (ref 40–120)
ALT FLD-CCNC: 37 U/L — SIGNIFICANT CHANGE UP (ref 12–78)
ANION GAP SERPL CALC-SCNC: 5 MMOL/L — SIGNIFICANT CHANGE UP (ref 5–17)
APPEARANCE UR: CLEAR — SIGNIFICANT CHANGE UP
APTT BLD: 60.8 SEC — HIGH (ref 27.5–35.5)
AST SERPL-CCNC: 25 U/L — SIGNIFICANT CHANGE UP (ref 15–37)
BASOPHILS # BLD AUTO: 0.04 K/UL — SIGNIFICANT CHANGE UP (ref 0–0.2)
BASOPHILS NFR BLD AUTO: 0.5 % — SIGNIFICANT CHANGE UP (ref 0–2)
BILIRUB SERPL-MCNC: 0.5 MG/DL — SIGNIFICANT CHANGE UP (ref 0.2–1.2)
BILIRUB UR-MCNC: NEGATIVE — SIGNIFICANT CHANGE UP
BUN SERPL-MCNC: 13 MG/DL — SIGNIFICANT CHANGE UP (ref 7–23)
CALCIUM SERPL-MCNC: 9.4 MG/DL — SIGNIFICANT CHANGE UP (ref 8.5–10.1)
CHLORIDE SERPL-SCNC: 109 MMOL/L — HIGH (ref 96–108)
CO2 SERPL-SCNC: 27 MMOL/L — SIGNIFICANT CHANGE UP (ref 22–31)
COLOR SPEC: YELLOW — SIGNIFICANT CHANGE UP
CREAT SERPL-MCNC: 0.78 MG/DL — SIGNIFICANT CHANGE UP (ref 0.5–1.3)
DIFF PNL FLD: ABNORMAL
EGFR: 83 ML/MIN/1.73M2 — SIGNIFICANT CHANGE UP
EOSINOPHIL # BLD AUTO: 0.12 K/UL — SIGNIFICANT CHANGE UP (ref 0–0.5)
EOSINOPHIL NFR BLD AUTO: 1.5 % — SIGNIFICANT CHANGE UP (ref 0–6)
GLUCOSE SERPL-MCNC: 105 MG/DL — HIGH (ref 70–99)
GLUCOSE UR QL: NEGATIVE — SIGNIFICANT CHANGE UP
HCT VFR BLD CALC: 37.9 % — SIGNIFICANT CHANGE UP (ref 34.5–45)
HGB BLD-MCNC: 12.3 G/DL — SIGNIFICANT CHANGE UP (ref 11.5–15.5)
IMM GRANULOCYTES NFR BLD AUTO: 0.4 % — SIGNIFICANT CHANGE UP (ref 0–0.9)
INR BLD: 4.65 RATIO — HIGH (ref 0.88–1.16)
KETONES UR-MCNC: NEGATIVE — SIGNIFICANT CHANGE UP
LEUKOCYTE ESTERASE UR-ACNC: ABNORMAL
LIDOCAIN IGE QN: 179 U/L — SIGNIFICANT CHANGE UP (ref 73–393)
LYMPHOCYTES # BLD AUTO: 1.8 K/UL — SIGNIFICANT CHANGE UP (ref 1–3.3)
LYMPHOCYTES # BLD AUTO: 22.6 % — SIGNIFICANT CHANGE UP (ref 13–44)
MCHC RBC-ENTMCNC: 30.8 PG — SIGNIFICANT CHANGE UP (ref 27–34)
MCHC RBC-ENTMCNC: 32.5 GM/DL — SIGNIFICANT CHANGE UP (ref 32–36)
MCV RBC AUTO: 94.8 FL — SIGNIFICANT CHANGE UP (ref 80–100)
MONOCYTES # BLD AUTO: 0.76 K/UL — SIGNIFICANT CHANGE UP (ref 0–0.9)
MONOCYTES NFR BLD AUTO: 9.6 % — SIGNIFICANT CHANGE UP (ref 2–14)
NEUTROPHILS # BLD AUTO: 5.2 K/UL — SIGNIFICANT CHANGE UP (ref 1.8–7.4)
NEUTROPHILS NFR BLD AUTO: 65.4 % — SIGNIFICANT CHANGE UP (ref 43–77)
NITRITE UR-MCNC: NEGATIVE — SIGNIFICANT CHANGE UP
NRBC # BLD: 0 /100 WBCS — SIGNIFICANT CHANGE UP (ref 0–0)
PH UR: 5 — SIGNIFICANT CHANGE UP (ref 5–8)
PLATELET # BLD AUTO: 211 K/UL — SIGNIFICANT CHANGE UP (ref 150–400)
POTASSIUM SERPL-MCNC: 4 MMOL/L — SIGNIFICANT CHANGE UP (ref 3.5–5.3)
POTASSIUM SERPL-SCNC: 4 MMOL/L — SIGNIFICANT CHANGE UP (ref 3.5–5.3)
PROT SERPL-MCNC: 7.9 G/DL — SIGNIFICANT CHANGE UP (ref 6–8.3)
PROT UR-MCNC: NEGATIVE — SIGNIFICANT CHANGE UP
PROTHROM AB SERPL-ACNC: 55.2 SEC — HIGH (ref 10.5–13.4)
RBC # BLD: 4 M/UL — SIGNIFICANT CHANGE UP (ref 3.8–5.2)
RBC # FLD: 12.6 % — SIGNIFICANT CHANGE UP (ref 10.3–14.5)
SARS-COV-2 RNA SPEC QL NAA+PROBE: SIGNIFICANT CHANGE UP
SODIUM SERPL-SCNC: 141 MMOL/L — SIGNIFICANT CHANGE UP (ref 135–145)
SP GR SPEC: 1.01 — SIGNIFICANT CHANGE UP (ref 1.01–1.02)
UROBILINOGEN FLD QL: NEGATIVE — SIGNIFICANT CHANGE UP
WBC # BLD: 7.95 K/UL — SIGNIFICANT CHANGE UP (ref 3.8–10.5)
WBC # FLD AUTO: 7.95 K/UL — SIGNIFICANT CHANGE UP (ref 3.8–10.5)

## 2022-10-04 PROCEDURE — 99204 OFFICE O/P NEW MOD 45 MIN: CPT

## 2022-10-04 PROCEDURE — 74177 CT ABD & PELVIS W/CONTRAST: CPT | Mod: 26,MG

## 2022-10-04 PROCEDURE — G1004: CPT

## 2022-10-04 PROCEDURE — 99223 1ST HOSP IP/OBS HIGH 75: CPT | Mod: GC

## 2022-10-04 PROCEDURE — 93010 ELECTROCARDIOGRAM REPORT: CPT

## 2022-10-04 PROCEDURE — 99285 EMERGENCY DEPT VISIT HI MDM: CPT

## 2022-10-04 RX ORDER — ZOLPIDEM TARTRATE 10 MG/1
5 TABLET ORAL AT BEDTIME
Refills: 0 | Status: DISCONTINUED | OUTPATIENT
Start: 2022-10-04 | End: 2022-10-07

## 2022-10-04 RX ORDER — LANOLIN ALCOHOL/MO/W.PET/CERES
5 CREAM (GRAM) TOPICAL AT BEDTIME
Refills: 0 | Status: DISCONTINUED | OUTPATIENT
Start: 2022-10-04 | End: 2022-10-07

## 2022-10-04 RX ORDER — LOSARTAN POTASSIUM 100 MG/1
50 TABLET, FILM COATED ORAL DAILY
Refills: 0 | Status: DISCONTINUED | OUTPATIENT
Start: 2022-10-04 | End: 2022-10-07

## 2022-10-04 RX ORDER — INFLUENZA VIRUS VACCINE 15; 15; 15; 15 UG/.5ML; UG/.5ML; UG/.5ML; UG/.5ML
0.7 SUSPENSION INTRAMUSCULAR ONCE
Refills: 0 | Status: DISCONTINUED | OUTPATIENT
Start: 2022-10-04 | End: 2022-10-07

## 2022-10-04 RX ORDER — ACETAMINOPHEN 500 MG
325 TABLET ORAL EVERY 4 HOURS
Refills: 0 | Status: DISCONTINUED | OUTPATIENT
Start: 2022-10-04 | End: 2022-10-05

## 2022-10-04 RX ORDER — PIPERACILLIN AND TAZOBACTAM 4; .5 G/20ML; G/20ML
3.38 INJECTION, POWDER, LYOPHILIZED, FOR SOLUTION INTRAVENOUS ONCE
Refills: 0 | Status: COMPLETED | OUTPATIENT
Start: 2022-10-04 | End: 2022-10-04

## 2022-10-04 RX ORDER — SODIUM CHLORIDE 9 MG/ML
1000 INJECTION INTRAMUSCULAR; INTRAVENOUS; SUBCUTANEOUS
Refills: 0 | Status: DISCONTINUED | OUTPATIENT
Start: 2022-10-04 | End: 2022-10-07

## 2022-10-04 RX ORDER — PANTOPRAZOLE SODIUM 20 MG/1
40 TABLET, DELAYED RELEASE ORAL DAILY
Refills: 0 | Status: DISCONTINUED | OUTPATIENT
Start: 2022-10-04 | End: 2022-10-07

## 2022-10-04 RX ORDER — KETOROLAC TROMETHAMINE 30 MG/ML
30 SYRINGE (ML) INJECTION ONCE
Refills: 0 | Status: DISCONTINUED | OUTPATIENT
Start: 2022-10-04 | End: 2022-10-04

## 2022-10-04 RX ORDER — DIATRIZOATE MEGLUMINE 180 MG/ML
30 INJECTION, SOLUTION INTRAVESICAL ONCE
Refills: 0 | Status: COMPLETED | OUTPATIENT
Start: 2022-10-04 | End: 2022-10-04

## 2022-10-04 RX ORDER — PIPERACILLIN AND TAZOBACTAM 4; .5 G/20ML; G/20ML
3.38 INJECTION, POWDER, LYOPHILIZED, FOR SOLUTION INTRAVENOUS ONCE
Refills: 0 | Status: COMPLETED | OUTPATIENT
Start: 2022-10-05 | End: 2022-10-05

## 2022-10-04 RX ORDER — ONDANSETRON 8 MG/1
4 TABLET, FILM COATED ORAL EVERY 6 HOURS
Refills: 0 | Status: DISCONTINUED | OUTPATIENT
Start: 2022-10-04 | End: 2022-10-07

## 2022-10-04 RX ORDER — SODIUM CHLORIDE 9 MG/ML
1000 INJECTION INTRAMUSCULAR; INTRAVENOUS; SUBCUTANEOUS ONCE
Refills: 0 | Status: COMPLETED | OUTPATIENT
Start: 2022-10-04 | End: 2022-10-04

## 2022-10-04 RX ORDER — ONDANSETRON 8 MG/1
4 TABLET, FILM COATED ORAL ONCE
Refills: 0 | Status: COMPLETED | OUTPATIENT
Start: 2022-10-04 | End: 2022-10-04

## 2022-10-04 RX ORDER — MORPHINE SULFATE 50 MG/1
4 CAPSULE, EXTENDED RELEASE ORAL ONCE
Refills: 0 | Status: DISCONTINUED | OUTPATIENT
Start: 2022-10-04 | End: 2022-10-04

## 2022-10-04 RX ORDER — ACETAMINOPHEN 500 MG
1000 TABLET ORAL ONCE
Refills: 0 | Status: COMPLETED | OUTPATIENT
Start: 2022-10-04 | End: 2022-10-04

## 2022-10-04 RX ORDER — KETOROLAC TROMETHAMINE 30 MG/ML
15 SYRINGE (ML) INJECTION ONCE
Refills: 0 | Status: DISCONTINUED | OUTPATIENT
Start: 2022-10-04 | End: 2022-10-05

## 2022-10-04 RX ADMIN — Medication 400 MILLIGRAM(S): at 18:48

## 2022-10-04 RX ADMIN — SODIUM CHLORIDE 1000 MILLILITER(S): 9 INJECTION INTRAMUSCULAR; INTRAVENOUS; SUBCUTANEOUS at 15:48

## 2022-10-04 RX ADMIN — MORPHINE SULFATE 4 MILLIGRAM(S): 50 CAPSULE, EXTENDED RELEASE ORAL at 15:03

## 2022-10-04 RX ADMIN — DIATRIZOATE MEGLUMINE 30 MILLILITER(S): 180 INJECTION, SOLUTION INTRAVESICAL at 15:44

## 2022-10-04 RX ADMIN — ONDANSETRON 4 MILLIGRAM(S): 8 TABLET, FILM COATED ORAL at 14:48

## 2022-10-04 RX ADMIN — MORPHINE SULFATE 4 MILLIGRAM(S): 50 CAPSULE, EXTENDED RELEASE ORAL at 15:49

## 2022-10-04 RX ADMIN — MORPHINE SULFATE 4 MILLIGRAM(S): 50 CAPSULE, EXTENDED RELEASE ORAL at 15:34

## 2022-10-04 RX ADMIN — ZOLPIDEM TARTRATE 5 MILLIGRAM(S): 10 TABLET ORAL at 23:36

## 2022-10-04 RX ADMIN — Medication 30 MILLIGRAM(S): at 22:21

## 2022-10-04 RX ADMIN — SODIUM CHLORIDE 75 MILLILITER(S): 9 INJECTION INTRAMUSCULAR; INTRAVENOUS; SUBCUTANEOUS at 22:21

## 2022-10-04 RX ADMIN — SODIUM CHLORIDE 1000 MILLILITER(S): 9 INJECTION INTRAMUSCULAR; INTRAVENOUS; SUBCUTANEOUS at 14:46

## 2022-10-04 RX ADMIN — PIPERACILLIN AND TAZOBACTAM 200 GRAM(S): 4; .5 INJECTION, POWDER, LYOPHILIZED, FOR SOLUTION INTRAVENOUS at 18:47

## 2022-10-04 RX ADMIN — MORPHINE SULFATE 4 MILLIGRAM(S): 50 CAPSULE, EXTENDED RELEASE ORAL at 14:47

## 2022-10-04 NOTE — ED ADULT NURSE NOTE - NS ED NURSE PATIENT LEFT UNIT TIME
Past History


Past Medical History:  UTI, Other


Additional Past Medical Histor:  HEART MURMUR


 (JAIME TORIBIO)


Past Surgical History:  , Tubal ligation, Other


Additional Past Surgical Histo:  UMBRELLA IN HEART


 (JAIME TORIBIO)


Smoking:  Non-smoker


Alcohol Use:  None


Drug Use:  None


 (JAIME TORIBIO)





General Adult


EDM:


Chief Complaint:  CHEST PAIN





HPI:


HPI:





Patient is a 33-year-old female presents with right-sided chest pain with short 

of breath.  Patient states that she was taking a nap earlier and the pain woke 

her up.  "I feel like I keep getting hot flashes also".  Denies 

nausea/vomiting/diarrhea.


 (JAIME TORIBIO)





Review of Systems:


Review of Systems:


Constitutional:  Denies fever or chills 


Eyes:  Denies change in visual acuity 


HENT:  Denies nasal congestion or sore throat 


Respiratory: Reports short of breath


Cardiovascular: Reports right-sided chest pain


GI:  Denies abdominal pain, nausea, vomiting, bloody stools or diarrhea 


: Denies dysuria 


Musculoskeletal:  Denies back pain or joint pain 


Integument:  Denies rash 


Neurologic:  Denies headache, focal weakness or sensory changes 


Endocrine:  Denies polyuria or polydipsia 


Lymphatic:  Denies swollen glands 


Psychiatric:  Denies depression or anxiety


 (JAIME TORIBIO)





Allergies:


Allergies:





Allergies








Coded Allergies Type Severity Reaction Last Updated Verified


 


  hydrocodone Allergy Intermediate  21 Yes








 (JAIME TORIBIO)





Physical Exam:


PE:





Constitutional: Well developed, well nourished, no acute distress, non-toxic 

appearance. []


HENT: Normocephalic, atraumatic, bilateral external ears normal, oropharynx 

moist, no oral exudates, nose normal. []


Eyes: PERRLA, EOMI, conjunctiva normal, no discharge. [] 


Neck: Normal range of motion, no tenderness, supple, no stridor. [] 


Cardiovascular:Heart rate regular rhythm, no murmur []


Lungs & Thorax:  Bilateral breath sounds clear to auscultation []


Abdomen: Bowel sounds normal, soft, no tenderness, no masses, no pulsatile 

masses. [] 


Skin: Warm, dry, no erythema, no rash. [] 


Back: No tenderness, no CVA tenderness. [] 


Extremities: No tenderness, no cyanosis, no clubbing, ROM intact, no edema. [] 


Neurologic: Alert and oriented X 3, normal motor function, normal sensory 

function, no focal deficits noted. []


Psychologic: Affect normal, judgement normal, mood normal. []


 (JAIME TORIBIO)





EKG:


EKG:


[]Exam: Chest 2 views





INDICATION: Chest pain





TECHNIQUE: Frontal and lateral views the chest





Comparisons: None





FINDINGS:


The cardiomediastinal silhouette and pulmonary vessels are within normal limits.





The lung and pleural spaces are clear.





IMPRESSION:


No acute cardiopulmonary process.





Electronically signed by: Dawson Rothman MD (2021 9:11 PM) [facility]


 (JAIME TORIBIO)





Radiology/Procedures:


Radiology/Procedures:


[]


 (JAIME TORIBIO)





Heart Score:


C/O Chest Pain:  Yes


HEART Score for Chest Pain:  








HEART Score for Chest Pain Response (Comments) Value


 


History Moderately Suspicious 1


 


ECG Normal 0


 


Age < 45 0


 


Risk Factors No Risk Factors 0


 


Troponin < Normal Limit 0


 


Total  1








Risk Factors:


Risk Factors:  DM, Current or recent (<one month) smoker, HTN, HLP, family 

history of CAD, obesity.


Risk Scores:


Score 0 - 3:  2.5% MACE over next 6 weeks - Discharge Home


Score 4 - 6:  20.3% MACE over next 6 weeks - Admit for Clinical Observation


Score 7 - 10:  72.7% MACE over next 6 weeks - Early Invasive Strategies


 (JAIME TORIBIO)





Course & Med Decision Making:


Course & Med Decision Making


Pertinent Labs and Imaging studies reviewed. (See chart for details)





[] 33 female presents with right-sided chest pain and shortness of breath since 

1 PM today.  Patient states she was taking a nap and the pain in her chest woke 

her up.  So I also feel like I am getting hot flashes.  Denies radiation of 

pain.  Denies nausea/vomiting/diarrhea.  States that her grandma had heart 

problems and had an MI in her 40s.  Patient also states that when she was 16 she

 had something wrong with her heart but she cannot remember what it was.  

Patient's alert and oriented, hemodynamically stable.





All labs unremarkable.  Troponin is negative.  Chest x-ray is unremarkable.  Low

 risk Wells.  PERC score negative. Heart score 1.  No need for D-dimer.  UA is 

positive for infection.  Patient given Rocephin in the emergency room and 

prescription given for Keflex.  Discussed results with patient.  Explained to 

patient she needs to call her PCP tomorrow to make a follow-up appointment for 

stress test, echo, and possible heart monitor.  Patient given strict return 

precautions.  Patient states that she will follow up.  Denies pain at this time.

  Patient is hemodynamically stable upon disposition.


 (JAIME TORIBIO)


Course & Med Decision Making


Did not see or evaluate patient.  Agree with NP's work-up and disposition per 

note.


 (DARREN KIM MD)


Dragon Disclaimer:


Dragon Disclaimer:


This electronic medical record was generated, in whole or in part, using a voice

 recognition dictation system.


 (JAIME TORIBIO)





Departure


Departure:


Impression:  


   Primary Impression:  


   Chest pain


   Qualified Codes:  R07.1 - Chest pain on breathing


   Additional Impression:  


   SOB (shortness of breath)


Disposition:   HOME / SELF CARE / HOMELESS


Condition:  STABLE


Referrals:  


PCP,UNKNOWN (PCP)


Patient Instructions:  Chest Pain (Nonspecific), Easy-to-Read





Additional Instructions:  


You were seen the emergency room for chest pain or shortness of breath.  All of 

your labs were unremarkable.  Your EKG and chest x-ray were unremarkable.  

Please call your PCP to make a follow-up appointment for possible echo, stress 

test, and possibly a heart monitor.  If you have worsening symptoms or concerns 

please return to the emergency room.





EMERGENCY DEPARTMENT GENERAL DISCHARGE INSTRUCTIONS





Thank you for coming to [facility] (ED) today and trusting us

 with you 


care.  We trust that you had a positivie experience in our Emergency Department.

  If you 


wish to speak to the department management, you may call the director at 

[phone].





YOUR FOLLOW UP INSTRUCTIONS ARE AS FOLLOWS:





1.  Do you have a private Doctor?  If you do not have a private doctor, please 

ask for a 


resource list of physicians or clinics that may be able to assist you with 

follow up care.





2.  The Emergency Physician has interpreted your x-rays.  The X-Ray specialist 

will also 


review them.  If there is a change in the findings, you will be notified in 48 

hours when at 


all possible.





3.  A lab test or culture has been done, your results will be reviewed and you 

will be 


notified if you need a change in treatment.





ADDITIONAL INSTRUCTIONS AND INFORMATION:





1.  Your care today has been supervised by a physician who is specially trained 

in emergency 


care.  Many problems require more than one evaluation for a complete diagnosis 

and 


treatment.  We recommend that you schedule your follow up appointment as 

recommended to 


ensure complete treatment of you illness or injury.  If you are unable to obtain

 follow up 


care and continue to have a problem, or if your condition worsens, we recommend 

that you 


return to the ED.





2.  We are not able to safely determine your condition over the phone nor are we

 able to 


give sound medical advice over the phone.  For these safety reasons, if you call

 for medical 


advice we will ask you to come to the ED for further evaluation.





3.  If you have any questions regarding these discharge instructions please call

 the ED at 


[phone].





SAFETY INFORMATION:





In the interest of safety, wellness, and injury prevention; we encourage you to 

wear your 


sealbelt, if you smoke; quite smoking, and we encourage family to use a 

protective helmet 


for bicycling and other sporting events that present an increased risk for head 

injury.





IF YOUR SYMPTOMS WORSEN OR NEW SYMPTOMS DEVELOP, OR YOU HAVE CONCERNS ABOUT YOUR

 CONDITION; 


OR IF YOUR CONDITION WORSENS WHILE YOU ARE WAITING FOR YOUR FOLLOW UP 

APPOINTMENT; EITHER 


CONTACT YOUR PRIMARY CARE DOCTOR, THE PHYSICIAN WHOSE NAME AND NUMBER YOU WERE 

GIVEN, OR 


RETURN TO THE ED IMMEDIATELY.











JAIME TORIBIO               Aug 22, 2021 19:47


DARREN KIM MD               Aug 22, 2021 23:36 22:46

## 2022-10-04 NOTE — CONSULT NOTE ADULT - ASSESSMENT
67 year old female with PMHx of diverticulitis, antiphospholipid antibody syndrome, DVT/PE (2012 while on premarin and sedentary, maintained on coumadin), anemia admitted for sigmoid diverticulitis. 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

## 2022-10-04 NOTE — H&P ADULT - NSHPREVIEWOFSYSTEMS_GEN_ALL_CORE
REVIEW OF SYSTEMS:    CONSTITUTIONAL: No weakness, fevers or chills  EYES/ENT: No visual changes;  No vertigo or throat pain   NECK: No pain or stiffness  RESPIRATORY: No cough, wheezing, hemoptysis; No shortness of breath  CARDIOVASCULAR: No chest pain or palpitations  GASTROINTESTINAL: +abdominal pain, constipation. No diarrhea or constipation. No melena or hematochezia.  GENITOURINARY: No dysuria, frequency or hematuria  NEUROLOGICAL: No numbness or weakness  SKIN: No itching, burning, rashes, or lesions   All other review of systems is negative unless indicated above.

## 2022-10-04 NOTE — ED ADULT NURSE NOTE - NSICDXPASTMEDICALHX_GEN_ALL_CORE_FT
PAST MEDICAL HISTORY:  BMI 37.0-37.9, adult     Chronic pain     DVT (deep venous thrombosis), bilateral     History of DVT (deep vein thrombosis) bilateral, 2015 related to HRT    History of pulmonary embolism 2015 related to HRT    Hyperlipidemia     Insomnia     Obesity (BMI 30-39.9)     Osteoarthritis of left knee     PE (pulmonary embolism)

## 2022-10-04 NOTE — CONSULT NOTE ADULT - PROBLEM SELECTOR RECOMMENDATION 6
VTE PPX as per Surgery team - SCDs VTE PPX as per Surgery team - Already (supra)therapeutically anticoagulated with warfarin; SCDs

## 2022-10-04 NOTE — ED ADULT NURSE NOTE - OBJECTIVE STATEMENT
Presents to ER with abd pain x 1 month, already had an outpt CT that showed a hernia/divertic.  Pt states pain has worsened over the past couple of weeks which prompted her to f/u with Dr Fraga who recommended pt come to ER.

## 2022-10-04 NOTE — H&P ADULT - HISTORY OF PRESENT ILLNESS
67 year old female with PMHx diverticulosis, HTN, DVT (on coumadin), presenting to [facility] ED with worsening abdominal pain for 2 weeks.  Patient states pain has been constant for 2 weeks, awoke this morning and after breakfast pain has worsened, rating it 8/10 in tensity with radiation of symptoms to LLQ.  Patient additionally reports since pain began she has had irregular bowel movements, only having a BM every 2-3 days.  Reports last BM 2 days ago, denies passing flatus.  Last ate earlier this morning.  Patient reports prior to abdominal pain starting she was in her regular state of health.  Denies fevers, chills, N/V, chest pain, SOB, palpitations, calf pain.

## 2022-10-04 NOTE — ASSESSMENT
[FreeTextEntry1] : Patient presents today for evaluation of incisional hernias.  Had undergone recent outpatient CT scan approximately 2 weeks ago which demonstrated a nonobstructive fat-containing umbilical and paraumbilical incisional hernia as well as postsurgical changes in the right lower quadrant from previous open herniorrhaphy.  CAT scan reveals however a knuckle of small bowel herniating through the transversalis and internal oblique musculature beneath the mesh onlay.  Patient was also noted to have pretty significant sigmoid diverticulosis without signs of diverticulitis\par \par On presentation today however she is complaining of significant left lower quadrant abdominal pain found to have rebound and guarding.  Although she denies fevers, chills, nauseousness vomiting or change in bowel habits this is concerning for possible acute sigmoid diverticulitis.\par \par As such I recommended she had straight to the emergency department for further evaluation and testing including CBC, chemistries as well as a repeat CT scan with intravenous contrast.  I have called ahead and notify the emergency department as well as a surgical PAs in house.\par \par Findings and plan of care been discussed at length with the patient as well as her .\par \par Further recommendations pending blood work and CT imaging.  I discussed with them both the implications of acute sigmoid diverticulitis both complicated and uncomplicated as well as the planned course of action in each event.  Once again all questions have been answered.\par \par With respect to the umbilical hernia and the recurrent spigelian hernia these can be addressed electively once her left lower quadrant symptoms have appropriately diagnosed and managed.\par

## 2022-10-04 NOTE — ED PROVIDER NOTE - CLINICAL SUMMARY MEDICAL DECISION MAKING FREE TEXT BOX
67-year-old white female with approximately 1 month of ill-defined left lower quadrant abdominal pain initially treated with what she believes to be Augmentin for a partial course who had persistent left lower quadrant abdominal pain ultimately being evaluated by Dr. Fraga general surgery today and referred to our emergency department here at [facility] for further evaluation management and treatment.  No fever no chills no nausea no vomiting no diarrhea no dysuria frequency urgency hesitancy or hematuria.  This case will require extensive evaluation labs imaging as well as IV fluids and medications.

## 2022-10-04 NOTE — ED PROVIDER NOTE - OBJECTIVE STATEMENT
67-year-old female with history of hypertension, DVT on Coumadin presents to the ED complaining of left lower quadrant pain x1 month.  Patient had outpatient CT abdomen pelvis 1 week ago which showed diverticulosis and umbilical hernia.  Patient had an appointment with Dr. Fraga today and sent to emergency department for worsening left lower quadrant abdominal pain.  Patient states that abdominal pain has been constant for the past month however worse in the past few days.  Nonradiating.  Denies fever, chills, chest pain, shortness of breath, nausea, vomiting, diarrhea, urinary symptoms, flank pain.  Patient with history of total hysterectomy no additional abdominal surgery.    pmd: Dr. Francois

## 2022-10-04 NOTE — REVIEW OF SYSTEMS
[As Noted in HPI] : as noted in HPI [Negative] : Heme/Lymph [FreeTextEntry7] : Left lower quadrant abdominal pain

## 2022-10-04 NOTE — H&P ADULT - ASSESSMENT
67 year old female with PMHx of diverticulosis presenting to [facility] ED w/ abdominal pain.  CT imaging suggestive of mild acute diverticulitis.  Patient's abdominal exam concerning with tenderness in LLQ.  WBC noted to be at baseline.  Vital signs remain stable.

## 2022-10-04 NOTE — CONSULT NOTE ADULT - PROBLEM SELECTOR RECOMMENDATION 2
Chronic   History of DVT/PE in 2012, history of antiphospholipid antibody syndrome   - Pt is on home Coumadin 5 mg on Sun, Mon, Wed, Fri and Sat and Coumadin 2.5 mg on Tues and Thurs; INR checked 2 weeks ago - 2    - INR: 4.65   - Will HOLD Coumadin tonight   - Monitor daily INR, may restart Coumadin once INR: 2-3 Chronic   History of DVT/PE in 2012, history of antiphospholipid antibody syndrome   - Pt is on home Coumadin 5 mg on Sun, Mon, Wed, Fri and Sat and Coumadin 2.5 mg on Tues and Thurs; INR checked 2 weeks ago - INR: 2    - INR: 4.65   - Will HOLD Coumadin tonight   - Monitor daily INR, may restart Coumadin once INR: 2-3 Chronic   History of DVT/PE in 2012, history of antiphospholipid antibody syndrome   - Pt is on home Coumadin 5 mg on Sun, Mon, Wed, Fri and Sat and Coumadin 2.5 mg on Tues and Thurs; INR checked 2 weeks ago - INR: 2    - INR: 4.65   - Would HOLD Coumadin tonight and until INR is in therapeutic range  - Monitor daily INR, may restart Coumadin once INR: 2-3

## 2022-10-04 NOTE — ED PROVIDER NOTE - NS ED ATTENDING STATEMENT MOD
This was a shared visit with the NIDHI. I reviewed and verified the documentation and independently performed the documented:

## 2022-10-04 NOTE — HISTORY OF PRESENT ILLNESS
[de-identified] : 67-year-old obese woman presenting for evaluation of incisional hernias.  Patient has history of partial hysterectomy as well as open hernia repair of a right lower quadrant hernia (possibly spigelian).

## 2022-10-04 NOTE — H&P ADULT - NSHPPHYSICALEXAM_GEN_ALL_CORE
PHYSICAL EXAM:  GENERAL: NAD, lying in bed comfortably  HEAD:  Atraumatic, Normocephalic  EYES: EOMI, PERRLA, conjunctiva and sclera clear  ENT: Moist mucous membranes  NECK: Supple, No JVD  CHEST/LUNG: Clear to auscultation bilaterally; No rales, rhonchi, wheezing, or rubs. Unlabored respirations  HEART: Regular rate and rhythm; No murmurs, rubs, or gallops  ABDOMEN: + Tenderness to palpation in LLQ.  Bowel sounds present; Soft, nondistended. No hepatomegally  EXTREMITIES:  2+ Peripheral Pulses, brisk capillary refill. No clubbing, cyanosis, or edema  NERVOUS SYSTEM:  Alert & Oriented X3, speech clear. No deficits   MSK: FROM all 4 extremities, full and equal strength  SKIN: No rashes or lesions

## 2022-10-04 NOTE — H&P ADULT - NSHPLABSRESULTS_GEN_ALL_CORE
12.3   7.95  )-----------( 211      ( 04 Oct 2022 14:40 )             37.9   10-04    141  |  109<H>  |  13  ----------------------------<  105<H>  4.0   |  27  |  0.78    Ca    9.4      04 Oct 2022 14:40    TPro  7.9  /  Alb  3.9  /  TBili  0.5  /  DBili  x   /  AST  25  /  ALT  37  /  AlkPhos  94  10-04      ACC: [id number] EXAM:  CT ABDOMEN AND PELVIS OC IC                          PROCEDURE DATE:  10/04/2022          INTERPRETATION:  CLINICAL INFORMATION: Left lower quadrant pain    COMPARISON: 9/13/2022    CONTRAST/COMPLICATIONS:  IV Contrast: Omnipaque 350  90 cc administered   10 cc discarded  Oral Contrast: Gastroview  Complications: None reported at time of study completion    PROCEDURE:  CT of the Abdomen and Pelvis was performed.  Sagittal and coronal reformats were performed.    FINDINGS:  LOWER CHEST: Tiny hiatal hernia. Coronary artery calcification.    LIVER: Within normal limits.  BILE DUCTS: Normal caliber.  GALLBLADDER: Within normal limits.  SPLEEN: Within normal limits.  PANCREAS: Within normal limits.  ADRENALS: Within normal limits.  KIDNEYS/URETERS: Within normal limits.    BLADDER: Within normal limits.  REPRODUCTIVE ORGANS: Hysterectomy.    BOWEL: No bowel obstruction. Innumerable colonic diverticula. Subtle   perisigmoid stranding suggesting mild acute diverticulitis. Appendix no   appendicitis  PERITONEUM: No ascites, extraluminal gas or drainable collection.  VESSELS: Nonaneurysmal  RETROPERITONEUM/LYMPH NODES: No lymphadenopathy.  ABDOMINAL WALL: Small fat-containing umbilical hernia.  BONES: Stable grade 1 anterolisthesis L4 on L5.    IMPRESSION:  Mild acute sigmoid diverticulitis. No drainable collection    GUICHO WILCOX MD; Attending Radiologist  This document has been electronically signed. Oct  4 2022  6:00PM

## 2022-10-04 NOTE — CONSULT NOTE ADULT - SUBJECTIVE AND OBJECTIVE BOX
Initial Hospitalist Consult Note  Department of Medicine at [facility]    Patient is a 67y old  Female who presents with a chief complaint of abdominal pain/ acute diverticulitis (04 Oct 2022 20:55)    HPI:  67 year old female with PMHx diverticulosis, HTN, DVT (on coumadin), presenting to [place] ED with worsening abdominal pain for 2 weeks.  Patient states pain has been constant for 2 weeks, awoke this morning and after breakfast pain has worsened, rating it 8/10 in tensity with radiation of symptoms to LLQ.  Patient additionally reports since pain began she has had irregular bowel movements, only having a BM every 2-3 days.  Reports last BM 2 days ago, denies passing flatus.  Last ate earlier this morning.  Patient reports prior to abdominal pain starting she was in her regular state of health.  Denies fevers, chills, N/V, chest pain, SOB, palpitations, calf pain.   (04 Oct 2022 20:55)      Medicine HPI: 67 year old female with PMHx of diverticulitis, antiphospholipid antibody syndrome, DVT/PE ( while on premarin and sedentary, maintained on coumadin), anemia presented to the ED for the evaluation of abdominal pain. Found to have sigmoid diverticulitis on CT scan and with impressive abdominal exam - admitted to surgical service. Medicine requested to consult for management of medical comorbidities and medical management of diverticulitis. Patient seen and examined at bedside....    I&O's Summary      PAST MEDICAL & SURGICAL HISTORY:  Hyperlipidemia      PE (pulmonary embolism)      DVT (deep venous thrombosis), bilateral      History of DVT (deep vein thrombosis)  bilateral,  related to HRT      History of pulmonary embolism   related to HRT      Insomnia      Chronic pain      Obesity (BMI 30-39.9)      BMI 37.0-37.9, adult      Osteoarthritis of left knee      S/P hysterectomy  1970&#x27;s      History of hernia repair  ,       History of total right knee replacement  2017      History of total knee replacement  left    FAMILY HISTORY:  Family history of colon cancer (Father)      Allergies    metal jewelry other than gold (Rash)  No Known Drug Allergies    Intolerances        SOCIAL HISTORY  Alcohol:  Tobacco:  Illicit substance use: denies        Home Medications:  losartan 50 mg oral tablet: 1 tab(s) orally once a day (04 Oct 2022 22:58)  oxyCODONE 5 mg oral tablet: 1 tab(s) orally 2 times a day (2019 17:33)  warfarin 5 mg oral tablet: 1 tab(s) orally once a day (2019 17:33)  zolpidem 10 mg oral tablet: 1 tab(s) orally once a day (at bedtime), As Needed (2019 17:33)        LABS:                        12.3   7.95  )-----------( 211      ( 04 Oct 2022 14:40 )             37.9     10-04    141  |  109<H>  |  13  ----------------------------<  105<H>  4.0   |  27  |  0.78    Ca    9.4      04 Oct 2022 14:40    TPro  7.9  /  Alb  3.9  /  TBili  0.5  /  DBili  x   /  AST  25  /  ALT  37  /  AlkPhos  94  10-04    PT/INR - ( 04 Oct 2022 14:40 )   PT: 55.2 sec;   INR: 4.65 ratio         PTT - ( 04 Oct 2022 14:40 )  PTT:60.8 sec  Urinalysis Basic - ( 04 Oct 2022 15:40 )    Color: Yellow / Appearance: Clear / S.015 / pH: x  Gluc: x / Ketone: Negative  / Bili: Negative / Urobili: Negative   Blood: x / Protein: Negative / Nitrite: Negative   Leuk Esterase: Trace / RBC: 0-2 /HPF / WBC 3-5   Sq Epi: x / Non Sq Epi: Few / Bacteria: Few      CAPILLARY BLOOD GLUCOSE            Urinalysis Basic - ( 04 Oct 2022 15:40 )    Color: Yellow / Appearance: Clear / S.015 / pH: x  Gluc: x / Ketone: Negative  / Bili: Negative / Urobili: Negative   Blood: x / Protein: Negative / Nitrite: Negative   Leuk Esterase: Trace / RBC: 0-2 /HPF / WBC 3-5   Sq Epi: x / Non Sq Epi: Few / Bacteria: Few        MEDICATIONS  (STANDING):  losartan 50 milliGRAM(s) Oral daily  melatonin 5 milliGRAM(s) Oral at bedtime  pantoprazole  Injectable 40 milliGRAM(s) IV Push daily  sodium chloride 0.9%. 1000 milliLiter(s) (75 mL/Hr) IV Continuous <Continuous>    MEDICATIONS  (PRN):  acetaminophen     Tablet .. 325 milliGRAM(s) Oral every 4 hours PRN Mild Pain (1 - 3)  ketorolac   Injectable 15 milliGRAM(s) IV Push once PRN Moderate Pain (4 - 6)  ondansetron Injectable 4 milliGRAM(s) IV Push every 6 hours PRN Nausea  zolpidem 5 milliGRAM(s) Oral at bedtime PRN Insomnia  zolpidem 5 milliGRAM(s) Oral at bedtime PRN Insomnia      REVIEW OF SYSTEMS:  CONSTITUTIONAL: denies fever, chills, fatigue, weakness  HEENT: denies blurred vision, sore throat  SKIN: denies new lesions, rash  CARDIOVASCULAR: denies chest pain, chest pressure, palpitations  RESPIRATORY: denies shortness of breath, sputum production  GASTROINTESTINAL: denies nausea, vomiting, diarrhea, abdominal pain  GENITOURINARY: denies dysuria, discharge  NEUROLOGICAL: denies numbness, headache, focal weakness  MUSCULOSKELETAL: denies new joint pain, muscle aches  HEMATOLOGIC: denies gross bleeding, bruising  LYMPHATICS: denies enlarged lymph nodes, extremity swelling  PSYCHIATRIC: denies recent changes in anxiety, depression  ENDOCRINOLOGIC: denies sweating, cold or heat intolerance    RADIOLOGY & ADDITIONAL TESTS:    < from: CT Abdomen and Pelvis w/ Oral Cont and w/ IV Cont (10.04.22 @ 17:52) >  ACC: [id number] EXAM:  CT ABDOMEN AND PELVIS OC IC                          PROCEDURE DATE:  10/04/2022          INTERPRETATION:  CLINICAL INFORMATION: Left lower quadrant pain    COMPARISON: 2022    CONTRAST/COMPLICATIONS:  IV Contrast: Omnipaque 350  90 cc administered   10 cc discarded  Oral Contrast: Gastroview  Complications: None reported at time of study completion    PROCEDURE:  CT of the Abdomen and Pelvis was performed.  Sagittal and coronal reformats were performed.    FINDINGS:  LOWER CHEST: Tiny hiatal hernia. Coronary artery calcification.    LIVER: Within normal limits.  BILE DUCTS: Normal caliber.  GALLBLADDER: Within normal limits.  SPLEEN: Within normal limits.  PANCREAS: Within normal limits.  ADRENALS: Within normal limits.  KIDNEYS/URETERS: Within normal limits.    BLADDER: Within normal limits.  REPRODUCTIVE ORGANS: Hysterectomy.    BOWEL: No bowel obstruction. Innumerable colonic diverticula. Subtle   perisigmoid stranding suggesting mild acute diverticulitis. Appendix no   appendicitis  PERITONEUM: No ascites, extraluminal gas or drainable collection.  VESSELS: Nonaneurysmal  RETROPERITONEUM/LYMPH NODES: No lymphadenopathy.  ABDOMINAL WALL: Small fat-containing umbilical hernia.  BONES: Stable grade 1 anterolisthesis L4 on L5.    IMPRESSION:  Mild acute sigmoid diverticulitis. No drainable collection    < end of copied text >    EKG:    Imaging Personally Reviewed:  [x] YES  [ ] NO    EKG Personally Reviewed:  [x] YES  [ ] NO    Consultant(s) Notes Reviewed:  [x] YES  [ ] NO        PHYSICAL EXAM:  T(F): 98.5 (10-04-22 @ 22:22), Max: 98.7 (10-04-22 @ 18:48)  HR: 64 (10-04-22 @ 22:22) (64 - 86)  BP: 148/78 (10-04-22 @ 22:22) (148/78 - 174/96)  RR: 16 (10-04-22 @ 22:22) (15 - 18)  SpO2: 96% (10-04-22 @ 22:22) (96% - 97%)    GENERAL: NAD, well-groomed, well-developed  HEAD:  Atraumatic, Normocephalic  EYES: EOMI, PERRL, conjunctiva and sclera clear  ENMT: No tonsillar erythema, exudates, or enlargement; Moist mucous membranes  NECK: Supple, No JVD, Normal thyroid  NERVOUS SYSTEM:  Alert & Oriented X3, Moves all extremities, Sensation to light touch intact  CHEST/LUNG: Clear to auscultation bilaterally; No wheezes, rales or rhonchi  HEART: RRR, S1, S2; No murmurs, rubs, or gallops  ABDOMEN: Soft, Nontender, Nondistended; Bowel sounds present  EXTREMITIES:  2+ Peripheral Pulses, No clubbing, cyanosis, or edema  LYMPH: No lymphadenopathy noted  SKIN: No rashes or lesions    Care Discussed with Consultants/Other Providers [x] YES  [ ] NO Initial Hospitalist Consult Note  Department of Medicine at [facility]    Patient is a 67y old  Female who presents with a chief complaint of abdominal pain/ acute diverticulitis (04 Oct 2022 20:55)    HPI:  67 year old female with PMHx diverticulosis, HTN, DVT (on coumadin), presenting to [place] ED with worsening abdominal pain for 2 weeks.  Patient states pain has been constant for 2 weeks, awoke this morning and after breakfast pain has worsened, rating it 8/10 in tensity with radiation of symptoms to LLQ.  Patient additionally reports since pain began she has had irregular bowel movements, only having a BM every 2-3 days.  Reports last BM 2 days ago, denies passing flatus.  Last ate earlier this morning.  Patient reports prior to abdominal pain starting she was in her regular state of health.  Denies fevers, chills, N/V, chest pain, SOB, palpitations, calf pain.   (04 Oct 2022 20:55)      Medicine HPI: 67 year old female with PMHx of diverticulitis, antiphospholipid antibody syndrome, HTN, HLD, DVT/PE (2012 while on premarin and sedentary, maintained on coumadin), anemia presented to the ED for the evaluation of abdominal pain. Found to have sigmoid diverticulitis on CT scan and with impressive abdominal exam - admitted to surgical service. Medicine requested to consult for management of medical comorbidities and medical management of diverticulitis. Patient seen and examined at bedside. Pt reports LLQ abdominal pain for the past one month, 8/10, radiating to the left groin and back. Pt has been taking Hydrocodone which she was prescribed for back pain. Pt admits to history of constipation, reports having a BM every other day, have been normal. Pt is not on a high fiber diet at home. Pt had a colonoscopy 3 years ago which was normal. Pt follows [facility] where she was diagnosed with antiphospholipid antibody syndrome. No family history of DVT/PE. Pt has been on Coumadin since DVT/PE in  with no issues with bleeding. Pt gets her INR checked monthly, last checked 2 weeks ago where her INR was 2. Pt is compliant with her Coumadin - 5 mg on Sun, Mon, Wed, Fri and Sat, takes 2.5 mg on Tues and Thurs.   She saw PCP Dr. Jones 2 weeks ago where she had an outpt CT A/P done which revealed an umbilical hernia and ?diverticulitis. She was then referred to see Surgery Dr. Fraga who she saw in the office today, was advised to go to the ED for further evaluation. Pt denies any fever, chills, body aches, CP, SOB, palpitations, nausea, vomiting, diarrhea, lower leg swelling, melena, hematochezia, hematemesis, hematuria.     I&O's Summary      PAST MEDICAL & SURGICAL HISTORY:  Hyperlipidemia      PE (pulmonary embolism)      DVT (deep venous thrombosis), bilateral      History of DVT (deep vein thrombosis)  bilateral,  related to HRT      History of pulmonary embolism   related to HRT      Insomnia      Chronic pain      Obesity (BMI 30-39.9)      BMI 37.0-37.9, adult      Osteoarthritis of left knee      S/P hysterectomy  1970&#x27;s      History of hernia repair  ,       History of total right knee replacement  2017      History of total knee replacement  left    FAMILY HISTORY:  Family history of colon cancer (Father)      Allergies    metal jewelry other than gold (Rash)  No Known Drug Allergies    Intolerances        SOCIAL HISTORY  Alcohol: Socially, drinks wine with dinner occasionally   Tobacco: Former smoker, quit 20 years ago   Illicit substance use: Denies        Home Medications:  losartan 50 mg oral tablet: 1 tab(s) orally once a day (04 Oct 2022 22:58)  oxyCODONE 5 mg oral tablet: 1 tab(s) orally 2 times a day (2019 17:33)  warfarin 5 mg oral tablet: 1 tab(s) orally once a day (2019 17:33)  zolpidem 10 mg oral tablet: 1 tab(s) orally once a day (at bedtime), As Needed (2019 17:33)        LABS:                        12.3   7.95  )-----------( 211      ( 04 Oct 2022 14:40 )             37.9     10-04    141  |  109<H>  |  13  ----------------------------<  105<H>  4.0   |  27  |  0.78    Ca    9.4      04 Oct 2022 14:40    TPro  7.9  /  Alb  3.9  /  TBili  0.5  /  DBili  x   /  AST  25  /  ALT  37  /  AlkPhos  94  10-04    PT/INR - ( 04 Oct 2022 14:40 )   PT: 55.2 sec;   INR: 4.65 ratio         PTT - ( 04 Oct 2022 14:40 )  PTT:60.8 sec  Urinalysis Basic - ( 04 Oct 2022 15:40 )    Color: Yellow / Appearance: Clear / S.015 / pH: x  Gluc: x / Ketone: Negative  / Bili: Negative / Urobili: Negative   Blood: x / Protein: Negative / Nitrite: Negative   Leuk Esterase: Trace / RBC: 0-2 /HPF / WBC 3-5   Sq Epi: x / Non Sq Epi: Few / Bacteria: Few      CAPILLARY BLOOD GLUCOSE            Urinalysis Basic - ( 04 Oct 2022 15:40 )    Color: Yellow / Appearance: Clear / S.015 / pH: x  Gluc: x / Ketone: Negative  / Bili: Negative / Urobili: Negative   Blood: x / Protein: Negative / Nitrite: Negative   Leuk Esterase: Trace / RBC: 0-2 /HPF / WBC 3-5   Sq Epi: x / Non Sq Epi: Few / Bacteria: Few        MEDICATIONS  (STANDING):  losartan 50 milliGRAM(s) Oral daily  melatonin 5 milliGRAM(s) Oral at bedtime  pantoprazole  Injectable 40 milliGRAM(s) IV Push daily  sodium chloride 0.9%. 1000 milliLiter(s) (75 mL/Hr) IV Continuous <Continuous>    MEDICATIONS  (PRN):  acetaminophen     Tablet .. 325 milliGRAM(s) Oral every 4 hours PRN Mild Pain (1 - 3)  ketorolac   Injectable 15 milliGRAM(s) IV Push once PRN Moderate Pain (4 - 6)  ondansetron Injectable 4 milliGRAM(s) IV Push every 6 hours PRN Nausea  zolpidem 5 milliGRAM(s) Oral at bedtime PRN Insomnia  zolpidem 5 milliGRAM(s) Oral at bedtime PRN Insomnia      REVIEW OF SYSTEMS:  CONSTITUTIONAL: denies fever, chills, fatigue, weakness  HEENT: denies blurred vision, sore throat  SKIN: denies new lesions, rash  CARDIOVASCULAR: denies chest pain, chest pressure, palpitations  RESPIRATORY: denies shortness of breath, sputum production  GASTROINTESTINAL: denies nausea, vomiting, diarrhea, admits to [facility] abdominal pain and intermittent constipation   GENITOURINARY: denies dysuria, discharge  NEUROLOGICAL: denies numbness, headache, focal weakness  MUSCULOSKELETAL: denies new joint pain, muscle aches  HEMATOLOGIC: denies gross bleeding, bruising, melena, hematochezia, hematemesis, hematuria  LYMPHATICS: denies enlarged lymph nodes, extremity swelling  PSYCHIATRIC: denies recent changes in anxiety, depression  ENDOCRINOLOGIC: denies sweating, cold or heat intolerance    RADIOLOGY & ADDITIONAL TESTS:    < from: CT Abdomen and Pelvis w/ Oral Cont and w/ IV Cont (10.04.22 @ 17:52) >  ACC: [id number] EXAM:  CT ABDOMEN AND PELVIS OC IC                          PROCEDURE DATE:  10/04/2022          INTERPRETATION:  CLINICAL INFORMATION: Left lower quadrant pain    COMPARISON: 2022    CONTRAST/COMPLICATIONS:  IV Contrast: Omnipaque 350  90 cc administered   10 cc discarded  Oral Contrast: Gastroview  Complications: None reported at time of study completion    PROCEDURE:  CT of the Abdomen and Pelvis was performed.  Sagittal and coronal reformats were performed.    FINDINGS:  LOWER CHEST: Tiny hiatal hernia. Coronary artery calcification.    LIVER: Within normal limits.  BILE DUCTS: Normal caliber.  GALLBLADDER: Within normal limits.  SPLEEN: Within normal limits.  PANCREAS: Within normal limits.  ADRENALS: Within normal limits.  KIDNEYS/URETERS: Within normal limits.    BLADDER: Within normal limits.  REPRODUCTIVE ORGANS: Hysterectomy.    BOWEL: No bowel obstruction. Innumerable colonic diverticula. Subtle   perisigmoid stranding suggesting mild acute diverticulitis. Appendix no   appendicitis  PERITONEUM: No ascites, extraluminal gas or drainable collection.  VESSELS: Nonaneurysmal  RETROPERITONEUM/LYMPH NODES: No lymphadenopathy.  ABDOMINAL WALL: Small fat-containing umbilical hernia.  BONES: Stable grade 1 anterolisthesis L4 on L5.    IMPRESSION:  Mild acute sigmoid diverticulitis. No drainable collection    < end of copied text >    EKG:    Imaging Personally Reviewed:  [x] YES  [ ] NO    EKG Personally Reviewed:  [x] YES  [ ] NO    Consultant(s) Notes Reviewed:  [x] YES  [ ] NO        PHYSICAL EXAM:  T(F): 98.5 (10-04-22 @ 22:22), Max: 98.7 (10-04-22 @ 18:48)  HR: 64 (10-04-22 @ 22:22) (64 - 86)  BP: 148/78 (10-04-22 @ 22:22) (148/78 - 174/96)  RR: 16 (10-04-22 @ 22:22) (15 - 18)  SpO2: 96% (10-04-22 @ 22:22) (96% - 97%)    GENERAL: NAD, well-groomed, well-developed  HEAD:  Atraumatic, Normocephalic  EYES: EOMI, PERRL, conjunctiva and sclera clear  ENMT: No tonsillar erythema, exudates, or enlargement; Moist mucous membranes  NECK: Supple, No JVD, Normal thyroid  NERVOUS SYSTEM:  Alert & Oriented X3, Moves all extremities, Sensation to light touch intact  CHEST/LUNG: Clear to auscultation bilaterally; No wheezes, rales or rhonchi  HEART: RRR, S1, S2; No murmurs, rubs, or gallops  ABDOMEN: Soft, Nondistended, tender to palpation of the LLQ, hyperactive bowel sounds   EXTREMITIES:  2+ Peripheral Pulses, No clubbing, cyanosis, or edema  LYMPH: non pitting BLE edema   SKIN: No rashes or lesions    Care Discussed with Consultants/Other Providers [x] YES  [ ] NO Initial Hospitalist Consult Note  Department of Medicine at [facility]    Patient is a 67y old  Female who presents with a chief complaint of abdominal pain/ acute diverticulitis (04 Oct 2022 20:55)    HPI:  67 year old female with PMHx diverticulosis, HTN, DVT (on coumadin), presenting to [place] ED with worsening abdominal pain for 2 weeks.  Patient states pain has been constant for 2 weeks, awoke this morning and after breakfast pain has worsened, rating it 8/10 in tensity with radiation of symptoms to LLQ.  Patient additionally reports since pain began she has had irregular bowel movements, only having a BM every 2-3 days.  Reports last BM 2 days ago, denies passing flatus.  Last ate earlier this morning.  Patient reports prior to abdominal pain starting she was in her regular state of health.  Denies fevers, chills, N/V, chest pain, SOB, palpitations, calf pain.   (04 Oct 2022 20:55)      Medicine HPI: 67 year old female with PMHx of diverticulitis, antiphospholipid antibody syndrome, HTN, HLD, DVT/PE (2012 while on premarin and sedentary, maintained on coumadin), anemia presented to the ED for the evaluation of abdominal pain. Found to have sigmoid diverticulitis on CT scan and with impressive abdominal exam - admitted to surgical service. Medicine requested to consult for management of medical comorbidities and medical management of diverticulitis. Patient seen and examined at bedside. Pt reports LLQ abdominal pain for the past one month, 8/10, radiating to the left groin and back. Pt has been taking Hydrocodone which she was prescribed for back pain. Pt admits to history of constipation, reports having a BM every other day, have been normal. Pt is not on a high fiber diet at home. Pt had a colonoscopy 3 years ago which was normal. Pt follows [facility] where she was diagnosed with antiphospholipid antibody syndrome. No family history of DVT/PE. Pt has been on Coumadin since DVT/PE in  with no issues with bleeding. Pt gets her INR checked monthly, last checked 2 weeks ago where her INR was 2. Pt is compliant with her Coumadin - 5 mg on Sun, Mon, Wed, Fri and Sat, takes 2.5 mg on Tues and Thurs.   She saw PCP Dr. Liu 2 weeks ago where she had an outpt CT A/P done which revealed an umbilical hernia and ?diverticulitis. She was then referred to see Surgery Dr. Fraga who she saw in the office today, was advised to go to the ED for further evaluation. Pt denies any fever, chills, body aches, CP, SOB, palpitations, nausea, vomiting, diarrhea, lower leg swelling, melena, hematochezia, hematemesis, hematuria.     I&O's Summary      PAST MEDICAL & SURGICAL HISTORY:  Hyperlipidemia      PE (pulmonary embolism)      DVT (deep venous thrombosis), bilateral      History of DVT (deep vein thrombosis)  bilateral,  related to HRT      History of pulmonary embolism   related to HRT      Insomnia      Chronic pain      Obesity (BMI 30-39.9)      BMI 37.0-37.9, adult      Osteoarthritis of left knee      S/P hysterectomy  1970&#x27;s      History of hernia repair  ,       History of total right knee replacement  2017      History of total knee replacement  left    FAMILY HISTORY:  Family history of colon cancer (Father)      Allergies    metal jewelry other than gold (Rash)  No Known Drug Allergies    Intolerances        SOCIAL HISTORY  Alcohol: Socially, drinks wine with dinner occasionally   Tobacco: Former smoker, quit 20 years ago   Illicit substance use: Denies        Home Medications:  losartan 50 mg oral tablet: 1 tab(s) orally once a day (04 Oct 2022 22:58)  oxyCODONE 5 mg oral tablet: 1 tab(s) orally 2 times a day (2019 17:33)  warfarin 5 mg oral tablet: 1 tab(s) orally once a day (2019 17:33)  zolpidem 10 mg oral tablet: 1 tab(s) orally once a day (at bedtime), As Needed (2019 17:33)        LABS:                        12.3   7.95  )-----------( 211      ( 04 Oct 2022 14:40 )             37.9     10-04    141  |  109<H>  |  13  ----------------------------<  105<H>  4.0   |  27  |  0.78    Ca    9.4      04 Oct 2022 14:40    TPro  7.9  /  Alb  3.9  /  TBili  0.5  /  DBili  x   /  AST  25  /  ALT  37  /  AlkPhos  94  10-04    PT/INR - ( 04 Oct 2022 14:40 )   PT: 55.2 sec;   INR: 4.65 ratio         PTT - ( 04 Oct 2022 14:40 )  PTT:60.8 sec  Urinalysis Basic - ( 04 Oct 2022 15:40 )    Color: Yellow / Appearance: Clear / S.015 / pH: x  Gluc: x / Ketone: Negative  / Bili: Negative / Urobili: Negative   Blood: x / Protein: Negative / Nitrite: Negative   Leuk Esterase: Trace / RBC: 0-2 /HPF / WBC 3-5   Sq Epi: x / Non Sq Epi: Few / Bacteria: Few      CAPILLARY BLOOD GLUCOSE            Urinalysis Basic - ( 04 Oct 2022 15:40 )    Color: Yellow / Appearance: Clear / S.015 / pH: x  Gluc: x / Ketone: Negative  / Bili: Negative / Urobili: Negative   Blood: x / Protein: Negative / Nitrite: Negative   Leuk Esterase: Trace / RBC: 0-2 /HPF / WBC 3-5   Sq Epi: x / Non Sq Epi: Few / Bacteria: Few        MEDICATIONS  (STANDING):  losartan 50 milliGRAM(s) Oral daily  melatonin 5 milliGRAM(s) Oral at bedtime  pantoprazole  Injectable 40 milliGRAM(s) IV Push daily  sodium chloride 0.9%. 1000 milliLiter(s) (75 mL/Hr) IV Continuous <Continuous>    MEDICATIONS  (PRN):  acetaminophen     Tablet .. 325 milliGRAM(s) Oral every 4 hours PRN Mild Pain (1 - 3)  ketorolac   Injectable 15 milliGRAM(s) IV Push once PRN Moderate Pain (4 - 6)  ondansetron Injectable 4 milliGRAM(s) IV Push every 6 hours PRN Nausea  zolpidem 5 milliGRAM(s) Oral at bedtime PRN Insomnia  zolpidem 5 milliGRAM(s) Oral at bedtime PRN Insomnia      REVIEW OF SYSTEMS:  CONSTITUTIONAL: denies fever, chills, fatigue, weakness  HEENT: denies blurred vision, sore throat  SKIN: denies new lesions, rash  CARDIOVASCULAR: denies chest pain, chest pressure, palpitations  RESPIRATORY: denies shortness of breath, sputum production  GASTROINTESTINAL: denies nausea, vomiting, diarrhea, admits to [facility] abdominal pain and intermittent constipation   GENITOURINARY: denies dysuria, discharge  NEUROLOGICAL: denies numbness, headache, focal weakness  MUSCULOSKELETAL: denies new joint pain, muscle aches  HEMATOLOGIC: denies gross bleeding, bruising, melena, hematochezia, hematemesis, hematuria  LYMPHATICS: denies enlarged lymph nodes, extremity swelling  PSYCHIATRIC: denies recent changes in anxiety, depression  ENDOCRINOLOGIC: denies sweating, cold or heat intolerance    RADIOLOGY & ADDITIONAL TESTS:    < from: CT Abdomen and Pelvis w/ Oral Cont and w/ IV Cont (10.04.22 @ 17:52) >  ACC: [id number] EXAM:  CT ABDOMEN AND PELVIS OC IC                          PROCEDURE DATE:  10/04/2022          INTERPRETATION:  CLINICAL INFORMATION: Left lower quadrant pain    COMPARISON: 2022    CONTRAST/COMPLICATIONS:  IV Contrast: Omnipaque 350  90 cc administered   10 cc discarded  Oral Contrast: Gastroview  Complications: None reported at time of study completion    PROCEDURE:  CT of the Abdomen and Pelvis was performed.  Sagittal and coronal reformats were performed.    FINDINGS:  LOWER CHEST: Tiny hiatal hernia. Coronary artery calcification.    LIVER: Within normal limits.  BILE DUCTS: Normal caliber.  GALLBLADDER: Within normal limits.  SPLEEN: Within normal limits.  PANCREAS: Within normal limits.  ADRENALS: Within normal limits.  KIDNEYS/URETERS: Within normal limits.    BLADDER: Within normal limits.  REPRODUCTIVE ORGANS: Hysterectomy.    BOWEL: No bowel obstruction. Innumerable colonic diverticula. Subtle   perisigmoid stranding suggesting mild acute diverticulitis. Appendix no   appendicitis  PERITONEUM: No ascites, extraluminal gas or drainable collection.  VESSELS: Nonaneurysmal  RETROPERITONEUM/LYMPH NODES: No lymphadenopathy.  ABDOMINAL WALL: Small fat-containing umbilical hernia.  BONES: Stable grade 1 anterolisthesis L4 on L5.    IMPRESSION:  Mild acute sigmoid diverticulitis. No drainable collection    < end of copied text >    EKG: NSR at 64bpm    Imaging Personally Reviewed:  [x] YES  [ ] NO    EKG Personally Reviewed:  [x] YES  [ ] NO    Consultant(s) Notes Reviewed:  [x] YES  [ ] NO      PHYSICAL EXAM:  T(F): 98.5 (10-04-22 @ 22:22), Max: 98.7 (10-04-22 @ 18:48)  HR: 64 (10-04-22 @ 22:22) (64 - 86)  BP: 148/78 (10-04-22 @ 22:22) (148/78 - 174/96)  RR: 16 (10-04-22 @ 22:22) (15 - 18)  SpO2: 96% (10-04-22 @ 22:22) (96% - 97%)    GENERAL: NAD, well-groomed, well-developed  HEAD:  Atraumatic, Normocephalic  EYES: EOMI, PERRL, conjunctiva and sclera clear  ENMT: No tonsillar erythema, exudates, or enlargement; Moist mucous membranes  NECK: Supple, No JVD, Normal thyroid  NERVOUS SYSTEM:  Alert & Oriented X3, Moves all extremities, Sensation to light touch intact  CHEST/LUNG: Clear to auscultation bilaterally; No wheezes, rales or rhonchi  HEART: RRR, S1, S2; No murmurs, rubs, or gallops  ABDOMEN: Soft, Nondistended, tender to palpation of the LLQ, hyperactive bowel sounds   EXTREMITIES:  2+ Peripheral Pulses, No clubbing, cyanosis, or edema  LYMPH: non pitting BLE edema   SKIN: No rashes or lesions    Care Discussed with Consultants/Other Providers [x] YES  [ ] NO

## 2022-10-04 NOTE — PHYSICAL EXAM
[Normal Breath Sounds] : Normal breath sounds [Normal Heart Sounds] : normal heart sounds [Normal Rate and Rhythm] : normal rate and rhythm [No Rash or Lesion] : No rash or lesion [Alert] : alert [Oriented to Person] : oriented to person [Oriented to Place] : oriented to place [Oriented to Time] : oriented to time [Calm] : calm [de-identified] : Morbidly obese woman [de-identified] : AFSHAN ABDI, YESY [de-identified] : Morbidly obese, soft, nondistended, positive bowel sounds in all four quadrants.  Incisional hernias at the umbilicus scar in the right lower abdomen.  Left lower quadrant abdominal pain with rebound and guarding. [de-identified] : Ambulating without difficulty or assistance

## 2022-10-04 NOTE — ED ADULT NURSE NOTE - NSICDXPASTSURGICALHX_GEN_ALL_CORE_FT
PAST SURGICAL HISTORY:  History of hernia repair 2000, 2015    History of total knee replacement left    History of total right knee replacement 2017    S/P hysterectomy 1970's

## 2022-10-04 NOTE — CONSULT NOTE ADULT - PROBLEM SELECTOR RECOMMENDATION 3
Chronic  - Stable  - Continue with home Losartan w/ hold parameters  - Monitor BP & titrate BP meds PRN

## 2022-10-04 NOTE — H&P ADULT - PROBLEM SELECTOR PLAN 1
- Conservative management for now, no indication for acute surgical intervention at this time  - IV Abx w/ Zosyn  - Remain NPO w/ IVF  - Serial abdominal exams  - Monitor for GI function  - VTE ppx w/ SCDs  - INR noted to be 4.65; will hold coumadin tonight and recheck PT/PTT/INR in am   - Admit to Dr. Moreira

## 2022-10-04 NOTE — CONSULT NOTE ADULT - PROBLEM SELECTOR RECOMMENDATION 9
Pt presents with LLQ abdominal pain   - Mild acute sigmoid diverticulitis, no drainable collection  - Conservative management for now, no indication for acute surgical intervention at this time as per Surgery   - NPO, continue IVF while NPO    - Continue IV Zosyn   - Continue PPI   - Pain regimen as per Surgery   - Zofran PRN nausea   - Continue serial abdominal exams  - Monitor leukocytosis and fever   - Plan as per Surgery Pt presents with LLQ abdominal pain   - Mild acute sigmoid diverticulitis, no drainable collection  - Conservative management for now, no indication for acute surgical intervention at this time as per Surgery   - NPO, continue IVF while NPO    - Continue IV Zosyn   - Continue PPI prophylaxis  - Pain regimen as per Surgery   - Zofran PRN nausea   - Continue serial abdominal exams - if any drastic change would rescan PRN  - Monitor leukocytosis and fever   - Plan as per Surgery

## 2022-10-04 NOTE — ED PROVIDER NOTE - INTERNATIONAL TRAVEL
Infusion Nursing Note:  Julia Andre presents today for Invanz.    Patient seen by provider today: No   present during visit today: Not Applicable.    Note: N/A.    Intravenous Access:  Implanted Port.    Treatment Conditions:  Not Applicable.      Post Infusion Assessment:  Patient tolerated infusion without incident.  Blood return noted pre and post infusion.  Site patent and intact, free from redness, edema or discomfort.  No evidence of extravasations.       Discharge Plan:   Patient declined prescription refills.  Discharge instructions reviewed with: Patient.  Patient and/or family verbalized understanding of discharge instructions and all questions answered.  Copy of AVS reviewed with patient and/or family.  Patient will return 9/27/2020 for next appointment.  Patient discharged in stable condition accompanied by: self.  Departure Mode: Ambulatory.    Leidy Lundy RN                        
No

## 2022-10-04 NOTE — ED PROVIDER NOTE - ATTENDING APP SHARED VISIT CONTRIBUTION OF CARE
Examination revealed a slightly obese white female in no acute distress with clear lungs normal heart sounds and markedly tender abdomen left lower quadrant with slight guarding and rebound.  I agree with plan and management outlined by PA.

## 2022-10-04 NOTE — ED PROVIDER NOTE - BOWEL SOUNDS
Mildly to Moderately Impaired: difficulty hearing in some environments or speaker may need to increase volume or speak distinctly present x 4 quadrants

## 2022-10-04 NOTE — CONSULT NOTE ADULT - ATTENDING COMMENTS
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 Admit to medicine. This is patient's first episode of diverticulitis. Mild on CT but impressive on exam and symptoms per surgery. At time of evaluation, pain is more controlled and abdomen is less tender per patient. Will treat medically with IV fluids and antibiotics. Keep NPO for now for bowel rest. PPI prophylaxis. Coagulopathy with supratherapeutic INR on warfarin for APS and history of DVT/PE - would hold until INR is in therapeutic range. Monitor serial abdominal exams. Discussed with patient at bedside.    Agree with resident notation as outlined above, edited personally in the body of the note where appropriate.

## 2022-10-04 NOTE — PATIENT PROFILE ADULT - DATE OF FIRST COVID-19 BOOSTER
"Chief Complaint   Patient presents with   • Abdominal Pain     Starting at noon today. Pt states it feels very similar to when he had gallbladder issues and gallbladder was removed 3 months ago here at [facility].      Pt was given 150 mcg of Fentanyl and 4 mg zofran pta and helped momentarily but pain has returned to 10/10.   /60   Pulse 60   Temp (!) 35.7 °C (96.2 °F) (Temporal)   Resp 18   Ht 1.753 m (5' 9\")   Wt 104.3 kg (230 lb)   SpO2 90%   BMI 33.97 kg/m²     Pt in gown, on monitor, chart up for ERP.   " 15-Nov-2021

## 2022-10-05 DIAGNOSIS — D68.61 ANTIPHOSPHOLIPID SYNDROME: ICD-10-CM

## 2022-10-05 DIAGNOSIS — E78.5 HYPERLIPIDEMIA, UNSPECIFIED: ICD-10-CM

## 2022-10-05 DIAGNOSIS — G47.00 INSOMNIA, UNSPECIFIED: ICD-10-CM

## 2022-10-05 DIAGNOSIS — I10 ESSENTIAL (PRIMARY) HYPERTENSION: ICD-10-CM

## 2022-10-05 DIAGNOSIS — Z86.718 PERSONAL HISTORY OF OTHER VENOUS THROMBOSIS AND EMBOLISM: ICD-10-CM

## 2022-10-05 DIAGNOSIS — Z29.9 ENCOUNTER FOR PROPHYLACTIC MEASURES, UNSPECIFIED: ICD-10-CM

## 2022-10-05 LAB
ANION GAP SERPL CALC-SCNC: 4 MMOL/L — LOW (ref 5–17)
APTT BLD: 50.4 SEC — HIGH (ref 27.5–35.5)
BASOPHILS # BLD AUTO: 0.04 K/UL — SIGNIFICANT CHANGE UP (ref 0–0.2)
BASOPHILS NFR BLD AUTO: 0.5 % — SIGNIFICANT CHANGE UP (ref 0–2)
BUN SERPL-MCNC: 11 MG/DL — SIGNIFICANT CHANGE UP (ref 7–23)
CALCIUM SERPL-MCNC: 8.6 MG/DL — SIGNIFICANT CHANGE UP (ref 8.5–10.1)
CHLORIDE SERPL-SCNC: 112 MMOL/L — HIGH (ref 96–108)
CO2 SERPL-SCNC: 28 MMOL/L — SIGNIFICANT CHANGE UP (ref 22–31)
CREAT SERPL-MCNC: 0.85 MG/DL — SIGNIFICANT CHANGE UP (ref 0.5–1.3)
EGFR: 75 ML/MIN/1.73M2 — SIGNIFICANT CHANGE UP
EOSINOPHIL # BLD AUTO: 0.19 K/UL — SIGNIFICANT CHANGE UP (ref 0–0.5)
EOSINOPHIL NFR BLD AUTO: 2.6 % — SIGNIFICANT CHANGE UP (ref 0–6)
GLUCOSE SERPL-MCNC: 103 MG/DL — HIGH (ref 70–99)
HCT VFR BLD CALC: 35.8 % — SIGNIFICANT CHANGE UP (ref 34.5–45)
HGB BLD-MCNC: 11.6 G/DL — SIGNIFICANT CHANGE UP (ref 11.5–15.5)
IMM GRANULOCYTES NFR BLD AUTO: 0.1 % — SIGNIFICANT CHANGE UP (ref 0–0.9)
INR BLD: 4.1 RATIO — HIGH (ref 0.88–1.16)
LYMPHOCYTES # BLD AUTO: 1.57 K/UL — SIGNIFICANT CHANGE UP (ref 1–3.3)
LYMPHOCYTES # BLD AUTO: 21.2 % — SIGNIFICANT CHANGE UP (ref 13–44)
MCHC RBC-ENTMCNC: 31.4 PG — SIGNIFICANT CHANGE UP (ref 27–34)
MCHC RBC-ENTMCNC: 32.4 GM/DL — SIGNIFICANT CHANGE UP (ref 32–36)
MCV RBC AUTO: 97 FL — SIGNIFICANT CHANGE UP (ref 80–100)
MONOCYTES # BLD AUTO: 0.6 K/UL — SIGNIFICANT CHANGE UP (ref 0–0.9)
MONOCYTES NFR BLD AUTO: 8.1 % — SIGNIFICANT CHANGE UP (ref 2–14)
NEUTROPHILS # BLD AUTO: 5 K/UL — SIGNIFICANT CHANGE UP (ref 1.8–7.4)
NEUTROPHILS NFR BLD AUTO: 67.5 % — SIGNIFICANT CHANGE UP (ref 43–77)
NRBC # BLD: 0 /100 WBCS — SIGNIFICANT CHANGE UP (ref 0–0)
PLATELET # BLD AUTO: 194 K/UL — SIGNIFICANT CHANGE UP (ref 150–400)
POTASSIUM SERPL-MCNC: 3.7 MMOL/L — SIGNIFICANT CHANGE UP (ref 3.5–5.3)
POTASSIUM SERPL-SCNC: 3.7 MMOL/L — SIGNIFICANT CHANGE UP (ref 3.5–5.3)
PROTHROM AB SERPL-ACNC: 48.6 SEC — HIGH (ref 10.5–13.4)
RBC # BLD: 3.69 M/UL — LOW (ref 3.8–5.2)
RBC # FLD: 12.7 % — SIGNIFICANT CHANGE UP (ref 10.3–14.5)
SODIUM SERPL-SCNC: 144 MMOL/L — SIGNIFICANT CHANGE UP (ref 135–145)
WBC # BLD: 7.41 K/UL — SIGNIFICANT CHANGE UP (ref 3.8–10.5)
WBC # FLD AUTO: 7.41 K/UL — SIGNIFICANT CHANGE UP (ref 3.8–10.5)

## 2022-10-05 PROCEDURE — 99222 1ST HOSP IP/OBS MODERATE 55: CPT

## 2022-10-05 PROCEDURE — 99233 SBSQ HOSP IP/OBS HIGH 50: CPT

## 2022-10-05 RX ORDER — KETOROLAC TROMETHAMINE 30 MG/ML
30 SYRINGE (ML) INJECTION EVERY 6 HOURS
Refills: 0 | Status: DISCONTINUED | OUTPATIENT
Start: 2022-10-05 | End: 2022-10-05

## 2022-10-05 RX ORDER — KETOROLAC TROMETHAMINE 30 MG/ML
10 SYRINGE (ML) INJECTION EVERY 6 HOURS
Refills: 0 | Status: DISCONTINUED | OUTPATIENT
Start: 2022-10-05 | End: 2022-10-07

## 2022-10-05 RX ORDER — KETOROLAC TROMETHAMINE 30 MG/ML
15 SYRINGE (ML) INJECTION EVERY 6 HOURS
Refills: 0 | Status: DISCONTINUED | OUTPATIENT
Start: 2022-10-05 | End: 2022-10-05

## 2022-10-05 RX ORDER — OXYCODONE HYDROCHLORIDE 5 MG/1
1 TABLET ORAL
Qty: 0 | Refills: 0 | DISCHARGE

## 2022-10-05 RX ORDER — PIPERACILLIN AND TAZOBACTAM 4; .5 G/20ML; G/20ML
3.38 INJECTION, POWDER, LYOPHILIZED, FOR SOLUTION INTRAVENOUS EVERY 8 HOURS
Refills: 0 | Status: DISCONTINUED | OUTPATIENT
Start: 2022-10-05 | End: 2022-10-07

## 2022-10-05 RX ORDER — LOSARTAN POTASSIUM 100 MG/1
1 TABLET, FILM COATED ORAL
Qty: 0 | Refills: 0 | DISCHARGE

## 2022-10-05 RX ORDER — ACETAMINOPHEN 500 MG
1000 TABLET ORAL EVERY 6 HOURS
Refills: 0 | Status: DISCONTINUED | OUTPATIENT
Start: 2022-10-05 | End: 2022-10-07

## 2022-10-05 RX ADMIN — Medication 15 MILLIGRAM(S): at 04:30

## 2022-10-05 RX ADMIN — Medication 15 MILLIGRAM(S): at 12:50

## 2022-10-05 RX ADMIN — Medication 15 MILLIGRAM(S): at 04:01

## 2022-10-05 RX ADMIN — PIPERACILLIN AND TAZOBACTAM 25 GRAM(S): 4; .5 INJECTION, POWDER, LYOPHILIZED, FOR SOLUTION INTRAVENOUS at 08:50

## 2022-10-05 RX ADMIN — LOSARTAN POTASSIUM 50 MILLIGRAM(S): 100 TABLET, FILM COATED ORAL at 05:14

## 2022-10-05 RX ADMIN — Medication 15 MILLIGRAM(S): at 12:39

## 2022-10-05 RX ADMIN — Medication 1000 MILLIGRAM(S): at 18:51

## 2022-10-05 RX ADMIN — PIPERACILLIN AND TAZOBACTAM 25 GRAM(S): 4; .5 INJECTION, POWDER, LYOPHILIZED, FOR SOLUTION INTRAVENOUS at 17:13

## 2022-10-05 RX ADMIN — ZOLPIDEM TARTRATE 5 MILLIGRAM(S): 10 TABLET ORAL at 23:38

## 2022-10-05 RX ADMIN — SODIUM CHLORIDE 75 MILLILITER(S): 9 INJECTION INTRAMUSCULAR; INTRAVENOUS; SUBCUTANEOUS at 09:36

## 2022-10-05 RX ADMIN — ZOLPIDEM TARTRATE 5 MILLIGRAM(S): 10 TABLET ORAL at 00:49

## 2022-10-05 RX ADMIN — PANTOPRAZOLE SODIUM 40 MILLIGRAM(S): 20 TABLET, DELAYED RELEASE ORAL at 12:12

## 2022-10-05 RX ADMIN — PIPERACILLIN AND TAZOBACTAM 25 GRAM(S): 4; .5 INJECTION, POWDER, LYOPHILIZED, FOR SOLUTION INTRAVENOUS at 01:42

## 2022-10-05 RX ADMIN — ZOLPIDEM TARTRATE 5 MILLIGRAM(S): 10 TABLET ORAL at 21:38

## 2022-10-05 RX ADMIN — Medication 1000 MILLIGRAM(S): at 16:23

## 2022-10-05 RX ADMIN — Medication 10 MILLIGRAM(S): at 23:39

## 2022-10-05 RX ADMIN — Medication 10 MILLIGRAM(S): at 17:13

## 2022-10-05 RX ADMIN — Medication 10 MILLIGRAM(S): at 18:52

## 2022-10-05 NOTE — PROGRESS NOTE ADULT - PROBLEM SELECTOR PLAN 1
- NPO w/ IVF  - Continue Zosyn, pain control as needed  - Encourage OOB/ambulation, SCDs  - Serial abdominal exams  - Trend labs, INR  - Pt on Coumadin for hx DVTs (antiphospholipid syndrome)  - Above to be discussed with Dr. Moreira (covering for Dr. Fraga)    Surgical Team  Spectralink: [id number] - NPO w/ IVF  - Continue Zosyn, pain control as needed  - Encourage OOB/ambulation, SCDs  - Serial abdominal exams  - Trend labs, daily INR  - Pt on Coumadin for hx DVTs (antiphospholipid syndrome) - INR 4.10 today, hold Wednesday dose  - Above to be discussed with Dr. Mroeira (covering for Dr. Fraga)    Surgical Team  Spectralink: [id number]

## 2022-10-05 NOTE — PROGRESS NOTE ADULT - ASSESSMENT
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     Problem/Recommendation - 1:  ·  Problem: Acute diverticulitis.   ·  Recommendation: Pt presents with LLQ abdominal pain   - Mild acute sigmoid diverticulitis, no drainable collection  - Conservative management for now, no indication for acute surgical intervention at this time as per Surgery   - NPO, continue IVF while NPO    - Continue IV Zosyn   - Continue PPI prophylaxis  - Pain regimen as per Surgery   - Zofran PRN nausea   - Continue serial abdominal exams - if any drastic change would rescan PRN  - Monitor leukocytosis and fever   - Plan as per Surgery.     Problem/Recommendation - 2:  ·  Problem: Personal history of DVT (deep vein thrombosis).   ·  Recommendation: Chronic   History of DVT/PE in 2012, history of antiphospholipid antibody syndrome   - Pt is on home Coumadin 5 mg on Sun, Mon, Wed, Fri and Sat and Coumadin 2.5 mg on Tues and Thurs; INR checked 2 weeks ago - INR: 2    - INR: 4.65   - Would HOLD Coumadin tonight and until INR is in therapeutic range  - Monitor daily INR, may restart Coumadin once INR: 2-3.     Problem/Recommendation - 3:  ·  Problem: HTN (hypertension).   ·  Recommendation: Chronic  - Stable  - Continue with home Losartan w/ hold parameters  - Monitor BP & titrate BP meds PRN.     Problem/Recommendation - 4:  ·  Problem: HLD (hyperlipidemia).   ·  Recommendation: Chronic   - Continue home statin.     Problem/Recommendation - 5:  ·  Problem: Insomnia.   ·  Recommendation: Chronic   - Continue home Ambien PRN.     Problem/Recommendation - 6:  ·  Problem: Need for prophylactic measure.   ·  Recommendation: VTE PPX as per Surgery team - Already (supra)therapeutically anticoagulated with warfarin; SCDs.

## 2022-10-05 NOTE — PROGRESS NOTE ADULT - SUBJECTIVE AND OBJECTIVE BOX
Patient is a 67y old  Female who presents with a chief complaint of abdominal pain/ acute diverticulitis (05 Oct 2022 07:41)      INTERVAL HPI/OVERNIGHT EVENTS: Seen and examined at bedside. Abdominal pain is better than before. Denies nausea, vomiting     MEDICATIONS  (STANDING):  influenza  Vaccine (HIGH DOSE) 0.7 milliLiter(s) IntraMuscular once  losartan 50 milliGRAM(s) Oral daily  melatonin 5 milliGRAM(s) Oral at bedtime  pantoprazole  Injectable 40 milliGRAM(s) IV Push daily  piperacillin/tazobactam IVPB.. 3.375 Gram(s) IV Intermittent every 8 hours  sodium chloride 0.9%. 1000 milliLiter(s) (75 mL/Hr) IV Continuous <Continuous>    MEDICATIONS  (PRN):  acetaminophen     Tablet .. 325 milliGRAM(s) Oral every 4 hours PRN Mild Pain (1 - 3)  ketorolac   Injectable 15 milliGRAM(s) IV Push every 6 hours PRN Moderate Pain (4 - 6)  ketorolac   Injectable 30 milliGRAM(s) IV Push every 6 hours PRN Severe Pain (7 - 10)  ondansetron Injectable 4 milliGRAM(s) IV Push every 6 hours PRN Nausea  zolpidem 5 milliGRAM(s) Oral at bedtime PRN Insomnia  zolpidem 5 milliGRAM(s) Oral at bedtime PRN Insomnia      Allergies    metal jewelry other than gold (Rash)  No Known Drug Allergies    Intolerances        REVIEW OF SYSTEMS:  CONSTITUTIONAL: No fever, weight loss, or fatigue  EYES: No eye pain, visual disturbances, or discharge  ENMT:  No difficulty hearing, tinnitus, vertigo; No sinus or throat pain  NECK: No pain or stiffness  RESPIRATORY: No cough, wheezing, chills or hemoptysis; No shortness of breath  CARDIOVASCULAR: No chest pain, palpitations, dizziness, or leg swelling  GASTROINTESTINAL: + abdominal  pain. No nausea, vomiting, or hematemesis  GENITOURINARY: No dysuria, frequency, hematuria, or incontinence  NEUROLOGICAL: No headaches, memory loss, loss of strength, numbness, or tremors  SKIN: No itching, burning, rashes, or lesions   LYMPH NODES: No enlarged glands  MUSCULOSKELETAL: No joint pain or swelling; No muscle, back, or extremity pain  HEME/LYMPH: No easy bruising, or bleeding gums  ALLERGY AND IMMUNOLOGIC: No hives or eczema    Vital Signs Last 24 Hrs  T(C): 36.6 (05 Oct 2022 04:52), Max: 37.1 (04 Oct 2022 18:48)  T(F): 97.8 (05 Oct 2022 04:52), Max: 98.7 (04 Oct 2022 18:48)  HR: 65 (05 Oct 2022 04:52) (64 - 86)  BP: 132/64 (05 Oct 2022 04:52) (132/64 - 174/96)  BP(mean): --  RR: 18 (05 Oct 2022 04:52) (15 - 18)  SpO2: 92% (05 Oct 2022 04:52) (92% - 97%)    Parameters below as of 05 Oct 2022 04:52  Patient On (Oxygen Delivery Method): room air        PHYSICAL EXAM:  GENERAL: NAD  HEAD:  Atraumatic, Normocephalic  EYES: EOMI, PERRLA, conjunctiva and sclera clear  ENMT: No tonsillar erythema, exudates, or enlargement; Moist mucous membranes  NECK: Supple, No JVD, Normal thyroid  NERVOUS SYSTEM:  Alert & Oriented X3, Good concentration; Motor Strength 5/5 B/L upper and lower extremities; DTRs 2+ intact and symmetric  CHEST/LUNG: Clear to auscultation bilaterally; No rales, rhonchi, wheezing, or rubs  HEART: Regular rate and rhythm; No murmurs, rubs, or gallops  ABDOMEN: Soft, Nondistended; Bowel sounds present  EXTREMITIES:  2+ Peripheral Pulses, No clubbing, cyanosis, or edema  LYMPH: No lymphadenopathy noted  SKIN: No rashes or lesions    LABS:                        11.6   7.41  )-----------( 194      ( 05 Oct 2022 07:00 )             35.8     05 Oct 2022 07:00    144    |  112    |  11     ----------------------------<  103    3.7     |  28     |  0.85     Ca    8.6        05 Oct 2022 07:00    TPro  7.9    /  Alb  3.9    /  TBili  0.5    /  DBili  x      /  AST  25     /  ALT  37     /  AlkPhos  94     04 Oct 2022 14:40    PT/INR - ( 05 Oct 2022 07:00 )   PT: 48.6 sec;   INR: 4.10 ratio         PTT - ( 05 Oct 2022 07:00 )  PTT:50.4 sec  CAPILLARY BLOOD GLUCOSE        BLOOD CULTURE    RADIOLOGY & ADDITIONAL TESTS:    Imaging Personally Reviewed:  [ ] YES     Consultant(s) Notes Reviewed:      Care Discussed with Consultants/Other Providers:

## 2022-10-05 NOTE — PROGRESS NOTE ADULT - SUBJECTIVE AND OBJECTIVE BOX
Pt seen and examined at bedside, denies any overnight events. Vital signs stable. Reports abdominal pain has improved, denies pains at rest. Tolerating diet. Denies any nausea/vomiting. Denies passing flatus or having a BM since admission. Pt OOB & ambulating.  Denies headache, chest pain, palpitations, shortness of breath, weakness or fatigue.    T(C): 36.6 (10-05-22 @ 04:52), Max: 37.1 (10-04-22 @ 18:48)  HR: 65 (10-05-22 @ 04:52) (64 - 86)  BP: 132/64 (10-05-22 @ 04:52) (132/64 - 174/96)  RR: 18 (10-05-22 @ 04:52) (15 - 18)  SpO2: 92% (10-05-22 @ 04:52) (92% - 97%)    PHYSICAL EXAM:  General: no acute distress, appears comfortable  HEENT: normocephalic, anicteric  Pulm: non labored respirations  Cardio: RRR  Abdomen: soft, tender to palpation LLQ, nondistended, (+) BS  Extremities: no calf tenderness noted    I&O's Detail    04 Oct 2022 07:01  -  05 Oct 2022 07:00  --------------------------------------------------------  IN:    sodium chloride 0.9%: 825 mL  Total IN: 825 mL    OUT:  Total OUT: 0 mL    Total NET: 825 mL    LABS:                        12.3   7.95  )-----------( 211      ( 04 Oct 2022 14:40 )             37.9     10-04    141  |  109<H>  |  13  ----------------------------<  105<H>  4.0   |  27  |  0.78    Ca    9.4      04 Oct 2022 14:40    TPro  7.9  /  Alb  3.9  /  TBili  0.5  /  DBili  x   /  AST  25  /  ALT  37  /  AlkPhos  94  10-04    PT/INR - ( 04 Oct 2022 14:40 )   PT: 55.2 sec;   INR: 4.65 ratio         PTT - ( 04 Oct 2022 14:40 )  PTT:60.8 sec  Urinalysis Basic - ( 04 Oct 2022 15:40 )    Color: Yellow / Appearance: Clear / S.015 / pH: x  Gluc: x / Ketone: Negative  / Bili: Negative / Urobili: Negative   Blood: x / Protein: Negative / Nitrite: Negative   Leuk Esterase: Trace / RBC: 0-2 /HPF / WBC 3-5   Sq Epi: x / Non Sq Epi: Few / Bacteria: Few    MEDICATIONS:  acetaminophen     Tablet .. 325 milliGRAM(s) Oral every 4 hours PRN  influenza  Vaccine (HIGH DOSE) 0.7 milliLiter(s) IntraMuscular once  ketorolac   Injectable 15 milliGRAM(s) IV Push every 6 hours PRN  ketorolac   Injectable 30 milliGRAM(s) IV Push every 6 hours PRN  losartan 50 milliGRAM(s) Oral daily  melatonin 5 milliGRAM(s) Oral at bedtime  ondansetron Injectable 4 milliGRAM(s) IV Push every 6 hours PRN  pantoprazole  Injectable 40 milliGRAM(s) IV Push daily  piperacillin/tazobactam IVPB.. 3.375 Gram(s) IV Intermittent every 8 hours  sodium chloride 0.9%. 1000 milliLiter(s) IV Continuous <Continuous>  zolpidem 5 milliGRAM(s) Oral at bedtime PRN  zolpidem 5 milliGRAM(s) Oral at bedtime PRN

## 2022-10-05 NOTE — PROGRESS NOTE ADULT - ASSESSMENT
66 y/o F w/ known hx of diverticulosis, presenting w/ LLQ pain, found to have mild sigmoid diverticulitis. Cataract OU-Visually significant cataract OU.-Cataract(s) causing symptomatic impairment of visual function not correctable with a tolerable change in glasses or contact lenses lighting or non-operative means resulting in specific activity limitations and/or participation restrictions including but not limited to reading viewing television driving or meeting vocational or recreational needs. -Expectation is clearer vision and functional improvement in symptoms as well as reduced glare disability after cataract removal.-Order IOLMaster and OPD today. -Recommend standard/traditional based on today's OPD testing and lifestyle questionnaire.-All questions were answered regarding surgery including pre and post-op medications appointments activity restrictions and anesthetic usage.-The risks benefits and alternatives and special risk factors for the patient were discussed in detail including but not limited to: bleeding infection retinal detachment vitreous loss problems with the implant and possible need for additional surgery.-Although rare the possibility of complete vision loss was discussed.-The possible need for glasses post-operatively was discussed.-Order H&P-Patient elects to proceed with cataract surgery OS. Will schedule at patient's convenience and re-evaluate OD in the future. **pateint elects standard/traditional and to start  with OS 68 y/o F w/ known hx of diverticulosis, presenting w/ LLQ pain, found to have mild sigmoid diverticulitis.    As in above PA notation.  Ms. Riggs personally seen and examined during PM rounds.  She reports that her pain continues to improve overall.  Vitals non-suggestive since admission.  Abdomen full/ obese, but soft.  Tender to moderate palpation of LLQ but no jessica peritoneal findings.  Labs reassuring from today.  CT negative for signs of microperforation or abscess per report and my review of images.  Clinically, improving overall with supportive care.  Surgically, stable for present.  To continue current supportive care in light of long duration of complaints and significant LLQ tenderness.  Consideration of clear liquids in am...

## 2022-10-06 ENCOUNTER — TRANSCRIPTION ENCOUNTER (OUTPATIENT)
Age: 67
End: 2022-10-06

## 2022-10-06 LAB
ANION GAP SERPL CALC-SCNC: 5 MMOL/L — SIGNIFICANT CHANGE UP (ref 5–17)
APTT BLD: 46.7 SEC — HIGH (ref 27.5–35.5)
BUN SERPL-MCNC: 9 MG/DL — SIGNIFICANT CHANGE UP (ref 7–23)
CALCIUM SERPL-MCNC: 8.7 MG/DL — SIGNIFICANT CHANGE UP (ref 8.5–10.1)
CHLORIDE SERPL-SCNC: 111 MMOL/L — HIGH (ref 96–108)
CO2 SERPL-SCNC: 28 MMOL/L — SIGNIFICANT CHANGE UP (ref 22–31)
CREAT SERPL-MCNC: 0.77 MG/DL — SIGNIFICANT CHANGE UP (ref 0.5–1.3)
CULTURE RESULTS: SIGNIFICANT CHANGE UP
EGFR: 84 ML/MIN/1.73M2 — SIGNIFICANT CHANGE UP
GLUCOSE SERPL-MCNC: 106 MG/DL — HIGH (ref 70–99)
HCT VFR BLD CALC: 34.4 % — LOW (ref 34.5–45)
HCV AB S/CO SERPL IA: 0.09 S/CO — SIGNIFICANT CHANGE UP (ref 0–0.99)
HCV AB SERPL-IMP: SIGNIFICANT CHANGE UP
HGB BLD-MCNC: 11.4 G/DL — LOW (ref 11.5–15.5)
INR BLD: 3.07 RATIO — HIGH (ref 0.88–1.16)
MCHC RBC-ENTMCNC: 31.3 PG — SIGNIFICANT CHANGE UP (ref 27–34)
MCHC RBC-ENTMCNC: 33.1 GM/DL — SIGNIFICANT CHANGE UP (ref 32–36)
MCV RBC AUTO: 94.5 FL — SIGNIFICANT CHANGE UP (ref 80–100)
NRBC # BLD: 0 /100 WBCS — SIGNIFICANT CHANGE UP (ref 0–0)
PLATELET # BLD AUTO: 202 K/UL — SIGNIFICANT CHANGE UP (ref 150–400)
POTASSIUM SERPL-MCNC: 3.9 MMOL/L — SIGNIFICANT CHANGE UP (ref 3.5–5.3)
POTASSIUM SERPL-SCNC: 3.9 MMOL/L — SIGNIFICANT CHANGE UP (ref 3.5–5.3)
PROTHROM AB SERPL-ACNC: 36.3 SEC — HIGH (ref 10.5–13.4)
RBC # BLD: 3.64 M/UL — LOW (ref 3.8–5.2)
RBC # FLD: 12.4 % — SIGNIFICANT CHANGE UP (ref 10.3–14.5)
SODIUM SERPL-SCNC: 144 MMOL/L — SIGNIFICANT CHANGE UP (ref 135–145)
SPECIMEN SOURCE: SIGNIFICANT CHANGE UP
WBC # BLD: 7.78 K/UL — SIGNIFICANT CHANGE UP (ref 3.8–10.5)
WBC # FLD AUTO: 7.78 K/UL — SIGNIFICANT CHANGE UP (ref 3.8–10.5)

## 2022-10-06 PROCEDURE — 99233 SBSQ HOSP IP/OBS HIGH 50: CPT

## 2022-10-06 RX ORDER — BENZOCAINE AND MENTHOL 5; 1 G/100ML; G/100ML
1 LIQUID ORAL
Refills: 0 | Status: DISCONTINUED | OUTPATIENT
Start: 2022-10-06 | End: 2022-10-07

## 2022-10-06 RX ORDER — WARFARIN SODIUM 2.5 MG/1
1.5 TABLET ORAL ONCE
Refills: 0 | Status: COMPLETED | OUTPATIENT
Start: 2022-10-06 | End: 2022-10-06

## 2022-10-06 RX ADMIN — Medication 10 MILLIGRAM(S): at 00:30

## 2022-10-06 RX ADMIN — PIPERACILLIN AND TAZOBACTAM 25 GRAM(S): 4; .5 INJECTION, POWDER, LYOPHILIZED, FOR SOLUTION INTRAVENOUS at 00:30

## 2022-10-06 RX ADMIN — WARFARIN SODIUM 1.5 MILLIGRAM(S): 2.5 TABLET ORAL at 21:21

## 2022-10-06 RX ADMIN — Medication 1000 MILLIGRAM(S): at 08:57

## 2022-10-06 RX ADMIN — Medication 10 MILLIGRAM(S): at 11:52

## 2022-10-06 RX ADMIN — Medication 10 MILLIGRAM(S): at 18:04

## 2022-10-06 RX ADMIN — PIPERACILLIN AND TAZOBACTAM 25 GRAM(S): 4; .5 INJECTION, POWDER, LYOPHILIZED, FOR SOLUTION INTRAVENOUS at 17:06

## 2022-10-06 RX ADMIN — Medication 10 MILLIGRAM(S): at 13:30

## 2022-10-06 RX ADMIN — Medication 1000 MILLIGRAM(S): at 07:42

## 2022-10-06 RX ADMIN — PIPERACILLIN AND TAZOBACTAM 25 GRAM(S): 4; .5 INJECTION, POWDER, LYOPHILIZED, FOR SOLUTION INTRAVENOUS at 09:14

## 2022-10-06 RX ADMIN — ZOLPIDEM TARTRATE 5 MILLIGRAM(S): 10 TABLET ORAL at 21:20

## 2022-10-06 RX ADMIN — Medication 10 MILLIGRAM(S): at 19:04

## 2022-10-06 RX ADMIN — LOSARTAN POTASSIUM 50 MILLIGRAM(S): 100 TABLET, FILM COATED ORAL at 05:25

## 2022-10-06 RX ADMIN — BENZOCAINE AND MENTHOL 1 LOZENGE: 5; 1 LIQUID ORAL at 21:22

## 2022-10-06 RX ADMIN — PANTOPRAZOLE SODIUM 40 MILLIGRAM(S): 20 TABLET, DELAYED RELEASE ORAL at 11:52

## 2022-10-06 NOTE — DISCHARGE NOTE PROVIDER - CARE PROVIDERS DIRECT ADDRESSES
[email or web address]

## 2022-10-06 NOTE — DISCHARGE NOTE PROVIDER - HOSPITAL COURSE
HPI:  67 year old female with PMHx diverticulosis, HTN, DVT (on coumadin), presenting to [place] ED with worsening abdominal pain for 2 weeks.  Patient states pain has been constant for 2 weeks, awoke this morning and after breakfast pain has worsened, rating it 8/10 in tensity with radiation of symptoms to LLQ.  Patient additionally reports since pain began she has had irregular bowel movements, only having a BM every 2-3 days.  Reports last BM 2 days ago, denies passing flatus.  Last ate earlier this morning.  Patient reports prior to abdominal pain starting she was in her regular state of health.  Denies fevers, chills, N/V, chest pain, SOB, palpitations, calf pain.   (04 Oct 2022 20:55)    Interval hospitalization:  Pt admitted for diverticulitis.  Started on ABX (Zosyn) and given bowel rest.   Her INR on admission was supratherapeutic, so her coumadin was held and her INR was monitored daily. On hospital day 2- patient was started on a clear liquid diet, and had a full liquid diet for dinner.  Pt pain was well controlled and she continued to improve on Zosyn.  On hospital day 3, patient had been advanced to a regular low fiber diet, GI function returned, and had been restarted on coumadin was a normal INR (Between 2-3).  Pt overall had improved well enough to be considered stable for discharge. Will follow up outpatient with her PCP to monitor her INR while on her coumadin, and will follow up with Dr. Moreira.  Pt will continue on PO antibiotics for a total of 10 days.     DISPO:  Stable for discharge with outpatient follow ups with PCP and Dr. Moreira. HPI:  67 year old female with PMHx diverticulosis, HTN, DVT (on coumadin), presenting to [place] ED with worsening abdominal pain for 2 weeks.  Patient states pain has been constant for 2 weeks, awoke this morning and after breakfast pain has worsened, rating it 8/10 in tensity with radiation of symptoms to LLQ.  Patient additionally reports since pain began she has had irregular bowel movements, only having a BM every 2-3 days.  Reports last BM 2 days ago, denies passing flatus.  Last ate earlier this morning.  Patient reports prior to abdominal pain starting she was in her regular state of health.  Denies fevers, chills, N/V, chest pain, SOB, palpitations, calf pain.   (04 Oct 2022 20:55)    Interval hospitalization:  Pt admitted for diverticulitis.  Started on ABX (Zosyn) and given bowel rest.   Her INR on admission was supratherapeutic, so her coumadin was held and her INR was monitored daily. On hospital day 2- patient was started on a clear liquid diet, and had a full liquid diet for dinner.  Pt pain was well controlled and she continued to improve on Zosyn.  On hospital day 3, patient had been advanced to a regular low fiber diet, GI function returned, and had been restarted on coumadin was a normal INR (Between 2-3).  Pt overall had improved well enough to be considered stable for discharge. Will follow up outpatient with her PCP to monitor her INR while on her coumadin, and will follow up with Dr. Fraga.  Pt will continue on PO antibiotics for a total of 10 days.     DISPO:  Stable for discharge with outpatient follow ups with PCP and Dr. Fraga.

## 2022-10-06 NOTE — DISCHARGE NOTE PROVIDER - NSDCMRMEDTOKEN_GEN_ALL_CORE_FT
losartan 50 mg oral tablet: 1.5 tab(s) orally once a day  rosuvastatin 5 mg oral tablet: 1 tab(s) orally once a day  warfarin 2.5 mg oral tablet: 1 tab(s) orally once a day on Tuesday and Thursday   warfarin 5 mg oral tablet: 1 tab(s) orally once a day on Sunday, Monday, Wednesday, Friday, Saturday   zolpidem 10 mg oral tablet: 1 tab(s) orally once a day (at bedtime), As Needed   amoxicillin-clavulanate 875 mg-125 mg oral tablet: 1 tab(s) orally 2 times a day   losartan 50 mg oral tablet: 1.5 tab(s) orally once a day  rosuvastatin 5 mg oral tablet: 1 tab(s) orally once a day  warfarin 2.5 mg oral tablet: 1 tab(s) orally once a day on Tuesday and Thursday   warfarin 5 mg oral tablet: 1 tab(s) orally once a day on Sunday, Monday, Wednesday, Friday, Saturday   zolpidem 10 mg oral tablet: 1 tab(s) orally once a day (at bedtime), As Needed   cefpodoxime 200 mg oral tablet: 1 tab(s) orally 2 times a day   losartan 50 mg oral tablet: 1.5 tab(s) orally once a day  metroNIDAZOLE 500 mg oral tablet: 1 tab(s) orally 2 times a day   rosuvastatin 5 mg oral tablet: 1 tab(s) orally once a day  warfarin 2.5 mg oral tablet: 1 tab(s) orally once a day on Tuesday and Thursday   warfarin 5 mg oral tablet: 1 tab(s) orally once a day on Sunday, Monday, Wednesday, Friday, Saturday   zolpidem 10 mg oral tablet: 1 tab(s) orally once a day (at bedtime), As Needed

## 2022-10-06 NOTE — PROGRESS NOTE ADULT - ASSESSMENT
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     Problem/Recommendation - 1:  ·  Problem: Acute diverticulitis.   ·  Recommendation: Pt presents with LLQ abdominal pain   - Mild acute sigmoid diverticulitis, no drainable collection  - Conservative management for now, no indication for acute surgical intervention at this time as per Surgery   - NPO, continue IVF while NPO. Possible plan to start clear liquid diet, will defer to surgery   - Continue IV Zosyn   - Continue PPI prophylaxis  - Pain regimen as per Surgery   - Zofran PRN nausea   - Continue serial abdominal exams - if any drastic change would rescan PRN  - Monitor leukocytosis and fever   - Plan as per Surgery.     Problem/Recommendation - 2:  ·  Problem: Personal history of DVT (deep vein thrombosis).   ·  Recommendation: Chronic   History of DVT/PE in 2012, history of antiphospholipid antibody syndrome   - Pt is on home Coumadin 5 mg on Sun, Mon, Wed, Fri and Sat and Coumadin 2.5 mg on Tues and Thurs; INR checked 2 weeks ago - INR: 2    - INR 3.07 today   - Resume coumadin when on diet and No plan for surgery   - Monitor daily INR, may restart Coumadin once INR: 2-3.     Problem/Recommendation - 3:  ·  Problem: HTN (hypertension).   ·  Recommendation: Chronic  - Stable  - Continue with home Losartan w/ hold parameters  - Monitor BP & titrate BP meds PRN.     Problem/Recommendation - 4:  ·  Problem: HLD (hyperlipidemia).   ·  Recommendation: Chronic   - Continue home statin.     Problem/Recommendation - 5:  ·  Problem: Insomnia.   ·  Recommendation: Chronic   - Continue home Ambien PRN.     Problem/Recommendation - 6:  ·  Problem: Need for prophylactic measure.   ·  Recommendation: VTE PPX as per Surgery team - Already (supra)therapeutically anticoagulated with warfarin; SCDs.

## 2022-10-06 NOTE — CHART NOTE - NSCHARTNOTEFT_GEN_A_CORE
D/w surgical PA  about coumadin dosing since her INR dropped from 4.1 to 3.0 today. She is on  clear liquid diet and plan is for advancing the diet and discharge in am. There is definitely no plan for any surgical Intervention per surgery. Will dose coumadin 1mg X1 today, so that INR does not drop a lot tomorrow and  needs to be bridged with Lovenox.

## 2022-10-06 NOTE — PROGRESS NOTE ADULT - PROBLEM SELECTOR PLAN 1
-  Advance diet as tolerated (on Sips and Chips)  - Continue Zosyn, pain control as needed  - Encourage OOB/ambulation, SCDs  - Serial abdominal exams  - Trend labs, daily INR  - Pt on Coumadin for hx DVTs (antiphospholipid syndrome) - INR 4.10 today, hold Wednesday dose -  Advance diet as tolerated (on Sips and Chips), Will adv to Clear liquid Diet.   - Continue Zosyn, pain control as needed  - Encourage OOB/ambulation, SCDs  - Serial abdominal exam   - Trend labs, daily INR  - Pt on Coumadin for hx DVTs (antiphospholipid syndrome) - INR 4.10 today, held 10/6 dose.  Will reassess pending AM INR   - Monitor GI function

## 2022-10-06 NOTE — PROGRESS NOTE ADULT - SUBJECTIVE AND OBJECTIVE BOX
SURGERY PROGRESS NOTE ON BEHALF OF DR. JACINTO:    S: Patient seen and examined at bedside.  Patient endorses slight left sided abdominal pain and passing flatus without BM. Patient tolerates sips and chips.  Patient denies fevers, chills, nausea, vomiting, diarrhea, chest pain, shortness of breath, constipation or diarrhea.     MEDICATIONS:  acetaminophen     Tablet .. 1000 milliGRAM(s) Oral every 6 hours PRN  influenza  Vaccine (HIGH DOSE) 0.7 milliLiter(s) IntraMuscular once  ketorolac 10 milliGRAM(s) Oral every 6 hours PRN  losartan 50 milliGRAM(s) Oral daily  melatonin 5 milliGRAM(s) Oral at bedtime  ondansetron Injectable 4 milliGRAM(s) IV Push every 6 hours PRN  pantoprazole  Injectable 40 milliGRAM(s) IV Push daily  piperacillin/tazobactam IVPB.. 3.375 Gram(s) IV Intermittent every 8 hours  sodium chloride 0.9%. 1000 milliLiter(s) IV Continuous <Continuous>  zolpidem 5 milliGRAM(s) Oral at bedtime PRN  zolpidem 5 milliGRAM(s) Oral at bedtime PRN      O:  Vital Signs Last 24 Hrs  T(C): 36.5 (06 Oct 2022 05:15), Max: 37.1 (05 Oct 2022 20:10)  T(F): 97.7 (06 Oct 2022 05:15), Max: 98.7 (05 Oct 2022 20:10)  HR: 68 (06 Oct 2022 05:15) (59 - 68)  BP: 153/73 (06 Oct 2022 05:15) (150/72 - 166/75)  BP(mean): --  RR: 16 (06 Oct 2022 05:15) (16 - 20)  SpO2: 94% (06 Oct 2022 05:15) (93% - 95%)    Parameters below as of 06 Oct 2022 05:15  Patient On (Oxygen Delivery Method): room air        I&O SUMMARY:    10-04-22 @ 07:01  -  10-05-22 @ 07:00  --------------------------------------------------------  IN: 825 mL / OUT: 0 mL / NET: 825 mL    10-05-22 @ 07:01  -  10-06-22 @ 06:47  --------------------------------------------------------  IN: 1550 mL / OUT: 0 mL / NET: 1550 mL        PHYSICAL EXAM:  General: Awake and alert in No Acute Distress.   Lungs: Equal Chest excursion with respirations, No Increased work of breathing on RA  Card: Regular rate and rhythm   Abd: Soft, Obese Body Habitus. Tender to palpation at the LLQ with deep palpation.  No rebound/guarding.    Ext: Calves soft, NT, without edema bilat    LABS:                        11.6   7.41  )--------( 194      ( 05 Oct 2022 07:00 )             35.8     10-05    144  |  112<H>  |  11  ----------------------------<  103<H>  3.7   |  28  |  0.85    Ca    8.6      05 Oct 2022 07:00    TPro  7.9  /  Alb  3.9  /  TBili  0.5  /  DBili  x   /  AST  25  /  ALT  37  /  AlkPhos  94  10-04    PT/INR - ( 05 Oct 2022 07:00 )   PT: 48.6 sec;   INR: 4.10 ratio         PTT - ( 05 Oct 2022 07:00 )  PTT:50.4 sec          RADIOLOGY:    10/4 CT abdomen and Pelvis with Oral and IV contrast:   Mild acute sigmoid diverticulitis. No drainable collection

## 2022-10-06 NOTE — DISCHARGE NOTE PROVIDER - CARE PROVIDER_API CALL
Dean Moreira)  Surgery  [address], [place] [id number]  Phone: [phone]  Fax: [phone]  Follow Up Time:    Nahum Fraga)  Surgery  [address] [place] [address]  Phone: [phone]  Fax: [phone]  Follow Up Time: 2 weeks

## 2022-10-06 NOTE — DISCHARGE NOTE PROVIDER - NSDCFUADDINST_GEN_ALL_CORE_FT
No strenuous activity,  No reaching, pulling, pushing, lifting >10 lbs  Low fiber diet.   Take coumadin as prescribed.  Must follow up with PCP to evaluate INR.   Continue antibiotics until completed.

## 2022-10-06 NOTE — PROGRESS NOTE ADULT - ASSESSMENT
66 y/o F w/ known hx of diverticulosis, presenting w/ LLQ pain, found to have mild sigmoid diverticulitis. Patient is improving with only slight LLQ abdominal pain on deep palpation of the LLQ.  66 y/o F w/ known hx of diverticulosis, presenting w/ LLQ pain, found to have mild sigmoid diverticulitis. Patient is improving with only slight LLQ abdominal pain on deep palpation of the LLQ. Patient tolerating sips and chips diet.  68 y/o F w/ known hx of diverticulosis, presenting w/ LLQ pain, found to have mild sigmoid diverticulitis. Patient is improving with only slight LLQ abdominal pain on deep palpation of the LLQ. Patient tolerating sips and chips diet.     As in above Residentnotation.  Ms. Riggs personally seen and examined during early PM rounds.  She reports that her pain continues to steadily improve.  Tolerating clear liquids.  Vitals non-suggestive since last visit.  Abdomen full/ obese, but soft.  Tender to deeper palpation of LLQ but no jessica peritoneal findings.  Labs reassuring once again from today.  Clinically, improving overall with supportive care and surgically stable for the present.  Starting full liquids tonight with consideration of transition to PO ABX and discharge in am...

## 2022-10-06 NOTE — DISCHARGE NOTE PROVIDER - NSDCFUADDAPPT_GEN_ALL_CORE_FT
Dr. Moreira to see in 1 week, please call to schedule.   PCP- please follow up early next week for lab work to evaluate INR.  Dr. Fraga to see in 1 week, please call to schedule.   PCP- please follow up early next week for lab work to evaluate INR.

## 2022-10-07 ENCOUNTER — TRANSCRIPTION ENCOUNTER (OUTPATIENT)
Age: 67
End: 2022-10-07

## 2022-10-07 VITALS
RESPIRATION RATE: 20 BRPM | SYSTOLIC BLOOD PRESSURE: 175 MMHG | DIASTOLIC BLOOD PRESSURE: 85 MMHG | HEART RATE: 64 BPM | TEMPERATURE: 99 F | OXYGEN SATURATION: 96 %

## 2022-10-07 LAB
ANION GAP SERPL CALC-SCNC: 7 MMOL/L — SIGNIFICANT CHANGE UP (ref 5–17)
APTT BLD: 43 SEC — HIGH (ref 27.5–35.5)
BUN SERPL-MCNC: 7 MG/DL — SIGNIFICANT CHANGE UP (ref 7–23)
CALCIUM SERPL-MCNC: 9.5 MG/DL — SIGNIFICANT CHANGE UP (ref 8.5–10.1)
CHLORIDE SERPL-SCNC: 111 MMOL/L — HIGH (ref 96–108)
CO2 SERPL-SCNC: 27 MMOL/L — SIGNIFICANT CHANGE UP (ref 22–31)
CREAT SERPL-MCNC: 0.71 MG/DL — SIGNIFICANT CHANGE UP (ref 0.5–1.3)
EGFR: 93 ML/MIN/1.73M2 — SIGNIFICANT CHANGE UP
GLUCOSE SERPL-MCNC: 103 MG/DL — HIGH (ref 70–99)
HCT VFR BLD CALC: 40.3 % — SIGNIFICANT CHANGE UP (ref 34.5–45)
HGB BLD-MCNC: 13 G/DL — SIGNIFICANT CHANGE UP (ref 11.5–15.5)
INR BLD: 2.54 RATIO — HIGH (ref 0.88–1.16)
MCHC RBC-ENTMCNC: 30.7 PG — SIGNIFICANT CHANGE UP (ref 27–34)
MCHC RBC-ENTMCNC: 32.3 GM/DL — SIGNIFICANT CHANGE UP (ref 32–36)
MCV RBC AUTO: 95.3 FL — SIGNIFICANT CHANGE UP (ref 80–100)
NRBC # BLD: 0 /100 WBCS — SIGNIFICANT CHANGE UP (ref 0–0)
PLATELET # BLD AUTO: 232 K/UL — SIGNIFICANT CHANGE UP (ref 150–400)
POTASSIUM SERPL-MCNC: 3.5 MMOL/L — SIGNIFICANT CHANGE UP (ref 3.5–5.3)
POTASSIUM SERPL-SCNC: 3.5 MMOL/L — SIGNIFICANT CHANGE UP (ref 3.5–5.3)
PROTHROM AB SERPL-ACNC: 30 SEC — HIGH (ref 10.5–13.4)
RBC # BLD: 4.23 M/UL — SIGNIFICANT CHANGE UP (ref 3.8–5.2)
RBC # FLD: 12.7 % — SIGNIFICANT CHANGE UP (ref 10.3–14.5)
SODIUM SERPL-SCNC: 145 MMOL/L — SIGNIFICANT CHANGE UP (ref 135–145)
WBC # BLD: 8.8 K/UL — SIGNIFICANT CHANGE UP (ref 3.8–10.5)
WBC # FLD AUTO: 8.8 K/UL — SIGNIFICANT CHANGE UP (ref 3.8–10.5)

## 2022-10-07 PROCEDURE — 99238 HOSP IP/OBS DSCHRG MGMT 30/<: CPT

## 2022-10-07 PROCEDURE — 99233 SBSQ HOSP IP/OBS HIGH 50: CPT

## 2022-10-07 RX ORDER — CEFPODOXIME PROXETIL 100 MG
1 TABLET ORAL
Qty: 14 | Refills: 0
Start: 2022-10-07 | End: 2022-10-13

## 2022-10-07 RX ORDER — METRONIDAZOLE 500 MG
1 TABLET ORAL
Qty: 14 | Refills: 0
Start: 2022-10-07 | End: 2022-10-13

## 2022-10-07 RX ADMIN — SODIUM CHLORIDE 50 MILLILITER(S): 9 INJECTION INTRAMUSCULAR; INTRAVENOUS; SUBCUTANEOUS at 04:53

## 2022-10-07 RX ADMIN — ZOLPIDEM TARTRATE 5 MILLIGRAM(S): 10 TABLET ORAL at 00:59

## 2022-10-07 RX ADMIN — PIPERACILLIN AND TAZOBACTAM 25 GRAM(S): 4; .5 INJECTION, POWDER, LYOPHILIZED, FOR SOLUTION INTRAVENOUS at 00:59

## 2022-10-07 RX ADMIN — PANTOPRAZOLE SODIUM 40 MILLIGRAM(S): 20 TABLET, DELAYED RELEASE ORAL at 11:53

## 2022-10-07 RX ADMIN — PIPERACILLIN AND TAZOBACTAM 25 GRAM(S): 4; .5 INJECTION, POWDER, LYOPHILIZED, FOR SOLUTION INTRAVENOUS at 09:26

## 2022-10-07 RX ADMIN — LOSARTAN POTASSIUM 50 MILLIGRAM(S): 100 TABLET, FILM COATED ORAL at 05:51

## 2022-10-07 NOTE — DISCHARGE NOTE NURSING/CASE MANAGEMENT/SOCIAL WORK - PATIENT PORTAL LINK FT
You can access the FollowMyHealth Patient Portal offered by [facility] by registering at the following website: [email or web address]. By joining [facility]’s FollowMyHealth portal, you will also be able to view your health information using other applications (apps) compatible with our system.

## 2022-10-07 NOTE — PROGRESS NOTE ADULT - ASSESSMENT
68 y/o F w/ known hx of diverticulosis, presenting w/ LLQ pain, found to have mild sigmoid diverticulitis. Patient is improving with only slight LLQ abdominal pain on deep palpation of the LLQ. Patient is tolerating a full liquid diet and is stable for discharge without any complaints of abdominal pain.

## 2022-10-07 NOTE — DISCHARGE NOTE NURSING/CASE MANAGEMENT/SOCIAL WORK - NSDCFUADDAPPT_GEN_ALL_CORE_FT
Dr. Fraga to see in 1 week, please call to schedule.   PCP- please follow up early next week for lab work to evaluate INR.

## 2022-10-07 NOTE — PROGRESS NOTE ADULT - ASSESSMENT
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     Problem/Recommendation - 1:  ·  Problem: Acute diverticulitis.   ·  Recommendation: Pt presents with LLQ abdominal pain   - Mild acute sigmoid diverticulitis, no drainable collection  - Continue IV Zosyn   -Advance diet as tolerated   - Possible plan for d/c today on oral antibiotics. Can do Augmentin x 14 days   -Dc plan per surgery        Problem/Recommendation - 2:  ·  Problem: Personal history of DVT (deep vein thrombosis).   - Resume home coumadin dosing upon d/c and f/u with her doctor for INR monitoring. Will need close monitoring until on antibiotics.     ·   Problem/Recommendation - 3:  ·  Problem: HTN (hypertension).   ·  Recommendation: Chronic  - Stable  - Continue with home Losartan w/ hold parameters  - Monitor BP & titrate BP meds PRN.     Problem/Recommendation - 4:  ·  Problem: HLD (hyperlipidemia).   ·  Recommendation: Chronic   - Continue home statin.     Problem/Recommendation - 5:  ·  Problem: Insomnia.   ·  Recommendation: Chronic   - Continue home Ambien PRN.     Problem/Recommendation - 6:  ·  Problem: Need for prophylactic measure.   ·  Recommendation: Ambulate as tolerated     Thank You for consulting Medicine. Will sign Off for now. Please reconsult as needed. F/u with PCP in 2-3 days is recommended for INR check and follow up

## 2022-10-07 NOTE — PROGRESS NOTE ADULT - SUBJECTIVE AND OBJECTIVE BOX
SURGERY Progress Note ON BEHALF OF DR. JACINTO:    S: Patient seen and examined at bedside.    Patient states that she is doing well and ready to return home. Patient stats that she has been tolerating diet advancement to Full Liquid Diet and  has been passing gas and having bowel movements.  Patient advised on interval colonoscopy after 6 weeks but states that she is reluctant due to a previous experience and would like to schedule it with home care.     MEDICATIONS:  acetaminophen     Tablet .. 1000 milliGRAM(s) Oral every 6 hours PRN  benzocaine 15 mG/menthol 3.6 mG Lozenge 1 Lozenge Oral every 3 hours PRN  influenza  Vaccine (HIGH DOSE) 0.7 milliLiter(s) IntraMuscular once  ketorolac 10 milliGRAM(s) Oral every 6 hours PRN  losartan 50 milliGRAM(s) Oral daily  melatonin 5 milliGRAM(s) Oral at bedtime  ondansetron Injectable 4 milliGRAM(s) IV Push every 6 hours PRN  pantoprazole  Injectable 40 milliGRAM(s) IV Push daily  piperacillin/tazobactam IVPB.. 3.375 Gram(s) IV Intermittent every 8 hours  sodium chloride 0.9%. 1000 milliLiter(s) IV Continuous <Continuous>  zolpidem 5 milliGRAM(s) Oral at bedtime PRN  zolpidem 5 milliGRAM(s) Oral at bedtime PRN      O:  Vital Signs Last 24 Hrs  T(C): 36.8 (07 Oct 2022 04:48), Max: 36.8 (06 Oct 2022 20:15)  T(F): 98.2 (07 Oct 2022 04:48), Max: 98.2 (06 Oct 2022 20:15)  HR: 86 (07 Oct 2022 04:48) (63 - 86)  BP: 157/90 (07 Oct 2022 04:48) (151/80 - 162/65)  BP(mean): --  RR: 17 (07 Oct 2022 04:48) (17 - 18)  SpO2: 96% (07 Oct 2022 04:48) (95% - 96%)    Parameters below as of 07 Oct 2022 04:48  Patient On (Oxygen Delivery Method): room air        I&O SUMMARY:    10-05-22 @ 07:01  -  10-06-22 @ 07:00  --------------------------------------------------------  IN: 1925 mL / OUT: 0 mL / NET: 1925 mL    10-06-22 @ 07:01  -  10-07-22 @ 06:46  --------------------------------------------------------  IN: 450 mL / OUT: 0 mL / NET: 450 mL        PHYSICAL EXAM:    General: Awake and alert in No Acute Distress.   Lungs: Equal Chest excursion with respirations, No Increased work of breathing on RA  Card: Regular rate and rhythm   Abd: Soft, Obese Body Habitus. Non tender to palpation.   No rebound/guarding.    Ext: Calves soft, NT, without edema bilat    LABS:                        11.4   7.78  )-----------( 202      ( 06 Oct 2022 07:10 )             34.4     10-06    144  |  111<H>  |  9   ----------------------------<  106<H>  3.9   |  28  |  0.77    Ca    8.7      06 Oct 2022 07:10      PT/INR - ( 06 Oct 2022 07:10 )   PT: 36.3 sec;   INR: 3.07 ratio         PTT - ( 06 Oct 2022 07:10 )  PTT:46.7 sec          RADIOLOGY:  10/4 CT abdomen and Pelvis with Oral and IV contrast:   Mild acute sigmoid diverticulitis. No drainable collection       SURGERY Progress Note ON BEHALF OF DR. JACINTO:    S: Patient seen and examined at bedside.    Patient states that she is doing well and ready to return home. Patient stats that she has been tolerating diet advancement to Full Liquid Diet and  has been passing gas and having bowel movements.  Patient advised on interval colonoscopy after 6 weeks but states that she is reluctant due to a previous experience and would like to schedule it with home care.  Denies fevers, chills, Nausea, vomiting, abdominal pain, chest pain, diarrhea, or constipation.     MEDICATIONS:  acetaminophen     Tablet .. 1000 milliGRAM(s) Oral every 6 hours PRN  benzocaine 15 mG/menthol 3.6 mG Lozenge 1 Lozenge Oral every 3 hours PRN  influenza  Vaccine (HIGH DOSE) 0.7 milliLiter(s) IntraMuscular once  ketorolac 10 milliGRAM(s) Oral every 6 hours PRN  losartan 50 milliGRAM(s) Oral daily  melatonin 5 milliGRAM(s) Oral at bedtime  ondansetron Injectable 4 milliGRAM(s) IV Push every 6 hours PRN  pantoprazole  Injectable 40 milliGRAM(s) IV Push daily  piperacillin/tazobactam IVPB.. 3.375 Gram(s) IV Intermittent every 8 hours  sodium chloride 0.9%. 1000 milliLiter(s) IV Continuous <Continuous>  zolpidem 5 milliGRAM(s) Oral at bedtime PRN  zolpidem 5 milliGRAM(s) Oral at bedtime PRN      O:  Vital Signs Last 24 Hrs  T(C): 36.8 (07 Oct 2022 04:48), Max: 36.8 (06 Oct 2022 20:15)  T(F): 98.2 (07 Oct 2022 04:48), Max: 98.2 (06 Oct 2022 20:15)  HR: 86 (07 Oct 2022 04:48) (63 - 86)  BP: 157/90 (07 Oct 2022 04:48) (151/80 - 162/65)  BP(mean): --  RR: 17 (07 Oct 2022 04:48) (17 - 18)  SpO2: 96% (07 Oct 2022 04:48) (95% - 96%)    Parameters below as of 07 Oct 2022 04:48  Patient On (Oxygen Delivery Method): room air        I&O SUMMARY:    10-05-22 @ 07:01  -  10-06-22 @ 07:00  --------------------------------------------------------  IN: 1925 mL / OUT: 0 mL / NET: 1925 mL    10-06-22 @ 07:01  -  10-07-22 @ 06:46  --------------------------------------------------------  IN: 450 mL / OUT: 0 mL / NET: 450 mL        PHYSICAL EXAM:    General: Awake and alert in No Acute Distress.   Lungs: Equal Chest excursion with respirations, No Increased work of breathing on RA  Card: Regular rate and rhythm   Abd: Soft, Obese Body Habitus. Non tender to palpation.   No rebound/guarding.    Ext: Calves soft, NT, without edema bilat    LABS:                        11.4   7.78  )-----------( 202      ( 06 Oct 2022 07:10 )             34.4     10-06    144  |  111<H>  |  9   ----------------------------<  106<H>  3.9   |  28  |  0.77    Ca    8.7      06 Oct 2022 07:10      PT/INR - ( 06 Oct 2022 07:10 )   PT: 36.3 sec;   INR: 3.07 ratio         PTT - ( 06 Oct 2022 07:10 )  PTT:46.7 sec          RADIOLOGY:  10/4 CT abdomen and Pelvis with Oral and IV contrast:   Mild acute sigmoid diverticulitis. No drainable collection

## 2022-10-07 NOTE — PROGRESS NOTE ADULT - PROBLEM SELECTOR PROBLEM 2
Personal history of DVT (deep vein thrombosis)

## 2022-10-07 NOTE — PROGRESS NOTE ADULT - TIME BILLING
Note by attending. Meds, labs, vitals, chart reviewed.
Reviewed with patient in detail, Surgical team and today's RN team.
Reviewed with patient in detail,  at bedside, Surgical team and today's RN.

## 2022-10-07 NOTE — PROGRESS NOTE ADULT - PROBLEM SELECTOR PLAN 1
- ADAT( On Full Liquid Diet, well tolerated).  - Continue Zosyn, pain control as needed  - Encourage OOB/ambulation, SCDs  - Serial abdominal exam   - Trend labs, daily INR  - Pt on Coumadin for hx DVTs (antiphospholipid syndrome) - INR 4.10 on 10-6-2022, held 10/6 dose. Dosed late evening.  Will reassess pending AM INR   - Monitor GI function.  -DC with Outpatient follow up pending Am Labs

## 2022-10-07 NOTE — PROGRESS NOTE ADULT - SUBJECTIVE AND OBJECTIVE BOX
Patient is a 67y old  Female who presents with a chief complaint of abdominal pain/ acute diverticulitis (07 Oct 2022 06:45)       INTERVAL HPI/OVERNIGHT EVENTS: Seen and examined at bedside. Feeling better, wants to go home today. Abdominal pain has improved significantly. Denies diarrhea, nausea, vomiting    MEDICATIONS  (STANDING):  influenza  Vaccine (HIGH DOSE) 0.7 milliLiter(s) IntraMuscular once  losartan 50 milliGRAM(s) Oral daily  melatonin 5 milliGRAM(s) Oral at bedtime  pantoprazole  Injectable 40 milliGRAM(s) IV Push daily  piperacillin/tazobactam IVPB.. 3.375 Gram(s) IV Intermittent every 8 hours    MEDICATIONS  (PRN):  acetaminophen     Tablet .. 1000 milliGRAM(s) Oral every 6 hours PRN Mild Pain (1 - 3)  benzocaine 15 mG/menthol 3.6 mG Lozenge 1 Lozenge Oral every 3 hours PRN Sore Throat  ketorolac 10 milliGRAM(s) Oral every 6 hours PRN Moderate Pain (4 - 6)  ondansetron Injectable 4 milliGRAM(s) IV Push every 6 hours PRN Nausea  zolpidem 5 milliGRAM(s) Oral at bedtime PRN Insomnia  zolpidem 5 milliGRAM(s) Oral at bedtime PRN Insomnia      Allergies    metal jewelry other than gold (Rash)  No Known Drug Allergies    Intolerances        REVIEW OF SYSTEMS:  CONSTITUTIONAL: No fever, weight loss, or fatigue  EYES: No eye pain, visual disturbances, or discharge  ENMT:  No difficulty hearing, tinnitus, vertigo; No sinus or throat pain  NECK: No pain or stiffness  BREASTS: No pain, masses, or nipple discharge  RESPIRATORY: No cough, wheezing, chills or hemoptysis; No shortness of breath  CARDIOVASCULAR: No chest pain, palpitations, dizziness, or leg swelling  GASTROINTESTINAL: No abdominal or epigastric pain. No nausea, vomiting, or hematemesis; No diarrhea or constipation. No melena or hematochezia.  GENITOURINARY: No dysuria, frequency, hematuria, or incontinence  NEUROLOGICAL: No headaches, memory loss, loss of strength, numbness, or tremors  SKIN: No itching, burning, rashes, or lesions   LYMPH NODES: No enlarged glands  ENDOCRINE: No heat or cold intolerance; No hair loss; No polydipsia or polyuria  MUSCULOSKELETAL: No joint pain or swelling; No muscle, back, or extremity pain  PSYCHIATRIC: No depression, anxiety, mood swings, or difficulty sleeping  HEME/LYMPH: No easy bruising, or bleeding gums  ALLERGY AND IMMUNOLOGIC: No hives or eczema    Vital Signs Last 24 Hrs  T(C): 36.8 (07 Oct 2022 04:48), Max: 36.8 (06 Oct 2022 20:15)  T(F): 98.2 (07 Oct 2022 04:48), Max: 98.2 (06 Oct 2022 20:15)  HR: 86 (07 Oct 2022 04:48) (63 - 86)  BP: 157/90 (07 Oct 2022 04:48) (151/80 - 162/65)  BP(mean): --  RR: 17 (07 Oct 2022 04:48) (17 - 18)  SpO2: 96% (07 Oct 2022 04:48) (95% - 96%)    Parameters below as of 07 Oct 2022 04:48  Patient On (Oxygen Delivery Method): room air        PHYSICAL EXAM:  GENERAL: NAD, well-groomed, well-developed  HEAD:  Atraumatic, Normocephalic  EYES: EOMI, PERRLA, conjunctiva and sclera clear  ENMT: No tonsillar erythema, exudates, or enlargement; Moist mucous membranes, Good dentition, No lesions  NECK: Supple, No JVD, Normal thyroid  NERVOUS SYSTEM:  Alert & Oriented X3, Good concentration; Motor Strength 5/5 B/L upper and lower extremities; DTRs 2+ intact and symmetric  CHEST/LUNG: Clear to auscultation bilaterally; No rales, rhonchi, wheezing, or rubs  HEART: Regular rate and rhythm; No murmurs, rubs, or gallops  ABDOMEN: Soft, Nontender, Nondistended; Bowel sounds present  EXTREMITIES:  2+ Peripheral Pulses, No clubbing, cyanosis, or edema  LYMPH: No lymphadenopathy noted  SKIN: No rashes or lesions    LABS:                        13.0   8.80  )-----------( 232      ( 07 Oct 2022 06:20 )             40.3     07 Oct 2022 06:20    145    |  111    |  7      ----------------------------<  103    3.5     |  27     |  0.71     Ca    9.5        07 Oct 2022 06:20      PT/INR - ( 07 Oct 2022 06:20 )   PT: 30.0 sec;   INR: 2.54 ratio         PTT - ( 07 Oct 2022 06:20 )  PTT:43.0 sec  CAPILLARY BLOOD GLUCOSE        BLOOD CULTURE  10-04 @ 15:40   >=3 organisms. Probable collection contamination.  --  --    RADIOLOGY & ADDITIONAL TESTS:    Imaging Personally Reviewed:  [ ] YES     Consultant(s) Notes Reviewed:      Care Discussed with Consultants/Other Providers:

## 2022-10-07 NOTE — PROGRESS NOTE ADULT - REASON FOR ADMISSION
abdominal pain/ acute diverticulitis

## 2022-10-07 NOTE — DISCHARGE NOTE NURSING/CASE MANAGEMENT/SOCIAL WORK - NSDCPEFALRISK_GEN_ALL_CORE
For information on Fall & Injury Prevention, visit: [email or web address] OR  [email or web address] OR  https://www.cdc.gov/steadi/patient.html

## 2022-10-11 ENCOUNTER — NON-APPOINTMENT (OUTPATIENT)
Age: 67
End: 2022-10-11

## 2022-10-11 RX ORDER — AMOXICILLIN AND CLAVULANATE POTASSIUM 875; 125 MG/1; MG/1
875-125 TABLET, COATED ORAL
Qty: 20 | Refills: 0 | Status: DISCONTINUED | COMMUNITY
Start: 2022-09-09 | End: 2022-10-11

## 2022-10-11 RX ORDER — WARFARIN 5 MG/1
5 TABLET ORAL
Qty: 90 | Refills: 1 | Status: DISCONTINUED | COMMUNITY
Start: 2021-06-28 | End: 2022-10-11

## 2022-10-11 RX ORDER — NITROFURANTOIN (MONOHYDRATE/MACROCRYSTALS) 25; 75 MG/1; MG/1
100 CAPSULE ORAL
Qty: 14 | Refills: 0 | Status: DISCONTINUED | COMMUNITY
Start: 2022-08-19 | End: 2022-10-11

## 2022-10-11 RX ORDER — NIRMATRELVIR AND RITONAVIR 300-100 MG
20 X 150 MG & KIT ORAL
Qty: 1 | Refills: 0 | Status: DISCONTINUED | COMMUNITY
Start: 2022-08-30 | End: 2022-10-11

## 2022-10-12 ENCOUNTER — APPOINTMENT (OUTPATIENT)
Dept: INTERNAL MEDICINE | Facility: CLINIC | Age: 67
End: 2022-10-12

## 2022-10-12 VITALS
HEART RATE: 94 BPM | RESPIRATION RATE: 18 BRPM | WEIGHT: 217 LBS | OXYGEN SATURATION: 97 % | TEMPERATURE: 98.9 F | BODY MASS INDEX: 36.15 KG/M2 | HEIGHT: 65 IN | SYSTOLIC BLOOD PRESSURE: 128 MMHG | DIASTOLIC BLOOD PRESSURE: 84 MMHG

## 2022-10-12 PROCEDURE — 99495 TRANSJ CARE MGMT MOD F2F 14D: CPT

## 2022-10-12 RX ORDER — HYDROCODONE BITARTRATE AND HOMATROPINE METHYLBROMIDE 1.5; 5 MG/5ML; MG/5ML
5-1.5 SOLUTION ORAL EVERY 8 HOURS
Qty: 50 | Refills: 0 | Status: COMPLETED | COMMUNITY
Start: 2022-08-30 | End: 2022-10-12

## 2022-10-12 NOTE — REVIEW OF SYSTEMS
[Abdominal Pain] : abdominal pain [Joint Pain] : joint pain [Back Pain] : back pain [Insomnia] : insomnia [Anxiety] : anxiety [Negative] : Heme/Lymph [Fever] : no fever [Chills] : no chills [Fatigue] : no fatigue [Recent Change In Weight] : ~T no recent weight change [Nosebleed] : no nosebleeds [Nasal Discharge] : no nasal discharge [Sore Throat] : no sore throat [Postnasal Drip] : no postnasal drip [Chest Pain] : no chest pain [Palpitations] : no palpitations [Shortness Of Breath] : no shortness of breath [Cough] : no cough [Nausea] : no nausea [Constipation] : no constipation [Diarrhea] : diarrhea [Vomiting] : no vomiting [Heartburn] : no heartburn [Melena] : no melena [Dysuria] : no dysuria [Incontinence] : no incontinence [Joint Stiffness] : no joint stiffness [Muscle Pain] : no muscle pain [Itching] : no itching [Mole Changes] : no mole changes [Skin Rash] : no skin rash [Headache] : no headache [Dizziness] : no dizziness [Memory Loss] : no memory loss [FreeTextEntry7] :   Abdominal discomfort left lower quadrant [FreeTextEntry9] : knee pain   [de-identified] : stressed

## 2022-10-12 NOTE — ASSESSMENT
[FreeTextEntry1] : A 67-year-old female, who presents to the office for hospital follow-up.  Was diagnosed with mild diverticulitis.

## 2022-10-12 NOTE — PLAN
[FreeTextEntry1] : GI -   Left lower quadrant pain, diarrhea,   Mild diverticulitis.  -  check CBC, comprehensive metabolic stool samples for C. difficile O&P and stool culture.  Advised to start a probiotic 2 tabs 1 hour prior to antibiotic therapy.\par  continue with a low residual diet,  increase fluid intake.\par  advised abdominal pain is getting worse, fever nausea or vomiting return back to the emergency room.\par \par Vascular -    Left upper extremity phlebitis.  Advised warm compresses.  Patient on warfarin therapy check PT/INR.\par \par Cardio - Hypertension -  Patient was educated about hypertension and the importance of controlling the pressure through lifestyle modification which include low sodium  diet and  aerobic  exercise.  Also discussed the use of prescription medication which included their benefits and their side effects. We discussed the use of ASA 81 mg daily.   Having a BMI less than or equal to 25.  Continue losartan 75 mg daily \par \par  Endocrinology  -  Obesity   Patient was educated about the importance of diet and exercise.   We discussed  a goal of a BMI near 25.   Advised weight loss would help the knee and back pain \par \par Orthopedic -- spinal stenosis- DJD lumbar spine -degenerative joint disease bilateral knees status post total knee replacement.  - Recent exacerbation of pain- Continue with   Vicodin  but increase  dispense amount advised to only take for sever pain. \par advised risk of dependancy.   Continue hydrocodone - reviewed \par Advised not to share the medication  Advised risk of dependancy and abuse \par \par Neurology-insomnia - Continue  Ambien. Advise risk alternatives benefits and side effects of medication.   Advise patient risk of taking sleep medication secondary to have an obstructive sleep apnea.  \par Advised to read pack insert - Advised not to use alcohol  \par Reviewed   refilled medication \par \par I spent  40   Minutes with the patient, half of which we discussed finding on physical exam  and coordinated care.  As well as reviewed my plans and follow ups. \par \par   Once feeling better we will administer flu vaccine

## 2022-10-12 NOTE — COUNSELING
[Behavioral health counseling provided] : Behavioral health counseling provided [Engage in a relaxing activity] : Engage in a relaxing activity [AUDIT-C Screening administered and reviewed] : AUDIT-C Screening administered and reviewed [Potential consequences of obesity discussed] : Potential consequences of obesity discussed [Encouraged to maintain food diary] : Encouraged to maintain food diary [Encouraged to increase physical activity] : Encouraged to increase physical activity [Good understanding] : Patient has a good understanding of disease, goals and obesity follow-up plan [Weight management counseling provided] : Weight management [Healthy eating counseling provided] : healthy eating [Fall prevention counseling provided] : fall prevention  [Needs reinforcement, provided] : Patient needs reinforcement on understanding of disease, goals and obesity follow-up plan; reinforcement was provided [Low Fat Diet] : Low fat diet [Low Salt Diet] : Low salt diet [Decrease Portions] : Decrease food portions [Patient motivation] : Patient motivation [None] : None [FreeTextEntry2] : 1600 nicole diet  increase exercise  [de-identified] : 1500 nicole diet \par Advised to walk daily for a goal of 10,000 steps \par Advised PT for B/l Knee replacemment

## 2022-10-12 NOTE — PHYSICAL EXAM
[No Acute Distress] : no acute distress [Well Nourished] : well nourished [Well Developed] : well developed [Well-Appearing] : well-appearing [Normal Sclera/Conjunctiva] : normal sclera/conjunctiva [PERRL] : pupils equal round and reactive to light [EOMI] : extraocular movements intact [Normal Outer Ear/Nose] : the outer ears and nose were normal in appearance [Normal Oropharynx] : the oropharynx was normal [Normal TMs] : both tympanic membranes were normal [Normal Nasal Mucosa] : the nasal mucosa was normal [No JVD] : no jugular venous distention [No Lymphadenopathy] : no lymphadenopathy [Supple] : supple [Thyroid Normal, No Nodules] : the thyroid was normal and there were no nodules present [No Respiratory Distress] : no respiratory distress  [No Accessory Muscle Use] : no accessory muscle use [Clear to Auscultation] : lungs were clear to auscultation bilaterally [Normal Rate] : normal rate  [Regular Rhythm] : with a regular rhythm [Normal S1, S2] : normal S1 and S2 [No Carotid Bruits] : no carotid bruits [No Abdominal Bruit] : a ~M bruit was not heard ~T in the abdomen [Pedal Pulses Present] : the pedal pulses are present [No Edema] : there was no peripheral edema [No Palpable Aorta] : no palpable aorta [No Extremity Clubbing/Cyanosis] : no extremity clubbing/cyanosis [Soft] : abdomen soft [Non Tender] : non-tender [Non-distended] : non-distended [No Masses] : no abdominal mass palpated [No HSM] : no HSM [Normal Bowel Sounds] : normal bowel sounds [Normal Posterior Cervical Nodes] : no posterior cervical lymphadenopathy [Normal Anterior Cervical Nodes] : no anterior cervical lymphadenopathy [No CVA Tenderness] : no CVA  tenderness [No Spinal Tenderness] : no spinal tenderness [No Joint Swelling] : no joint swelling [Grossly Normal Strength/Tone] : grossly normal strength/tone [No Rash] : no rash [Coordination Grossly Intact] : coordination grossly intact [No Focal Deficits] : no focal deficits [Normal Gait] : normal gait [Deep Tendon Reflexes (DTR)] : deep tendon reflexes were 2+ and symmetric [Speech Grossly Normal] : speech grossly normal [Normal Affect] : the affect was normal [Alert and Oriented x3] : oriented to person, place, and time [Normal Mood] : the mood was normal [Normal Insight/Judgement] : insight and judgment were intact [61669 - Moderate Complexity requires multiple possible diagnoses and/or the management options, moderate complexity of the medical data (tests, etc.) to be reviewed, and moderate risk of significant complications, morbidity, and/or mortality as well as co] : Moderate Complexity [de-identified] : with murmur  [de-identified] : umbiical hernia slightly tenderness noted -- LLQ tenderness noted  normal BS no guarding noted  [de-identified] :  as per gynecology [de-identified] : bruising b/l wrist and left post tricep area

## 2022-10-12 NOTE — HEALTH RISK ASSESSMENT
[Yes] : Yes [4 or more  times a week (4 pts)] : 4 or more  times a week (4 points) [1 or 2 (0 pts)] : 1 or 2 (0 points) [Never (0 pts)] : Never (0 points) [No] : In the past 12 months have you used drugs other than those required for medical reasons? No [No falls in past year] : Patient reported no falls in the past year [0] : 1) Little interest or pleasure doing things: Not at all (0) [1] : 2) Feeling down, depressed, or hopeless for several days (1) [None] : None [With Family] : lives with family [# of Members in Household ___] :  household currently consist of [unfilled] member(s) [Employed] : employed [] :  [# Of Children ___] : has [unfilled] children [Feels Safe at Home] : Feels safe at home [Fully functional (bathing, dressing, toileting, transferring, walking, feeding)] : Fully functional (bathing, dressing, toileting, transferring, walking, feeding) [Fully functional (using the telephone, shopping, preparing meals, housekeeping, doing laundry, using] : Fully functional and needs no help or supervision to perform IADLs (using the telephone, shopping, preparing meals, housekeeping, doing laundry, using transportation, managing medications and managing finances) [Smoke Detector] : smoke detector [Carbon Monoxide Detector] : carbon monoxide detector [de-identified] :   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 Puentes [de-identified] : quit [YearQuit] : 2000 [Audit-CScore] : 4 [de-identified] : walking at work [de-identified] : regular [[id number]] : 1 [Change in mental status noted] : No change in mental status noted

## 2022-10-12 NOTE — ADDENDUM
[FreeTextEntry1] :  reviewed labs from hospital as well as CT scan.  Showed mild diverticulitis.  White blood cell count was normal\par \par   Reviewed .

## 2022-10-12 NOTE — HISTORY OF PRESENT ILLNESS
[Post-hospitalization from ___ Hospital] : Post-hospitalization from [unfilled] Hospital [Admitted on: ___] : The patient was admitted on [unfilled] [Discharged on ___] : discharged on [unfilled] [Discharge Summary] : discharge summary [Pertinent Labs] : pertinent labs [Radiology Findings] : radiology findings [Med Reconciliation] : medication reconciliation has been completed [Patient Contacted By: ____] : and contacted by [unfilled] [FreeTextEntry2] :  a 67-year-old female, with a past medical history as noted below, presents to the office for hospital follow-up.  Patient was again evaluated by general surgeon for an umbilical hernia when he sent her to the hospital for evaluation of diverticulitis secondary to left lower quadrant tenderness.  Patient was admitted was admitted, was placed on IV fluids and antibiotics.  Was discharged  to home on October 7, 2022.  With no restrictions.  Was given a note to return back to work.  \par   Patient states over the last couple of days she has been having slight left lower quadrant tenderness again.  Still currently taking the oral antibiotics.  Also states that she is been having bouts of diarrhea after eating.  States the diarrhea is yellow and foul in odor.    Patient denies any change in appetite.\par   Also has bruising from venipuncture from the hospital on the upper extremity.  Also feels a little discomfort in the left triceps area.    Also to the area is a bruise as well.  Unaware of any trauma to the area.\par \par   Patient also requesting renewal for her Ambien.  Which she takes nightly for insomnia.  Denies any adverse reaction to it.

## 2022-10-13 ENCOUNTER — RX RENEWAL (OUTPATIENT)
Age: 67
End: 2022-10-13

## 2022-10-18 ENCOUNTER — APPOINTMENT (OUTPATIENT)
Dept: SURGERY | Facility: CLINIC | Age: 67
End: 2022-10-18

## 2022-10-18 VITALS
WEIGHT: 220 LBS | HEART RATE: 80 BPM | DIASTOLIC BLOOD PRESSURE: 85 MMHG | OXYGEN SATURATION: 97 % | BODY MASS INDEX: 36.65 KG/M2 | HEIGHT: 65 IN | SYSTOLIC BLOOD PRESSURE: 136 MMHG | TEMPERATURE: 97.6 F

## 2022-10-18 DIAGNOSIS — K43.2 INCISIONAL HERNIA W/OUT OBSTRUCTION OR GANGRENE: ICD-10-CM

## 2022-10-18 DIAGNOSIS — G47.33 OBSTRUCTIVE SLEEP APNEA (ADULT) (PEDIATRIC): ICD-10-CM

## 2022-10-18 DIAGNOSIS — I25.10 ATHEROSCLEROTIC HEART DISEASE OF NATIVE CORONARY ARTERY W/OUT ANGINA PECTORIS: ICD-10-CM

## 2022-10-18 PROCEDURE — 99214 OFFICE O/P EST MOD 30 MIN: CPT

## 2022-10-18 NOTE — PHYSICAL EXAM
[Normal Breath Sounds] : Normal breath sounds [Normal Heart Sounds] : normal heart sounds [Normal Rate and Rhythm] : normal rate and rhythm [No Rash or Lesion] : No rash or lesion [Alert] : alert [Oriented to Person] : oriented to person [Oriented to Place] : oriented to place [Oriented to Time] : oriented to time [Calm] : calm [de-identified] : Morbidly obese woman [de-identified] : AFSHAN ABDI, YESY [de-identified] : Morbidly obese, soft, nondistended, positive bowel sounds in all four quadrants.  Incisional hernias at the umbilicus scar in the right lower abdomen.  Left lower quadrant abdominal pain has resolved. [de-identified] : Ambulating without difficulty or assistance

## 2022-10-18 NOTE — ASSESSMENT
[FreeTextEntry1] : 67 year old recovering from recent episode of sigmoid diverticulitis.  CT significant for extensive diverticulosis of the sigmoid colon without abscess or perforation.  Since treatment she has competed course of oral antibiotics and has only mild residual loose stools.  Has seen her PMD who has ordered C diff panel.  Also she is scheduled to see Dr Deal for colonoscopy.\par \par The incisional hernia remains unchanged.  Small and asymptomatic.  LLQ tenderness as also resolved.\par \par Given her ongoing relationship with Dr Deal I've asked that she consult with him regarding definitive management of her Diverticular disease.  Elective sigmoid colectomy with primary anastomosis may be beneficial.   As a board certified Colon/Rectal specialist i would defer management of the sigmoid colon to him.\par \par With regards to the asymptomatic incisional hernia, elective repair can be considered if no interventions planned for the colon.\par \par f/u with me in 6-8 weeks.\par

## 2022-10-18 NOTE — HISTORY OF PRESENT ILLNESS
[de-identified] : Recovering from acute sigmoid diverticulitis after being sent to hospital for imaging and treatment following out previous encounter at which time she presented for evaluation fo incisional hernia but was having Left lower quadrant abdominal pain.\par She has since completed course of Oral antibiotics and now with some persistent loose stools.\par She follows for routine colonoscopy with Dr Gideon Deal and is due to see him next week.

## 2022-10-19 ENCOUNTER — APPOINTMENT (OUTPATIENT)
Dept: INTERNAL MEDICINE | Facility: CLINIC | Age: 67
End: 2022-10-19

## 2022-10-19 VITALS
BODY MASS INDEX: 36.18 KG/M2 | OXYGEN SATURATION: 97 % | RESPIRATION RATE: 16 BRPM | DIASTOLIC BLOOD PRESSURE: 70 MMHG | HEART RATE: 88 BPM | TEMPERATURE: 96.8 F | SYSTOLIC BLOOD PRESSURE: 118 MMHG | HEIGHT: 65 IN | WEIGHT: 217.13 LBS

## 2022-10-19 DIAGNOSIS — U07.1 COVID-19: ICD-10-CM

## 2022-10-19 LAB
INR PPP: 3.8 RATIO
POCT-PROTHROMBIN TIME: 45.1 SECS

## 2022-10-19 PROCEDURE — 99214 OFFICE O/P EST MOD 30 MIN: CPT | Mod: 25

## 2022-10-19 PROCEDURE — 85610 PROTHROMBIN TIME: CPT | Mod: QW

## 2022-10-19 NOTE — DATA REVIEWED
[FreeTextEntry1] : INR 3.8\par reviewed General Surg note  \par Patient's blood test and stool samples from [facility] was reviewed no C. difficile noted O&P was negative negative culture.  INR was 2.3 normal CBC comprehensive metabolic.\par  done on 10/15/2022

## 2022-10-19 NOTE — HEALTH RISK ASSESSMENT
[Yes] : Yes [4 or more  times a week (4 pts)] : 4 or more  times a week (4 points) [1 or 2 (0 pts)] : 1 or 2 (0 points) [Never (0 pts)] : Never (0 points) [No] : In the past 12 months have you used drugs other than those required for medical reasons? No [No falls in past year] : Patient reported no falls in the past year [0] : 1) Little interest or pleasure doing things: Not at all (0) [1] : 2) Feeling down, depressed, or hopeless for several days (1) [PHQ-2 Negative - No further assessment needed] : PHQ-2 Negative - No further assessment needed [de-identified] :   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 Puentes [de-identified] : quit [YearQuit] : 2000 [Audit-CScore] : 4 [de-identified] : walking at work [de-identified] : regular [[id number]] : 1

## 2022-10-19 NOTE — HISTORY OF PRESENT ILLNESS
[FreeTextEntry1] : Follow up on abd pain and diarrhea  [de-identified] : A 67-year-old female, with a past medical history as noted below, presents to the office today for follow-up on her abdominal pain and diarrhea.  Patient states recently her loose stools are starting to get formed.  Abdominal pain continues to resolve and improve day by day.  Patient denies having fever chills or night sweats.  Patient denies any abnormal bleeding.  States she went to [facility] on Friday and had the blood work done  and stool samples dropped off.\par   Did have a follow-up with the general surgery Dr. Puentes and advised him that she will follow-up with her gastroenterologist Dr. Deal next week.\par  since taking the antibiotics for period of time developed a yeast infection.  Has vaginal discharge that is itchiness.  History of yeast infection in the past after antibiotic use

## 2022-10-19 NOTE — PHYSICAL EXAM
[No Acute Distress] : no acute distress [Well Nourished] : well nourished [Well Developed] : well developed [Well-Appearing] : well-appearing [Normal Sclera/Conjunctiva] : normal sclera/conjunctiva [PERRL] : pupils equal round and reactive to light [EOMI] : extraocular movements intact [Normal Outer Ear/Nose] : the outer ears and nose were normal in appearance [Normal Oropharynx] : the oropharynx was normal [Normal TMs] : both tympanic membranes were normal [Normal Nasal Mucosa] : the nasal mucosa was normal [No JVD] : no jugular venous distention [No Lymphadenopathy] : no lymphadenopathy [Supple] : supple [Thyroid Normal, No Nodules] : the thyroid was normal and there were no nodules present [No Respiratory Distress] : no respiratory distress  [No Accessory Muscle Use] : no accessory muscle use [Clear to Auscultation] : lungs were clear to auscultation bilaterally [Normal Rate] : normal rate  [Regular Rhythm] : with a regular rhythm [Normal S1, S2] : normal S1 and S2 [No Carotid Bruits] : no carotid bruits [No Abdominal Bruit] : a ~M bruit was not heard ~T in the abdomen [Pedal Pulses Present] : the pedal pulses are present [No Edema] : there was no peripheral edema [No Palpable Aorta] : no palpable aorta [No Extremity Clubbing/Cyanosis] : no extremity clubbing/cyanosis [Soft] : abdomen soft [Non Tender] : non-tender [Non-distended] : non-distended [No Masses] : no abdominal mass palpated [No HSM] : no HSM [Normal Bowel Sounds] : normal bowel sounds [Normal Posterior Cervical Nodes] : no posterior cervical lymphadenopathy [Normal Anterior Cervical Nodes] : no anterior cervical lymphadenopathy [No CVA Tenderness] : no CVA  tenderness [No Spinal Tenderness] : no spinal tenderness [No Joint Swelling] : no joint swelling [Grossly Normal Strength/Tone] : grossly normal strength/tone [No Rash] : no rash [Coordination Grossly Intact] : coordination grossly intact [No Focal Deficits] : no focal deficits [Normal Gait] : normal gait [Deep Tendon Reflexes (DTR)] : deep tendon reflexes were 2+ and symmetric [Speech Grossly Normal] : speech grossly normal [Normal Affect] : the affect was normal [Alert and Oriented x3] : oriented to person, place, and time [Normal Mood] : the mood was normal [Normal Insight/Judgement] : insight and judgment were intact [de-identified] : with murmur  [de-identified] : umbical hernia slightly tenderness noted -- LLQ  tender but improved  [de-identified] :  as per gynecology [de-identified] : bruising b/l wrist and left post tricep area

## 2022-10-19 NOTE — PLAN
[FreeTextEntry1] :  return to office on Tuesday.  Advise no work until I see her on Tuesday for reevaluation of abdominal pain and diarrhea.\par   Follow-up gastroenterology.

## 2022-10-19 NOTE — COUNSELING
[Behavioral health counseling provided] : Behavioral health counseling provided [Engage in a relaxing activity] : Engage in a relaxing activity [AUDIT-C Screening administered and reviewed] : AUDIT-C Screening administered and reviewed [Potential consequences of obesity discussed] : Potential consequences of obesity discussed [Encouraged to maintain food diary] : Encouraged to maintain food diary [Encouraged to increase physical activity] : Encouraged to increase physical activity [Good understanding] : Patient has a good understanding of disease, goals and obesity follow-up plan [Weight management counseling provided] : Weight management [Healthy eating counseling provided] : healthy eating [Fall prevention counseling provided] : fall prevention  [Needs reinforcement, provided] : Patient needs reinforcement on understanding of disease, goals and obesity follow-up plan; reinforcement was provided [Low Fat Diet] : Low fat diet [Low Salt Diet] : Low salt diet [Decrease Portions] : Decrease food portions [Patient motivation] : Patient motivation [None] : None [FreeTextEntry2] : 1600 nicole diet  increase exercise  [de-identified] : 1500 nicole diet \par Advised to walk daily for a goal of 10,000 steps \par Advised PT for B/l Knee replacemment

## 2022-10-19 NOTE — ASSESSMENT
[FreeTextEntry1] : A  67-year-old female with acute anxiety, degenerative joint disease, JANINA, insomnia, obesity present  to the office for INR check, and  follow up on left abd pain  Oriented - self; Oriented - place; Oriented - time

## 2022-10-19 NOTE — REVIEW OF SYSTEMS
[Abdominal Pain] : abdominal pain [Joint Pain] : joint pain [Back Pain] : back pain [Insomnia] : insomnia [Anxiety] : anxiety [Negative] : Heme/Lymph [Fever] : no fever [Chills] : no chills [Fatigue] : no fatigue [Recent Change In Weight] : ~T no recent weight change [Nosebleed] : no nosebleeds [Nasal Discharge] : no nasal discharge [Sore Throat] : no sore throat [Postnasal Drip] : no postnasal drip [Chest Pain] : no chest pain [Palpitations] : no palpitations [Shortness Of Breath] : no shortness of breath [Cough] : no cough [Nausea] : no nausea [Constipation] : no constipation [Diarrhea] : diarrhea [Vomiting] : no vomiting [Heartburn] : no heartburn [Melena] : no melena [Dysuria] : no dysuria [Incontinence] : no incontinence [Joint Stiffness] : no joint stiffness [Muscle Pain] : no muscle pain [Itching] : no itching [Mole Changes] : no mole changes [Skin Rash] : no skin rash [Headache] : no headache [Dizziness] : no dizziness [Memory Loss] : no memory loss [FreeTextEntry7] :   Abdominal discomfort left lower quadrant improving  [FreeTextEntry9] : knee pain   [de-identified] : stressed

## 2022-10-20 PROCEDURE — 96375 TX/PRO/DX INJ NEW DRUG ADDON: CPT

## 2022-10-20 PROCEDURE — 80053 COMPREHEN METABOLIC PANEL: CPT

## 2022-10-20 PROCEDURE — 96374 THER/PROPH/DIAG INJ IV PUSH: CPT

## 2022-10-20 PROCEDURE — 87086 URINE CULTURE/COLONY COUNT: CPT

## 2022-10-20 PROCEDURE — 99285 EMERGENCY DEPT VISIT HI MDM: CPT

## 2022-10-20 PROCEDURE — 96376 TX/PRO/DX INJ SAME DRUG ADON: CPT

## 2022-10-20 PROCEDURE — 83690 ASSAY OF LIPASE: CPT

## 2022-10-20 PROCEDURE — 85025 COMPLETE CBC W/AUTO DIFF WBC: CPT

## 2022-10-20 PROCEDURE — 80048 BASIC METABOLIC PNL TOTAL CA: CPT

## 2022-10-20 PROCEDURE — G1004: CPT

## 2022-10-20 PROCEDURE — 87635 SARS-COV-2 COVID-19 AMP PRB: CPT

## 2022-10-20 PROCEDURE — 74177 CT ABD & PELVIS W/CONTRAST: CPT | Mod: MG

## 2022-10-20 PROCEDURE — 85027 COMPLETE CBC AUTOMATED: CPT

## 2022-10-20 PROCEDURE — 85730 THROMBOPLASTIN TIME PARTIAL: CPT

## 2022-10-20 PROCEDURE — 85610 PROTHROMBIN TIME: CPT

## 2022-10-20 PROCEDURE — 86803 HEPATITIS C AB TEST: CPT

## 2022-10-20 PROCEDURE — 81001 URINALYSIS AUTO W/SCOPE: CPT

## 2022-10-20 PROCEDURE — 93005 ELECTROCARDIOGRAM TRACING: CPT

## 2022-10-20 PROCEDURE — 36415 COLL VENOUS BLD VENIPUNCTURE: CPT

## 2022-10-25 ENCOUNTER — MED ADMIN CHARGE (OUTPATIENT)
Age: 67
End: 2022-10-25

## 2022-10-25 ENCOUNTER — APPOINTMENT (OUTPATIENT)
Dept: INTERNAL MEDICINE | Facility: CLINIC | Age: 67
End: 2022-10-25

## 2022-10-25 VITALS
OXYGEN SATURATION: 99 % | BODY MASS INDEX: 35.99 KG/M2 | SYSTOLIC BLOOD PRESSURE: 116 MMHG | WEIGHT: 216 LBS | TEMPERATURE: 98.6 F | HEART RATE: 93 BPM | HEIGHT: 65 IN | DIASTOLIC BLOOD PRESSURE: 72 MMHG | RESPIRATION RATE: 17 BRPM

## 2022-10-25 DIAGNOSIS — Z71.89 OTHER SPECIFIED COUNSELING: ICD-10-CM

## 2022-10-25 DIAGNOSIS — B37.9 CANDIDIASIS, UNSPECIFIED: ICD-10-CM

## 2022-10-25 DIAGNOSIS — Z20.822 CONTACT WITH AND (SUSPECTED) EXPOSURE TO COVID-19: ICD-10-CM

## 2022-10-25 DIAGNOSIS — K57.32 DIVERTICULITIS OF LARGE INTESTINE W/OUT PERFORATION OR ABSCESS W/OUT BLEEDING: ICD-10-CM

## 2022-10-25 DIAGNOSIS — Z87.898 PERSONAL HISTORY OF OTHER SPECIFIED CONDITIONS: ICD-10-CM

## 2022-10-25 DIAGNOSIS — R42 DIZZINESS AND GIDDINESS: ICD-10-CM

## 2022-10-25 LAB
INR PPP: 1.7 RATIO
POCT-PROTHROMBIN TIME: 20.8 SECS

## 2022-10-25 PROCEDURE — 90662 IIV NO PRSV INCREASED AG IM: CPT

## 2022-10-25 PROCEDURE — 99214 OFFICE O/P EST MOD 30 MIN: CPT | Mod: 25

## 2022-10-25 PROCEDURE — 36416 COLLJ CAPILLARY BLOOD SPEC: CPT

## 2022-10-25 PROCEDURE — G0008: CPT

## 2022-10-25 PROCEDURE — 85610 PROTHROMBIN TIME: CPT | Mod: QW

## 2022-10-25 RX ORDER — METRONIDAZOLE 500 MG/1
500 TABLET ORAL
Qty: 14 | Refills: 0 | Status: COMPLETED | COMMUNITY
Start: 2022-10-07

## 2022-10-25 RX ORDER — COVID-19 ANTIGEN TEST
KIT MISCELLANEOUS
Qty: 8 | Refills: 0 | Status: ACTIVE | COMMUNITY
Start: 2022-09-01

## 2022-10-25 NOTE — DATA REVIEWED
[FreeTextEntry1] : INR  today is 1.7 was 3.8 was on hold x 3 days \par [facility] [id number]

## 2022-10-25 NOTE — PLAN
[FreeTextEntry1] : Gastro - diverticulitis- advised to follow-up with with gastroenterology to discuss colonoscopy and to consider a colectomy secondary to general surgeons recommendation.  Advised patient make the appointment for January if that is the earliest appointment available.\par Umbilical hernia-reproducible not really tender today.  Advised patient to follow-up with general surgeon after her gastroenterology consult.\par Diarrhea   resolved\par \par Heme -Anticardiolipin Ab-  INR  1.7     - Continue with   Warfarin 2.5 mg daily except Mondays and Wednesday 5 mg.    check INR 14 days .  -  Monitor for abnormal bleeding.\par \par Cardio - CAD and hyperlipidemia --Continue with current medication. Discussed the importance of stress reduction and weight loss.  \par \par Cardio - Hypertension -  Patient was educated about hypertension and the importance of controlling the pressure through lifestyle modification which include low sodium  diet and  aerobic  exercise.  Also discussed the use of prescription medication which included their benefits and their side effects. We discussed the use of ASA 81 mg daily.   Having a BMI less than or equal to 25.  Continue losartan 75 mg daily \par \par  Endocrinology  -  Obesity   Patient was educated about the importance of diet and exercise.   We discussed  a goal of a BMI near 25.   Advised weight loss would help the knee and back pain \par \par Orthopedic -- spinal stenosis- DJD lumbar spine -degenerative joint disease bilateral knees status post total knee replacement.  - Recent exacerbation of pain- Continue with   Vicodin  but increase  dispense amount advised to only take for sever pain. \par advised risk of dependancy.   Continue hydrocodone - \par Advised not to share the medication  Advised risk of dependancy and abuse \par \par Neurology-insomnia - Continue  Ambien. Advise risk alternatives benefits and side effects of medication.   Advise patient risk of taking sleep medication secondary to have an obstructive sleep apnea.  \par Advised to read pack insert - Advised not to use alcohol  \par Reviewed .  Refilled Ambien\par \par Gynecology-yeast vaginitis-  resolved\par \par Immunization - Flu vaccination discussed. Education provided - Fluzone  HD 0.7 mL given IM deltoid area.  See consent form for lot number and expiration date.    Administration of the vaccine was given by registered nurse  Kennedy\par \par I spent  30  Minutes with the patient, half of which we discussed finding on physical exam  and coordinated care.  As well as reviewed my plans and follow ups\par \par  follow-up withGeneral surgeon  Dr. Fraga  Gastro Dr. Deal.\par  return to work to full duty work on in the office today.\par \par

## 2022-10-25 NOTE — HISTORY OF PRESENT ILLNESS
[FreeTextEntry1] : Follow up on abd pain, medication renewal, and INR check  [de-identified] : Mrs. CARDOZA is a 67 year  female, With a past medical history as noted below, who present to the office for   warfarin/INR check as well as a follow-up on abdominal pain.   patient states abdominal pain has resolved.  She has been having normal bowel movements no more diarrhea.  Energy level is back.  Denies any abnormal bleeding.\par held  Coumadin/warfarin dose for 3 days   Secondary to elevated INR reading last visit.\par Requesting renewal of Ambien.  Takes for insomnia and denies any adverse reactions to it. Denies early morning somnolence.\par  States she called her colorectal surgeon earliest appointment was January.  Needs to call back to make the appointment.\par  Also states her bowel movements are back to normal

## 2022-10-25 NOTE — REVIEW OF SYSTEMS
[Joint Pain] : joint pain [Back Pain] : back pain [Insomnia] : insomnia [Anxiety] : anxiety [Fever] : no fever [Chills] : no chills [Fatigue] : no fatigue [Recent Change In Weight] : ~T no recent weight change [Nosebleed] : no nosebleeds [Nasal Discharge] : no nasal discharge [Sore Throat] : no sore throat [Postnasal Drip] : no postnasal drip [Chest Pain] : no chest pain [Palpitations] : no palpitations [Shortness Of Breath] : no shortness of breath [Cough] : no cough [Abdominal Pain] : no abdominal pain [Nausea] : no nausea [Constipation] : no constipation [Diarrhea] : diarrhea [Vomiting] : no vomiting [Heartburn] : no heartburn [Melena] : no melena [Dysuria] : no dysuria [Incontinence] : no incontinence [Joint Stiffness] : no joint stiffness [Muscle Pain] : no muscle pain [Itching] : no itching [Mole Changes] : no mole changes [Skin Rash] : no skin rash [Headache] : no headache [Dizziness] : no dizziness [Memory Loss] : no memory loss [Negative] : Gastrointestinal [FreeTextEntry9] : knee pain   [de-identified] : stressed

## 2022-10-25 NOTE — ASSESSMENT
[FreeTextEntry1] : A  67-year-old female with acute anxiety, degenerative joint disease, JANINA, insomnia, obesity present  to the office for INR check,  medication renewal, and  follow up on left abd pain

## 2022-10-25 NOTE — COUNSELING
[Behavioral health counseling provided] : Behavioral health counseling provided [Engage in a relaxing activity] : Engage in a relaxing activity [AUDIT-C Screening administered and reviewed] : AUDIT-C Screening administered and reviewed [Potential consequences of obesity discussed] : Potential consequences of obesity discussed [Encouraged to maintain food diary] : Encouraged to maintain food diary [Encouraged to increase physical activity] : Encouraged to increase physical activity [Good understanding] : Patient has a good understanding of disease, goals and obesity follow-up plan [Weight management counseling provided] : Weight management [Healthy eating counseling provided] : healthy eating [Fall prevention counseling provided] : fall prevention  [Needs reinforcement, provided] : Patient needs reinforcement on understanding of disease, goals and obesity follow-up plan; reinforcement was provided [Low Fat Diet] : Low fat diet [Low Salt Diet] : Low salt diet [Decrease Portions] : Decrease food portions [Patient motivation] : Patient motivation [None] : None [FreeTextEntry2] : 1600 nicole diet  increase exercise  [de-identified] : 1500 nicole diet \par Advised to walk daily for a goal of 10,000 steps \par Advised PT for B/l Knee replacemment

## 2022-10-25 NOTE — PHYSICAL EXAM
[No Acute Distress] : no acute distress [Well Nourished] : well nourished [Well Developed] : well developed [Well-Appearing] : well-appearing [Normal Sclera/Conjunctiva] : normal sclera/conjunctiva [PERRL] : pupils equal round and reactive to light [EOMI] : extraocular movements intact [Normal Outer Ear/Nose] : the outer ears and nose were normal in appearance [Normal Oropharynx] : the oropharynx was normal [Normal TMs] : both tympanic membranes were normal [Normal Nasal Mucosa] : the nasal mucosa was normal [No JVD] : no jugular venous distention [No Lymphadenopathy] : no lymphadenopathy [Supple] : supple [Thyroid Normal, No Nodules] : the thyroid was normal and there were no nodules present [No Respiratory Distress] : no respiratory distress  [No Accessory Muscle Use] : no accessory muscle use [Clear to Auscultation] : lungs were clear to auscultation bilaterally [Normal Rate] : normal rate  [Regular Rhythm] : with a regular rhythm [Normal S1, S2] : normal S1 and S2 [No Carotid Bruits] : no carotid bruits [No Abdominal Bruit] : a ~M bruit was not heard ~T in the abdomen [Pedal Pulses Present] : the pedal pulses are present [No Edema] : there was no peripheral edema [No Palpable Aorta] : no palpable aorta [No Extremity Clubbing/Cyanosis] : no extremity clubbing/cyanosis [Soft] : abdomen soft [Non Tender] : non-tender [Non-distended] : non-distended [No Masses] : no abdominal mass palpated [No HSM] : no HSM [Normal Bowel Sounds] : normal bowel sounds [Normal Posterior Cervical Nodes] : no posterior cervical lymphadenopathy [Normal Anterior Cervical Nodes] : no anterior cervical lymphadenopathy [No CVA Tenderness] : no CVA  tenderness [No Spinal Tenderness] : no spinal tenderness [No Joint Swelling] : no joint swelling [Grossly Normal Strength/Tone] : grossly normal strength/tone [No Rash] : no rash [Coordination Grossly Intact] : coordination grossly intact [No Focal Deficits] : no focal deficits [Normal Gait] : normal gait [Deep Tendon Reflexes (DTR)] : deep tendon reflexes were 2+ and symmetric [Speech Grossly Normal] : speech grossly normal [Normal Affect] : the affect was normal [Alert and Oriented x3] : oriented to person, place, and time [Normal Mood] : the mood was normal [Normal Insight/Judgement] : insight and judgment were intact [Declined Breast Exam] : declined breast exam  [Declined Rectal Exam] : declined rectal exam [de-identified] : with murmur  [de-identified] : umbical hernia slightly tenderness noted - nontender to palp of the left lower quadrant [FreeTextEntry1] :  as per gynecology [de-identified] :  as per gynecology [de-identified] : bruising b/l wrist and left post tricep area

## 2022-10-25 NOTE — HEALTH RISK ASSESSMENT
[Yes] : Yes [4 or more  times a week (4 pts)] : 4 or more  times a week (4 points) [1 or 2 (0 pts)] : 1 or 2 (0 points) [Never (0 pts)] : Never (0 points) [No] : In the past 12 months have you used drugs other than those required for medical reasons? No [No falls in past year] : Patient reported no falls in the past year [0] : 1) Little interest or pleasure doing things: Not at all (0) [1] : 2) Feeling down, depressed, or hopeless for several days (1) [PHQ-2 Negative - No further assessment needed] : PHQ-2 Negative - No further assessment needed [de-identified] :   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 Puentes [de-identified] : quit [YearQuit] : 2000 [Audit-CScore] : 4 [de-identified] : walking at work [de-identified] : regular [[id number]] : 1

## 2022-11-18 ENCOUNTER — APPOINTMENT (OUTPATIENT)
Dept: INTERNAL MEDICINE | Facility: CLINIC | Age: 67
End: 2022-11-18

## 2022-11-18 VITALS
DIASTOLIC BLOOD PRESSURE: 72 MMHG | WEIGHT: 212 LBS | HEART RATE: 100 BPM | RESPIRATION RATE: 15 BRPM | BODY MASS INDEX: 35.32 KG/M2 | SYSTOLIC BLOOD PRESSURE: 118 MMHG | OXYGEN SATURATION: 97 % | TEMPERATURE: 98.4 F | HEIGHT: 65 IN

## 2022-11-18 DIAGNOSIS — F32.A ANXIETY DISORDER, UNSPECIFIED: ICD-10-CM

## 2022-11-18 DIAGNOSIS — Z11.59 ENCOUNTER FOR SCREENING FOR OTHER VIRAL DISEASES: ICD-10-CM

## 2022-11-18 DIAGNOSIS — F41.9 ANXIETY DISORDER, UNSPECIFIED: ICD-10-CM

## 2022-11-18 LAB
FLUAV SPEC QL CULT: NEGATIVE
FLUBV AG SPEC QL IA: NEGATIVE
SARS-COV-2 AG RESP QL IA.RAPID: NEGATIVE

## 2022-11-18 PROCEDURE — 87811 SARS-COV-2 COVID19 W/OPTIC: CPT

## 2022-11-18 PROCEDURE — 87804 INFLUENZA ASSAY W/OPTIC: CPT | Mod: QW

## 2022-11-18 PROCEDURE — 99213 OFFICE O/P EST LOW 20 MIN: CPT | Mod: 25

## 2022-11-21 ENCOUNTER — NON-APPOINTMENT (OUTPATIENT)
Age: 67
End: 2022-11-21

## 2022-11-21 PROBLEM — F41.9 ANXIETY AND DEPRESSION: Status: ACTIVE | Noted: 2021-04-09

## 2022-11-21 LAB
INFLUENZA A RESULT: NOT DETECTED
INFLUENZA B RESULT: NOT DETECTED
RESP SYN VIRUS RESULT: NOT DETECTED
SARS-COV-2 RESULT: NOT DETECTED

## 2022-11-21 NOTE — ASSESSMENT
[FreeTextEntry1] : A  67-year-old female with acute anxiety, degenerative joint disease, JANINA, insomnia, obesity present  to the office cough and congestion x4 days.  Also renewal of medication for insomnia and chronic pain Detail Level: Detailed Quality 130: Documentation Of Current Medications In The Medical Record: Current Medications Documented

## 2022-11-21 NOTE — PLAN
Initiate Treatment: Recheck start Metrogel once a day
Render In Strict Bullet Format?: Yes
Detail Level: Simple
[FreeTextEntry1] : I&D -cough and congestion   -check rapid COVID swab and influenza a as well as influenza B which was negative.  's COVID swab was questionable so we will send out for PCR COVID and flu swab for confirmation.  Advised patient to quarantine pending the results of the PCR swab.  \par \par Cough advised patient to start Tessalon Perles 1 tab p.o. 3 times daily as needed cough.  Increase fluid intake.  Call office if any changes in his symptoms.  \par \par Gastro -status post acute diverticulitis attack.  Patient feels much better.  Advised patient to follow-up with colorectal surgeon.  Advised patient make the appointment for January if that is the earliest appointment available.\par Umbilical hernia-reproducible not really tender today.  Advised patient to follow-up with general surgeon after her gastroenterology consult.\par \par Heme -Anticardiolipin Ab- monitor INR level.\par \par Cardio - CAD and hyperlipidemia --Continue with current medication. Discussed the importance of stress reduction and weight loss.  \par \par Cardio - Hypertension -  Patient was educated about hypertension and the importance of controlling the pressure through lifestyle modification which include low sodium  diet and  aerobic  exercise.  Also discussed the use of prescription medication which included their benefits and their side effects. We discussed the use of ASA 81 mg daily.   Having a BMI less than or equal to 25.  Continue losartan 75 mg daily \par \par  Endocrinology  -  Obesity   Patient was educated about the importance of diet and exercise.   We discussed  a goal of a BMI near 25.   Advised weight loss would help the knee and back pain \par \par Orthopedic -- spinal stenosis- DJD lumbar spine -degenerative joint disease bilateral knees status post total knee replacement.  - Recent exacerbation of pain- Continue with   Vicodin  but increase  dispense amount advised to only take for sever pain. \par advised risk of dependancy.   Continue hydrocodone - \par Advised not to share the medication  Advised risk of dependancy and abuse \par \par Neurology-insomnia - Continue  Ambien. Advise risk alternatives benefits and side effects of medication.   Advise patient risk of taking sleep medication secondary to have an obstructive sleep apnea.  \par Advised to read pack insert - Advised not to use alcohol  \par Reviewed .  Refilled Ambien\par \par I spent  25   Minutes with the patient, half of which we discussed finding on physical exam  and coordinated care.  As well as reviewed my plans and follow ups\par \par \par \par

## 2022-11-21 NOTE — REVIEW OF SYSTEMS
[Joint Pain] : joint pain [Back Pain] : back pain [Insomnia] : insomnia [Anxiety] : anxiety [Negative] : Heme/Lymph [Fever] : no fever [Chills] : chills [Fatigue] : fatigue [Recent Change In Weight] : ~T no recent weight change [Nosebleed] : no nosebleeds [Nasal Discharge] : nasal discharge [Sore Throat] : no sore throat [Postnasal Drip] : no postnasal drip [Chest Pain] : no chest pain [Palpitations] : no palpitations [Shortness Of Breath] : no shortness of breath [Cough] : cough [Abdominal Pain] : no abdominal pain [Nausea] : no nausea [Constipation] : no constipation [Diarrhea] : diarrhea [Vomiting] : no vomiting [Heartburn] : no heartburn [Melena] : no melena [Dysuria] : no dysuria [Incontinence] : no incontinence [Joint Stiffness] : no joint stiffness [Muscle Pain] : no muscle pain [Itching] : no itching [Mole Changes] : no mole changes [Skin Rash] : no skin rash [Headache] : no headache [Dizziness] : no dizziness [Memory Loss] : no memory loss [FreeTextEntry2] : Feel feverish [FreeTextEntry9] : knee pain   [de-identified] : stressed

## 2022-11-21 NOTE — COUNSELING
[Behavioral health counseling provided] : Behavioral health counseling provided [Engage in a relaxing activity] : Engage in a relaxing activity [AUDIT-C Screening administered and reviewed] : AUDIT-C Screening administered and reviewed [Potential consequences of obesity discussed] : Potential consequences of obesity discussed [Encouraged to maintain food diary] : Encouraged to maintain food diary [Encouraged to increase physical activity] : Encouraged to increase physical activity [Good understanding] : Patient has a good understanding of disease, goals and obesity follow-up plan [Weight management counseling provided] : Weight management [Healthy eating counseling provided] : healthy eating [Fall prevention counseling provided] : fall prevention  [Needs reinforcement, provided] : Patient needs reinforcement on understanding of disease, goals and obesity follow-up plan; reinforcement was provided [Low Fat Diet] : Low fat diet [Low Salt Diet] : Low salt diet [Decrease Portions] : Decrease food portions [Patient motivation] : Patient motivation [None] : None [FreeTextEntry2] : 1600 nicole diet  increase exercise  [de-identified] : 1500 nicole diet \par Advised to walk daily for a goal of 10,000 steps \par Advised PT for B/l Knee replacemment

## 2022-11-21 NOTE — HEALTH RISK ASSESSMENT
[Yes] : Yes [4 or more  times a week (4 pts)] : 4 or more  times a week (4 points) [1 or 2 (0 pts)] : 1 or 2 (0 points) [Never (0 pts)] : Never (0 points) [No] : In the past 12 months have you used drugs other than those required for medical reasons? No [No falls in past year] : Patient reported no falls in the past year [0] : 1) Little interest or pleasure doing things: Not at all (0) [1] : 2) Feeling down, depressed, or hopeless for several days (1) [PHQ-2 Negative - No further assessment needed] : PHQ-2 Negative - No further assessment needed [de-identified] :   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 Puentes [de-identified] : quit [YearQuit] : 2000 [Audit-CScore] : 4 [de-identified] : walking at work [de-identified] : regular [[id number]] : 1

## 2022-11-21 NOTE — HISTORY OF PRESENT ILLNESS
FAX received from [facility] stating Qsymia 7.5-46mg is approved from 12/9/17 to 1/8/19 [Cold Symptoms] : cold symptoms [Moderate] : moderate [___ Days ago] : [unfilled] days ago [Constant] : constant [Congestion] : congestion [Cough] : cough [Fatigue] : fatigue [At Night] : at night [Sore Throat] : no sore throat [Wheezing] : no wheezing [Chills] : no chills [Anorexia] : no anorexia [Shortness Of Breath] : no shortness of breath [Earache] : no earache [Headache] : no headache [Fever] : no fever [FreeTextEntry5] : unaware  [FreeTextEntry8] : Took 2 home covid swab has been negative \par States  is also so sick.\par \par Also requested renewal of Ambien and oxycodone.  Takes Ambien for insomnia.  Denies any adverse effects.  Denies any daytime somnolence.  Taking oxycodone for chronic knee pain/lower back pain.  Status post knee replacement surgery\par

## 2022-11-21 NOTE — ADDENDUM
[FreeTextEntry1] : Forgot to call in a prescription for Ambien and hydrocodone/acetaminophen 7.5 300.  Recheck  on 11/21 sent an Rx to the pharmacy today.

## 2022-11-21 NOTE — PHYSICAL EXAM
[No Acute Distress] : no acute distress [Well Nourished] : well nourished [Well Developed] : well developed [Well-Appearing] : well-appearing [Normal Sclera/Conjunctiva] : normal sclera/conjunctiva [PERRL] : pupils equal round and reactive to light [EOMI] : extraocular movements intact [Normal Outer Ear/Nose] : the outer ears and nose were normal in appearance [Normal Oropharynx] : the oropharynx was normal [Normal TMs] : both tympanic membranes were normal [Normal Nasal Mucosa] : the nasal mucosa was normal [No JVD] : no jugular venous distention [No Lymphadenopathy] : no lymphadenopathy [Supple] : supple [Thyroid Normal, No Nodules] : the thyroid was normal and there were no nodules present [No Respiratory Distress] : no respiratory distress  [No Accessory Muscle Use] : no accessory muscle use [Clear to Auscultation] : lungs were clear to auscultation bilaterally [Normal Rate] : normal rate  [Regular Rhythm] : with a regular rhythm [Normal S1, S2] : normal S1 and S2 [No Carotid Bruits] : no carotid bruits [No Abdominal Bruit] : a ~M bruit was not heard ~T in the abdomen [Pedal Pulses Present] : the pedal pulses are present [No Edema] : there was no peripheral edema [No Palpable Aorta] : no palpable aorta [No Extremity Clubbing/Cyanosis] : no extremity clubbing/cyanosis [Declined Breast Exam] : declined breast exam  [Soft] : abdomen soft [Non Tender] : non-tender [Non-distended] : non-distended [No Masses] : no abdominal mass palpated [No HSM] : no HSM [Normal Bowel Sounds] : normal bowel sounds [Declined Rectal Exam] : declined rectal exam [Normal Posterior Cervical Nodes] : no posterior cervical lymphadenopathy [Normal Anterior Cervical Nodes] : no anterior cervical lymphadenopathy [No CVA Tenderness] : no CVA  tenderness [No Spinal Tenderness] : no spinal tenderness [No Joint Swelling] : no joint swelling [Grossly Normal Strength/Tone] : grossly normal strength/tone [No Rash] : no rash [Coordination Grossly Intact] : coordination grossly intact [No Focal Deficits] : no focal deficits [Normal Gait] : normal gait [Deep Tendon Reflexes (DTR)] : deep tendon reflexes were 2+ and symmetric [Speech Grossly Normal] : speech grossly normal [Normal Affect] : the affect was normal [Alert and Oriented x3] : oriented to person, place, and time [Normal Mood] : the mood was normal [Normal Insight/Judgement] : insight and judgment were intact [de-identified] : with murmur  [de-identified] : umbical hernia slightly tenderness noted - nontender to palp of the left lower quadrant [FreeTextEntry1] :  as per gynecology [de-identified] :  as per gynecology [de-identified] : bruising b/l wrist and left post tricep area

## 2022-12-14 ENCOUNTER — APPOINTMENT (OUTPATIENT)
Dept: INTERNAL MEDICINE | Facility: CLINIC | Age: 67
End: 2022-12-14

## 2022-12-14 VITALS
WEIGHT: 218 LBS | HEART RATE: 70 BPM | SYSTOLIC BLOOD PRESSURE: 142 MMHG | HEIGHT: 65 IN | TEMPERATURE: 96.8 F | DIASTOLIC BLOOD PRESSURE: 80 MMHG | OXYGEN SATURATION: 98 % | BODY MASS INDEX: 36.32 KG/M2 | RESPIRATION RATE: 16 BRPM

## 2022-12-14 DIAGNOSIS — Z86.711 PERSONAL HISTORY OF PULMONARY EMBOLISM: ICD-10-CM

## 2022-12-14 DIAGNOSIS — R05.9 COUGH, UNSPECIFIED: ICD-10-CM

## 2022-12-14 LAB
INR PPP: 5.3 RATIO
POCT-PROTHROMBIN TIME: 63 SECS

## 2022-12-14 PROCEDURE — 36415 COLL VENOUS BLD VENIPUNCTURE: CPT

## 2022-12-14 PROCEDURE — 85610 PROTHROMBIN TIME: CPT | Mod: QW

## 2022-12-14 PROCEDURE — 36416 COLLJ CAPILLARY BLOOD SPEC: CPT

## 2022-12-14 PROCEDURE — 99213 OFFICE O/P EST LOW 20 MIN: CPT | Mod: 25

## 2022-12-14 RX ORDER — BENZONATATE 200 MG/1
200 CAPSULE ORAL 3 TIMES DAILY
Qty: 40 | Refills: 0 | Status: COMPLETED | COMMUNITY
Start: 2022-11-18 | End: 2022-12-14

## 2022-12-14 RX ORDER — BROMPHENIRAMINE MALEATE, PSEUDOEPHEDRINE HYDROCHLORIDE, 2; 30; 10 MG/5ML; MG/5ML; MG/5ML
30-2-10 SYRUP ORAL
Qty: 200 | Refills: 0 | Status: COMPLETED | COMMUNITY
Start: 2022-11-23 | End: 2022-12-14

## 2022-12-14 NOTE — END OF VISIT
Born by breech delivery [Time Spent: ___ minutes] : I have spent [unfilled] minutes of time on the encounter.

## 2022-12-15 DIAGNOSIS — Z79.01 LONG TERM (CURRENT) USE OF ANTICOAGULANTS: ICD-10-CM

## 2022-12-15 LAB
INR PPP: 4.95 RATIO
PT BLD: 58.3 SEC

## 2022-12-16 PROBLEM — Z86.711 HISTORY OF PULMONARY EMBOLISM: Status: RESOLVED | Noted: 2017-09-19 | Resolved: 2022-12-16

## 2022-12-16 PROBLEM — R05.9 COUGH IN ADULT: Status: RESOLVED | Noted: 2022-08-30 | Resolved: 2022-12-16

## 2022-12-16 NOTE — COUNSELING
[Behavioral health counseling provided] : Behavioral health counseling provided [Engage in a relaxing activity] : Engage in a relaxing activity [AUDIT-C Screening administered and reviewed] : AUDIT-C Screening administered and reviewed [Potential consequences of obesity discussed] : Potential consequences of obesity discussed [Encouraged to maintain food diary] : Encouraged to maintain food diary [Encouraged to increase physical activity] : Encouraged to increase physical activity [Good understanding] : Patient has a good understanding of disease, goals and obesity follow-up plan [Weight management counseling provided] : Weight management [Healthy eating counseling provided] : healthy eating [Fall prevention counseling provided] : fall prevention  [Needs reinforcement, provided] : Patient needs reinforcement on understanding of disease, goals and obesity follow-up plan; reinforcement was provided [Low Fat Diet] : Low fat diet [Low Salt Diet] : Low salt diet [Decrease Portions] : Decrease food portions [Patient motivation] : Patient motivation [None] : None [FreeTextEntry2] : 1600 nicole diet  increase exercise  [de-identified] : 1500 nicole diet \par Advised to walk daily for a goal of 10,000 steps \par Advised PT for B/l Knee replacemment

## 2022-12-16 NOTE — HEALTH RISK ASSESSMENT
[Yes] : Yes [4 or more  times a week (4 pts)] : 4 or more  times a week (4 points) [1 or 2 (0 pts)] : 1 or 2 (0 points) [Never (0 pts)] : Never (0 points) [No] : In the past 12 months have you used drugs other than those required for medical reasons? No [No falls in past year] : Patient reported no falls in the past year [0] : 1) Little interest or pleasure doing things: Not at all (0) [1] : 2) Feeling down, depressed, or hopeless for several days (1) [PHQ-2 Negative - No further assessment needed] : PHQ-2 Negative - No further assessment needed [de-identified] :   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 Puentes [de-identified] : quit [YearQuit] : 2000 [Audit-CScore] : 4 [de-identified] : walking at work [de-identified] : regular [[id number]] : 1

## 2022-12-16 NOTE — PHYSICAL EXAM
[No Acute Distress] : no acute distress [Well Nourished] : well nourished [Well Developed] : well developed [Well-Appearing] : well-appearing [Normal Sclera/Conjunctiva] : normal sclera/conjunctiva [PERRL] : pupils equal round and reactive to light [EOMI] : extraocular movements intact [Normal Outer Ear/Nose] : the outer ears and nose were normal in appearance [Normal Oropharynx] : the oropharynx was normal [Normal TMs] : both tympanic membranes were normal [Normal Nasal Mucosa] : the nasal mucosa was normal [No JVD] : no jugular venous distention [No Lymphadenopathy] : no lymphadenopathy [Supple] : supple [Thyroid Normal, No Nodules] : the thyroid was normal and there were no nodules present [No Respiratory Distress] : no respiratory distress  [No Accessory Muscle Use] : no accessory muscle use [Clear to Auscultation] : lungs were clear to auscultation bilaterally [Normal Rate] : normal rate  [Regular Rhythm] : with a regular rhythm [Normal S1, S2] : normal S1 and S2 [No Carotid Bruits] : no carotid bruits [No Abdominal Bruit] : a ~M bruit was not heard ~T in the abdomen [Pedal Pulses Present] : the pedal pulses are present [No Edema] : there was no peripheral edema [No Palpable Aorta] : no palpable aorta [No Extremity Clubbing/Cyanosis] : no extremity clubbing/cyanosis [Declined Breast Exam] : declined breast exam  [Soft] : abdomen soft [Non Tender] : non-tender [Non-distended] : non-distended [No Masses] : no abdominal mass palpated [No HSM] : no HSM [Normal Bowel Sounds] : normal bowel sounds [Declined Rectal Exam] : declined rectal exam [Normal Posterior Cervical Nodes] : no posterior cervical lymphadenopathy [Normal Anterior Cervical Nodes] : no anterior cervical lymphadenopathy [No CVA Tenderness] : no CVA  tenderness [No Spinal Tenderness] : no spinal tenderness [No Joint Swelling] : no joint swelling [Grossly Normal Strength/Tone] : grossly normal strength/tone [No Rash] : no rash [Coordination Grossly Intact] : coordination grossly intact [No Focal Deficits] : no focal deficits [Normal Gait] : normal gait [Deep Tendon Reflexes (DTR)] : deep tendon reflexes were 2+ and symmetric [Speech Grossly Normal] : speech grossly normal [Normal Affect] : the affect was normal [Alert and Oriented x3] : oriented to person, place, and time [Normal Mood] : the mood was normal [Normal Insight/Judgement] : insight and judgment were intact [de-identified] : with murmur  [de-identified] : umbical hernia slightly tenderness noted - nontender to palp of the left lower quadrant [FreeTextEntry1] :  as per gynecology [de-identified] :  as per gynecology [de-identified] : bruising b/l wrist and left post tricep area

## 2022-12-16 NOTE — HISTORY OF PRESENT ILLNESS
[FreeTextEntry1] : INR check and medication renewal  [de-identified] : Mrs. CARDOZA is a 67 year  female, with a past medical history as noted below,who present to the office  today for INR check and medication renewal for insomnia and chronic bilateral knee pain.  Patient states she is taking the medication on a daily basis without any adverse events.  Denies any side effects to current medication regimen.\par Patient denies noticing any easy bleeding or bruising.  Denies having any bloody noses or bleeding gums.\par States she is seeing Dr. Gustafson tomorrow for follow-up status post diverticulitis attack.\par Since last visit cough has resolved.

## 2022-12-16 NOTE — ASSESSMENT
[FreeTextEntry1] : A  67-year-old female with acute anxiety, degenerative joint disease, JANINA, insomnia, obesity present  to the office today for renewal of medication, and a warfarin/INR check

## 2022-12-16 NOTE — REVIEW OF SYSTEMS
[Chills] : chills [Fatigue] : fatigue [Nasal Discharge] : nasal discharge [Cough] : cough [Joint Pain] : joint pain [Back Pain] : back pain [Insomnia] : insomnia [Anxiety] : anxiety [Negative] : Heme/Lymph [Fever] : no fever [Recent Change In Weight] : ~T no recent weight change [Nosebleed] : no nosebleeds [Sore Throat] : no sore throat [Postnasal Drip] : no postnasal drip [Chest Pain] : no chest pain [Palpitations] : no palpitations [Shortness Of Breath] : no shortness of breath [Abdominal Pain] : no abdominal pain [Nausea] : no nausea [Constipation] : no constipation [Diarrhea] : diarrhea [Vomiting] : no vomiting [Heartburn] : no heartburn [Melena] : no melena [Dysuria] : no dysuria [Incontinence] : no incontinence [Joint Stiffness] : no joint stiffness [Muscle Pain] : no muscle pain [Itching] : no itching [Mole Changes] : no mole changes [Skin Rash] : no skin rash [Headache] : no headache [Dizziness] : no dizziness [Memory Loss] : no memory loss [FreeTextEntry2] : Feel feverish [FreeTextEntry9] : knee pain   [de-identified] : stressed

## 2022-12-16 NOTE — PLAN
[FreeTextEntry1] : I&D -cough and congestion   -resolved\par .  \par \par Gastro -status post acute diverticulitis attack.  Follow-up with colorectal surgeon Dr. Deal has a scheduled appointment for the morning\par \par Heme -Anticardiolipin Ab- -advised patient to hold warfarin secondary to elevated INR level.  We will confirm POCT INR with blood work.  Advised patient increase vitamin K in her diet for now.  If notice easy bruising or bleeding, bleeding gums go to the emergency room for evaluation\par \par Cardio - CAD and hyperlipidemia --Continue with current medication. Discussed the importance of stress reduction and weight loss.  \par \par Cardio - Hypertension -  Patient was educated about hypertension and the importance of controlling the pressure through lifestyle modification which include low sodium  diet and  aerobic  exercise.  Having a BMI less than or equal to 25.  Continue losartan 75 mg daily.   \par \par Endocrinology  -  Obesity   Patient was educated about the importance of diet and exercise.   We discussed  a goal of a BMI near 25.   Advised weight loss would help the knee and back pain \par \par Orthopedic -- spinal stenosis- DJD lumbar spine -degenerative joint disease bilateral knees status post total knee replacement.  - Recent exacerbation of pain- Continue with   Vicodin  but increase  dispense amount advised to only take for sever pain. \par advised risk of dependancy.   Continue hydrocodone - \par Advised not to share the medication  Advised risk of dependancy and abuse \par \par Neurology-insomnia - Continue  Ambien. Advise risk alternatives benefits and side effects of medication.   Advise patient risk of taking sleep medication secondary to have an obstructive sleep apnea.  \par Advised to read pack insert - Advised not to use alcohol  \par Reviewed .  Refilled Ambien\par \par I spent  25   Minutes with the patient, half of which we discussed finding on physical exam  and coordinated care.  As well as reviewed my plans and follow ups\par \par Advised patient we will call her tomorrow regarding INR levels done at the lab\par \par

## 2022-12-19 ENCOUNTER — APPOINTMENT (OUTPATIENT)
Dept: INTERNAL MEDICINE | Facility: CLINIC | Age: 67
End: 2022-12-19

## 2022-12-19 VITALS
HEART RATE: 70 BPM | HEIGHT: 65 IN | BODY MASS INDEX: 36.49 KG/M2 | RESPIRATION RATE: 16 BRPM | SYSTOLIC BLOOD PRESSURE: 136 MMHG | WEIGHT: 219 LBS | OXYGEN SATURATION: 98 % | TEMPERATURE: 98.7 F | DIASTOLIC BLOOD PRESSURE: 80 MMHG

## 2022-12-19 LAB
INR PPP: 1.7 RATIO
POCT-PROTHROMBIN TIME: 20 SECS

## 2022-12-19 PROCEDURE — 36416 COLLJ CAPILLARY BLOOD SPEC: CPT

## 2022-12-19 PROCEDURE — 85610 PROTHROMBIN TIME: CPT | Mod: QW

## 2022-12-19 PROCEDURE — 99213 OFFICE O/P EST LOW 20 MIN: CPT | Mod: 25

## 2022-12-19 NOTE — PLAN
[FreeTextEntry1] : Gastro -status post acute diverticulitis attack.  Follow-up with colorectal surgeon Dr. Deal\par  states she has a follow-up appoint with Dr. Deal in 2/22 \par \par Heme -Anticardiolipin Ab- - INR 1.7  advised patient resume warfarin at 2.5 mg daily except for Monday Wednesday and Friday which will take 5 mg.  Repeat INR in 10 days.   advised patient if notice any bruising, bleeding gums or bloody noses return to office immediately for an INR check\par \par Cardio - CAD and hyperlipidemia --Continue with current medication. Discussed the importance of stress reduction and weight loss.  \par \par Cardio - Hypertension -  Patient was educated about hypertension and the importance of controlling the pressure through lifestyle modification which include low sodium  diet and  aerobic  exercise.  Having a BMI less than or equal to 25.  Continue losartan 75 mg daily.   \par \par Endocrinology  -  Obesity   Patient was educated about the importance of diet and exercise.   We discussed  a goal of a BMI near 25.   Advised weight loss would help the knee and back pain \par \par Orthopedic -- spinal stenosis- DJD lumbar spine -degenerative joint disease bilateral knees status post total knee replacement.  - Recent exacerbation of pain- Continue with   Vicodin  but increase  dispense amount advised to only take for sever pain. \par advised risk of dependancy.   \par Advised not to share the medication  Advised risk of dependancy and abuse \par \par Neurology-insomnia - Continue  Ambien. Advise risk alternatives benefits and side effects of medication.   Advise patient risk of taking sleep medication secondary to have an obstructive sleep apnea.  \par Advised to read pack insert - Advised not to use alcohol  \par Reviewed . \par \par I spent  21   Minutes with the patient, half of which we discussed finding on physical exam  and coordinated care.  As well as reviewed my plans and follow ups\par \par \par

## 2022-12-19 NOTE — REVIEW OF SYSTEMS
[Fever] : no fever [Chills] : no chills [Fatigue] : no fatigue [Recent Change In Weight] : ~T no recent weight change [Nosebleed] : no nosebleeds [Nasal Discharge] : no nasal discharge [Sore Throat] : no sore throat [Postnasal Drip] : no postnasal drip [Chest Pain] : no chest pain [Palpitations] : no palpitations [Shortness Of Breath] : no shortness of breath [Cough] : no cough [Abdominal Pain] : no abdominal pain [Nausea] : no nausea [Constipation] : no constipation [Diarrhea] : diarrhea [Vomiting] : no vomiting [Heartburn] : no heartburn [Melena] : no melena [Dysuria] : no dysuria [Incontinence] : no incontinence [Joint Pain] : joint pain [Joint Stiffness] : no joint stiffness [Muscle Pain] : no muscle pain [Back Pain] : no back pain [Itching] : no itching [Mole Changes] : no mole changes [Skin Rash] : no skin rash [Headache] : no headache [Dizziness] : no dizziness [Memory Loss] : no memory loss [Insomnia] : insomnia [Anxiety] : anxiety [Negative] : Heme/Lymph [FreeTextEntry9] : knee pain   [de-identified] : stressed

## 2022-12-19 NOTE — ASSESSMENT
[FreeTextEntry1] : A  67-year-old female with acute anxiety, degenerative joint disease, JANINA, insomnia, obesity present  to the office  for INR check  status post elevated level

## 2022-12-19 NOTE — HEALTH RISK ASSESSMENT
[Yes] : Yes [4 or more  times a week (4 pts)] : 4 or more  times a week (4 points) [1 or 2 (0 pts)] : 1 or 2 (0 points) [Never (0 pts)] : Never (0 points) [No] : In the past 12 months have you used drugs other than those required for medical reasons? No [No falls in past year] : Patient reported no falls in the past year [0] : 1) Little interest or pleasure doing things: Not at all (0) [1] : 2) Feeling down, depressed, or hopeless for several days (1) [PHQ-2 Negative - No further assessment needed] : PHQ-2 Negative - No further assessment needed [de-identified] :   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 Puentes [de-identified] : quit [YearQuit] : 2000 [Audit-CScore] : 4 [de-identified] : walking at work [de-identified] : regular [[id number]] : 1

## 2022-12-19 NOTE — HISTORY OF PRESENT ILLNESS
[FreeTextEntry1] : INR check and medication renewal.  [de-identified] : Mrs. CARDOZA is a 67 year  female, with a past medical history as noted below,who present to the office  today for INR check.  Patient denies having any abnormal bleeding.  Does not has not noticed blood in the urine or stool.  Has not noticed any blood when brushing her teeth or having abnormal bruising of the skin.  Has not taken Coumadin since Thursday night. \par  states she is getting go for a COVID booster at the end of the week.

## 2022-12-19 NOTE — PHYSICAL EXAM
[No Acute Distress] : no acute distress [Well Nourished] : well nourished [Well Developed] : well developed [Well-Appearing] : well-appearing [Normal Sclera/Conjunctiva] : normal sclera/conjunctiva [PERRL] : pupils equal round and reactive to light [EOMI] : extraocular movements intact [Normal Outer Ear/Nose] : the outer ears and nose were normal in appearance [Normal Oropharynx] : the oropharynx was normal [Normal TMs] : both tympanic membranes were normal [Normal Nasal Mucosa] : the nasal mucosa was normal [No JVD] : no jugular venous distention [No Lymphadenopathy] : no lymphadenopathy [Supple] : supple [Thyroid Normal, No Nodules] : the thyroid was normal and there were no nodules present [No Respiratory Distress] : no respiratory distress  [No Accessory Muscle Use] : no accessory muscle use [Clear to Auscultation] : lungs were clear to auscultation bilaterally [Normal Rate] : normal rate  [Regular Rhythm] : with a regular rhythm [Normal S1, S2] : normal S1 and S2 [No Carotid Bruits] : no carotid bruits [No Abdominal Bruit] : a ~M bruit was not heard ~T in the abdomen [Pedal Pulses Present] : the pedal pulses are present [No Edema] : there was no peripheral edema [No Palpable Aorta] : no palpable aorta [No Extremity Clubbing/Cyanosis] : no extremity clubbing/cyanosis [Declined Breast Exam] : declined breast exam  [Soft] : abdomen soft [Non Tender] : non-tender [Non-distended] : non-distended [No Masses] : no abdominal mass palpated [No HSM] : no HSM [Normal Bowel Sounds] : normal bowel sounds [Declined Rectal Exam] : declined rectal exam [Normal Posterior Cervical Nodes] : no posterior cervical lymphadenopathy [Normal Anterior Cervical Nodes] : no anterior cervical lymphadenopathy [No CVA Tenderness] : no CVA  tenderness [No Spinal Tenderness] : no spinal tenderness [No Joint Swelling] : no joint swelling [Grossly Normal Strength/Tone] : grossly normal strength/tone [No Rash] : no rash [Coordination Grossly Intact] : coordination grossly intact [No Focal Deficits] : no focal deficits [Normal Gait] : normal gait [Deep Tendon Reflexes (DTR)] : deep tendon reflexes were 2+ and symmetric [Speech Grossly Normal] : speech grossly normal [Normal Affect] : the affect was normal [Alert and Oriented x3] : oriented to person, place, and time [Normal Mood] : the mood was normal [Normal Insight/Judgement] : insight and judgment were intact [de-identified] : with murmur  [de-identified] : umbical hernia slightly tenderness noted - nontender to palp of the left lower quadrant [FreeTextEntry1] :  as per gynecology [de-identified] :  as per gynecology [de-identified] : bruising b/l wrist and left post tricep area

## 2022-12-19 NOTE — COUNSELING
[Behavioral health counseling provided] : Behavioral health counseling provided [Engage in a relaxing activity] : Engage in a relaxing activity [AUDIT-C Screening administered and reviewed] : AUDIT-C Screening administered and reviewed [Potential consequences of obesity discussed] : Potential consequences of obesity discussed [Encouraged to maintain food diary] : Encouraged to maintain food diary [Encouraged to increase physical activity] : Encouraged to increase physical activity [Good understanding] : Patient has a good understanding of disease, goals and obesity follow-up plan [FreeTextEntry2] : 1600 nicole diet  increase exercise  [Weight management counseling provided] : Weight management [Healthy eating counseling provided] : healthy eating [Fall prevention counseling provided] : fall prevention  [Needs reinforcement, provided] : Patient needs reinforcement on understanding of disease, goals and obesity follow-up plan; reinforcement was provided [Low Fat Diet] : Low fat diet [Low Salt Diet] : Low salt diet [Decrease Portions] : Decrease food portions [Patient motivation] : Patient motivation [None] : None [de-identified] : 1500 nicole diet \par Advised to walk daily for a goal of 10,000 steps \par Advised PT for B/l Knee replacemment

## 2023-01-09 ENCOUNTER — RESULT CHARGE (OUTPATIENT)
Age: 68
End: 2023-01-09

## 2023-01-10 ENCOUNTER — NON-APPOINTMENT (OUTPATIENT)
Age: 68
End: 2023-01-10

## 2023-01-10 ENCOUNTER — APPOINTMENT (OUTPATIENT)
Dept: INTERNAL MEDICINE | Facility: CLINIC | Age: 68
End: 2023-01-10
Payer: MEDICARE

## 2023-01-10 VITALS
WEIGHT: 206 LBS | RESPIRATION RATE: 15 BRPM | DIASTOLIC BLOOD PRESSURE: 78 MMHG | TEMPERATURE: 98.4 F | SYSTOLIC BLOOD PRESSURE: 124 MMHG | HEIGHT: 65 IN | BODY MASS INDEX: 34.32 KG/M2 | HEART RATE: 95 BPM | OXYGEN SATURATION: 96 %

## 2023-01-10 DIAGNOSIS — R07.89 OTHER CHEST PAIN: ICD-10-CM

## 2023-01-10 LAB
INR PPP: 1.7 RATIO
POCT-PROTHROMBIN TIME: 20.4 SECS

## 2023-01-10 PROCEDURE — 36416 COLLJ CAPILLARY BLOOD SPEC: CPT

## 2023-01-10 PROCEDURE — 93000 ELECTROCARDIOGRAM COMPLETE: CPT | Mod: 59

## 2023-01-10 PROCEDURE — 85610 PROTHROMBIN TIME: CPT | Mod: QW

## 2023-01-10 PROCEDURE — 36415 COLL VENOUS BLD VENIPUNCTURE: CPT

## 2023-01-10 PROCEDURE — 99214 OFFICE O/P EST MOD 30 MIN: CPT | Mod: 25

## 2023-01-10 NOTE — PLAN
[FreeTextEntry1] : Gastro -hx diverticulitis .  Follow-up with colorectal surgeon Dr. Deal\par \par Heme -Anticardiolipin Ab- - INR 1.7  advised patient resume warfarin at 2.5 mg daily except for Monday Wednesday and Friday which will take 5 mg. \par Check CBC PT and PT/INR at the labs secondary to bruising noted on abdomen an legs \par  Repeat INR in 10 days.\par \par Cardio - CAD and hyperlipidemia --Atypical chest pain  EKG no changes  - Continue with current medication. Discussed the importance of stress reduction and weight loss.  \par \par Cardio - Hypertension -  Patient was educated about hypertension and the importance of controlling the pressure through lifestyle modification which include low sodium  diet and  aerobic  exercise.  Having a BMI less than or equal to 25.  Continue losartan 75 mg daily.   \par \par Endocrinology  -  Obesity   Patient was educated about the importance of diet and exercise.   We discussed  a goal of a BMI near 25.   Advised weight loss would help the knee and back pain \par \par Orthopedic -- spinal stenosis- DJD lumbar spine -degenerative joint disease bilateral knees status post total knee replacement.  - Recent exacerbation of pain- Continue with   Vicodin  but increase  dispense amount advised to only take for sever pain. \par advised risk of dependancy.   \par Advised not to share the medication  Advised risk of dependancy and abuse \par \par Neurology-insomnia - Continue  Ambien. Advise risk alternatives benefits and side effects of medication.   Advise patient risk of taking sleep medication secondary to have an obstructive sleep apnea.  \par Advised to read pack insert - Advised not to use alcohol  \par Reviewed .  Not due for a renewal today picked up last rx 12/27\par \par I spent  30   Minutes with the patient, half of which we discussed finding on physical exam  and coordinated care.  As well as reviewed my plans and follow ups\par \par \par

## 2023-01-10 NOTE — HISTORY OF PRESENT ILLNESS
[Spouse] : spouse [FreeTextEntry1] : INR check and atypica chest pain \par Also requesting medication renewal  [de-identified] : Mrs. CARDOZA is a 67 year  female, with a past medical history as noted below,who present to the office  today for INR check.  Alma states she has been noticing bruising on her lower extremity without any known cause.    Denies leg pain..\par  denies bloody gums or bloody noses.\par Been having  chest tightness.   States his symptoms comes and goes.    Denies feeling shortness of breath Also been under a lot of stress taking care of her   (see recent total knee replacement) and her  her dog   Who has been very ill recently.   Denies having fever chills or night sweats

## 2023-01-10 NOTE — COUNSELING
[Behavioral health counseling provided] : Behavioral health counseling provided [Engage in a relaxing activity] : Engage in a relaxing activity [AUDIT-C Screening administered and reviewed] : AUDIT-C Screening administered and reviewed [Potential consequences of obesity discussed] : Potential consequences of obesity discussed [Encouraged to maintain food diary] : Encouraged to maintain food diary [Encouraged to increase physical activity] : Encouraged to increase physical activity [Good understanding] : Patient has a good understanding of disease, goals and obesity follow-up plan [Weight management counseling provided] : Weight management [Healthy eating counseling provided] : healthy eating [Fall prevention counseling provided] : fall prevention  [Needs reinforcement, provided] : Patient needs reinforcement on understanding of disease, goals and obesity follow-up plan; reinforcement was provided [Low Fat Diet] : Low fat diet [Low Salt Diet] : Low salt diet [Decrease Portions] : Decrease food portions [Patient motivation] : Patient motivation [None] : None [FreeTextEntry2] : 1600 nicole diet  increase exercise  [de-identified] : 1500 nicole diet \par Advised to walk daily for a goal of 10,000 steps \par Advised PT for B/l Knee replacemment

## 2023-01-10 NOTE — ASSESSMENT
[FreeTextEntry1] : A  67-year-old female with acute anxiety, degenerative joint disease, JANINA, insomnia, obesity present  to the office  for INR check  check -  bruising  and medication renewal

## 2023-01-10 NOTE — PHYSICAL EXAM
[No Acute Distress] : no acute distress [Well Nourished] : well nourished [Well Developed] : well developed [Well-Appearing] : well-appearing [Normal Sclera/Conjunctiva] : normal sclera/conjunctiva [PERRL] : pupils equal round and reactive to light [EOMI] : extraocular movements intact [Normal Outer Ear/Nose] : the outer ears and nose were normal in appearance [Normal Oropharynx] : the oropharynx was normal [Normal TMs] : both tympanic membranes were normal [Normal Nasal Mucosa] : the nasal mucosa was normal [No JVD] : no jugular venous distention [No Lymphadenopathy] : no lymphadenopathy [Supple] : supple [Thyroid Normal, No Nodules] : the thyroid was normal and there were no nodules present [No Respiratory Distress] : no respiratory distress  [No Accessory Muscle Use] : no accessory muscle use [Clear to Auscultation] : lungs were clear to auscultation bilaterally [Normal Rate] : normal rate  [Regular Rhythm] : with a regular rhythm [Normal S1, S2] : normal S1 and S2 [No Carotid Bruits] : no carotid bruits [No Abdominal Bruit] : a ~M bruit was not heard ~T in the abdomen [Pedal Pulses Present] : the pedal pulses are present [No Edema] : there was no peripheral edema [No Palpable Aorta] : no palpable aorta [No Extremity Clubbing/Cyanosis] : no extremity clubbing/cyanosis [Declined Breast Exam] : declined breast exam  [Soft] : abdomen soft [Non Tender] : non-tender [Non-distended] : non-distended [No Masses] : no abdominal mass palpated [No HSM] : no HSM [Normal Bowel Sounds] : normal bowel sounds [Declined Rectal Exam] : declined rectal exam [Normal Posterior Cervical Nodes] : no posterior cervical lymphadenopathy [Normal Anterior Cervical Nodes] : no anterior cervical lymphadenopathy [No CVA Tenderness] : no CVA  tenderness [No Spinal Tenderness] : no spinal tenderness [No Joint Swelling] : no joint swelling [Grossly Normal Strength/Tone] : grossly normal strength/tone [No Rash] : no rash [Coordination Grossly Intact] : coordination grossly intact [No Focal Deficits] : no focal deficits [Normal Gait] : normal gait [Deep Tendon Reflexes (DTR)] : deep tendon reflexes were 2+ and symmetric [Speech Grossly Normal] : speech grossly normal [Normal Affect] : the affect was normal [Alert and Oriented x3] : oriented to person, place, and time [Normal Mood] : the mood was normal [Normal Insight/Judgement] : insight and judgment were intact [de-identified] : with murmur  [de-identified] : umbical hernia slightly tenderness noted - nontender to palp of the left lower quadrant [FreeTextEntry1] :  as per gynecology [de-identified] :  as per gynecology [de-identified] : bruising Noted on lower extremity as well as abdomen   bruises very  a light yellow in nature   measuring anywhere from 2 to 10 mm

## 2023-01-10 NOTE — REVIEW OF SYSTEMS
[Insomnia] : insomnia [Anxiety] : anxiety [Chest Pain] : chest pain [Easy Bruising] : easy bruising [Negative] : Musculoskeletal [Fever] : no fever [Chills] : no chills [Fatigue] : no fatigue [Recent Change In Weight] : ~T no recent weight change [Nosebleed] : no nosebleeds [Nasal Discharge] : no nasal discharge [Sore Throat] : no sore throat [Postnasal Drip] : no postnasal drip [Palpitations] : no palpitations [Shortness Of Breath] : no shortness of breath [Cough] : no cough [Abdominal Pain] : no abdominal pain [Nausea] : no nausea [Constipation] : no constipation [Diarrhea] : diarrhea [Vomiting] : no vomiting [Heartburn] : no heartburn [Melena] : no melena [Dysuria] : no dysuria [Incontinence] : no incontinence [Joint Pain] : no joint pain [Joint Stiffness] : no joint stiffness [Muscle Pain] : no muscle pain [Back Pain] : no back pain [Itching] : no itching [Mole Changes] : no mole changes [Skin Rash] : no skin rash [Headache] : no headache [Dizziness] : no dizziness [Memory Loss] : no memory loss [de-identified] : stressed

## 2023-01-10 NOTE — HEALTH RISK ASSESSMENT
[Yes] : Yes [4 or more  times a week (4 pts)] : 4 or more  times a week (4 points) [1 or 2 (0 pts)] : 1 or 2 (0 points) [Never (0 pts)] : Never (0 points) [No] : In the past 12 months have you used drugs other than those required for medical reasons? No [No falls in past year] : Patient reported no falls in the past year [0] : 1) Little interest or pleasure doing things: Not at all (0) [1] : 2) Feeling down, depressed, or hopeless for several days (1) [PHQ-2 Negative - No further assessment needed] : PHQ-2 Negative - No further assessment needed [de-identified] :   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 Puentes [de-identified] : quit [YearQuit] : 2000 [Audit-CScore] : 4 [de-identified] : walking at work [de-identified] : regular [ESM6Xcpns] : 1

## 2023-01-11 LAB
APTT BLD: 35.6 SEC
BASOPHILS # BLD AUTO: 0.05 K/UL
BASOPHILS NFR BLD AUTO: 0.6 %
EOSINOPHIL # BLD AUTO: 0.08 K/UL
EOSINOPHIL NFR BLD AUTO: 1 %
HCT VFR BLD CALC: 41.3 %
HGB BLD-MCNC: 13.7 G/DL
IMM GRANULOCYTES NFR BLD AUTO: 0.2 %
INR PPP: 1.55 RATIO
LYMPHOCYTES # BLD AUTO: 2.03 K/UL
LYMPHOCYTES NFR BLD AUTO: 24.2 %
MAN DIFF?: NORMAL
MCHC RBC-ENTMCNC: 31.6 PG
MCHC RBC-ENTMCNC: 33.2 GM/DL
MCV RBC AUTO: 95.2 FL
MONOCYTES # BLD AUTO: 0.84 K/UL
MONOCYTES NFR BLD AUTO: 10 %
NEUTROPHILS # BLD AUTO: 5.36 K/UL
NEUTROPHILS NFR BLD AUTO: 64 %
PLATELET # BLD AUTO: 258 K/UL
PT BLD: 18.4 SEC
RBC # BLD: 4.34 M/UL
RBC # FLD: 13.9 %
WBC # FLD AUTO: 8.38 K/UL

## 2023-02-07 ENCOUNTER — APPOINTMENT (OUTPATIENT)
Dept: INTERNAL MEDICINE | Facility: CLINIC | Age: 68
End: 2023-02-07
Payer: MEDICARE

## 2023-02-07 VITALS
BODY MASS INDEX: 34.82 KG/M2 | HEART RATE: 80 BPM | TEMPERATURE: 98.9 F | RESPIRATION RATE: 16 BRPM | HEIGHT: 65 IN | DIASTOLIC BLOOD PRESSURE: 70 MMHG | OXYGEN SATURATION: 98 % | SYSTOLIC BLOOD PRESSURE: 118 MMHG | WEIGHT: 209 LBS

## 2023-02-07 DIAGNOSIS — R10.2 PELVIC AND PERINEAL PAIN: ICD-10-CM

## 2023-02-07 LAB
BILIRUB UR QL STRIP: NEGATIVE
CLARITY UR: CLEAR
COLLECTION METHOD: NORMAL
GLUCOSE UR-MCNC: NEGATIVE
HCG UR QL: 0.2 EU/DL
HGB UR QL STRIP.AUTO: ABNORMAL
INR PPP: 1.1 RATIO
KETONES UR-MCNC: NEGATIVE
LEUKOCYTE ESTERASE UR QL STRIP: ABNORMAL
NITRITE UR QL STRIP: NEGATIVE
PH UR STRIP: 6
POCT-PROTHROMBIN TIME: 12.9 SECS
PROT UR STRIP-MCNC: NEGATIVE
SP GR UR STRIP: 1.03

## 2023-02-07 PROCEDURE — 36416 COLLJ CAPILLARY BLOOD SPEC: CPT

## 2023-02-07 PROCEDURE — 85610 PROTHROMBIN TIME: CPT | Mod: QW

## 2023-02-07 PROCEDURE — 99214 OFFICE O/P EST MOD 30 MIN: CPT | Mod: 25

## 2023-02-07 PROCEDURE — 36415 COLL VENOUS BLD VENIPUNCTURE: CPT

## 2023-02-07 PROCEDURE — 81003 URINALYSIS AUTO W/O SCOPE: CPT | Mod: QW

## 2023-02-07 RX ORDER — ENOXAPARIN SODIUM 100 MG/ML
100 INJECTION, SOLUTION SUBCUTANEOUS
Qty: 1 | Refills: 0 | Status: COMPLETED | COMMUNITY
Start: 2023-01-30 | End: 2023-02-07

## 2023-02-07 RX ORDER — AMOXICILLIN AND CLAVULANATE POTASSIUM 875; 125 MG/1; MG/1
875-125 TABLET, COATED ORAL
Qty: 14 | Refills: 0 | Status: COMPLETED | COMMUNITY
Start: 2023-02-07 | End: 2023-02-14

## 2023-02-08 NOTE — PHYSICAL EXAM
[No Acute Distress] : no acute distress [Well Nourished] : well nourished [Well Developed] : well developed [Well-Appearing] : well-appearing [Normal Sclera/Conjunctiva] : normal sclera/conjunctiva [PERRL] : pupils equal round and reactive to light [EOMI] : extraocular movements intact [Normal Outer Ear/Nose] : the outer ears and nose were normal in appearance [Normal Oropharynx] : the oropharynx was normal [Normal TMs] : both tympanic membranes were normal [Normal Nasal Mucosa] : the nasal mucosa was normal [No JVD] : no jugular venous distention [No Lymphadenopathy] : no lymphadenopathy [Supple] : supple [Thyroid Normal, No Nodules] : the thyroid was normal and there were no nodules present [No Respiratory Distress] : no respiratory distress  [No Accessory Muscle Use] : no accessory muscle use [Clear to Auscultation] : lungs were clear to auscultation bilaterally [Normal Rate] : normal rate  [Regular Rhythm] : with a regular rhythm [Normal S1, S2] : normal S1 and S2 [No Carotid Bruits] : no carotid bruits [No Abdominal Bruit] : a ~M bruit was not heard ~T in the abdomen [Pedal Pulses Present] : the pedal pulses are present [No Edema] : there was no peripheral edema [No Palpable Aorta] : no palpable aorta [No Extremity Clubbing/Cyanosis] : no extremity clubbing/cyanosis [Declined Breast Exam] : declined breast exam  [Soft] : abdomen soft [Non-distended] : non-distended [No Masses] : no abdominal mass palpated [No HSM] : no HSM [Normal Bowel Sounds] : normal bowel sounds [Declined Rectal Exam] : declined rectal exam [No CVA Tenderness] : no CVA  tenderness [No Spinal Tenderness] : no spinal tenderness [No Joint Swelling] : no joint swelling [Grossly Normal Strength/Tone] : grossly normal strength/tone [No Rash] : no rash [Coordination Grossly Intact] : coordination grossly intact [No Focal Deficits] : no focal deficits [Normal Gait] : normal gait [Deep Tendon Reflexes (DTR)] : deep tendon reflexes were 2+ and symmetric [Speech Grossly Normal] : speech grossly normal [Normal Affect] : the affect was normal [Alert and Oriented x3] : oriented to person, place, and time [Normal Mood] : the mood was normal [Normal Insight/Judgement] : insight and judgment were intact [de-identified] : with murmur  [de-identified] : Left lower quadrant tenderness to palpation, umbical hernia slightly tenderness noted - nontender to palp of the left lower quadrant, bruising noted on lower extremity secondary to Lovenox injection [FreeTextEntry1] :  as per gynecology [de-identified] :  as per gynecology [de-identified] : Bruising/ecchymosis abdomen secondary to Lovenox injections

## 2023-02-08 NOTE — HEALTH RISK ASSESSMENT
[Yes] : Yes [4 or more  times a week (4 pts)] : 4 or more  times a week (4 points) [1 or 2 (0 pts)] : 1 or 2 (0 points) [Never (0 pts)] : Never (0 points) [No] : In the past 12 months have you used drugs other than those required for medical reasons? No [No falls in past year] : Patient reported no falls in the past year [0] : 1) Little interest or pleasure doing things: Not at all (0) [1] : 2) Feeling down, depressed, or hopeless for several days (1) [PHQ-2 Negative - No further assessment needed] : PHQ-2 Negative - No further assessment needed [Former] : Former [de-identified] :   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 Puentes [Audit-CScore] : 4 [de-identified] : walking at work [de-identified] : regular [[id number]] : 1

## 2023-02-08 NOTE — REVIEW OF SYSTEMS
[Joint Pain] : joint pain [Insomnia] : insomnia [Anxiety] : anxiety [Negative] : Heme/Lymph [Fever] : no fever [Chills] : no chills [Fatigue] : no fatigue [Recent Change In Weight] : ~T no recent weight change [Nosebleed] : no nosebleeds [Nasal Discharge] : no nasal discharge [Sore Throat] : no sore throat [Postnasal Drip] : no postnasal drip [Chest Pain] : no chest pain [Palpitations] : no palpitations [Shortness Of Breath] : no shortness of breath [Cough] : no cough [Abdominal Pain] : abdominal pain [Nausea] : no nausea [Constipation] : no constipation [Diarrhea] : diarrhea [Vomiting] : no vomiting [Heartburn] : no heartburn [Melena] : no melena [Dysuria] : no dysuria [Incontinence] : no incontinence [Joint Stiffness] : no joint stiffness [Muscle Pain] : no muscle pain [Back Pain] : no back pain [Itching] : no itching [Mole Changes] : no mole changes [Skin Rash] : no skin rash [Headache] : no headache [Dizziness] : no dizziness [Memory Loss] : no memory loss [FreeTextEntry7] : Foul odor in her rectal area [FreeTextEntry9] : knee pain   [de-identified] : stressed

## 2023-02-08 NOTE — COUNSELING
[Behavioral health counseling provided] : Behavioral health counseling provided [Engage in a relaxing activity] : Engage in a relaxing activity [AUDIT-C Screening administered and reviewed] : AUDIT-C Screening administered and reviewed [Potential consequences of obesity discussed] : Potential consequences of obesity discussed [Encouraged to maintain food diary] : Encouraged to maintain food diary [Encouraged to increase physical activity] : Encouraged to increase physical activity [Good understanding] : Patient has a good understanding of disease, goals and obesity follow-up plan [Weight management counseling provided] : Weight management [Healthy eating counseling provided] : healthy eating [Fall prevention counseling provided] : fall prevention  [Needs reinforcement, provided] : Patient needs reinforcement on understanding of disease, goals and obesity follow-up plan; reinforcement was provided [Low Fat Diet] : Low fat diet [Low Salt Diet] : Low salt diet [Decrease Portions] : Decrease food portions [Patient motivation] : Patient motivation [None] : None [FreeTextEntry2] : 1600 nicole diet  increase exercise  [de-identified] : 1500 nicole diet \par Advised to walk daily for a goal of 10,000 steps \par Advised PT for B/l Knee replacemment

## 2023-02-08 NOTE — HISTORY OF PRESENT ILLNESS
[FreeTextEntry1] : PT INR check, abdominal pain, renewal of medication [de-identified] : Mrs. CARDOZA is a 68 year  female, with a past medical history as noted below,who present to the office  today for  PT/INR check.  Also been having acute abdominal pain for the last few days.   Had a colonoscopy last week.   Patient states hercolonoscopy was normal, no diverticulitis, still having left lower quadrant abdominal pain seems to be getting worse.  Denies fever denies loss of appetite.  Denies pain with urination.  States abdominal pain radiates to the back area.   Sometimes pain is worse than others unaware what makes it better.   At times feels like she has a foul odor coming from her rectal area.  Takes 2 showers a day to try to clean the odor.  Also been noticing hair loss.\par Patient requesting renewal of her Ambien as well as her pain medication for lower back pain, knee pain Patient takes she has been trying to cut back on the pain medication.  Patient denies any adverse reaction to current medication regimen.\par \par Also also here for a PT/INR check status post coming off Lovenox.  Took first dose of Coumadin 2.5 mg yesterday.\par \par Also has been noticing hair loss.  Unaware what makes it better or worse.

## 2023-02-08 NOTE — PLAN
[FreeTextEntry1] : Gastro -status post colonoscopy with Dr. Deal.  \par Left lower quadrant pain.  Check labs, start Augmentin 875 1 tab p.o. twice daily.\par Follow-up with general surgeon.  Request copy of recent colonoscopy report.  Call office in the morning if getting worse or start with a fever.\par \par General surgeon-umbilical hernia refer patient back to Dr. Fraga  for evaluation and surgical consideration of the umbilical hernia.\par \par Heme -Anticardiolipin Ab- - INR 1.1 advised patient to take 5 mg today.  Than resume warfarin at 2.5 mg daily except for Monday Wednesday and Friday which will take 5 mg.  Repeat INR in 10 days.   advised patient if notice any bruising, bleeding gums or bloody noses return to office immediately for an INR check\par \par Cardio - CAD and hyperlipidemia --Continue with current medication. Discussed the importance of stress reduction and weight loss.  \par \par Cardio - Hypertension -  Patient was educated about hypertension and the importance of controlling the pressure through lifestyle modification which include low sodium  diet and  aerobic  exercise.  Having a BMI less than or equal to 25.  Continue losartan 75 mg daily.   \par \par Endocrinology  -  Obesity   Patient was educated about the importance of diet and exercise.   We discussed  a goal of a BMI near 25.   Advised weight loss would help the knee and back pain \par \par Orthopedic -- spinal stenosis- DJD lumbar spine -degenerative joint disease bilateral knees status post total knee replacement.  - Recent exacerbation of pain- Continue with   Vicodin  but increase  dispense amount advised to only take for sever pain. \par advised risk of dependancy.   \par Advised not to share the medication  Advised risk of dependancy and abuse \par \par Neurology-insomnia - Continue  Ambien. Advise risk alternatives benefits and side effects of medication.   Advise patient risk of taking sleep medication secondary to have an obstructive sleep apnea.  \par Advised to read pack insert - Advised not to use alcohol  \par Reviewed . \par \par I spent 30  Minutes with the patient, half of which we discussed finding on physical exam  and coordinated care.  As well as reviewed my plans and follow ups. Dragon speech recognition software was used to create portions of this document.  An attempt at proofreading has been made to minimize errors please call if any questions arise. \par \par \par

## 2023-02-08 NOTE — ASSESSMENT
[FreeTextEntry1] : A  67-year-old female with acute anxiety, degenerative joint disease, JANINA, insomnia, obesity present  to the office  for INR check, abdominal pain, renewal of medication, and evaluation of hair loss

## 2023-02-10 LAB
ALBUMIN SERPL ELPH-MCNC: 4.6 G/DL
ALP BLD-CCNC: 81 U/L
ALT SERPL-CCNC: 34 U/L
ANION GAP SERPL CALC-SCNC: 11 MMOL/L
AST SERPL-CCNC: 34 U/L
BACTERIA UR CULT: NORMAL
BASOPHILS # BLD AUTO: 0.05 K/UL
BASOPHILS NFR BLD AUTO: 0.7 %
BILIRUB SERPL-MCNC: 0.4 MG/DL
BUN SERPL-MCNC: 13 MG/DL
CALCIUM SERPL-MCNC: 9.9 MG/DL
CHLORIDE SERPL-SCNC: 103 MMOL/L
CO2 SERPL-SCNC: 28 MMOL/L
CREAT SERPL-MCNC: 0.73 MG/DL
EGFR: 90 ML/MIN/1.73M2
EOSINOPHIL # BLD AUTO: 0.1 K/UL
EOSINOPHIL NFR BLD AUTO: 1.5 %
GLUCOSE SERPL-MCNC: 86 MG/DL
HCT VFR BLD CALC: 40.1 %
HGB BLD-MCNC: 12.8 G/DL
IMM GRANULOCYTES NFR BLD AUTO: 0.3 %
LYMPHOCYTES # BLD AUTO: 2.18 K/UL
LYMPHOCYTES NFR BLD AUTO: 32.2 %
MAN DIFF?: NORMAL
MCHC RBC-ENTMCNC: 30.8 PG
MCHC RBC-ENTMCNC: 31.9 GM/DL
MCV RBC AUTO: 96.4 FL
MONOCYTES # BLD AUTO: 0.71 K/UL
MONOCYTES NFR BLD AUTO: 10.5 %
NEUTROPHILS # BLD AUTO: 3.71 K/UL
NEUTROPHILS NFR BLD AUTO: 54.8 %
PLATELET # BLD AUTO: 240 K/UL
POTASSIUM SERPL-SCNC: 4.6 MMOL/L
PROT SERPL-MCNC: 7.2 G/DL
RBC # BLD: 4.16 M/UL
RBC # FLD: 12.6 %
SODIUM SERPL-SCNC: 141 MMOL/L
T4 FREE SERPL-MCNC: 1.1 NG/DL
TSH SERPL-ACNC: 1.45 UIU/ML
WBC # FLD AUTO: 6.77 K/UL

## 2023-02-14 ENCOUNTER — RX RENEWAL (OUTPATIENT)
Age: 68
End: 2023-02-14

## 2023-02-17 ENCOUNTER — APPOINTMENT (OUTPATIENT)
Dept: INTERNAL MEDICINE | Facility: CLINIC | Age: 68
End: 2023-02-17

## 2023-02-22 NOTE — PHYSICAL THERAPY INITIAL EVALUATION ADULT - ADL SKILLS, REHAB EVAL
Patient is aware this visit will be treated as a billable for insurance purposes if applicable.     To accommodate patient care during the COVID-19 pandemic, this visit was performed using video visit. I spent a total of 15 minutes talking with the patient.      All of the patient's questions were answered.      Clinician Location: [place], [place]  Patient Location: [place]      Bariatric/Medical Weight Loss Progress Report       Referral Diagnosis:   Morbid obesity with BMI of 50.0-59.9, adult (CMS/[facility]) [E66.01, Z68.43],     Appointment #[id number] - previous [facility] patient       Length of Appointment Time:   15 minutes    Assessment / Food intake related directly to surgery readiness:  Oral fluids: Per 24 hour recall: 11 oz Fairlife protein shake, 16 oz coffee with sugar free creamer and Truvia, 64 oz water with sugar free flavoring, 20 oz Gatorade Zero.      Amount of food: Per 24 hour recall: Woke at 6:45am. Breakfast at 9am: Fairlife protein shake. AM snack: sugar free pudding. Lunch at 1pm: 6 rolls prosciutto with mozzarella cheese. Dinner at 6pm: 3 hot dogs (no buns) with steamed broccoli, cauliflower, carrots. Bed at 9:30pm.     Type of food/meals: Patient checks her weight 1x/week at home. She continues to skip eating meals, and she is still smoking. She checks her blood glucose 3x/day before meals, which ranges  mg/dL. She does not feel low, but can feel when her blood glucose is decreasing rapidly.    Meal/Snack pattern: 2-3 meals/day with snacks  Fried Foods: none  Fast Food: none  Eating Out: none  Sweets: 2x/month - ice cream  Fruit: none  Starches: none  Soda: none  Allergies/Intolerances: none  Alcohol: none  Tobacco: yes, decreased to 1 cigarette/day       Medications / Supplements  Weight loss medication: wegovy  Medications, specify prescription or OTC: metformin, zofran, levemir, novolog  Herbal/complementary products: women's MVI with iron, vitamin D3     Motivation  Motivation: Patient  appears somewhat motivated to make nutrition related behavior changes.      Patient is interested in medical weight loss and bariatric surgery - gastric sleeve.     Behavior  Binge eating behavior: Patient is tempted to eat sweets, but is sticking to sugar free products   Meal duration: 25 minutes   Ability to build and utilize social network: Patient lives in her own apartment     Physical Activity  Type of physical activity: increased walking and stretching      Anthropometric Measurements:   Height/length: 65.5\"  Weight: 357# on home scale per patient report   Pre-surgery goal weight: 344#  Weight change: 4# weight loss in past month     Labs reviewed:  Hemoglobin A1C (%)   Date Value   12/19/2022 9.5 (H)     Nutrition-Focused Physical Findings:   Overall appearance: female with BMI >40  Extremities, muscles and bones: reports joint and right knee pain  Digestive system (mouth to rectum): constipation, diarrhea     Client History:   Past Medical History:   Diagnosis Date   • Depression    • Migraine    • Polycystic ovarian disease      Nutrition Diagnosis:   Overweight/Obesity related to history of excessive calorie intake and physical inactivity as evidenced by BMI.      Nutrition Prescription:    Eat 3 small meals per day, choose foods low in sugar, choose foods low in fat, eat meals slowly, substitute low calorie non-carbonated beverages for carbonated and be physically active.     Intervention:   Modified diet: Liver reduction diet     Recommended modifications:   1. Quit smoking  2. Follow liver reduction diet - pair a lean protein with a non-starchy vegetable for each meal  3. Avoid eating sweets and high fat meats    Nutrition Progress Checklist:  [x] Eats three meals/day  [] Avoids all sweets  [x] Avoids all fried food  [] Lean source of protein with every meal  [] Follows the diet provided by the dietitian  [x] Avoids all alcohol  [x] Chews well and takes approximately 30 minutes for meals  [x] Avoids  all carbonated and sweetened beverages  [x] Avoids straws and gum  [] Achieve pre surgical goal weight of 344#     Print/Written Resources Provided:   AVS available via patient portal     Monitoring and Evaluation:   Self-reported adherence score: The patient will comply with interventions in order to be an appropriate candidate for bariatric surgery.     The instruction was given to the patient.  Areas and level of knowledge: Patient demonstrates an inadequate level of knowledge prior to education and basic level of knowledge after education.  The barriers to self-care and learning limitations were assessed as none.  Preferences for learning: No preference     Learning Topics: Rationale for Diet, Guidelines for Diet, Label Reading/Product Information, Food Preparation, Eating Out and Lifestyle changes      Recommended Follow-up:   1 month or per provider    Patient is slowly progressing toward bariatric surgery. She needs to work on quitting smoking.         independent

## 2023-03-03 ENCOUNTER — APPOINTMENT (OUTPATIENT)
Dept: INTERNAL MEDICINE | Facility: CLINIC | Age: 68
End: 2023-03-03
Payer: MEDICARE

## 2023-03-03 VITALS
TEMPERATURE: 97.2 F | RESPIRATION RATE: 16 BRPM | SYSTOLIC BLOOD PRESSURE: 104 MMHG | BODY MASS INDEX: 34.32 KG/M2 | WEIGHT: 206 LBS | HEART RATE: 94 BPM | HEIGHT: 65 IN | OXYGEN SATURATION: 98 % | DIASTOLIC BLOOD PRESSURE: 80 MMHG

## 2023-03-03 LAB
INR PPP: 1.4 RATIO
POCT-PROTHROMBIN TIME: 16.9 SECS

## 2023-03-03 PROCEDURE — 85610 PROTHROMBIN TIME: CPT | Mod: QW

## 2023-03-03 PROCEDURE — 36416 COLLJ CAPILLARY BLOOD SPEC: CPT

## 2023-03-03 PROCEDURE — 99214 OFFICE O/P EST MOD 30 MIN: CPT | Mod: 25

## 2023-03-06 NOTE — ASSESSMENT
[FreeTextEntry1] : A  68 -year-old female with acute anxiety, degenerative joint disease, JANINA, insomnia, obesity present  to the office  for INR check, abdominal pain, renewal of medication.

## 2023-03-06 NOTE — HISTORY OF PRESENT ILLNESS
[FreeTextEntry1] : PT INR check, abdominal pain, renewal of medication   [de-identified] : Mrs. CARDOZA is a 68 year  female, with a past medical history as noted below,who present to the office  today for  PT/INR check.  Also been having acute abdominal pain for the last since last visit.  Patient states she did go for a follow-up with gastroenterology and colonoscopy was normal.    Pain radiates down to the groin area.  Unaware what makes it better or worse.  Denies any change in bowel habits.  Denies any blood in the stool.  States she has been taking more of her pain medication secondary to the pain.    explains pains as moderate to severe in nature.   Seems to be exacerbated if you press on the area or  going from a lying position to a sitting position.  After last visit did finish up antibiotics did not notice any relief of the discomfort\par Patient requesting renewal of her Ambien as well as her pain medication for lower back pain, knee pain Patient takes she has been trying to cut back on the pain medication.  Patient denies any adverse reaction to current medication regimen.\par \par Also also here for a PT/INR check status post coming off Lovenox.  Took first dose of Coumadin 2.5 mg yesterday.\par

## 2023-03-06 NOTE — PHYSICAL EXAM
[No Acute Distress] : no acute distress [Well Nourished] : well nourished [Well Developed] : well developed [Well-Appearing] : well-appearing [Normal Sclera/Conjunctiva] : normal sclera/conjunctiva [PERRL] : pupils equal round and reactive to light [EOMI] : extraocular movements intact [Normal Outer Ear/Nose] : the outer ears and nose were normal in appearance [Normal Oropharynx] : the oropharynx was normal [Normal TMs] : both tympanic membranes were normal [Normal Nasal Mucosa] : the nasal mucosa was normal [No JVD] : no jugular venous distention [No Lymphadenopathy] : no lymphadenopathy [Supple] : supple [Thyroid Normal, No Nodules] : the thyroid was normal and there were no nodules present [No Respiratory Distress] : no respiratory distress  [No Accessory Muscle Use] : no accessory muscle use [Clear to Auscultation] : lungs were clear to auscultation bilaterally [Normal Rate] : normal rate  [Regular Rhythm] : with a regular rhythm [Normal S1, S2] : normal S1 and S2 [No Carotid Bruits] : no carotid bruits [No Abdominal Bruit] : a ~M bruit was not heard ~T in the abdomen [Pedal Pulses Present] : the pedal pulses are present [No Edema] : there was no peripheral edema [No Palpable Aorta] : no palpable aorta [No Extremity Clubbing/Cyanosis] : no extremity clubbing/cyanosis [Declined Breast Exam] : declined breast exam  [Soft] : abdomen soft [Non-distended] : non-distended [No Masses] : no abdominal mass palpated [No HSM] : no HSM [Normal Bowel Sounds] : normal bowel sounds [Declined Rectal Exam] : declined rectal exam [No CVA Tenderness] : no CVA  tenderness [No Spinal Tenderness] : no spinal tenderness [No Joint Swelling] : no joint swelling [Grossly Normal Strength/Tone] : grossly normal strength/tone [No Rash] : no rash [Coordination Grossly Intact] : coordination grossly intact [No Focal Deficits] : no focal deficits [Normal Gait] : normal gait [Deep Tendon Reflexes (DTR)] : deep tendon reflexes were 2+ and symmetric [Speech Grossly Normal] : speech grossly normal [Normal Affect] : the affect was normal [Alert and Oriented x3] : oriented to person, place, and time [Normal Mood] : the mood was normal [Normal Insight/Judgement] : insight and judgment were intact [de-identified] : with murmur  [de-identified] : Left lower quadrant tenderness to palpation, umbical hernia slightly tenderness noted - nontender to palp of the left lower quadrant, bruising noted on lower extremity secondary to Lovenox injection [FreeTextEntry1] :  as per gynecology [de-identified] :  as per gynecology [de-identified] : Bruising/ecchymosis abdomen secondary to Lovenox injections

## 2023-03-06 NOTE — COUNSELING
[Behavioral health counseling provided] : Behavioral health counseling provided [Engage in a relaxing activity] : Engage in a relaxing activity [AUDIT-C Screening administered and reviewed] : AUDIT-C Screening administered and reviewed [Potential consequences of obesity discussed] : Potential consequences of obesity discussed [Encouraged to maintain food diary] : Encouraged to maintain food diary [Encouraged to increase physical activity] : Encouraged to increase physical activity [Good understanding] : Patient has a good understanding of disease, goals and obesity follow-up plan [Weight management counseling provided] : Weight management [Healthy eating counseling provided] : healthy eating [Fall prevention counseling provided] : fall prevention  [Needs reinforcement, provided] : Patient needs reinforcement on understanding of disease, goals and obesity follow-up plan; reinforcement was provided [Low Fat Diet] : Low fat diet [Low Salt Diet] : Low salt diet [Decrease Portions] : Decrease food portions [Patient motivation] : Patient motivation [None] : None [FreeTextEntry2] : 1600 nicole diet  increase exercise  [de-identified] : 1500 nicole diet \par Advised to walk daily for a goal of 10,000 steps \par Advised PT for B/l Knee replacemment

## 2023-03-06 NOTE — REVIEW OF SYSTEMS
[Abdominal Pain] : abdominal pain [Joint Pain] : joint pain [Insomnia] : insomnia [Anxiety] : anxiety [Negative] : Heme/Lymph [Fever] : no fever [Chills] : no chills [Fatigue] : no fatigue [Recent Change In Weight] : ~T no recent weight change [Nosebleed] : no nosebleeds [Nasal Discharge] : no nasal discharge [Sore Throat] : no sore throat [Postnasal Drip] : no postnasal drip [Chest Pain] : no chest pain [Palpitations] : no palpitations [Shortness Of Breath] : no shortness of breath [Cough] : no cough [Nausea] : no nausea [Constipation] : no constipation [Diarrhea] : diarrhea [Vomiting] : no vomiting [Heartburn] : no heartburn [Melena] : no melena [Dysuria] : no dysuria [Incontinence] : no incontinence [Joint Stiffness] : no joint stiffness [Muscle Pain] : no muscle pain [Back Pain] : no back pain [Itching] : no itching [Mole Changes] : no mole changes [Skin Rash] : no skin rash [Headache] : no headache [Dizziness] : no dizziness [Memory Loss] : no memory loss [FreeTextEntry7] :  left groin/left lower quadrant pain [FreeTextEntry9] : knee pain   [de-identified] : stressed

## 2023-03-06 NOTE — PLAN
[FreeTextEntry1] : Gastro -status post colonoscopy with Dr. Deal.  \par Left lower quadrant pain.  Check  abdominal sonogram.\par Follow-up with general surgeon/Gastroenterology.  Request copy of recent colonoscopy report.  \par \par General surgeon-umbilical hernia refer patient back to Dr. Fraga  for evaluation and surgical consideration of the umbilical hernia.\par \par Heme -Anticardiolipin Ab- - INR 1.4 advised patient to take 5 mg today and tomorrow.  Than resume warfarin at 2.5 mg daily except for Monday Wednesday and Friday which will take 5 mg.  Repeat INR in 10 days.   advised patient if notice any bruising, bleeding gums or bloody noses return to office immediately for an INR check\par \par Cardio - CAD and hyperlipidemia --Continue with current medication. Discussed the importance of stress reduction and weight loss.  \par \par Cardio - Hypertension -  Patient was educated about hypertension and the importance of controlling the pressure through lifestyle modification which include low sodium  diet and  aerobic  exercise.  Having a BMI less than or equal to 25.  Continue losartan 75 mg daily.   \par \par Endocrinology  -  Obesity   Patient was educated about the importance of diet and exercise.   We discussed  a goal of a BMI near 25.   Advised weight loss would help the knee and back pain \par \par Orthopedic -- spinal stenosis- DJD lumbar spine -degenerative joint disease bilateral knees status post total knee replacement.  - Recent exacerbation of pain- Continue with   Vicodin  but increase  dispense amount advised to only take for sever pain. \par advised risk of dependancy.   \par Advised not to share the medication  Advised risk of dependancy and abuse \par \par Neurology-insomnia - Continue  Ambien. Advise risk alternatives benefits and side effects of medication.   Advise patient risk of taking sleep medication secondary to have an obstructive sleep apnea.  \par Advised to read pack insert - Advised not to use alcohol  \par Reviewed . \par \par I spent 30  Minutes with the patient, half of which we discussed finding on physical exam  and coordinated care.  As well as reviewed my plans and follow ups. Dragon speech recognition software was used to create portions of this document.  An attempt at proofreading has been made to minimize errors please call if any questions arise. \par \par \par

## 2023-03-06 NOTE — HEALTH RISK ASSESSMENT
[Yes] : Yes [4 or more  times a week (4 pts)] : 4 or more  times a week (4 points) [1 or 2 (0 pts)] : 1 or 2 (0 points) [Never (0 pts)] : Never (0 points) [No] : In the past 12 months have you used drugs other than those required for medical reasons? No [No falls in past year] : Patient reported no falls in the past year [0] : 1) Little interest or pleasure doing things: Not at all (0) [1] : 2) Feeling down, depressed, or hopeless for several days (1) [PHQ-2 Negative - No further assessment needed] : PHQ-2 Negative - No further assessment needed [Former] : Former [de-identified] :   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 Puentes [Audit-CScore] : 4 [de-identified] : walking at work [de-identified] : regular [[id number]] : 1

## 2023-03-13 ENCOUNTER — RX RENEWAL (OUTPATIENT)
Age: 68
End: 2023-03-13

## 2023-03-30 ENCOUNTER — APPOINTMENT (OUTPATIENT)
Dept: INTERNAL MEDICINE | Facility: CLINIC | Age: 68
End: 2023-03-30
Payer: MEDICARE

## 2023-03-30 VITALS
HEART RATE: 78 BPM | TEMPERATURE: 98 F | DIASTOLIC BLOOD PRESSURE: 66 MMHG | OXYGEN SATURATION: 99 % | WEIGHT: 206 LBS | BODY MASS INDEX: 34.32 KG/M2 | RESPIRATION RATE: 16 BRPM | SYSTOLIC BLOOD PRESSURE: 124 MMHG | HEIGHT: 65 IN

## 2023-03-30 LAB
INR PPP: 1.7 RATIO
POCT-PROTHROMBIN TIME: 20.2 SECS

## 2023-03-30 PROCEDURE — 85610 PROTHROMBIN TIME: CPT | Mod: QW

## 2023-03-30 PROCEDURE — 36416 COLLJ CAPILLARY BLOOD SPEC: CPT

## 2023-03-30 PROCEDURE — 99213 OFFICE O/P EST LOW 20 MIN: CPT | Mod: 25

## 2023-03-30 NOTE — REVIEW OF SYSTEMS
[Abdominal Pain] : abdominal pain [Joint Pain] : joint pain [Insomnia] : insomnia [Anxiety] : anxiety [Negative] : Heme/Lymph [Fever] : no fever [Chills] : no chills [Fatigue] : no fatigue [Recent Change In Weight] : ~T no recent weight change [Nosebleed] : no nosebleeds [Nasal Discharge] : no nasal discharge [Sore Throat] : no sore throat [Postnasal Drip] : no postnasal drip [Chest Pain] : no chest pain [Palpitations] : no palpitations [Shortness Of Breath] : no shortness of breath [Cough] : no cough [Nausea] : no nausea [Constipation] : no constipation [Diarrhea] : diarrhea [Vomiting] : no vomiting [Heartburn] : no heartburn [Melena] : no melena [Dysuria] : no dysuria [Incontinence] : no incontinence [Joint Stiffness] : no joint stiffness [Muscle Pain] : no muscle pain [Back Pain] : no back pain [Itching] : no itching [Mole Changes] : no mole changes [Skin Rash] : no skin rash [Headache] : no headache [Dizziness] : no dizziness [Memory Loss] : no memory loss [FreeTextEntry7] :  left groin/left lower quadrant pain [FreeTextEntry9] : knee pain   [de-identified] : stressed

## 2023-03-30 NOTE — HEALTH RISK ASSESSMENT
[Yes] : Yes [4 or more  times a week (4 pts)] : 4 or more  times a week (4 points) [1 or 2 (0 pts)] : 1 or 2 (0 points) [Never (0 pts)] : Never (0 points) [No] : In the past 12 months have you used drugs other than those required for medical reasons? No [No falls in past year] : Patient reported no falls in the past year [0] : 1) Little interest or pleasure doing things: Not at all (0) [1] : 2) Feeling down, depressed, or hopeless for several days (1) [PHQ-2 Negative - No further assessment needed] : PHQ-2 Negative - No further assessment needed [Former] : Former [de-identified] :   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 Puentes [Audit-CScore] : 4 [de-identified] : walking at work [de-identified] : regular [[id number]] : 1

## 2023-03-30 NOTE — DATA REVIEWED
[FreeTextEntry1] : POCT PT/INR was noted see labs for details.  INR 1.7\par \par  reviewed reference #[id number]

## 2023-03-30 NOTE — PHYSICAL EXAM
[No Acute Distress] : no acute distress [Well Nourished] : well nourished [Well Developed] : well developed [Well-Appearing] : well-appearing [Normal Sclera/Conjunctiva] : normal sclera/conjunctiva [PERRL] : pupils equal round and reactive to light [EOMI] : extraocular movements intact [Normal Outer Ear/Nose] : the outer ears and nose were normal in appearance [Normal Oropharynx] : the oropharynx was normal [Normal TMs] : both tympanic membranes were normal [Normal Nasal Mucosa] : the nasal mucosa was normal [No JVD] : no jugular venous distention [No Lymphadenopathy] : no lymphadenopathy [Supple] : supple [Thyroid Normal, No Nodules] : the thyroid was normal and there were no nodules present [No Respiratory Distress] : no respiratory distress  [No Accessory Muscle Use] : no accessory muscle use [Clear to Auscultation] : lungs were clear to auscultation bilaterally [Normal Rate] : normal rate  [Regular Rhythm] : with a regular rhythm [Normal S1, S2] : normal S1 and S2 [No Carotid Bruits] : no carotid bruits [No Abdominal Bruit] : a ~M bruit was not heard ~T in the abdomen [Pedal Pulses Present] : the pedal pulses are present [No Edema] : there was no peripheral edema [No Palpable Aorta] : no palpable aorta [No Extremity Clubbing/Cyanosis] : no extremity clubbing/cyanosis [Declined Breast Exam] : declined breast exam  [Soft] : abdomen soft [Non-distended] : non-distended [No Masses] : no abdominal mass palpated [No HSM] : no HSM [Normal Bowel Sounds] : normal bowel sounds [Declined Rectal Exam] : declined rectal exam [No CVA Tenderness] : no CVA  tenderness [No Spinal Tenderness] : no spinal tenderness [No Joint Swelling] : no joint swelling [Grossly Normal Strength/Tone] : grossly normal strength/tone [No Rash] : no rash [Coordination Grossly Intact] : coordination grossly intact [No Focal Deficits] : no focal deficits [Normal Gait] : normal gait [Deep Tendon Reflexes (DTR)] : deep tendon reflexes were 2+ and symmetric [Speech Grossly Normal] : speech grossly normal [Normal Affect] : the affect was normal [Alert and Oriented x3] : oriented to person, place, and time [Normal Mood] : the mood was normal [Normal Insight/Judgement] : insight and judgment were intact [de-identified] : with murmur  [de-identified] : Left lower quadrant tenderness to palpation, umbical hernia slightly tenderness noted - nontender to palp of the left lower quadrant, bruising noted on lower extremity secondary to Lovenox injection [FreeTextEntry1] :  as per gynecology [de-identified] :  as per gynecology [de-identified] : Bruising/ecchymosis abdomen secondary to Lovenox injections

## 2023-03-30 NOTE — PLAN
[FreeTextEntry1] : Gastro -status post colonoscopy with Dr. Dael.  \par Left lower quadrant pain.  Check  abdominal sonogram.\par Follow-up with general surgeon/Gastroenterology.  \par Also advised to follow-up with gynecology\par \par General surgeon-umbilical hernia refer patient back to Dr. Fraga  for evaluation and surgical consideration of the umbilical hernia.\par \par Heme -Anticardiolipin Ab- - INR 1.7 advised patient to take 5 mg today and tomorrow.  Than resume warfarin at 2.5 mg daily except for Monday Wednesday and Friday which will take 5 mg.  Repeat INR in 10 days.   Again advised patient if notice any bruising, bleeding gums or bloody noses return to office immediately for an INR check\par \par Cardio - CAD and hyperlipidemia --Continue with current medication. Discussed the importance of stress reduction and weight loss.  \par \par Cardio - Hypertension -  Patient was educated about hypertension and the importance of controlling the pressure through lifestyle modification which include low sodium  diet and  aerobic  exercise.  Having a BMI less than or equal to 25.  Continue losartan 75 mg daily.   DASH diet \par \par Endocrinology  -  Obesity   Patient was educated about the importance of diet and exercise.   We discussed  a goal of a BMI near 25.   Advised weight loss would help the knee and back pain \par \par Orthopedic -- spinal stenosis- DJD lumbar spine -degenerative joint disease bilateral knees status post total knee replacement.  - Recent exacerbation of pain- Continue with   Vicodin  but increase  dispense amount advised to only take for sever pain. \par advised risk of dependancy.   \par Advised not to share the medication  Advised risk of dependancy and abuse \par \par Neurology-insomnia - Continue  Ambien. Advise risk alternatives benefits and side effects of medication.   Advise patient risk of taking sleep medication secondary to have an obstructive sleep apnea.  \par Advised to read pack insert - Advised not to use alcohol  \par Reviewed . \par \par I spent 25   Minutes with the patient, half of which we discussed finding on physical exam  and coordinated care.  As well as reviewed my plans and follow ups. Dragon speech recognition software was used to create portions of this document.  An attempt at proofreading has been made to minimize errors please call if any questions arise. \par \par \par

## 2023-03-30 NOTE — HISTORY OF PRESENT ILLNESS
[FreeTextEntry1] : PT INR check,  renewal of medication.    [de-identified] : Mrs. CARDOZA is a 68 year  female, with a past medical history as noted below,who present to the office  today for  PT/INR check.  \par Alma  states she still  having left lower abdominal pain.  Patient denies nausea vomiting, blood in stool, blood in urine, pain with urination.  Pain seems to be exacerbated by touching the area or from lying to a sitting position.  Denies going for abdominal sonogram that was provided at last visit.  Denies following up with gynecology as discussed at last visit.\par   Patient requesting renewal of hydrocodone   for her chronic knee and lower back pain as well as it seems to be helping her left lower abdominal pain.    Patient denies any adverse effects to the current medication  regimen.  Denies being constipated.  Patient states she takes on a as needed basis for acute pain

## 2023-04-18 ENCOUNTER — APPOINTMENT (OUTPATIENT)
Dept: ULTRASOUND IMAGING | Facility: CLINIC | Age: 68
End: 2023-04-18
Payer: MEDICARE

## 2023-04-18 PROCEDURE — 76856 US EXAM PELVIC COMPLETE: CPT

## 2023-04-18 PROCEDURE — 76700 US EXAM ABDOM COMPLETE: CPT

## 2023-04-19 NOTE — PROGRESS NOTE ADULT - SUBJECTIVE AND OBJECTIVE BOX
JACKSON CARDOZA  [id number]    Pt is a 62y year old Female s/p right TKR. pain is 8/10. (+) Voids, tolerating regular diet. Denies chest pain/shortness of breath/nausea/vomitting.     Vital Signs Last 24 Hrs  T(C): 36.9 (13 Oct 2017 08:07), Max: 36.9 (13 Oct 2017 03:12)  T(F): 98.4 (13 Oct 2017 08:07), Max: 98.4 (13 Oct 2017 03:12)  HR: 74 (13 Oct 2017 08:07) (64 - 74)  BP: 116/78 (13 Oct 2017 08:07) (113/75 - 133/79)  BP(mean): --  RR: 16 (13 Oct 2017 08:07) (16 - 16)  SpO2: 95% (13 Oct 2017 08:07) (93% - 97%)                              10.3   9.3   )-----------( 196      ( 13 Oct 2017 07:44 )             32.2     10-13    140  |  106  |  18  ----------------------------<  89  4.1   |  26  |  0.65    Ca    8.7      13 Oct 2017 07:44          PE:   RLE: Dressing changed, incision clean and dry, no erythema or draininage, XXXXX intact SILT distally, (+2) DP/PT pulses, EHL/FHL/TA intact, Capillary refill < 2 seconds. negative calf tenderness.PAS on,      A: 62y year old Female s/p right TKR POD#2    Plan:   -DVT ppx =  enoxaparin Injectable 100 milliGRAM(s) SubCutaneous every 12 hours    -PT/OT = OOB  -Pain control   -Medicine to follow   -continue to follow Labs  -continue use of PAS  -Dispo: Rehab when cleared by medicine pt/ot JACKSON CARDOZA  [id number]    Pt is a 62y year old Female s/p right TKR. pain is 8/10. (+) Voids, tolerating regular diet. Denies chest pain/shortness of breath/nausea/vomitting.     Vital Signs Last 24 Hrs  T(C): 36.9 (13 Oct 2017 08:07), Max: 36.9 (13 Oct 2017 03:12)  T(F): 98.4 (13 Oct 2017 08:07), Max: 98.4 (13 Oct 2017 03:12)  HR: 74 (13 Oct 2017 08:07) (64 - 74)  BP: 116/78 (13 Oct 2017 08:07) (113/75 - 133/79)  BP(mean): --  RR: 16 (13 Oct 2017 08:07) (16 - 16)  SpO2: 95% (13 Oct 2017 08:07) (93% - 97%)                              10.3   9.3   )-----------( 196      ( 13 Oct 2017 07:44 )             32.2     10-13    140  |  106  |  18  ----------------------------<  89  4.1   |  26  |  0.65    Ca    8.7      13 Oct 2017 07:44    PT- 1.07      PE:   RLE: Dressing changed, incision clean and dry, no erythema or draininage, nylon intact SILT distally, (+2) DP/PT pulses, EHL/FHL/TA intact, Capillary refill < 2 seconds. negative calf tenderness.PAS on,      A: 62y year old Female s/p right TKR POD#2    Plan:   -DVT ppx =  enoxaparin Injectable 100 milliGRAM(s) SubCutaneous every 12 hours  -PT/OT = OOB  -Pain control   -Medicine to follow   -continue to follow Labs  -continue use of PAS  -Dispo: Rehab when cleared by medicine pt/ot left knee OA

## 2023-04-24 ENCOUNTER — RX RENEWAL (OUTPATIENT)
Age: 68
End: 2023-04-24

## 2023-04-24 ENCOUNTER — APPOINTMENT (OUTPATIENT)
Dept: INTERNAL MEDICINE | Facility: CLINIC | Age: 68
End: 2023-04-24
Payer: MEDICARE

## 2023-04-24 VITALS
TEMPERATURE: 97 F | RESPIRATION RATE: 16 BRPM | SYSTOLIC BLOOD PRESSURE: 128 MMHG | HEART RATE: 70 BPM | DIASTOLIC BLOOD PRESSURE: 72 MMHG | OXYGEN SATURATION: 98 % | BODY MASS INDEX: 33.99 KG/M2 | WEIGHT: 204 LBS | HEIGHT: 65 IN

## 2023-04-24 LAB
INR PPP: 1.5 RATIO
POCT-PROTHROMBIN TIME: 18.6 SECS

## 2023-04-24 PROCEDURE — 85610 PROTHROMBIN TIME: CPT | Mod: QW

## 2023-04-24 PROCEDURE — 36416 COLLJ CAPILLARY BLOOD SPEC: CPT

## 2023-04-24 PROCEDURE — 99213 OFFICE O/P EST LOW 20 MIN: CPT | Mod: 25

## 2023-04-24 RX ORDER — CEFPODOXIME PROXETIL 200 MG/1
200 TABLET, FILM COATED ORAL
Refills: 0 | Status: COMPLETED | COMMUNITY
Start: 2022-10-11 | End: 2023-04-24

## 2023-04-24 NOTE — HISTORY OF PRESENT ILLNESS
[FreeTextEntry1] : PT INR check,  renewal of medication for pain, review of recent abd rondao  [de-identified] : Mrs. CARODZA is a 68 year  female, with a past medical history as noted below,who present to the office  today for  PT/INR check.  \par Alma  states she still  having left lower abdominal pain. Pain is persistent. Alma denies nausea vomiting, blood in stool, blood in urine, pain with urination.  Pain seems to be exacerbated by touching the area or from lying to a sitting position.   Denies following up with gynecology as discussed at last visit.\par   Patient requesting renewal of hydrocodone   for her chronic knee and lower back pain as well as it seems to be helping her left lower abdominal pain.    Patient denies any adverse effects to the current medication  regimen.  Denies being constipated.  Patient states she takes on a as needed basis for acute pain.\par   States she follow-up with dermatology secondary to persistent hair loss.  Dermatology believes it is from her stress.

## 2023-04-24 NOTE — REVIEW OF SYSTEMS
[Abdominal Pain] : abdominal pain [Joint Pain] : joint pain [Insomnia] : insomnia [Anxiety] : anxiety [Negative] : Heme/Lymph [Fever] : no fever [Chills] : no chills [Fatigue] : no fatigue [Recent Change In Weight] : ~T no recent weight change [Nosebleed] : no nosebleeds [Nasal Discharge] : no nasal discharge [Sore Throat] : no sore throat [Postnasal Drip] : no postnasal drip [Chest Pain] : no chest pain [Palpitations] : no palpitations [Shortness Of Breath] : no shortness of breath [Cough] : no cough [Nausea] : no nausea [Constipation] : no constipation [Diarrhea] : diarrhea [Vomiting] : no vomiting [Heartburn] : no heartburn [Melena] : no melena [Dysuria] : no dysuria [Incontinence] : no incontinence [Joint Stiffness] : no joint stiffness [Muscle Pain] : no muscle pain [Back Pain] : no back pain [Itching] : no itching [Mole Changes] : no mole changes [Skin Rash] : no skin rash [Headache] : no headache [Dizziness] : no dizziness [Memory Loss] : no memory loss [FreeTextEntry7] :  left groin/left lower quadrant pain [FreeTextEntry9] : knee pain   [de-identified] : stressed

## 2023-04-24 NOTE — PLAN
[FreeTextEntry1] : Gastro -status post colonoscopy with Dr. Deal.  Which was normal \par Left lower quadrant pain.  Reviewed  abdominal sonogram.\par Follow-up with general surgeon/Gastroenterology.  \par Also advised to follow-up with gynecology\par \par General surgeon-umbilical hernia refer patient back to Dr. Fraga  for evaluation and surgical consideration of the umbilical hernia.\par \par Heme -Anticardiolipin Ab- - INR 1.5 advised patient to take 5 mg today and tomorrow.  Than resume warfarin at 2.5 mg daily except for Monday Wednesday and Friday which will take 5 mg.  Repeat INR in 10 days.   Again advised patient if notice any bruising, bleeding gums or bloody noses return to office immediately for an INR check\par \par Cardio - CAD and hyperlipidemia --Continue with current medication. Discussed the importance of stress reduction and weight loss.  \par \par Cardio - Hypertension -  Patient was educated about hypertension and the importance of controlling the pressure through lifestyle modification which include low sodium  diet and  aerobic  exercise.  Having a BMI less than or equal to 25.  Continue losartan 75 mg daily.   DASH diet \par \par Endocrinology  -  Obesity   Patient was educated about the importance of diet and exercise.   We discussed  a goal of a BMI near 25.   Advised weight loss would help the knee and back pain \par \par Orthopedic -- spinal stenosis- DJD lumbar spine -degenerative joint disease bilateral knees status post total knee replacement.  - Recent exacerbation of pain- Continue with   Vicodin  but increase  dispense amount advised to only take for sever pain. \par advised risk of dependancy.   \par Advised not to share the medication  Advised risk of dependancy and abuse \par \par Neurology-insomnia - Continue  Ambien. Advise risk alternatives benefits and side effects of medication.   Advise patient risk of taking sleep medication secondary to have an obstructive sleep apnea.  \par Advised to read pack insert - Advised not to use alcohol  \par Reviewed . \par \par Derm - Hair loss - follow up with Derm - stress reduction advised \par I spent 28   Minutes with the patient, half of which we discussed finding on physical exam  and coordinated care.  As well as reviewed my plans and follow ups. Dragon speech recognition software was used to create portions of this document.  An attempt at proofreading has been made to minimize errors please call if any questions arise. \par \par

## 2023-04-24 NOTE — DATA REVIEWED
[FreeTextEntry1] : POCT PT/INR was noted see labs for details.  INR 1.5\par \par  reviewed  see scan

## 2023-04-24 NOTE — HEALTH RISK ASSESSMENT
[Yes] : Yes [4 or more  times a week (4 pts)] : 4 or more  times a week (4 points) [1 or 2 (0 pts)] : 1 or 2 (0 points) [Never (0 pts)] : Never (0 points) [No] : In the past 12 months have you used drugs other than those required for medical reasons? No [No falls in past year] : Patient reported no falls in the past year [0] : 1) Little interest or pleasure doing things: Not at all (0) [1] : 2) Feeling down, depressed, or hopeless for several days (1) [PHQ-2 Negative - No further assessment needed] : PHQ-2 Negative - No further assessment needed [Former] : Former [de-identified] :   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 Puentes [Audit-CScore] : 4 [de-identified] : walking at work [de-identified] : regular [[id number]] : 1

## 2023-04-24 NOTE — COUNSELING
[Behavioral health counseling provided] : Behavioral health counseling provided [Engage in a relaxing activity] : Engage in a relaxing activity [AUDIT-C Screening administered and reviewed] : AUDIT-C Screening administered and reviewed [Potential consequences of obesity discussed] : Potential consequences of obesity discussed [Encouraged to maintain food diary] : Encouraged to maintain food diary [Encouraged to increase physical activity] : Encouraged to increase physical activity [Good understanding] : Patient has a good understanding of disease, goals and obesity follow-up plan [Weight management counseling provided] : Weight management [Healthy eating counseling provided] : healthy eating [Fall prevention counseling provided] : fall prevention  [Needs reinforcement, provided] : Patient needs reinforcement on understanding of disease, goals and obesity follow-up plan; reinforcement was provided [Low Fat Diet] : Low fat diet [Low Salt Diet] : Low salt diet [Decrease Portions] : Decrease food portions [Patient motivation] : Patient motivation [None] : None [FreeTextEntry2] : 1600 nicole diet  increase exercise  [de-identified] : 1500 nicole diet \par Advised to walk daily for a goal of 10,000 steps \par Advised PT for B/l Knee replacemment

## 2023-04-24 NOTE — PHYSICAL EXAM
[No Acute Distress] : no acute distress [Well Nourished] : well nourished [Well Developed] : well developed [Well-Appearing] : well-appearing [Normal Sclera/Conjunctiva] : normal sclera/conjunctiva [PERRL] : pupils equal round and reactive to light [EOMI] : extraocular movements intact [Normal Outer Ear/Nose] : the outer ears and nose were normal in appearance [Normal Oropharynx] : the oropharynx was normal [Normal TMs] : both tympanic membranes were normal [Normal Nasal Mucosa] : the nasal mucosa was normal [No JVD] : no jugular venous distention [No Lymphadenopathy] : no lymphadenopathy [Supple] : supple [Thyroid Normal, No Nodules] : the thyroid was normal and there were no nodules present [No Respiratory Distress] : no respiratory distress  [No Accessory Muscle Use] : no accessory muscle use [Clear to Auscultation] : lungs were clear to auscultation bilaterally [Normal Rate] : normal rate  [Regular Rhythm] : with a regular rhythm [Normal S1, S2] : normal S1 and S2 [No Carotid Bruits] : no carotid bruits [No Abdominal Bruit] : a ~M bruit was not heard ~T in the abdomen [Pedal Pulses Present] : the pedal pulses are present [No Edema] : there was no peripheral edema [No Palpable Aorta] : no palpable aorta [No Extremity Clubbing/Cyanosis] : no extremity clubbing/cyanosis [Declined Breast Exam] : declined breast exam  [Soft] : abdomen soft [Non-distended] : non-distended [No Masses] : no abdominal mass palpated [No HSM] : no HSM [Normal Bowel Sounds] : normal bowel sounds [Declined Rectal Exam] : declined rectal exam [No CVA Tenderness] : no CVA  tenderness [No Spinal Tenderness] : no spinal tenderness [No Joint Swelling] : no joint swelling [Grossly Normal Strength/Tone] : grossly normal strength/tone [No Rash] : no rash [Coordination Grossly Intact] : coordination grossly intact [No Focal Deficits] : no focal deficits [Normal Gait] : normal gait [Deep Tendon Reflexes (DTR)] : deep tendon reflexes were 2+ and symmetric [Speech Grossly Normal] : speech grossly normal [Normal Affect] : the affect was normal [Alert and Oriented x3] : oriented to person, place, and time [Normal Mood] : the mood was normal [Normal Insight/Judgement] : insight and judgment were intact [de-identified] : with murmur  [de-identified] : Left lower quadrant tenderness to palpation, umbical hernia slightly tenderness noted - nontender to palp of the left lower quadrant, bruising noted on lower extremity secondary to Lovenox injection [FreeTextEntry1] :  as per gynecology [de-identified] :  as per gynecology [de-identified] : Bruising/ecchymosis abdomen secondary to Lovenox injections Was The Medication Purchased By The Clinic?: No

## 2023-05-24 ENCOUNTER — APPOINTMENT (OUTPATIENT)
Dept: INTERNAL MEDICINE | Facility: CLINIC | Age: 68
End: 2023-05-24
Payer: MEDICARE

## 2023-05-24 VITALS
SYSTOLIC BLOOD PRESSURE: 122 MMHG | DIASTOLIC BLOOD PRESSURE: 84 MMHG | WEIGHT: 207 LBS | RESPIRATION RATE: 16 BRPM | HEART RATE: 80 BPM | BODY MASS INDEX: 34.49 KG/M2 | OXYGEN SATURATION: 98 % | HEIGHT: 65 IN | TEMPERATURE: 97.2 F

## 2023-05-24 DIAGNOSIS — L65.9 NONSCARRING HAIR LOSS, UNSPECIFIED: ICD-10-CM

## 2023-05-24 LAB
INR PPP: 3.4 RATIO
POCT-PROTHROMBIN TIME: 42.2 SECS

## 2023-05-24 PROCEDURE — 99214 OFFICE O/P EST MOD 30 MIN: CPT | Mod: 25

## 2023-05-24 PROCEDURE — 85610 PROTHROMBIN TIME: CPT | Mod: QW

## 2023-05-24 PROCEDURE — 36416 COLLJ CAPILLARY BLOOD SPEC: CPT

## 2023-05-24 NOTE — COUNSELING
[Behavioral health counseling provided] : Behavioral health counseling provided [Engage in a relaxing activity] : Engage in a relaxing activity [AUDIT-C Screening administered and reviewed] : AUDIT-C Screening administered and reviewed [Potential consequences of obesity discussed] : Potential consequences of obesity discussed [Encouraged to maintain food diary] : Encouraged to maintain food diary [Encouraged to increase physical activity] : Encouraged to increase physical activity [Good understanding] : Patient has a good understanding of disease, goals and obesity follow-up plan [Weight management counseling provided] : Weight management [Healthy eating counseling provided] : healthy eating [Fall prevention counseling provided] : fall prevention  [Needs reinforcement, provided] : Patient needs reinforcement on understanding of disease, goals and obesity follow-up plan; reinforcement was provided [Low Fat Diet] : Low fat diet [Low Salt Diet] : Low salt diet [Decrease Portions] : Decrease food portions [Patient motivation] : Patient motivation [None] : None [FreeTextEntry2] : 1600 nicole diet  increase exercise  [de-identified] : 1500 nicole diet \par Advised to walk daily for a goal of 10,000 steps \par Advised PT for B/l Knee replacemment

## 2023-05-24 NOTE — HISTORY OF PRESENT ILLNESS
[FreeTextEntry1] : INR check and medication renewal  [de-identified] : Ms. CARDOZA is a 68 year  female, with a past medical history as noted below,who present to the office  today for INR check and medication renewal.  States she Feels well.  Still with chronic abdominal pain and knee pain.  States she has to follow back up with the general surgeon for the hernia repair.  \par   Denies chest pain, shortness of breath, dyspnea on exertion, nausea or vomiting.  Doing well with current medication regimen without any side effects.  Requesting renewal of Ambien as well as the pain medication.\par Patient states since she was transferred to her new position she has been feeling less anxious and stressed.  Still having hair loss.  Taking multivitamin significant relief.\par Denies any abnormal bleeding

## 2023-05-24 NOTE — PHYSICAL EXAM
[No Acute Distress] : no acute distress [Well Nourished] : well nourished [Well Developed] : well developed [Well-Appearing] : well-appearing [Normal Sclera/Conjunctiva] : normal sclera/conjunctiva [PERRL] : pupils equal round and reactive to light [EOMI] : extraocular movements intact [Normal Outer Ear/Nose] : the outer ears and nose were normal in appearance [Normal Oropharynx] : the oropharynx was normal [Normal TMs] : both tympanic membranes were normal [Normal Nasal Mucosa] : the nasal mucosa was normal [No JVD] : no jugular venous distention [No Lymphadenopathy] : no lymphadenopathy [Supple] : supple [Thyroid Normal, No Nodules] : the thyroid was normal and there were no nodules present [No Respiratory Distress] : no respiratory distress  [No Accessory Muscle Use] : no accessory muscle use [Clear to Auscultation] : lungs were clear to auscultation bilaterally [Normal Rate] : normal rate  [Regular Rhythm] : with a regular rhythm [Normal S1, S2] : normal S1 and S2 [No Carotid Bruits] : no carotid bruits [No Abdominal Bruit] : a ~M bruit was not heard ~T in the abdomen [Pedal Pulses Present] : the pedal pulses are present [No Edema] : there was no peripheral edema [No Palpable Aorta] : no palpable aorta [No Extremity Clubbing/Cyanosis] : no extremity clubbing/cyanosis [Declined Breast Exam] : declined breast exam  [Soft] : abdomen soft [Non-distended] : non-distended [No Masses] : no abdominal mass palpated [No HSM] : no HSM [Normal Bowel Sounds] : normal bowel sounds [Declined Rectal Exam] : declined rectal exam [No CVA Tenderness] : no CVA  tenderness [No Spinal Tenderness] : no spinal tenderness [No Joint Swelling] : no joint swelling [Grossly Normal Strength/Tone] : grossly normal strength/tone [No Rash] : no rash [Coordination Grossly Intact] : coordination grossly intact [No Focal Deficits] : no focal deficits [Normal Gait] : normal gait [Deep Tendon Reflexes (DTR)] : deep tendon reflexes were 2+ and symmetric [Speech Grossly Normal] : speech grossly normal [Normal Affect] : the affect was normal [Alert and Oriented x3] : oriented to person, place, and time [Normal Mood] : the mood was normal [Normal Insight/Judgement] : insight and judgment were intact [Normal Posterior Cervical Nodes] : no posterior cervical lymphadenopathy [Normal Anterior Cervical Nodes] : no anterior cervical lymphadenopathy [de-identified] : with murmur  [de-identified] : Left lower quadrant tenderness to palpation, umbical hernia slightly tenderness noted - nontender to palp of the left lower quadrant, bruising noted on lower extremity secondary to Lovenox injection [FreeTextEntry1] :  as per gynecology [de-identified] :  as per gynecology [de-identified] : knee pain  with flexion   decrease ROM w flexion

## 2023-05-24 NOTE — HEALTH RISK ASSESSMENT
[Yes] : Yes [4 or more  times a week (4 pts)] : 4 or more  times a week (4 points) [1 or 2 (0 pts)] : 1 or 2 (0 points) [Never (0 pts)] : Never (0 points) [No] : In the past 12 months have you used drugs other than those required for medical reasons? No [No falls in past year] : Patient reported no falls in the past year [0] : 1) Little interest or pleasure doing things: Not at all (0) [1] : 2) Feeling down, depressed, or hopeless for several days (1) [PHQ-2 Negative - No further assessment needed] : PHQ-2 Negative - No further assessment needed [Former] : Former [de-identified] :   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 Puentes [Audit-CScore] : 4 [de-identified] : walking at work [de-identified] : regular [[id number]] : 1

## 2023-05-24 NOTE — REVIEW OF SYSTEMS
[Abdominal Pain] : abdominal pain [Joint Pain] : joint pain [Insomnia] : insomnia [Anxiety] : anxiety [Negative] : Heme/Lymph [Fever] : no fever [Chills] : no chills [Fatigue] : no fatigue [Recent Change In Weight] : ~T no recent weight change [Nosebleed] : no nosebleeds [Nasal Discharge] : no nasal discharge [Sore Throat] : no sore throat [Postnasal Drip] : no postnasal drip [Chest Pain] : no chest pain [Palpitations] : no palpitations [Shortness Of Breath] : no shortness of breath [Cough] : no cough [Nausea] : no nausea [Constipation] : no constipation [Diarrhea] : diarrhea [Vomiting] : no vomiting [Heartburn] : no heartburn [Melena] : no melena [Dysuria] : no dysuria [Incontinence] : no incontinence [Joint Stiffness] : no joint stiffness [Muscle Pain] : no muscle pain [Back Pain] : no back pain [Itching] : no itching [Mole Changes] : no mole changes [Skin Rash] : no skin rash [Headache] : no headache [Dizziness] : no dizziness [Memory Loss] : no memory loss [FreeTextEntry7] :  left groin/left lower quadrant pain [FreeTextEntry9] : knee pain   [de-identified] : stressed

## 2023-05-24 NOTE — PLAN
[FreeTextEntry1] : Gastro -persisternt LLQ abd pain  - Follow up with GI and general surgeon for evaluation \par General surgeon-umbilical hernia refer patient back to Dr. Fraga  for evaluation and surgical consideration of the umbilical hernia.\par \par Heme -Anticardiolipin Ab- - INR 3.4  advised patient to hold tonight dose and tomorrow dose. Than resume warfarin at 2.5 mg daily except for Monday Wednesday and Friday which will take 5 mg.  Repeat INR in 10 days.   Again advised patient if notice any bruising, bleeding gums or bloody noses return to office immediately for an INR check\par \par Cardio - CAD and hyperlipidemia --Continue with current medication. Discussed the importance of stress reduction and weight loss.  \par Check FLP next ov\par \par Cardio - Hypertension -  Patient was educated about hypertension and the importance of controlling the pressure through lifestyle modification which include low sodium  diet and  aerobic  exercise.  Having a BMI less than or equal to 25.  Continue losartan 75 mg daily.   DASH diet. weight loss \par \par Endocrinology  -  Obesity   Patient was educated about the importance of diet and exercise.   We discussed  a goal of a BMI near 25.   Advised weight loss would help the knee and back pain \par \par Orthopedic -- spinal stenosis- DJD lumbar spine -degenerative joint disease bilateral knees status post total knee replacement.  - Recent exacerbation of pain- Continue with   Vicodin  but increase  dispense amount advised to only take for sever pain. \par advised risk of dependancy.   \par Advised not to share the medication  Advised risk of dependancy and abuse.\par Discussed taper off medication  advised to take one tab po Q 8 than next week go to Q10 hours \par \par Neurology-insomnia - Continue  Ambien. Advise risk alternatives benefits and side effects of medication.   Advise patient risk of taking sleep medication secondary to have an obstructive sleep apnea.  \par Advised to read pack insert - Advised not to use alcohol  \par Reviewed . \par \par Derm - Hair loss - follow up with Derm - stress reduction advised \par advised fasting labs next office visit \par \par I spent 30   Minutes with the patient, half of which we discussed finding on physical exam  and coordinated care.  As well as reviewed my plans and follow ups. Dragon speech recognition software was used to create portions of this document.  An attempt at proofreading has been made to minimize errors please call if any questions arise. \par \par RTO 10 days and return for fasting labs

## 2023-05-24 NOTE — PATIENT PROFILE ADULT. - TOBACCO USE
[FreeTextEntry8] : 29 yo M presenting to establish care with new PCP. Referred by GI given elevated hematocrit on routine labwork completed by GI doctor. Seen initially by GI to determine if any element of IBD present in patient's two episode of abdominal pain and diarrhea. Patient denies any acute complaints today. Denies headaches, chest pain, shortness of breath, nausea/vomiting, diarrhea, poor appetite.  Former smoker

## 2023-06-20 ENCOUNTER — APPOINTMENT (OUTPATIENT)
Dept: INTERNAL MEDICINE | Facility: CLINIC | Age: 68
End: 2023-06-20
Payer: MEDICARE

## 2023-06-20 VITALS
WEIGHT: 209 LBS | HEIGHT: 65 IN | BODY MASS INDEX: 34.82 KG/M2 | TEMPERATURE: 96.6 F | SYSTOLIC BLOOD PRESSURE: 138 MMHG | HEART RATE: 81 BPM | DIASTOLIC BLOOD PRESSURE: 82 MMHG | OXYGEN SATURATION: 98 % | RESPIRATION RATE: 16 BRPM

## 2023-06-20 DIAGNOSIS — R10.9 UNSPECIFIED ABDOMINAL PAIN: ICD-10-CM

## 2023-06-20 DIAGNOSIS — Z87.898 PERSONAL HISTORY OF OTHER SPECIFIED CONDITIONS: ICD-10-CM

## 2023-06-20 PROCEDURE — 36415 COLL VENOUS BLD VENIPUNCTURE: CPT

## 2023-06-20 PROCEDURE — 36416 COLLJ CAPILLARY BLOOD SPEC: CPT

## 2023-06-20 PROCEDURE — 99214 OFFICE O/P EST MOD 30 MIN: CPT | Mod: 25

## 2023-06-20 PROCEDURE — 85610 PROTHROMBIN TIME: CPT | Mod: QW

## 2023-06-21 PROBLEM — Z87.898 HISTORY OF BRUISING EASILY: Status: RESOLVED | Noted: 2023-01-10 | Resolved: 2023-06-21

## 2023-06-21 PROBLEM — R10.9 ABDOMINAL DISCOMFORT: Status: ACTIVE | Noted: 2023-02-07

## 2023-06-21 NOTE — HEALTH RISK ASSESSMENT
[Yes] : Yes [4 or more  times a week (4 pts)] : 4 or more  times a week (4 points) [1 or 2 (0 pts)] : 1 or 2 (0 points) [Never (0 pts)] : Never (0 points) [No] : In the past 12 months have you used drugs other than those required for medical reasons? No [No falls in past year] : Patient reported no falls in the past year [0] : 1) Little interest or pleasure doing things: Not at all (0) [1] : 2) Feeling down, depressed, or hopeless for several days (1) [PHQ-2 Negative - No further assessment needed] : PHQ-2 Negative - No further assessment needed [Former] : Former [de-identified] :   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 Puentes [Audit-CScore] : 4 [de-identified] : walking at work [de-identified] : regular [[id number]] : 1

## 2023-06-21 NOTE — COUNSELING
[Behavioral health counseling provided] : Behavioral health counseling provided [Engage in a relaxing activity] : Engage in a relaxing activity [AUDIT-C Screening administered and reviewed] : AUDIT-C Screening administered and reviewed [Potential consequences of obesity discussed] : Potential consequences of obesity discussed [Encouraged to maintain food diary] : Encouraged to maintain food diary [Encouraged to increase physical activity] : Encouraged to increase physical activity [Good understanding] : Patient has a good understanding of disease, goals and obesity follow-up plan [Weight management counseling provided] : Weight management [Healthy eating counseling provided] : healthy eating [Fall prevention counseling provided] : fall prevention  [Needs reinforcement, provided] : Patient needs reinforcement on understanding of disease, goals and obesity follow-up plan; reinforcement was provided [Low Fat Diet] : Low fat diet [Low Salt Diet] : Low salt diet [Decrease Portions] : Decrease food portions [Patient motivation] : Patient motivation [None] : None [FreeTextEntry2] : 1600 nicole diet  increase exercise  [de-identified] : 1500 nicole diet \par Advised to walk daily for a goal of 10,000 steps \par Advised PT for B/l Knee replacemment

## 2023-06-21 NOTE — REVIEW OF SYSTEMS
[Abdominal Pain] : abdominal pain [Joint Pain] : joint pain [Insomnia] : insomnia [Anxiety] : anxiety [Negative] : Heme/Lymph [Fever] : no fever [Chills] : no chills [Fatigue] : no fatigue [Recent Change In Weight] : ~T no recent weight change [Nosebleed] : no nosebleeds [Nasal Discharge] : no nasal discharge [Sore Throat] : no sore throat [Postnasal Drip] : no postnasal drip [Chest Pain] : no chest pain [Palpitations] : no palpitations [Shortness Of Breath] : no shortness of breath [Cough] : no cough [Nausea] : no nausea [Constipation] : no constipation [Diarrhea] : diarrhea [Vomiting] : no vomiting [Heartburn] : no heartburn [Melena] : no melena [Dysuria] : no dysuria [Incontinence] : no incontinence [Joint Stiffness] : no joint stiffness [Muscle Pain] : no muscle pain [Back Pain] : no back pain [Itching] : no itching [Mole Changes] : no mole changes [Skin Rash] : no skin rash [Headache] : no headache [Dizziness] : no dizziness [Memory Loss] : no memory loss [FreeTextEntry7] :  left groin/left lower quadrant pain [FreeTextEntry9] : knee pain   [de-identified] : stressed

## 2023-06-21 NOTE — HISTORY OF PRESENT ILLNESS
[FreeTextEntry1] : INR check and medication renewal  [de-identified] : Mrs. CARDOZA is a 68 year  female, with a past medical history as noted below,who present to the office  today for INR check and medication renewal.  States she Feels well.  Still with chronic abdominal pain and knee pain. \par DId not follow back up with GI or general surgeon.  BM are normal.   \par   Denies chest pain, shortness of breath, dyspnea on exertion, nausea or vomiting.  Doing well with current medication regimen without any side effects.  Requesting renewal of Ambien as well as the pain medication. States suffers from insomnia takes ambien to aid in her sleep.  Denies early morning sedation.  Still with knee , LBP, and and pain   no changes take meds on prn basis to aid in ADL without pain \par Patient states since she was transferred to her new position she has been feeling less anxious and stressed.

## 2023-06-21 NOTE — PHYSICAL EXAM
[No Acute Distress] : no acute distress [Well Nourished] : well nourished [Well Developed] : well developed [Well-Appearing] : well-appearing [Normal Sclera/Conjunctiva] : normal sclera/conjunctiva [PERRL] : pupils equal round and reactive to light [EOMI] : extraocular movements intact [Normal Outer Ear/Nose] : the outer ears and nose were normal in appearance [Normal Oropharynx] : the oropharynx was normal [Normal TMs] : both tympanic membranes were normal [Normal Nasal Mucosa] : the nasal mucosa was normal [No JVD] : no jugular venous distention [No Lymphadenopathy] : no lymphadenopathy [Supple] : supple [Thyroid Normal, No Nodules] : the thyroid was normal and there were no nodules present [No Respiratory Distress] : no respiratory distress  [No Accessory Muscle Use] : no accessory muscle use [Clear to Auscultation] : lungs were clear to auscultation bilaterally [Normal Rate] : normal rate  [Regular Rhythm] : with a regular rhythm [Normal S1, S2] : normal S1 and S2 [No Carotid Bruits] : no carotid bruits [No Abdominal Bruit] : a ~M bruit was not heard ~T in the abdomen [Pedal Pulses Present] : the pedal pulses are present [No Edema] : there was no peripheral edema [No Palpable Aorta] : no palpable aorta [No Extremity Clubbing/Cyanosis] : no extremity clubbing/cyanosis [Declined Breast Exam] : declined breast exam  [Soft] : abdomen soft [Non-distended] : non-distended [No Masses] : no abdominal mass palpated [No HSM] : no HSM [Normal Bowel Sounds] : normal bowel sounds [Declined Rectal Exam] : declined rectal exam [Normal Posterior Cervical Nodes] : no posterior cervical lymphadenopathy [Normal Anterior Cervical Nodes] : no anterior cervical lymphadenopathy [No CVA Tenderness] : no CVA  tenderness [No Spinal Tenderness] : no spinal tenderness [No Joint Swelling] : no joint swelling [Grossly Normal Strength/Tone] : grossly normal strength/tone [No Rash] : no rash [Coordination Grossly Intact] : coordination grossly intact [No Focal Deficits] : no focal deficits [Normal Gait] : normal gait [Speech Grossly Normal] : speech grossly normal [Deep Tendon Reflexes (DTR)] : deep tendon reflexes were 2+ and symmetric [Normal Affect] : the affect was normal [Alert and Oriented x3] : oriented to person, place, and time [Normal Mood] : the mood was normal [Normal Insight/Judgement] : insight and judgment were intact [de-identified] : with murmur  [de-identified] : Left lower quadrant tenderness to palpation, umbical hernia slightly tenderness noted - nontender to palp of the left lower quadrant, bruising noted on lower extremity secondary to Lovenox injection [FreeTextEntry1] :  as per gynecology [de-identified] :  as per gynecology [de-identified] : knee pain  with flexion   decrease ROM w flexion

## 2023-07-18 ENCOUNTER — RESULT REVIEW (OUTPATIENT)
Age: 68
End: 2023-07-18

## 2023-07-18 ENCOUNTER — APPOINTMENT (OUTPATIENT)
Dept: INTERNAL MEDICINE | Facility: CLINIC | Age: 68
End: 2023-07-18
Payer: MEDICARE

## 2023-07-18 VITALS
RESPIRATION RATE: 16 BRPM | WEIGHT: 208.5 LBS | TEMPERATURE: 96.8 F | DIASTOLIC BLOOD PRESSURE: 68 MMHG | OXYGEN SATURATION: 97 % | HEART RATE: 80 BPM | BODY MASS INDEX: 34.74 KG/M2 | SYSTOLIC BLOOD PRESSURE: 126 MMHG | HEIGHT: 65 IN

## 2023-07-18 DIAGNOSIS — R10.32 LEFT LOWER QUADRANT PAIN: ICD-10-CM

## 2023-07-18 LAB
INR PPP: 1.9 RATIO
POCT-PROTHROMBIN TIME: 23.4 SECS

## 2023-07-18 PROCEDURE — 85610 PROTHROMBIN TIME: CPT | Mod: QW

## 2023-07-18 PROCEDURE — 99214 OFFICE O/P EST MOD 30 MIN: CPT | Mod: 25

## 2023-07-18 PROCEDURE — 36416 COLLJ CAPILLARY BLOOD SPEC: CPT

## 2023-07-18 NOTE — HEALTH RISK ASSESSMENT
[Yes] : Yes [4 or more  times a week (4 pts)] : 4 or more  times a week (4 points) [1 or 2 (0 pts)] : 1 or 2 (0 points) [Never (0 pts)] : Never (0 points) [No] : In the past 12 months have you used drugs other than those required for medical reasons? No [No falls in past year] : Patient reported no falls in the past year [0] : 1) Little interest or pleasure doing things: Not at all (0) [1] : 2) Feeling down, depressed, or hopeless for several days (1) [PHQ-2 Negative - No further assessment needed] : PHQ-2 Negative - No further assessment needed [Former] : Former [de-identified] :   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 Puentes [Audit-CScore] : 4 [de-identified] : walking at work [de-identified] : regular [[id number]] : 1

## 2023-07-18 NOTE — COUNSELING
[Behavioral health counseling provided] : Behavioral health counseling provided [Engage in a relaxing activity] : Engage in a relaxing activity [AUDIT-C Screening administered and reviewed] : AUDIT-C Screening administered and reviewed [Potential consequences of obesity discussed] : Potential consequences of obesity discussed [Encouraged to maintain food diary] : Encouraged to maintain food diary [Encouraged to increase physical activity] : Encouraged to increase physical activity [Good understanding] : Patient has a good understanding of disease, goals and obesity follow-up plan [Weight management counseling provided] : Weight management [Healthy eating counseling provided] : healthy eating [Fall prevention counseling provided] : fall prevention  [Needs reinforcement, provided] : Patient needs reinforcement on understanding of disease, goals and obesity follow-up plan; reinforcement was provided [Low Fat Diet] : Low fat diet [Low Salt Diet] : Low salt diet [Decrease Portions] : Decrease food portions [Patient motivation] : Patient motivation [None] : None [FreeTextEntry2] : 1600 nicole diet  increase exercise  [de-identified] : 1500 nicole diet \par Advised to walk daily for a goal of 10,000 steps \par Advised PT for B/l Knee replacemment

## 2023-07-18 NOTE — REVIEW OF SYSTEMS
[Abdominal Pain] : abdominal pain [Joint Pain] : joint pain [Insomnia] : insomnia [Anxiety] : anxiety [Negative] : Heme/Lymph [Fever] : no fever [Chills] : no chills [Fatigue] : no fatigue [Recent Change In Weight] : ~T no recent weight change [Nosebleed] : no nosebleeds [Nasal Discharge] : no nasal discharge [Sore Throat] : no sore throat [Postnasal Drip] : no postnasal drip [Chest Pain] : no chest pain [Palpitations] : no palpitations [Shortness Of Breath] : no shortness of breath [Cough] : no cough [Nausea] : no nausea [Constipation] : no constipation [Diarrhea] : diarrhea [Vomiting] : no vomiting [Heartburn] : no heartburn [Melena] : no melena [Dysuria] : no dysuria [Incontinence] : no incontinence [Joint Stiffness] : no joint stiffness [Muscle Pain] : no muscle pain [Back Pain] : no back pain [Itching] : no itching [Mole Changes] : no mole changes [Skin Rash] : no skin rash [Headache] : no headache [Dizziness] : no dizziness [Memory Loss] : no memory loss [Easy Bleeding] : no easy bleeding [Easy Bruising] : no easy bruising [Swollen Glands] : no swollen glands [FreeTextEntry7] : Less left groin/left lower quadrant pain [FreeTextEntry9] : knee pain   [de-identified] : stressed

## 2023-07-18 NOTE — HISTORY OF PRESENT ILLNESS
[FreeTextEntry1] : INR check and medication renewal  [de-identified] : Mrs. CARDOZA is a 68 year  female, with a past medical history as noted below,who present to the office  today for INR check and medication renewal.  States she still Having chronic left lower  abdominal pain which seems to be getting worse.  Patient states the pain is constant now.  Radiates to groin and down the leg.  Denies follow back up with general surgeon as discussed in the past denies following back up with gynecology as discussed in the past.   denies any change in bowel habits.  Denies any fever chills or night sweats.\par

## 2023-07-18 NOTE — PHYSICAL EXAM
[No Acute Distress] : no acute distress [Well Nourished] : well nourished [Well Developed] : well developed [Well-Appearing] : well-appearing [Normal Sclera/Conjunctiva] : normal sclera/conjunctiva [PERRL] : pupils equal round and reactive to light [EOMI] : extraocular movements intact [Normal Outer Ear/Nose] : the outer ears and nose were normal in appearance [Normal Oropharynx] : the oropharynx was normal [Normal TMs] : both tympanic membranes were normal [Normal Nasal Mucosa] : the nasal mucosa was normal [No JVD] : no jugular venous distention [No Lymphadenopathy] : no lymphadenopathy [Supple] : supple [Thyroid Normal, No Nodules] : the thyroid was normal and there were no nodules present [No Respiratory Distress] : no respiratory distress  [No Accessory Muscle Use] : no accessory muscle use [Clear to Auscultation] : lungs were clear to auscultation bilaterally [Normal Rate] : normal rate  [Regular Rhythm] : with a regular rhythm [Normal S1, S2] : normal S1 and S2 [No Carotid Bruits] : no carotid bruits [No Abdominal Bruit] : a ~M bruit was not heard ~T in the abdomen [Pedal Pulses Present] : the pedal pulses are present [No Edema] : there was no peripheral edema [No Palpable Aorta] : no palpable aorta [No Extremity Clubbing/Cyanosis] : no extremity clubbing/cyanosis [Declined Breast Exam] : declined breast exam  [Soft] : abdomen soft [Non-distended] : non-distended [No Masses] : no abdominal mass palpated [No HSM] : no HSM [Normal Bowel Sounds] : normal bowel sounds [Declined Rectal Exam] : declined rectal exam [Normal Posterior Cervical Nodes] : no posterior cervical lymphadenopathy [Normal Anterior Cervical Nodes] : no anterior cervical lymphadenopathy [No CVA Tenderness] : no CVA  tenderness [No Spinal Tenderness] : no spinal tenderness [No Joint Swelling] : no joint swelling [Grossly Normal Strength/Tone] : grossly normal strength/tone [No Rash] : no rash [Coordination Grossly Intact] : coordination grossly intact [No Focal Deficits] : no focal deficits [Normal Gait] : normal gait [Deep Tendon Reflexes (DTR)] : deep tendon reflexes were 2+ and symmetric [Speech Grossly Normal] : speech grossly normal [Normal Affect] : the affect was normal [Alert and Oriented x3] : oriented to person, place, and time [Normal Mood] : the mood was normal [Normal Insight/Judgement] : insight and judgment were intact [de-identified] : with murmur  [de-identified] : Left lower quadrant tenderness to palpation, guarding tenderness -  umbical hernia slightly tenderness noted - nontender to palp of the left lower quadrant, bruising noted on lower extremity secondary to Lovenox injection [FreeTextEntry1] :  as per gynecology [de-identified] :  as per gynecology [de-identified] : knee pain  with flexion   decrease ROM w flexion

## 2023-07-18 NOTE — PLAN
[FreeTextEntry1] : Gastro -persistent LLQ abdominal  pain  - Follow up with GI and general surgeon for evaluation \par General surgeon-umbilical hernia refer patient back to Dr. Fraga  for evaluation and surgical consideration of the umbilical hernia.                     \par Check ct scan abd pelvis with contrast \par \par Heme -Anticardiolipin Ab- - INR 1.9   Continue with current dose of  warfarin at 2.5 mg daily except for Monday Wednesday and Friday which will take 5 mg.  Repeat INR in 14 days.   Again advised patient if notice any bruising, bleeding gums or bloody noses return to office immediately for an INR check\par \par Cardio - CAD and hyperlipidemia --Continue with current medication. Discussed the importance of stress reduction and weight loss.  \par \par GYN advised again to see GYN \par Cardio - Hypertension -  Patient was educated about hypertension and the importance of controlling the pressure through lifestyle modification which include low sodium  diet and  aerobic  exercise.  Having a BMI less than or equal to 25.  Continue losartan 75 mg daily.   DASH diet. weight loss \par \par Endocrinology  -  Obesity   Patient was educated about the importance of diet and exercise.   We discussed  a goal of a BMI near 25.   Advised weight loss would help the knee and back pain \par \par Orthopedic -- spinal stenosis- DJD lumbar spine -degenerative joint disease bilateral knees status post total knee replacement.  - Recent exacerbation of pain- Continue with   Vicodin  but increase  dispense amount advised to only take for sever pain. \par advised risk of dependancy.  \par Check x-ray hip  \par Advised not to share the medication  Advised risk of dependancy and abuse.\par Discussed taper off medication  advised to take one tab po Q 8 than next week go to Q10 hours \par \par Neurology-insomnia - Continue  Ambien. Advise risk alternatives benefits and side effects of medication.   Advise patient risk of taking sleep medication secondary to have an obstructive sleep apnea.  \par Advised to read pack insert - Advised not to use alcohol  \par Reviewed . \par \par Derm - Hair loss - follow up with Derm - stress reduction advised \par \par I spent 33  Minutes with the patient, half of which we discussed finding on physical exam  and coordinated care.  As well as reviewed my plans and follow ups. Dragon speech recognition software was used to create portions of this document.  An attempt at proofreading has been made to minimize errors please call if any questions arise. \par \par

## 2023-07-21 ENCOUNTER — RX RENEWAL (OUTPATIENT)
Age: 68
End: 2023-07-21

## 2023-08-11 ENCOUNTER — APPOINTMENT (OUTPATIENT)
Dept: CT IMAGING | Facility: CLINIC | Age: 68
End: 2023-08-11
Payer: MEDICARE

## 2023-08-11 ENCOUNTER — OUTPATIENT (OUTPATIENT)
Dept: OUTPATIENT SERVICES | Facility: HOSPITAL | Age: 68
LOS: 1 days | End: 2023-08-11
Payer: MEDICARE

## 2023-08-11 DIAGNOSIS — Z96.659 PRESENCE OF UNSPECIFIED ARTIFICIAL KNEE JOINT: Chronic | ICD-10-CM

## 2023-08-11 DIAGNOSIS — Z96.651 PRESENCE OF RIGHT ARTIFICIAL KNEE JOINT: Chronic | ICD-10-CM

## 2023-08-11 DIAGNOSIS — R10.32 LEFT LOWER QUADRANT PAIN: ICD-10-CM

## 2023-08-11 DIAGNOSIS — Z98.890 OTHER SPECIFIED POSTPROCEDURAL STATES: Chronic | ICD-10-CM

## 2023-08-11 DIAGNOSIS — Z90.710 ACQUIRED ABSENCE OF BOTH CERVIX AND UTERUS: Chronic | ICD-10-CM

## 2023-08-11 PROCEDURE — 74177 CT ABD & PELVIS W/CONTRAST: CPT

## 2023-08-11 PROCEDURE — 74177 CT ABD & PELVIS W/CONTRAST: CPT | Mod: 26,MH

## 2023-08-21 ENCOUNTER — APPOINTMENT (OUTPATIENT)
Dept: INTERNAL MEDICINE | Facility: CLINIC | Age: 68
End: 2023-08-21
Payer: MEDICARE

## 2023-08-21 VITALS
WEIGHT: 205 LBS | BODY MASS INDEX: 34.16 KG/M2 | HEART RATE: 88 BPM | OXYGEN SATURATION: 98 % | DIASTOLIC BLOOD PRESSURE: 90 MMHG | HEIGHT: 65 IN | TEMPERATURE: 97.9 F | RESPIRATION RATE: 16 BRPM | SYSTOLIC BLOOD PRESSURE: 150 MMHG

## 2023-08-21 VITALS — SYSTOLIC BLOOD PRESSURE: 136 MMHG | DIASTOLIC BLOOD PRESSURE: 82 MMHG

## 2023-08-21 DIAGNOSIS — E78.5 HYPERLIPIDEMIA, UNSPECIFIED: ICD-10-CM

## 2023-08-21 LAB
INR PPP: 2.1 RATIO
POCT-PROTHROMBIN TIME: 25.1 SECS
QUALITY CONTROL: YES

## 2023-08-21 PROCEDURE — 85610 PROTHROMBIN TIME: CPT | Mod: QW

## 2023-08-21 PROCEDURE — 99213 OFFICE O/P EST LOW 20 MIN: CPT

## 2023-08-21 NOTE — HEALTH RISK ASSESSMENT
[Never (0 pts)] : Never (0 points) [No] : In the past 12 months have you used drugs other than those required for medical reasons? No [No falls in past year] : Patient reported no falls in the past year [Little interest or pleasure doing things] : 1) Little interest or pleasure doing things [Feeling down, depressed, or hopeless] : 2) Feeling down, depressed, or hopeless [0] : 2) Feeling down, depressed, or hopeless: Not at all (0) [PHQ-2 Negative - No further assessment needed] : PHQ-2 Negative - No further assessment needed [Former] : Former [> 15 Years] : > 15 Years [[id number]] : 0

## 2023-08-21 NOTE — PLAN
[FreeTextEntry1] : continue medications Stayed the same, repeat 1 month  2.1 today  discused CT results

## 2023-08-21 NOTE — END OF VISIT
[FreeTextEntry3] : "I,Husam Easton, personally scribed the services dictated to me by Dr. Jerome Shepherd MD in this documentation on 08/21/2023 "     "I Dr. Jerome Shepherd MD, personally performed the services described in this documentation on 08/21/2023 for the patient as scribed by Husam Easton in my presence. I have reviewed and verified that all the information is accurate and true."

## 2023-08-21 NOTE — HISTORY OF PRESENT ILLNESS
[FreeTextEntry1] : follow up [de-identified] : JACKSON CARDOZA is a 68 year old F who presents today for follow up for HTN and INR. Pt has a hx of CAD and HLD. Pt recently had a CT scan of her abdomen and pelvis done last week.

## 2023-08-29 NOTE — END OF VISIT
Can you pull up the medicaid diabetes med formulary so we can review options? [Time Spent: ___ minutes] : I have spent [unfilled] minutes of time on the encounter.

## 2023-09-22 ENCOUNTER — APPOINTMENT (OUTPATIENT)
Dept: INTERNAL MEDICINE | Facility: CLINIC | Age: 68
End: 2023-09-22
Payer: MEDICARE

## 2023-09-22 VITALS
BODY MASS INDEX: 34.49 KG/M2 | SYSTOLIC BLOOD PRESSURE: 144 MMHG | OXYGEN SATURATION: 96 % | WEIGHT: 207 LBS | HEART RATE: 89 BPM | RESPIRATION RATE: 14 BRPM | HEIGHT: 65 IN | DIASTOLIC BLOOD PRESSURE: 76 MMHG

## 2023-09-22 VITALS — SYSTOLIC BLOOD PRESSURE: 130 MMHG | DIASTOLIC BLOOD PRESSURE: 82 MMHG

## 2023-09-22 DIAGNOSIS — E66.3 OVERWEIGHT: ICD-10-CM

## 2023-09-22 DIAGNOSIS — Z23 ENCOUNTER FOR IMMUNIZATION: ICD-10-CM

## 2023-09-22 LAB
INR PPP: 3 RATIO
POCT-PROTHROMBIN TIME: 35.7 SECS
QUALITY CONTROL: YES

## 2023-09-22 PROCEDURE — 99214 OFFICE O/P EST MOD 30 MIN: CPT | Mod: 25

## 2023-09-22 PROCEDURE — 85610 PROTHROMBIN TIME: CPT | Mod: QW

## 2023-09-22 PROCEDURE — 90662 IIV NO PRSV INCREASED AG IM: CPT

## 2023-09-22 PROCEDURE — G0008: CPT

## 2023-09-28 NOTE — COUNSELING
Chronic illness [Behavioral health counseling provided] : Behavioral health counseling provided [Engage in a relaxing activity] : Engage in a relaxing activity [AUDIT-C Screening administered and reviewed] : AUDIT-C Screening administered and reviewed [Potential consequences of obesity discussed] : Potential consequences of obesity discussed [Encouraged to maintain food diary] : Encouraged to maintain food diary [Encouraged to increase physical activity] : Encouraged to increase physical activity [Good understanding] : Patient has a good understanding of disease, goals and obesity follow-up plan [Weight management counseling provided] : Weight management [Healthy eating counseling provided] : healthy eating [Fall prevention counseling provided] : fall prevention  [Needs reinforcement, provided] : Patient needs reinforcement on understanding of disease, goals and obesity follow-up plan; reinforcement was provided [Low Fat Diet] : Low fat diet [Low Salt Diet] : Low salt diet [Decrease Portions] : Decrease food portions [Patient motivation] : Patient motivation [None] : None [FreeTextEntry2] : 1600 nicole diet  increase exercise  [de-identified] : 1500 nicole diet \par Advised to walk daily for a goal of 10,000 steps \par Advised PT for B/l Knee replacemment

## 2023-10-05 ENCOUNTER — NON-APPOINTMENT (OUTPATIENT)
Age: 68
End: 2023-10-05

## 2023-10-20 ENCOUNTER — APPOINTMENT (OUTPATIENT)
Dept: INTERNAL MEDICINE | Facility: CLINIC | Age: 68
End: 2023-10-20
Payer: MEDICARE

## 2023-10-20 ENCOUNTER — RX RENEWAL (OUTPATIENT)
Age: 68
End: 2023-10-20

## 2023-10-20 VITALS
OXYGEN SATURATION: 97 % | RESPIRATION RATE: 16 BRPM | WEIGHT: 208 LBS | HEART RATE: 83 BPM | SYSTOLIC BLOOD PRESSURE: 148 MMHG | HEIGHT: 65 IN | BODY MASS INDEX: 34.66 KG/M2 | DIASTOLIC BLOOD PRESSURE: 80 MMHG | TEMPERATURE: 98.2 F

## 2023-10-20 VITALS — SYSTOLIC BLOOD PRESSURE: 136 MMHG | DIASTOLIC BLOOD PRESSURE: 84 MMHG

## 2023-10-20 DIAGNOSIS — E66.9 OBESITY, UNSPECIFIED: ICD-10-CM

## 2023-10-20 LAB
INR PPP: 4.9 RATIO
POCT-PROTHROMBIN TIME: 59 SECS
QUALITY CONTROL: YES

## 2023-10-20 PROCEDURE — 99214 OFFICE O/P EST MOD 30 MIN: CPT | Mod: 25

## 2023-10-20 PROCEDURE — 85610 PROTHROMBIN TIME: CPT | Mod: QW

## 2023-10-31 ENCOUNTER — APPOINTMENT (OUTPATIENT)
Dept: INTERNAL MEDICINE | Facility: CLINIC | Age: 68
End: 2023-10-31
Payer: MEDICARE

## 2023-10-31 LAB
INR PPP: 3.9 RATIO
POCT-PROTHROMBIN TIME: 47 SECS
QUALITY CONTROL: YES

## 2023-10-31 PROCEDURE — 85610 PROTHROMBIN TIME: CPT | Mod: QW

## 2023-10-31 PROCEDURE — 36415 COLL VENOUS BLD VENIPUNCTURE: CPT

## 2023-11-08 ENCOUNTER — APPOINTMENT (OUTPATIENT)
Dept: INTERNAL MEDICINE | Facility: CLINIC | Age: 68
End: 2023-11-08
Payer: MEDICARE

## 2023-11-08 VITALS
OXYGEN SATURATION: 98 % | TEMPERATURE: 98 F | SYSTOLIC BLOOD PRESSURE: 130 MMHG | HEART RATE: 80 BPM | HEIGHT: 65 IN | DIASTOLIC BLOOD PRESSURE: 80 MMHG | RESPIRATION RATE: 14 BRPM

## 2023-11-08 LAB
INR PPP: 1.5 RATIO
POCT-PROTHROMBIN TIME: 18 SECS

## 2023-11-08 PROCEDURE — 99213 OFFICE O/P EST LOW 20 MIN: CPT | Mod: 25

## 2023-11-08 PROCEDURE — 85610 PROTHROMBIN TIME: CPT | Mod: QW

## 2023-12-08 ENCOUNTER — APPOINTMENT (OUTPATIENT)
Dept: INTERNAL MEDICINE | Facility: CLINIC | Age: 68
End: 2023-12-08
Payer: MEDICARE

## 2023-12-08 VITALS
BODY MASS INDEX: 35.16 KG/M2 | HEART RATE: 84 BPM | OXYGEN SATURATION: 98 % | SYSTOLIC BLOOD PRESSURE: 142 MMHG | HEIGHT: 65 IN | DIASTOLIC BLOOD PRESSURE: 88 MMHG | WEIGHT: 211 LBS | TEMPERATURE: 98.1 F | RESPIRATION RATE: 14 BRPM

## 2023-12-08 VITALS — SYSTOLIC BLOOD PRESSURE: 140 MMHG | DIASTOLIC BLOOD PRESSURE: 80 MMHG

## 2023-12-08 LAB
INR PPP: 5.6 RATIO
POCT-PROTHROMBIN TIME: 67.1 SECS
QUALITY CONTROL: YES

## 2023-12-08 PROCEDURE — 85610 PROTHROMBIN TIME: CPT | Mod: QW

## 2023-12-08 PROCEDURE — 99213 OFFICE O/P EST LOW 20 MIN: CPT | Mod: 25

## 2023-12-08 RX ORDER — FLUCONAZOLE 150 MG/1
150 TABLET ORAL
Qty: 1 | Refills: 0 | Status: DISCONTINUED | COMMUNITY
Start: 2022-10-19 | End: 2023-12-08

## 2023-12-09 LAB
INR PPP: 4.81 RATIO
PT BLD: 51.9 SEC

## 2023-12-15 ENCOUNTER — APPOINTMENT (OUTPATIENT)
Dept: INTERNAL MEDICINE | Facility: CLINIC | Age: 68
End: 2023-12-15
Payer: COMMERCIAL

## 2023-12-15 VITALS
HEIGHT: 65 IN | OXYGEN SATURATION: 98 % | DIASTOLIC BLOOD PRESSURE: 76 MMHG | RESPIRATION RATE: 16 BRPM | WEIGHT: 210 LBS | BODY MASS INDEX: 34.99 KG/M2 | SYSTOLIC BLOOD PRESSURE: 132 MMHG | HEART RATE: 74 BPM | TEMPERATURE: 98.3 F

## 2023-12-15 LAB
INR PPP: 1.5 RATIO
POCT-PROTHROMBIN TIME: 17.4 SECS
QUALITY CONTROL: YES

## 2023-12-15 PROCEDURE — 99213 OFFICE O/P EST LOW 20 MIN: CPT | Mod: 25

## 2023-12-15 PROCEDURE — 85610 PROTHROMBIN TIME: CPT | Mod: QW

## 2023-12-15 NOTE — PLAN
[FreeTextEntry1] : 5 mg of Warfarin Monday and Friday then 2.5 mg all other days.  continue medications for HTN and HLD.  Return in 1 week

## 2023-12-15 NOTE — HEALTH RISK ASSESSMENT
[Never (0 pts)] : Never (0 points) [No] : In the past 12 months have you used drugs other than those required for medical reasons? No [No falls in past year] : Patient reported no falls in the past year [0] : 2) Feeling down, depressed, or hopeless: Not at all (0) [PHQ-2 Negative - No further assessment needed] : PHQ-2 Negative - No further assessment needed [Former] : Former [[id number]] : 0

## 2023-12-15 NOTE — END OF VISIT
[FreeTextEntry3] : "I, Kasia Perez, personally scribed the services dictated to me by Dr. Jerome Shepherd MD in this documentation on 12/15/2023 "   "I Dr. Jerome Shepherd MD, personally performed the services described in this documentation on 12/15/2023 for the patient as scribed by Kasia Perez in my presence. I have reviewed and verified that all the information is accurate and true."

## 2023-12-15 NOTE — HISTORY OF PRESENT ILLNESS
[FreeTextEntry1] : follow up  [de-identified] : JACKSON CARDOZA is a 68 year old F who presents today for follow up for HTN and INR. Pt's blood pressure was increased last week and was instructed to increase Losartan to 100 mg.  Pt also has hx of HLD and CAD. INR: 1.5

## 2023-12-22 ENCOUNTER — APPOINTMENT (OUTPATIENT)
Dept: INTERNAL MEDICINE | Facility: CLINIC | Age: 68
End: 2023-12-22
Payer: MEDICARE

## 2023-12-22 LAB
INR PPP: 1.5 RATIO
POCT-PROTHROMBIN TIME: 17.7 SECS
QUALITY CONTROL: YES

## 2023-12-22 PROCEDURE — 85610 PROTHROMBIN TIME: CPT | Mod: QW

## 2023-12-22 PROCEDURE — 99212 OFFICE O/P EST SF 10 MIN: CPT | Mod: 25

## 2023-12-22 NOTE — PHYSICAL EXAM
[No Acute Distress] : no acute distress [Well Nourished] : well nourished [Well Developed] : well developed [Well-Appearing] : well-appearing [Normal Voice/Communication] : normal voice/communication [Normal Sclera/Conjunctiva] : normal sclera/conjunctiva [PERRL] : pupils equal round and reactive to light [EOMI] : extraocular movements intact [Normal Outer Ear/Nose] : the outer ears and nose were normal in appearance [Normal Oropharynx] : the oropharynx was normal [Normal TMs] : both tympanic membranes were normal [No JVD] : no jugular venous distention [No Lymphadenopathy] : no lymphadenopathy [Supple] : supple [Thyroid Normal, No Nodules] : the thyroid was normal and there were no nodules present [No Respiratory Distress] : no respiratory distress  [No Accessory Muscle Use] : no accessory muscle use [Clear to Auscultation] : lungs were clear to auscultation bilaterally [Normal Rate] : normal rate  [Regular Rhythm] : with a regular rhythm [No Murmur] : no murmur heard [Normal S1, S2] : normal S1 and S2 [No Carotid Bruits] : no carotid bruits [No Abdominal Bruit] : a ~M bruit was not heard ~T in the abdomen [No Varicosities] : no varicosities [Pedal Pulses Present] : the pedal pulses are present [No Edema] : there was no peripheral edema [No Palpable Aorta] : no palpable aorta [No Extremity Clubbing/Cyanosis] : no extremity clubbing/cyanosis [Soft] : abdomen soft [Non Tender] : non-tender [Non-distended] : non-distended [No Masses] : no abdominal mass palpated [No HSM] : no HSM [Normal Bowel Sounds] : normal bowel sounds [Normal Supraclavicular Nodes] : no supraclavicular lymphadenopathy [Normal Posterior Cervical Nodes] : no posterior cervical lymphadenopathy [Normal Anterior Cervical Nodes] : no anterior cervical lymphadenopathy [No CVA Tenderness] : no CVA  tenderness [No Spinal Tenderness] : no spinal tenderness [No Joint Swelling] : no joint swelling [Grossly Normal Strength/Tone] : grossly normal strength/tone [No Rash] : no rash [Coordination Grossly Intact] : coordination grossly intact [No Focal Deficits] : no focal deficits [Normal Gait] : normal gait [Deep Tendon Reflexes (DTR)] : deep tendon reflexes were 2+ and symmetric [Speech Grossly Normal] : speech grossly normal [Memory Grossly Normal] : memory grossly normal [Normal Affect] : the affect was normal [Alert and Oriented x3] : oriented to person, place, and time [Normal Mood] : the mood was normal [Normal Insight/Judgement] : insight and judgment were intact

## 2023-12-24 NOTE — HISTORY OF PRESENT ILLNESS
[FreeTextEntry1] : follow up  INR [de-identified] : JACKSON CARDOZA is a 68 year old F who presents today for follow up for INR. Pt also has hx of HTN, HLD and CAD.  INR: 1.5

## 2023-12-24 NOTE — HEALTH RISK ASSESSMENT
[Never (0 pts)] : Never (0 points) [No] : In the past 12 months have you used drugs other than those required for medical reasons? No [No falls in past year] : Patient reported no falls in the past year [0] : 2) Feeling down, depressed, or hopeless: Not at all (0) [PHQ-2 Negative - No further assessment needed] : PHQ-2 Negative - No further assessment needed [Former] : Former [[id number]] : 0

## 2023-12-24 NOTE — PLAN
[FreeTextEntry1] : Monday, Wednesday, Friday, Saturday 5 mg Warfarin  2.5 mg all other days  Return in 1 week Renew Ambien

## 2023-12-24 NOTE — END OF VISIT
[FreeTextEntry3] : "I, Kasia Perez, personally scribed the services dictated to me by Dr. Jerome Shepherd MD in this documentation on 12/22/2023 "   "I Dr. Jerome Shepherd MD, personally performed the services described in this documentation on 12/22/2023 for the patient as scribed by Kasia Perez in my presence. I have reviewed and verified that all the information is accurate and true."

## 2023-12-29 ENCOUNTER — APPOINTMENT (OUTPATIENT)
Dept: INTERNAL MEDICINE | Facility: CLINIC | Age: 68
End: 2023-12-29
Payer: MEDICARE

## 2023-12-29 DIAGNOSIS — M25.562 PAIN IN RIGHT KNEE: ICD-10-CM

## 2023-12-29 DIAGNOSIS — M25.561 PAIN IN RIGHT KNEE: ICD-10-CM

## 2023-12-29 PROCEDURE — 99212 OFFICE O/P EST SF 10 MIN: CPT

## 2023-12-29 NOTE — PLAN
[FreeTextEntry1] : Continue medication for HTN and HLD including Losartan  5 mg Warfarin Monday, Wednesday, Friday and Saturday and 2.5 mg all other days. Return in 1 month to repeat INR

## 2023-12-29 NOTE — END OF VISIT
[FreeTextEntry3] : "I, Kasia Perez, personally scribed the services dictated to me by Dr. Jerome Shepherd MD in this documentation on 12/29/2023 "   "I Dr. Jerome Shepherd MD, personally performed the services described in this documentation on 12/29/2023 for the patient as scribed by Kasia Perez in my presence. I have reviewed and verified that all the information is accurate and true."

## 2023-12-29 NOTE — HEALTH RISK ASSESSMENT
[Never (0 pts)] : Never (0 points) [No] : In the past 12 months have you used drugs other than those required for medical reasons? No [No falls in past year] : Patient reported no falls in the past year [0] : 2) Feeling down, depressed, or hopeless: Not at all (0) [PHQ-2 Negative - No further assessment needed] : PHQ-2 Negative - No further assessment needed [Former] : Former [[id number]] : 0

## 2023-12-29 NOTE — HISTORY OF PRESENT ILLNESS
[FreeTextEntry1] : follow up and INR [de-identified] : JACKSON CARDOZA is a 68-year-old F who presents today for follow up for INR. Pt was put on 5 mg Warfarin Monday, Wednesday, Friday and Saturday and 2.5 mg all other days.  Pt also has hx of HTN, HLD and CAD.  INR: 2.4

## 2024-01-01 NOTE — PHYSICAL THERAPY INITIAL EVALUATION ADULT - ASSISTIVE DEVICE FOR TRANSFER: GAIT, REHAB EVAL
SOCIAL COMMENTS:  4/12: Father/Mother updated in OR  4/13: parents updated at bedside by MD and NNP during rounds  4/14: parents updated at bedside by NNP  4/16: NNP attempted to call Mother- no answer.   4/17: NNP attempted to call Mother- no answer.   4/18: Mother updated at bedside per NNP   4/19: Father updated via phone by PA  4/22: Mom updated via phone, BF window today (SB)  4/24: Mom updated by phone, BF well (SB)  4/26: attempted to call the mother and left brief voicemail regarding no change and infant taking  volumes consistent with gestation (HDO)  4/27: mother updated by phone, discussed emesis events this AM as well as goals for discharge (EL)  4/29: updated the mother at bedside with plan of care and questions answered ( HDO)  5/2: mother updated by phone, dad updated at bedside (EL)  5/4: dad updated at bedside, discussed starting treatment for nail infection (EL)  5/8: Mom updated via phone, finger improved, feeding improved (SB)    SCREENING PLANS:  - CCHD  - Car seat  - Repeat NBS at 28 DOL (ordered)    COMPLETED:  -NBS (4/15) pending  -Hearing screen 4/27 passed    IMMUNIZATIONS:  Immunization History   Administered Date(s) Administered    Hepatitis B, Pediatric/Adolescent 2024      rolling walker

## 2024-01-08 ENCOUNTER — RX RENEWAL (OUTPATIENT)
Age: 69
End: 2024-01-08

## 2024-01-16 ENCOUNTER — RX RENEWAL (OUTPATIENT)
Age: 69
End: 2024-01-16

## 2024-01-25 ENCOUNTER — NON-APPOINTMENT (OUTPATIENT)
Age: 69
End: 2024-01-25

## 2024-01-26 ENCOUNTER — EMERGENCY (EMERGENCY)
Facility: HOSPITAL | Age: 69
LOS: 1 days | Discharge: LEFT BEFORE DISCHARGE | End: 2024-01-26
Admitting: STUDENT IN AN ORGANIZED HEALTH CARE EDUCATION/TRAINING PROGRAM
Payer: MEDICARE

## 2024-01-26 ENCOUNTER — NON-APPOINTMENT (OUTPATIENT)
Age: 69
End: 2024-01-26

## 2024-01-26 ENCOUNTER — APPOINTMENT (OUTPATIENT)
Dept: INTERNAL MEDICINE | Facility: CLINIC | Age: 69
End: 2024-01-26
Payer: MEDICARE

## 2024-01-26 ENCOUNTER — EMERGENCY (EMERGENCY)
Facility: HOSPITAL | Age: 69
LOS: 1 days | Discharge: HOME | End: 2024-01-26
Attending: EMERGENCY MEDICINE | Admitting: EMERGENCY MEDICINE
Payer: MEDICARE

## 2024-01-26 VITALS
HEART RATE: 79 BPM | TEMPERATURE: 98 F | DIASTOLIC BLOOD PRESSURE: 79 MMHG | WEIGHT: 213.85 LBS | RESPIRATION RATE: 16 BRPM | HEIGHT: 65 IN | SYSTOLIC BLOOD PRESSURE: 147 MMHG | OXYGEN SATURATION: 98 %

## 2024-01-26 VITALS
HEART RATE: 71 BPM | WEIGHT: 208 LBS | SYSTOLIC BLOOD PRESSURE: 136 MMHG | BODY MASS INDEX: 34.66 KG/M2 | OXYGEN SATURATION: 99 % | DIASTOLIC BLOOD PRESSURE: 76 MMHG | HEIGHT: 65 IN | RESPIRATION RATE: 14 BRPM

## 2024-01-26 VITALS
WEIGHT: 199.96 LBS | HEIGHT: 65 IN | OXYGEN SATURATION: 97 % | SYSTOLIC BLOOD PRESSURE: 197 MMHG | DIASTOLIC BLOOD PRESSURE: 104 MMHG | RESPIRATION RATE: 20 BRPM | TEMPERATURE: 94 F | HEART RATE: 77 BPM

## 2024-01-26 VITALS
DIASTOLIC BLOOD PRESSURE: 90 MMHG | RESPIRATION RATE: 16 BRPM | HEART RATE: 84 BPM | SYSTOLIC BLOOD PRESSURE: 158 MMHG | OXYGEN SATURATION: 99 %

## 2024-01-26 DIAGNOSIS — Z96.651 PRESENCE OF RIGHT ARTIFICIAL KNEE JOINT: Chronic | ICD-10-CM

## 2024-01-26 DIAGNOSIS — T45.511A POISONING BY ANTICOAGULANTS, ACCIDENTAL (UNINTENTIONAL), INITIAL ENCOUNTER: ICD-10-CM

## 2024-01-26 DIAGNOSIS — Z98.890 OTHER SPECIFIED POSTPROCEDURAL STATES: Chronic | ICD-10-CM

## 2024-01-26 DIAGNOSIS — Z90.710 ACQUIRED ABSENCE OF BOTH CERVIX AND UTERUS: Chronic | ICD-10-CM

## 2024-01-26 DIAGNOSIS — Z96.659 PRESENCE OF UNSPECIFIED ARTIFICIAL KNEE JOINT: Chronic | ICD-10-CM

## 2024-01-26 LAB
ALBUMIN SERPL ELPH-MCNC: 4 G/DL — SIGNIFICANT CHANGE UP (ref 3.3–5)
ALP SERPL-CCNC: 79 U/L — SIGNIFICANT CHANGE UP (ref 30–120)
ALT FLD-CCNC: 32 U/L — SIGNIFICANT CHANGE UP (ref 10–60)
ANION GAP SERPL CALC-SCNC: 13 MMOL/L — SIGNIFICANT CHANGE UP (ref 5–17)
APTT BLD: 67.4 SEC — HIGH (ref 24.5–35.6)
AST SERPL-CCNC: 29 U/L — SIGNIFICANT CHANGE UP (ref 10–40)
BASOPHILS # BLD AUTO: 0.04 K/UL — SIGNIFICANT CHANGE UP (ref 0–0.2)
BASOPHILS NFR BLD AUTO: 0.4 % — SIGNIFICANT CHANGE UP (ref 0–2)
BILIRUB SERPL-MCNC: 0.6 MG/DL — SIGNIFICANT CHANGE UP (ref 0.2–1.2)
BUN SERPL-MCNC: 21 MG/DL — SIGNIFICANT CHANGE UP (ref 7–23)
CALCIUM SERPL-MCNC: 9.6 MG/DL — SIGNIFICANT CHANGE UP (ref 8.4–10.5)
CHLORIDE SERPL-SCNC: 99 MMOL/L — SIGNIFICANT CHANGE UP (ref 96–108)
CO2 SERPL-SCNC: 28 MMOL/L — SIGNIFICANT CHANGE UP (ref 22–31)
CREAT SERPL-MCNC: 0.72 MG/DL — SIGNIFICANT CHANGE UP (ref 0.5–1.3)
EGFR: 90 ML/MIN/1.73M2 — SIGNIFICANT CHANGE UP
EOSINOPHIL # BLD AUTO: 0.12 K/UL — SIGNIFICANT CHANGE UP (ref 0–0.5)
EOSINOPHIL NFR BLD AUTO: 1.3 % — SIGNIFICANT CHANGE UP (ref 0–6)
GLUCOSE SERPL-MCNC: 128 MG/DL — HIGH (ref 70–99)
HCT VFR BLD CALC: 37.1 % — SIGNIFICANT CHANGE UP (ref 34.5–45)
HGB BLD-MCNC: 12.4 G/DL — SIGNIFICANT CHANGE UP (ref 11.5–15.5)
IMM GRANULOCYTES NFR BLD AUTO: 0.3 % — SIGNIFICANT CHANGE UP (ref 0–0.9)
INR BLD: 7 RATIO — CRITICAL HIGH (ref 0.85–1.18)
INR PPP: 7.9 RATIO
LYMPHOCYTES # BLD AUTO: 2.45 K/UL — SIGNIFICANT CHANGE UP (ref 1–3.3)
LYMPHOCYTES # BLD AUTO: 27.5 % — SIGNIFICANT CHANGE UP (ref 13–44)
MCHC RBC-ENTMCNC: 32 PG — SIGNIFICANT CHANGE UP (ref 27–34)
MCHC RBC-ENTMCNC: 33.4 GM/DL — SIGNIFICANT CHANGE UP (ref 32–36)
MCV RBC AUTO: 95.9 FL — SIGNIFICANT CHANGE UP (ref 80–100)
MONOCYTES # BLD AUTO: 0.66 K/UL — SIGNIFICANT CHANGE UP (ref 0–0.9)
MONOCYTES NFR BLD AUTO: 7.4 % — SIGNIFICANT CHANGE UP (ref 2–14)
NEUTROPHILS # BLD AUTO: 5.62 K/UL — SIGNIFICANT CHANGE UP (ref 1.8–7.4)
NEUTROPHILS NFR BLD AUTO: 63.1 % — SIGNIFICANT CHANGE UP (ref 43–77)
NRBC # BLD: 0 /100 WBCS — SIGNIFICANT CHANGE UP (ref 0–0)
PLATELET # BLD AUTO: 206 K/UL — SIGNIFICANT CHANGE UP (ref 150–400)
POCT-PROTHROMBIN TIME: 95.7 SECS
POTASSIUM SERPL-MCNC: 3.7 MMOL/L — SIGNIFICANT CHANGE UP (ref 3.5–5.3)
POTASSIUM SERPL-SCNC: 3.7 MMOL/L — SIGNIFICANT CHANGE UP (ref 3.5–5.3)
PROT SERPL-MCNC: 8.2 G/DL — SIGNIFICANT CHANGE UP (ref 6–8.3)
PROTHROM AB SERPL-ACNC: 74.1 SEC — HIGH (ref 9.5–13)
QUALITY CONTROL: YES
RBC # BLD: 3.87 M/UL — SIGNIFICANT CHANGE UP (ref 3.8–5.2)
RBC # FLD: 12.6 % — SIGNIFICANT CHANGE UP (ref 10.3–14.5)
SODIUM SERPL-SCNC: 140 MMOL/L — SIGNIFICANT CHANGE UP (ref 135–145)
WBC # BLD: 8.92 K/UL — SIGNIFICANT CHANGE UP (ref 3.8–10.5)
WBC # FLD AUTO: 8.92 K/UL — SIGNIFICANT CHANGE UP (ref 3.8–10.5)

## 2024-01-26 PROCEDURE — 85730 THROMBOPLASTIN TIME PARTIAL: CPT

## 2024-01-26 PROCEDURE — L9991: CPT

## 2024-01-26 PROCEDURE — 80053 COMPREHEN METABOLIC PANEL: CPT

## 2024-01-26 PROCEDURE — 99284 EMERGENCY DEPT VISIT MOD MDM: CPT | Mod: FS

## 2024-01-26 PROCEDURE — 36415 COLL VENOUS BLD VENIPUNCTURE: CPT

## 2024-01-26 PROCEDURE — 99283 EMERGENCY DEPT VISIT LOW MDM: CPT

## 2024-01-26 PROCEDURE — G2211 COMPLEX E/M VISIT ADD ON: CPT

## 2024-01-26 PROCEDURE — 85610 PROTHROMBIN TIME: CPT

## 2024-01-26 PROCEDURE — 85610 PROTHROMBIN TIME: CPT | Mod: QW

## 2024-01-26 PROCEDURE — 85025 COMPLETE CBC W/AUTO DIFF WBC: CPT

## 2024-01-26 PROCEDURE — 99212 OFFICE O/P EST SF 10 MIN: CPT

## 2024-01-26 RX ORDER — EZETIMIBE 10 MG/1
1 TABLET ORAL
Refills: 0 | DISCHARGE

## 2024-01-26 RX ORDER — PHYTONADIONE 5 MG/1
5 TABLET ORAL DAILY
Qty: 3 | Refills: 0 | Status: ACTIVE | COMMUNITY
Start: 2024-01-26 | End: 1900-01-01

## 2024-01-26 RX ORDER — PHYTONADIONE (VIT K1) 5 MG
2.5 TABLET ORAL ONCE
Refills: 0 | Status: COMPLETED | OUTPATIENT
Start: 2024-01-26 | End: 2024-01-26

## 2024-01-26 RX ORDER — ROSUVASTATIN CALCIUM 5 MG/1
1 TABLET ORAL
Refills: 0 | DISCHARGE

## 2024-01-26 RX ORDER — WARFARIN SODIUM 2.5 MG/1
1 TABLET ORAL
Refills: 0 | DISCHARGE

## 2024-01-26 RX ORDER — ZOLPIDEM TARTRATE 10 MG/1
1 TABLET ORAL
Qty: 0 | Refills: 0 | DISCHARGE

## 2024-01-26 RX ORDER — LOSARTAN POTASSIUM 100 MG/1
1.5 TABLET, FILM COATED ORAL
Qty: 0 | Refills: 0 | DISCHARGE

## 2024-01-26 RX ORDER — ROSUVASTATIN CALCIUM 5 MG/1
1 TABLET ORAL
Qty: 0 | Refills: 0 | DISCHARGE

## 2024-01-26 RX ADMIN — Medication 2.5 MILLIGRAM(S): at 16:53

## 2024-01-26 NOTE — ED PROVIDER NOTE - OBJECTIVE STATEMENT
69-year-old female with history of arthritis, obesity, chronic pain, hyperlipidemia, and history of PE and DVT (2014, takes Coumadin daily) presents with elevated INR.  Patient had routine blood work today that she gets monthly.  Was called by her primary care doctor and told INR was 7.9.  Told to come to emergency room to have rechecked.  Patient denies any bleeding.  Denies any pain.  Reports mild lightheadedness earlier today  PCP Jerome Shepherd

## 2024-01-26 NOTE — ED ADULT NURSE NOTE - NSFALLUNIVINTERV_ED_ALL_ED
Bed/Stretcher in lowest position, wheels locked, appropriate side rails in place/Call bell, personal items and telephone in reach/Instruct patient to call for assistance before getting out of bed/chair/stretcher/Non-slip footwear applied when patient is off stretcher/[place] to call system/Physically safe environment - no spills, clutter or unnecessary equipment/Purposeful proactive rounding/Room/bathroom lighting operational, light cord in reach

## 2024-01-26 NOTE — ED PROVIDER NOTE - NSICDXFAMILYHX_GEN_ALL_CORE_FT
182.88 FAMILY HISTORY:  Father  Still living? No  Family history of colon cancer, Age at diagnosis: Age Unknown

## 2024-01-26 NOTE — ED ADULT TRIAGE NOTE - CHIEF COMPLAINT QUOTE
I went to Dr. Shepherd for a routine visit (monthly visit for my INR) and he told me to come here since it is 7.9.  patient states it has never been this high before.  no bleeding.  I do feel slightly lightheaded, but otherwise fine.

## 2024-01-26 NOTE — ED ADULT NURSE NOTE - NSICDXPASTMEDICALHX_GEN_ALL_CORE_FT
Pt placed on cpap at this time   PAST MEDICAL HISTORY:  BMI 37.0-37.9, adult     Chronic pain     DVT (deep venous thrombosis), bilateral     History of DVT (deep vein thrombosis) bilateral, 2015 related to HRT    History of pulmonary embolism 2015 related to HRT    Hyperlipidemia     Insomnia     Obesity (BMI 30-39.9)     Osteoarthritis of left knee     PE (pulmonary embolism)

## 2024-01-26 NOTE — ED ADULT NURSE NOTE - EXPLANATION OF PATIENT'S REASON FOR LEAVING
Dr. Shepherd said I would be in / out.  your waiting room is packed.  I am NOT waiting.  he should have told me you were busy.

## 2024-01-26 NOTE — ED ADULT NURSE NOTE - DISCHARGE DATE/TIME
[Home] : at home, [unfilled] , at the time of the visit. [Medical Office: ([facility])___] : at the medical office located in  [Spouse] : spouse [Family Member] : family member [Verbal consent obtained from patient] : the patient, [unfilled] [FreeTextEntry1] : Follow-up telehealth visit, daughter and  present\par \par COVID day 9\par \par Patient's  and daughter are concerned that she has been confused at times. She has also herself noted shaking of her hands as well as repetitive movements of her mouth. these were there before, but worsenednPatient is currently being treated by psychiatrist, she is on Depakote, Wellbutrin, remeron,  protriptyline, as well as Xanax.\par \par She is afebrile. She denies shortness of breath. Her oxygen saturation has been running 92 to 94% on room air.\par \par On video, obese woman. Repetitive movements of the mouth, hand tremors. But she is alert oriented x3 conversive and fully appropriate.\par \par Her symptoms are much more consistent with some form of toxicity from her psychiatric medications and COVID. Her daughter though notes that the symptoms definitely have increased since COVID.\par \par Patient and family need to reach out to her psychiatrist to discuss this further.\par \par And, she is going to come in for an in person visit in my office this afternoon for further evaluation and lab work\par \par  26-Jan-2024 17:32

## 2024-01-26 NOTE — HEALTH RISK ASSESSMENT
[Never (0 pts)] : Never (0 points) [No] : In the past 12 months have you used drugs other than those required for medical reasons? No [Little interest or pleasure doing things] : 1) Little interest or pleasure doing things [No falls in past year] : Patient reported no falls in the past year [Feeling down, depressed, or hopeless] : 2) Feeling down, depressed, or hopeless [0] : 2) Feeling down, depressed, or hopeless: Not at all (0) [PHQ-2 Negative - No further assessment needed] : PHQ-2 Negative - No further assessment needed [Former] : Former [[id number]] : 0

## 2024-01-26 NOTE — ED PROVIDER NOTE - DIFFERENTIAL DIAGNOSIS
Differentials include but not limited to electrolyte abnormality, subtherapeutic INR Differential Diagnosis Differentials include but not limited to electrolyte abnormality, subtherapeutic INR, Warfarin coagulopathy

## 2024-01-26 NOTE — PHYSICAL EXAM
[No Acute Distress] : no acute distress [Well Nourished] : well nourished [Well Developed] : well developed [Well-Appearing] : well-appearing [Normal Voice/Communication] : normal voice/communication [PERRL] : pupils equal round and reactive to light [Normal Sclera/Conjunctiva] : normal sclera/conjunctiva [Normal Outer Ear/Nose] : the outer ears and nose were normal in appearance [EOMI] : extraocular movements intact [Normal TMs] : both tympanic membranes were normal [Normal Oropharynx] : the oropharynx was normal [No Lymphadenopathy] : no lymphadenopathy [No JVD] : no jugular venous distention [Supple] : supple [Thyroid Normal, No Nodules] : the thyroid was normal and there were no nodules present [No Accessory Muscle Use] : no accessory muscle use [No Respiratory Distress] : no respiratory distress  [Normal Rate] : normal rate  [Clear to Auscultation] : lungs were clear to auscultation bilaterally [Normal S1, S2] : normal S1 and S2 [Regular Rhythm] : with a regular rhythm [No Murmur] : no murmur heard [No Carotid Bruits] : no carotid bruits [No Varicosities] : no varicosities [No Abdominal Bruit] : a ~M bruit was not heard ~T in the abdomen [Pedal Pulses Present] : the pedal pulses are present [No Edema] : there was no peripheral edema [No Extremity Clubbing/Cyanosis] : no extremity clubbing/cyanosis [No Palpable Aorta] : no palpable aorta [Soft] : abdomen soft [Non Tender] : non-tender [Non-distended] : non-distended [No Masses] : no abdominal mass palpated [Normal Bowel Sounds] : normal bowel sounds [No HSM] : no HSM [Normal Supraclavicular Nodes] : no supraclavicular lymphadenopathy [Normal Anterior Cervical Nodes] : no anterior cervical lymphadenopathy [Normal Posterior Cervical Nodes] : no posterior cervical lymphadenopathy [No Spinal Tenderness] : no spinal tenderness [No CVA Tenderness] : no CVA  tenderness [No Joint Swelling] : no joint swelling [Grossly Normal Strength/Tone] : grossly normal strength/tone [No Rash] : no rash [Coordination Grossly Intact] : coordination grossly intact [No Focal Deficits] : no focal deficits [Normal Gait] : normal gait [Deep Tendon Reflexes (DTR)] : deep tendon reflexes were 2+ and symmetric [Speech Grossly Normal] : speech grossly normal [Memory Grossly Normal] : memory grossly normal [Normal Affect] : the affect was normal [Alert and Oriented x3] : oriented to person, place, and time [Normal Mood] : the mood was normal [Normal Insight/Judgement] : insight and judgment were intact

## 2024-01-26 NOTE — PLAN
[FreeTextEntry1] : continue medications  Pt refuses to go to hospital in emergency crisis Return in 1 month  sent to ER for repeat

## 2024-01-26 NOTE — ED PROVIDER NOTE - PROGRESS NOTE DETAILS
Patient stable.  No pain or complaints.  INR 7.  No bleeding.  2.5 mg of oral vitamin K given in emergency room and recommend to hold Coumadin for 2 days.  States she will call her primary doctor tomorrow to discuss repeat labs and Coumadin dosing.

## 2024-01-26 NOTE — HISTORY OF PRESENT ILLNESS
[FreeTextEntry1] : follow up  [de-identified] :  JACKSON CARDOZA is a 69 year old F who presents today for follow up for INR. Pt has a hx of HTN, HLD and CAD. INR: 7.9 Pt refuses to go to the hospital.

## 2024-01-26 NOTE — ED ADULT NURSE NOTE - OBJECTIVE STATEMENT
pt sent in by pmd inr 7.0 at md office. pt denies any bleeding with teeth brushing or urinating or from rectum. pt aaox3 skin warm and dry no resp distress lungs clear and equal b/l ascultation abd soft non tender + bs

## 2024-01-26 NOTE — ED PROVIDER NOTE - NSFOLLOWUPINSTRUCTIONS_ED_ALL_ED_FT
You were given 2.5 mg of oral vitamin K in emergency room  Hold Coumadin today and tomorrow  Call primary care doctor tomorrow to arrange repeat INR testing and Coumadin dosing      Warfarin Coagulopathy  Warfarin coagulopathy refers to bleeding that may occur as a complication of the medicine warfarin. This can be life-threatening. Warfarin is a blood thinner (anticoagulant). Anticoagulants prevent dangerous blood clots. Bleeding is the most common and most serious complication of warfarin.    While taking warfarin, you will need to have blood tests (prothrombin tests, or PT tests) regularly to measure your blood clotting time. The PT test results will be reported as the international normalized ratio (INR). The INR tells your health care provider whether your dosage of warfarin needs to be changed. The longer it takes your blood to clot, the higher the INR. Your risk of warfarin coagulopathy increases as your INR increases.    What are the causes?  This condition may be caused by:  Taking too much warfarin (overdose).  Underlying medical conditions.  Dietary changes.  Interactions with medicines, supplements, or alcohol.  What are the signs or symptoms?  Warfarin coagulopathy may cause bleeding from anywhere. Symptoms may include:  Bleeding from the gums, or a nosebleed that is not easily stopped.  Blood in your stool or urine. This may look like bright red, dark, or black, tarry stool, or pink, red, or brown urine.  Unusual bruising or bruising easily, or a cut that does not stop bleeding within 10 minutes.  Coughing up or vomiting blood.  Feeling nauseous for longer than 1 day.  Broken blood vessels in the eye.  Abdominal or back pain with or without bruising.  Unusual vaginal bleeding.  Swelling or pain at an injection site.  Skin scarring due to tissue death of fatty tissue. This may cause pain in the waist, thighs, or buttocks. This is more common among women.  Symptoms may also include stroke signs, such as:  Sudden, severe headache.  Sudden weakness or numbness of the face, arm, or leg, especially on one side of the body.  Sudden confusion.  Difficulty speaking or understanding speech.  Sudden trouble seeing out of one or both eyes.  Unexpected difficulty walking.  Dizziness.  Loss of balance or coordination.  How is this diagnosed?  This condition is diagnosed after your health care provider places you on warfarin and then tests your blood's ability to clot. Prothrombin time (PT) clotting tests are used to monitor your clotting ability.    How is this treated?  If you have bleeding, you may be treated with vitamin K. Vitamin K helps the blood to clot.    You may also receive donated plasma. Plasma is the liquid part of blood. It contains substances that help the blood clot. If you have life-threatening bleeding, you may be given other medicines through an IV.    Follow these instructions at home:  Medicines    Take warfarin exactly as told by your health care provider, at the same time every day. Doing this helps you avoid bleeding or blood clots that could result in serious injury, pain, or disability.  Contact your health care provider if a dose is forgotten or missed. Do not change or take additional doses to make up for missed or accidental extra doses.  Many prescription, over-the-counter, and pain medicines, as well as vitamins, herbs, and supplements can interfere with warfarin. Talk with your health care provider or pharmacist before starting or stopping any new medicines. Your warfarin dosage may need to be adjusted.  Eating and drinking    It is important to maintain a normal, balanced diet while taking warfarin. Avoid major changes in your diet. If you are planning to change your diet, talk with your health care provider before making changes. Your health care provider may recommend that you work with a dietitian.  Vitamin K makes warfarin less effective. It is found in many foods. Eat a consistent amount of foods that contain vitamin K. For example, you may decide to eat 2 vitamin K-containing foods each day. Eating the same amount each day enables your health care provider to set the correct dose of warfarin.  Tests    A person having a blood sample taken from the arm.  Make sure to have PT tests at least once every 4–6 weeks while you are taking warfarin.  Ask your health care provider what your target INR range is. Make sure you always know your target range. If your INR is not in your target range, your health care provider may adjust your dosage.  Preventing bleeding and injury    Some common over-the-counter medicines and supplements may increase the risk of bleeding while taking warfarin, including:  Aspirin.  NSAIDs, such as ibuprofen or naproxen.  Vitamin E.  Fish oils.  Avoid situations that cause bleeding by:  Using a softer toothbrush.  Flossing with waxed floss, not unwaxed floss.  Shaving with an electric razor, not with a blade.  Limiting your use of sharp objects.  Avoiding potentially harmful activities, such as contact sports.  General instructions    Wear a medical alert bracelet or carry a card that lists what medicines you take.  Make sure that all health care providers, including your dentist, know that you are taking warfarin.  If you plan to breastfeed or become pregnant while taking warfarin, talk with your health care provider.  Avoid alcohol, drugs, and products that contain nicotine and tobacco.  If you change the amount of nicotine, tobacco, or alcohol you use, tell your health care provider.  Keep all follow-up visits, including visits for lab tests. This is important.  Contact a health care provider if:  You miss a dose.  You take an extra dose.  You plan to have any kind of surgery or procedure. You may have to stop taking warfarin before your surgery.  You are unable to take your medicine due to nausea, vomiting, or diarrhea.  You have any major changes in your diet, or you plan to make major changes in your diet.  You start or stop any over-the-counter medicine, prescription medicine, or dietary supplement.  You become pregnant, plan to become pregnant, or think you may be pregnant.  You have menstrual periods that are heavier than usual, or unusual vaginal bleeding.  You have unusual bruising.  You lose your appetite.  You have a fever.  You have diarrhea that lasts for more than 24 hours.  Get help right away if:  You develop symptoms of an allergic reaction, such as:  Swelling of the lips, face, tongue, mouth, or throat.  Rash.  Itching.  Itchy, red, swollen areas of skin (hives).  Trouble breathing.  Chest tightness.  You have any symptoms of a stroke. "BE FAST" is an easy way to remember the main warning signs of a stroke:  B - Balance. Signs are dizziness, sudden trouble walking, or loss of balance.  E - Eyes. Signs are trouble seeing or a sudden change in vision.  F - Face. Signs are sudden weakness or numbness of the face, or the face or eyelid drooping on one side.  A - Arms. Signs are weakness or numbness in an arm. This happens suddenly and usually on one side of the body.  S - Speech. Signs are sudden trouble speaking, slurred speech, or trouble understanding what people say.  T - Time. Time to call emergency services. Write down what time symptoms started.  You have other signs of stroke, such as:  A sudden, severe headache with no known cause.  Nausea or vomiting.  Seizure.  You have signs or symptoms of a blood clot, such as:  Pain or swelling in your leg or arm.  Skin that is red or warm to the touch on your arm or leg.  Shortness of breath or difficulty breathing.  Chest pain.  Unexplained fever.  You have:  A fall or an accident, especially if you hit your head.  Blood in your urine. Your urine may look reddish, pinkish, or tea-colored.  Blood in your stool. Your stool may be black or bright red.  Bleeding that does not stop after applying pressure to the area for 30 minutes.  Severe pain in your joints or back.  Purple or blue toes.  Skin ulcers that do not go away.  You vomit blood or cough up blood. The blood may be bright red, or it may look like coffee grounds.  These symptoms may represent a serious problem that is an emergency. Do not wait to see if the symptoms will go away. Get medical help right away. Call your local emergency services (911 in the [place] Do not drive yourself to the hospital.    Summary  Warfarin needs to be closely monitored with blood tests. It is very important to keep all lab visits and follow-up visits with your health care provider. Make sure you know your target INR range and your warfarin dosage.  Monitor how much vitamin K you eat every day. Try to eat the same amount every day.  Wear or carry identification that says you are taking warfarin.  Take warfarin at the same time every day. Call your health care provider if you miss a dose or if you take an extra dose. Do not change the dosage of warfarin on your own.  Know the signs and symptoms of blood clots, bleeding, and stroke. Know when to get emergency medical help.  This information is not intended to replace advice given to you by your health care provider. Make sure you discuss any questions you have with your health care provider.

## 2024-01-26 NOTE — ED PROVIDER NOTE - PATIENT PORTAL LINK FT
You can access the FollowMyHealth Patient Portal offered by [facility] by registering at the following website: [email or web address]. By joining [facility] [facility]’s FollowMyHealth portal, you will also be able to view your health information using other applications (apps) compatible with our system.

## 2024-01-26 NOTE — ED ADULT NURSE REASSESSMENT NOTE - NS ED NURSE REASSESS COMMENT FT1
1400:  patient and spouse very upset that we are busy.  "your waiting room is packed.  please tell me that these people are not all ahead of me!  Dr. Shepherd told me that I would come and be seen / treated IMMEDIATELY!!  I am not waiting.  Paulino.  why did he not tell me you were busy??".  I tried to explain that we are an ER, that we can be slow one minute and then completely busy the next, that it is not something that can be determined ahead of time, especially during flu / covid season.  the spouse stated, "well, were not waiting.  we're leaving.  well go tomorrow to his office and be rechecked".  I tried to encourage patient to stay, stating "yes we are busy, but you should not be waiting too long" but they refused and left w/o being seen.  elif mistry.

## 2024-01-26 NOTE — ED ADULT TRIAGE NOTE - CHIEF COMPLAINT QUOTE
" Im on Coumadin  ( hx  DVT PE ) , I had my blood checked  this morning and my doctor called me that my INR 7.9 "

## 2024-01-26 NOTE — ED ADULT NURSE REASSESSMENT NOTE - NS ED NURSE REASSESS COMMENT FT1
pt re evaluated by md and to be d'c/d  pt discharged stable and ambulatory in nad at present d/c instruction reinforced and pt verbalized understanding vital signs as charted  pt advised to stop coumadin today and tomorrow and follow up pmd

## 2024-01-26 NOTE — END OF VISIT
[FreeTextEntry3] : "I, Husam Easton, personally scribed the services dictated to me by Dr. Jerome Shepherd MD in this documentation on 01/26/2024"   "I Dr. Jerome Shepherd MD, personally performed the services described in this documentation on 01/26/2024 for the patient as scribed by Husam Easton in my presence. I have reviewed and verified that all the information is accurate and true."

## 2024-01-26 NOTE — ED PROVIDER NOTE - CLINICAL SUMMARY MEDICAL DECISION MAKING FREE TEXT BOX
69-year-old female with history of arthritis, obesity, chronic pain, hyperlipidemia, and history of PE and DVT (2014, takes Coumadin daily) presents with elevated INR.  Patient had routine blood work today that she gets monthly.  Was called by her primary care doctor and told INR was 7.9.  Told to come to emergency room to have rechecked.  Patient denies any bleeding.  Denies any pain.  Reports mild lightheadedness earlier today  PCP Jerome Shepherd    VSS Afebrile, NAD  HEENT - clear  PERRL EOMI  Neck supple  lungs clear  Cor S1S2 RR - MGR  Abd soft nontender, no mass or HSM, no rebound  Ext FROM intact, no edema  Neuro Intact, no deficits.  Skin Warm and dry no rash.  Imp- Abnormal coags, no active bleeding likely coumadin induced. Plan - Repeat INR now. May need Vit K. Will need repeat INR in 2-3 days and hold coumadin until down to therapeutic range.

## 2024-01-26 NOTE — ED PROVIDER NOTE - CARE PROVIDER_API CALL
Jerome Shepherd  Internal Medicine  [address] [place] [id number]  Phone: [phone]  Fax: [phone]  Follow Up Time: 1-3 Days

## 2024-01-27 LAB
INR PPP: 1.81 RATIO
PT BLD: 20.1 SEC

## 2024-02-02 ENCOUNTER — APPOINTMENT (OUTPATIENT)
Dept: INTERNAL MEDICINE | Facility: CLINIC | Age: 69
End: 2024-02-02
Payer: MEDICARE

## 2024-02-02 LAB
INR PPP: 2.3 RATIO
POCT-PROTHROMBIN TIME: 27.9 SECS
QUALITY CONTROL: YES

## 2024-02-02 PROCEDURE — 85610 PROTHROMBIN TIME: CPT | Mod: QW

## 2024-02-02 NOTE — HISTORY OF PRESENT ILLNESS
[FreeTextEntry1] : Follow up [de-identified] : JACKSON SONY is a 69 year old F who presents today for INR

## 2024-02-23 ENCOUNTER — APPOINTMENT (OUTPATIENT)
Dept: INTERNAL MEDICINE | Facility: CLINIC | Age: 69
End: 2024-02-23
Payer: MEDICARE

## 2024-02-23 VITALS
RESPIRATION RATE: 16 BRPM | TEMPERATURE: 98.3 F | WEIGHT: 208 LBS | HEART RATE: 86 BPM | OXYGEN SATURATION: 98 % | SYSTOLIC BLOOD PRESSURE: 122 MMHG | BODY MASS INDEX: 34.66 KG/M2 | HEIGHT: 65 IN | DIASTOLIC BLOOD PRESSURE: 84 MMHG

## 2024-02-23 DIAGNOSIS — G47.00 INSOMNIA, UNSPECIFIED: ICD-10-CM

## 2024-02-23 LAB
INR PPP: 3.3 RATIO
POCT-PROTHROMBIN TIME: 43 SECS
QUALITY CONTROL: YES

## 2024-02-23 PROCEDURE — 99214 OFFICE O/P EST MOD 30 MIN: CPT | Mod: 25

## 2024-02-23 PROCEDURE — 85610 PROTHROMBIN TIME: CPT | Mod: QW

## 2024-02-23 NOTE — PLAN
[FreeTextEntry1] : Skip tonight, start 3 mg every night then 1.5 mg on Friday.  Repeat in 2 weeks.  INR: 3.3 Continue medications

## 2024-02-23 NOTE — HISTORY OF PRESENT ILLNESS
[FreeTextEntry1] : follow up  [de-identified] : JACKSON CARDOZA is a 69 year old F who presents today for INR.  INR: 3.3

## 2024-02-23 NOTE — PHYSICAL EXAM
[No Acute Distress] : no acute distress [Well Nourished] : well nourished [Well Developed] : well developed [Well-Appearing] : well-appearing [PERRL] : pupils equal round and reactive to light [Normal Sclera/Conjunctiva] : normal sclera/conjunctiva [EOMI] : extraocular movements intact [Normal Outer Ear/Nose] : the outer ears and nose were normal in appearance [Normal Oropharynx] : the oropharynx was normal [No JVD] : no jugular venous distention [No Lymphadenopathy] : no lymphadenopathy [Thyroid Normal, No Nodules] : the thyroid was normal and there were no nodules present [Supple] : supple [No Respiratory Distress] : no respiratory distress  [No Accessory Muscle Use] : no accessory muscle use [Normal Rate] : normal rate  [Clear to Auscultation] : lungs were clear to auscultation bilaterally [Normal S1, S2] : normal S1 and S2 [Regular Rhythm] : with a regular rhythm [No Murmur] : no murmur heard [No Carotid Bruits] : no carotid bruits [No Abdominal Bruit] : a ~M bruit was not heard ~T in the abdomen [Pedal Pulses Present] : the pedal pulses are present [No Varicosities] : no varicosities [No Edema] : there was no peripheral edema [No Palpable Aorta] : no palpable aorta [No Extremity Clubbing/Cyanosis] : no extremity clubbing/cyanosis [Soft] : abdomen soft [Non Tender] : non-tender [Non-distended] : non-distended [No HSM] : no HSM [No Masses] : no abdominal mass palpated [Normal Bowel Sounds] : normal bowel sounds [Normal Supraclavicular Nodes] : no supraclavicular lymphadenopathy [Normal Posterior Cervical Nodes] : no posterior cervical lymphadenopathy [No CVA Tenderness] : no CVA  tenderness [Normal Anterior Cervical Nodes] : no anterior cervical lymphadenopathy [No Spinal Tenderness] : no spinal tenderness [No Joint Swelling] : no joint swelling [Grossly Normal Strength/Tone] : grossly normal strength/tone [No Rash] : no rash [Coordination Grossly Intact] : coordination grossly intact [No Focal Deficits] : no focal deficits [Normal Gait] : normal gait [Deep Tendon Reflexes (DTR)] : deep tendon reflexes were 2+ and symmetric [Speech Grossly Normal] : speech grossly normal [Memory Grossly Normal] : memory grossly normal [Normal Affect] : the affect was normal [Normal Mood] : the mood was normal [Alert and Oriented x3] : oriented to person, place, and time [Normal Insight/Judgement] : insight and judgment were intact

## 2024-02-23 NOTE — END OF VISIT
[FreeTextEntry3] : "I, Kasia Perez, personally scribed the services dictated to me by Dr. Jerome Shepherd MD in this documentation on 02/23/2024 "   "I Dr. Jerome Shepherd MD, personally performed the services described in this documentation on 02/23/2024 for the patient as scribed by Kasia Perez in my presence. I have reviewed and verified that all the information is accurate and true."

## 2024-03-15 RX ORDER — AMOXICILLIN 500 MG/1
500 CAPSULE ORAL
Qty: 20 | Refills: 0 | Status: ACTIVE | COMMUNITY
Start: 2021-10-12 | End: 1900-01-01

## 2024-03-20 NOTE — PLAN
2-3x/week
[FreeTextEntry1] : Heme -Anticardiolipin Ab-  INR  2.4    - Continue with   Warfarin 2.5 mg daily except Mondays and Wednesday 5 mg.    check INR 14 days .  -  Monitor for abnormal bleeding.\par \par Cardio - CAD and hyperlipidemia --Continue with current medication. Discussed the importance of stress reduction and weight loss \par \par Cardio - Hypertension -  Patient was educated about hypertension and the importance of controlling the pressure through lifestyle modification which include low sodium  diet and  aerobic  exercise.  Also discussed the use of prescription medication which included their benefits and their side effects. We discussed the use of ASA 81 mg daily.   Having a BMI less than or equal to 25.  Continue losartan 75 mg daily \par \par  Endocrinology  -  Obesity   Patient was educated about the importance of diet and exercise.   We discussed  a goal of a BMI near 25.   \par \par Orthopedic -- spinal stenosis- DJD lumbar spine -degenerative joint disease bilateral knees status post total knee replacement.  Doing better with pain - Continue with   Vicodin  but decrease dispense amount advised to only take for sever pain. \par advised risk of dependancy.   Continue hydrocodone - reviewed \par Advised not to share the medication  Advised risk of dependancy and abuse \par \par Neurology-insomnia - Continue  Ambien. Advise risk alternatives benefits and side effects of medication.   Advise patient risk of taking sleep medication secondary to have an obstructive sleep apnea.  \par Advised to read pack insert - Advised not to use alcohol  \par Reviewed   refilled medication \par \par Patient  education  - COVID-19   Counseling and education provided to the patient.  Advised sign and symptoms of the virus.  Advised contact precautions.  Educated patient on proper hand washing and to participate in social distancing. Had covid vax x 2 -and booster \par \par I spent 25 Minutes with the patient, half of which we discussed finding on physical exam  and coordinated care.  As well as reviewed my plans and follow ups.

## 2024-03-22 ENCOUNTER — APPOINTMENT (OUTPATIENT)
Dept: INTERNAL MEDICINE | Facility: CLINIC | Age: 69
End: 2024-03-22
Payer: MEDICARE

## 2024-03-22 VITALS
HEIGHT: 65 IN | OXYGEN SATURATION: 98 % | RESPIRATION RATE: 14 BRPM | DIASTOLIC BLOOD PRESSURE: 80 MMHG | HEART RATE: 80 BPM | TEMPERATURE: 98 F | SYSTOLIC BLOOD PRESSURE: 130 MMHG

## 2024-03-22 DIAGNOSIS — L98.9 DISORDER OF THE SKIN AND SUBCUTANEOUS TISSUE, UNSPECIFIED: ICD-10-CM

## 2024-03-22 LAB
INR PPP: 2.6 RATIO
POCT-PROTHROMBIN TIME: 31.4 SECS
QUALITY CONTROL: YES

## 2024-03-22 PROCEDURE — 99213 OFFICE O/P EST LOW 20 MIN: CPT

## 2024-03-22 PROCEDURE — G2211 COMPLEX E/M VISIT ADD ON: CPT

## 2024-03-22 PROCEDURE — 85610 PROTHROMBIN TIME: CPT | Mod: QW

## 2024-03-22 RX ORDER — WARFARIN 3 MG/1
3 TABLET ORAL
Qty: 90 | Refills: 1 | Status: ACTIVE | COMMUNITY
Start: 2022-10-11 | End: 1900-01-01

## 2024-03-22 RX ORDER — EZETIMIBE 10 MG/1
10 TABLET ORAL
Qty: 90 | Refills: 1 | Status: ACTIVE | COMMUNITY
Start: 2019-07-08 | End: 1900-01-01

## 2024-03-22 NOTE — PLAN
No [FreeTextEntry1] : Continue medications Stayed the same, repeat 1 month Advised to lose weight  return 1 month

## 2024-03-22 NOTE — PHYSICAL EXAM
[No Acute Distress] : no acute distress [Well Nourished] : well nourished [Well Developed] : well developed [Well-Appearing] : well-appearing [Normal Sclera/Conjunctiva] : normal sclera/conjunctiva [PERRL] : pupils equal round and reactive to light [Normal Outer Ear/Nose] : the outer ears and nose were normal in appearance [EOMI] : extraocular movements intact [Normal Oropharynx] : the oropharynx was normal [No JVD] : no jugular venous distention [No Lymphadenopathy] : no lymphadenopathy [Supple] : supple [Thyroid Normal, No Nodules] : the thyroid was normal and there were no nodules present [No Respiratory Distress] : no respiratory distress  [No Accessory Muscle Use] : no accessory muscle use [Clear to Auscultation] : lungs were clear to auscultation bilaterally [Normal Rate] : normal rate  [Normal S1, S2] : normal S1 and S2 [Regular Rhythm] : with a regular rhythm [No Murmur] : no murmur heard [No Carotid Bruits] : no carotid bruits [No Abdominal Bruit] : a ~M bruit was not heard ~T in the abdomen [Pedal Pulses Present] : the pedal pulses are present [No Varicosities] : no varicosities [No Edema] : there was no peripheral edema [No Palpable Aorta] : no palpable aorta [No Extremity Clubbing/Cyanosis] : no extremity clubbing/cyanosis [Soft] : abdomen soft [Non Tender] : non-tender [Non-distended] : non-distended [No HSM] : no HSM [No Masses] : no abdominal mass palpated [Normal Bowel Sounds] : normal bowel sounds [Normal Posterior Cervical Nodes] : no posterior cervical lymphadenopathy [Normal Anterior Cervical Nodes] : no anterior cervical lymphadenopathy [No CVA Tenderness] : no CVA  tenderness [No Spinal Tenderness] : no spinal tenderness [No Joint Swelling] : no joint swelling [Grossly Normal Strength/Tone] : grossly normal strength/tone [No Rash] : no rash [Coordination Grossly Intact] : coordination grossly intact [Normal Gait] : normal gait [No Focal Deficits] : no focal deficits [Deep Tendon Reflexes (DTR)] : deep tendon reflexes were 2+ and symmetric [Speech Grossly Normal] : speech grossly normal [Memory Grossly Normal] : memory grossly normal [Normal Affect] : the affect was normal [Alert and Oriented x3] : oriented to person, place, and time [Normal Mood] : the mood was normal [Normal Insight/Judgement] : insight and judgment were intact [de-identified] : skin lesion neck

## 2024-03-22 NOTE — HEALTH RISK ASSESSMENT
[Yes] : Yes [4 or more  times a week (4 pts)] : 4 or more  times a week (4 points) [1 or 2 (0 pts)] : 1 or 2 (0 points) [Never (0 pts)] : Never (0 points) [No falls in past year] : Patient reported no falls in the past year [No] : In the past 12 months have you used drugs other than those required for medical reasons? No [Little interest or pleasure doing things] : 1) Little interest or pleasure doing things [Feeling down, depressed, or hopeless] : 2) Feeling down, depressed, or hopeless [0] : 2) Feeling down, depressed, or hopeless: Not at all (0) [PHQ-2 Negative - No further assessment needed] : PHQ-2 Negative - No further assessment needed [Former] : Former [> 15 Years] : > 15 Years [[id number]] : 0

## 2024-03-22 NOTE — HISTORY OF PRESENT ILLNESS
[FreeTextEntry1] : follow up [de-identified] : JACKSON CARDOZA is a 69 year old F who presents today for follow up INR on Warfarin today INR 2.4 feels well has skin lesion neck needs Medication renewals

## 2024-03-27 NOTE — PHYSICAL THERAPY INITIAL EVALUATION ADULT - ADDITIONAL COMMENTS
18 year old comes in with a CC Headache, fever, chills, rash, body aches. Pt states that felt sick for the last 4 days. Pt was seen yesterday at [facility] and was tested for flu, covid, and strep. Pt is A&Ox4  
lives with family has 4 naldo and 13 steps in house

## 2024-04-08 NOTE — REVIEW OF SYSTEMS
No [Recent Change In Weight] : ~T recent weight change [Joint Pain] : joint pain [Joint Stiffness] : joint stiffness [Insomnia] : insomnia [Negative] : Heme/Lymph [FreeTextEntry9] : knee pain

## 2024-04-23 ENCOUNTER — APPOINTMENT (OUTPATIENT)
Dept: INTERNAL MEDICINE | Facility: CLINIC | Age: 69
End: 2024-04-23
Payer: MEDICARE

## 2024-04-23 VITALS
HEIGHT: 65 IN | HEART RATE: 76 BPM | WEIGHT: 208 LBS | BODY MASS INDEX: 34.66 KG/M2 | SYSTOLIC BLOOD PRESSURE: 122 MMHG | RESPIRATION RATE: 16 BRPM | DIASTOLIC BLOOD PRESSURE: 78 MMHG | TEMPERATURE: 98.2 F | OXYGEN SATURATION: 98 %

## 2024-04-23 DIAGNOSIS — M19.90 UNSPECIFIED OSTEOARTHRITIS, UNSPECIFIED SITE: ICD-10-CM

## 2024-04-23 DIAGNOSIS — I10 ESSENTIAL (PRIMARY) HYPERTENSION: ICD-10-CM

## 2024-04-23 LAB
INR PPP: 1.8 RATIO
POCT-PROTHROMBIN TIME: 21.2 SECS
QUALITY CONTROL: YES

## 2024-04-23 PROCEDURE — G2211 COMPLEX E/M VISIT ADD ON: CPT

## 2024-04-23 PROCEDURE — 85610 PROTHROMBIN TIME: CPT | Mod: QW

## 2024-04-23 PROCEDURE — 99213 OFFICE O/P EST LOW 20 MIN: CPT

## 2024-04-25 NOTE — HISTORY OF PRESENT ILLNESS
[FreeTextEntry1] : follow up [de-identified] : JACKSON SONY is a 69 year old F who presents today for INR

## 2024-05-16 ENCOUNTER — APPOINTMENT (OUTPATIENT)
Dept: ORTHOPEDIC SURGERY | Facility: CLINIC | Age: 69
End: 2024-05-16
Payer: MEDICARE

## 2024-05-16 VITALS
BODY MASS INDEX: 34.66 KG/M2 | HEIGHT: 65 IN | WEIGHT: 208 LBS | SYSTOLIC BLOOD PRESSURE: 140 MMHG | DIASTOLIC BLOOD PRESSURE: 72 MMHG | HEART RATE: 76 BPM

## 2024-05-16 DIAGNOSIS — M16.12 UNILATERAL PRIMARY OSTEOARTHRITIS, LEFT HIP: ICD-10-CM

## 2024-05-16 DIAGNOSIS — M25.552 PAIN IN LEFT HIP: ICD-10-CM

## 2024-05-16 PROCEDURE — 99204 OFFICE O/P NEW MOD 45 MIN: CPT

## 2024-05-16 PROCEDURE — 73502 X-RAY EXAM HIP UNI 2-3 VIEWS: CPT

## 2024-05-16 NOTE — HISTORY OF PRESENT ILLNESS
[de-identified] : JACKSON CARDOZA  is an 69 year-old female presents today with a chief complaint of left hip pain. Patient describes onset over the last number of years, but it may be going on for longer.  Patient points to the hip, specifically in the groin aspect as maximum point of tenderness. Pain is typically 10/10 in severity, dull and achy but sometimes sharp in quality with certain activities. Symptoms are exacerbated by activity, or even walking/standing. They are improved with rest and modifications of daily routines. Patient denies any radiation of symptoms beyond the surrounding area. Back and knee symptoms appear to be largely unrelated.   To address the pathology, they have tried OTC medications/NSAIDs with some relief, even though short lasting.  Exercise therapy actually makes things worse but may have allowed greater than 50 % range of motion. Putting on shoes and other similar activities of daily living are difficult. Things have gotten bad enough that patient will need assistive devices like the banister for going up and down stairs. They may need a cane.   At this point the patient is quite frustrated by their condition. As duration of symptoms exceeds years, they are here for further evaluation and discussion of possible diagnoses and management. They deny any further trauma. No fever or chills, no signs of infection. Today, patient does not state any other associated signs or complains outside of those described.   A complete review of symptoms as well as past medical/surgical history, medications, allergies, social and family history, and other details of HPI and exam were reviewed per first visit intake form and updated accordingly. Additional and more relevant details are noted in further detail today.

## 2024-05-16 NOTE — PHYSICAL EXAM
[de-identified] : General Appearance / Station: Well developed, well nourished, in no acute distress Orientation: Oriented to person, place, and time Gait & Station: Ambulates without assistive device Neurologic: Normal leg sensation Cardiovascular: Warm extremity Lymphatics: No lymphedema Generalized Ligament Laxity: Normal Stiffness: Normal.  LUMBAR SPINE Nontender at lumbar spine Straight leg raise: Negative Motor: 5/5 motor L2-S1 Sensation Intact. No paresthesias L2-S1  SYMPTOMATIC LEFT HIP Range of motion: PAINFUL internal and external rotation of the hip. Strength: Within Normal Limits. Negative Trendelenburg sign                                                                      FADIR: POSITIVE Stinchfield: POSITIVE, with groin pain Palpation: TENDER at greater trochanter, Nontender SI joint                    LEFT KNEE Alignment: Varus Skin: Normal.  Effusion: None Quadriceps: Normal Range of motion: Normal PF crepitus: 1+ PF apprehension: None Patella / Patella Tendon: None Palpation: Nontender Meniscus signs: Negative  ASYMPTOMATIC RIGHT HIP Range of motion: Painless internal and external rotation of the hip. Skin: Normal Strength: Within normal limits JOYCE: Negative FADIR: Negative Stinchfield: Negative Palpation: Nontender at greater trochanter, Nontender at SI joint [de-identified] : Imaging: AP Pelvis and lateral views of the left hip show left hip severe osteoarthritis with loss of joint space, subchondral sclerosis, marginal osteophyte formations, and subchondral cyst. There are no signs of fracture. The soft tissues appear unremarkable 5

## 2024-05-16 NOTE — DISCUSSION/SUMMARY
[de-identified] : I discussed nonoperative treatment options for their left hip arthritis. We discussed nonoperative treatments options including activity modification, therapy, bracing, nonsteroidal anti-inflammatory medications, and injections. The patient would like to continue with nonoperative treatment and would like to proceed with activity modification, steroid injection  We discussed that an intra-articular steroid injection may help with pain but would not be a long-term solution as they has hip arthritis.  We also discussed that there is a very rare possibility of rapid progressive osteoarthritis after the hip injection which may worsen their symptoms.  I also explained that due to the increased risk of infection, we do not perform hip replacements within 3 months of an intra-articular injection to the joint.  Patient understands the risks and is willing to proceed with the injection.  Our office will work to schedule the injection at their earliest convenience and they will follow-up in about 3 months.   I discussed with them that I often prescribe an anti-inflammatory that should be taken once a day with meals to decrease pain and expedite symptom relief. They should not take this while also taking Aleve (Naprosyn), Motrin/ Advil (Ibuprofen), Toradol (ketoralac). They must stop taking it if they develops stomach pain, increased bleeding or bruising and they should follow-up with their primary care doctor for routine blood work including kidney function to monitor its effect. While it Is not a habit-forming substance, it should only  be taken as needed and to discontinue use once symptoms have resolved.  They cannot take this medication   We discussed that when nonoperative treatment is no longer successful and their pain is severe enough that it is affecting their ability to perform activities of daily living, a joint replacement may help them.   My cumulative time spent on this patients visit included: Preparation for the visit, review of the medical records, review of pertinent diagnostic studies, examination and counseling of the patient on the above diagnosis, treatment plan and prognosis, orders of diagnostic tests, medications and/or appropriate procedures and documentation in the medical records of todays visit.

## 2024-05-21 ENCOUNTER — APPOINTMENT (OUTPATIENT)
Dept: INTERNAL MEDICINE | Facility: CLINIC | Age: 69
End: 2024-05-21
Payer: MEDICARE

## 2024-05-21 DIAGNOSIS — D68.59 OTHER PRIMARY THROMBOPHILIA: ICD-10-CM

## 2024-05-21 LAB
INR PPP: 3.5 RATIO
POCT-PROTHROMBIN TIME: 42.5 SECS
QUALITY CONTROL: YES

## 2024-05-21 PROCEDURE — 85610 PROTHROMBIN TIME: CPT | Mod: QW

## 2024-05-21 NOTE — PLAN
[FreeTextEntry1] : INR 3.5 hold tonight then Warfarin 1.5 mg Wed Sat  3 mg all other nights return 1 week

## 2024-05-21 NOTE — HISTORY OF PRESENT ILLNESS
PT DAILY TREATMENT NOTE 10-18    Patient Name: Echo Li  Date:2018  : 1977  [x]  Patient  Verified  Payor: Ibis Lopez / Plan: Greg Olvera / Product Type: HMO /    In time:902  Out time:943  Total Treatment Time (min): 41  Visit #: 5 of     Medicare/BCBS Only   Total Timed Codes (min):  41 1:1 Treatment Time:  34       Treatment Area: Pain in left ankle [M25.572]    SUBJECTIVE  Pain Level (0-10 scale): 0  Any medication changes, allergies to medications, adverse drug reactions, diagnosis change, or new procedure performed?: [x] No    [] Yes (see summary sheet for update)  Subjective functional status/changes:   [] No changes reported  \"No pain. \"    OBJECTIVE    Modality rationale: patient declined   Min Type Additional Details    [] Estim:  []Unatt       []IFC  []Premod                        []Other:  []w/ice   []w/heat  Position:  Location:    [] Estim: []Att    []TENS instruct  []NMES                    []Other:  []w/US   []w/ice   []w/heat  Position:  Location:    []  Traction: [] Cervical       []Lumbar                       [] Prone          []Supine                       []Intermittent   []Continuous Lbs:  [] before manual  [] after manual    []  Ultrasound: []Continuous   [] Pulsed                           []1MHz   []3MHz W/cm2:  Location:    []  Iontophoresis with dexamethasone         Location: [] Take home patch   [] In clinic    []  Ice     []  heat  []  Ice massage  []  Laser   []  Anodyne Position:  Location:    []  Laser with stim  []  Other:  Position:  Location:    []  Vasopneumatic Device Pressure:       [] lo [] med [] hi   Temperature: [] lo [] med [] hi   [] Skin assessment post-treatment:  []intact []redness- no adverse reaction    []redness - adverse reaction:     10 min Therapeutic Exercise:  [x] See flow sheet :   Rationale: increase ROM and increase strength to improve the patients ability to perform ADLs     21 min Neuromuscular Re-education:  [x]  See flow [FreeTextEntry1] : Follow up [de-identified] : JACKSON CARDOZA is a 69 year old F who presents today for INR INR 3.5 sheet : ankle/foot stabilization activities   Rationale: increase ROM, increase strength, improve coordination, improve balance and increase proprioception  to improve the patients ability to improve mobility, stance stability, and gait    10 min Manual Therapy:  PROM to great toe with gentle mobs, distal tib/fib posterior and superior, posterior TC mobs for DF. PROM of left ankle to improve DF and PF. Rationale: decrease pain, increase ROM, increase tissue extensibility, decrease trigger points and increase postural awareness to improve mobility and gait        With   [] TE   [] TA   [] neuro   [] other: Patient Education: [x] Review HEP    [] Progressed/Changed HEP based on:   [] positioning   [] body mechanics   [] transfers   [] heat/ice application    [] other:      Other Objective/Functional Measures:      Pain Level (0-10 scale) post treatment: 0    ASSESSMENT/Changes in Function: Pt is making good progress with her gait. She is able to ambulate without an AD at this time, but with an antalgic gait pattern. She is still challenged with weight shifting and push off, but steadily improving. Patient will continue to benefit from skilled PT services to modify and progress therapeutic interventions, address functional mobility deficits, address ROM deficits, address strength deficits, analyze and address soft tissue restrictions, analyze and cue movement patterns, analyze and modify body mechanics/ergonomics, assess and modify postural abnormalities, address imbalance/dizziness and instruct in home and community integration to attain remaining goals. [x]  See Plan of Care  []  See progress note/recertification  []  See Discharge Summary         Progress towards goals / Updated goals: · Short Term Goals: To be accomplished in  2  treatments:  1. Pt will be compliant with HEP for symptom management at home.              Reports compliance   · Long Term Goals: To be accomplished in  8-12  treatments:  1. Pt will demonstrate an increased FOTO score to 65/100 in order to improve function              Assess at 5th visit  2. Pt will be independent with HEP at D/C for self management. Reports compliance  3. Pt will demonstrate decreased edema in the left ankle by 3cm in order to aid in improved mobility necessary for ambulation and stair negotiation              Making progress with swelling  4. Pt will demonstrate increased left ankle AROM into DF to > 5 degrees in order to assist with foot clearance throughout swing phase of gait              Still lacking some DF  5. Pt will demonstrate 4/5 strength throughout the left ankle in order to aid in balance and stability while ambulating and stair climbing              Assess at later dater  6. Pt will be able to negotiate 3, 6in stairs with unilateral rail with reciprocal gait without CAM boot in order to enter/exit her home more easily              Initiated ambulation without her CAM boot today  7. Pt will ambulate 250ft continuously without AD/LE CAM boot/LOB/pain in order to negotiate her community more safely.               Ambulated 100+ ft with shoe and single crutch      PLAN  []  Upgrade activities as tolerated     [x]  Continue plan of care  []  Update interventions per flow sheet       []  Discharge due to:_  []  Other:_      Jack Hairston PTA, CSCS 11/14/2018  9:51 AM    Future Appointments   Date Time Provider Jessica Flor   11/16/2018  9:00 AM [facility]   11/19/2018 10:00 AM [facility]   11/21/2018  9:00 AM [facility]   1/11/2019  9:35 AM IO NURSE VISIT [facility] ISABELLA SCHED   1/18/2019  8:30 AM Ramez Irwin MD [facility]

## 2024-05-31 RX ORDER — ZOLPIDEM TARTRATE 10 MG/1
10 TABLET ORAL
Qty: 30 | Refills: 1 | Status: ACTIVE | COMMUNITY
Start: 2017-06-09 | End: 1900-01-01

## 2024-06-09 ENCOUNTER — RX RENEWAL (OUTPATIENT)
Age: 69
End: 2024-06-09

## 2024-06-09 RX ORDER — LOSARTAN POTASSIUM 100 MG/1
100 TABLET, FILM COATED ORAL DAILY
Qty: 90 | Refills: 0 | Status: ACTIVE | COMMUNITY
Start: 2020-10-06 | End: 1900-01-01

## 2024-06-10 RX ORDER — ROSUVASTATIN CALCIUM 10 MG/1
10 TABLET, FILM COATED ORAL
Qty: 90 | Refills: 1 | Status: ACTIVE | COMMUNITY
Start: 2019-10-23 | End: 1900-01-01

## 2024-06-18 ENCOUNTER — APPOINTMENT (OUTPATIENT)
Dept: INTERNAL MEDICINE | Facility: CLINIC | Age: 69
End: 2024-06-18
Payer: MEDICARE

## 2024-06-18 DIAGNOSIS — D68.61 ANTIPHOSPHOLIPID SYNDROME: ICD-10-CM

## 2024-06-18 LAB
INR PPP: 2.1 RATIO
POCT-PROTHROMBIN TIME: 25.5 SECS
QUALITY CONTROL: YES

## 2024-06-18 PROCEDURE — 85610 PROTHROMBIN TIME: CPT | Mod: QW

## 2024-06-18 RX ORDER — HYDROCODONE BITARTRATE AND ACETAMINOPHEN 7.5; 3 MG/1; MG/1
7.5-3 TABLET ORAL EVERY 8 HOURS
Qty: 90 | Refills: 0 | Status: ACTIVE | COMMUNITY
Start: 2020-05-27 | End: 1900-01-01

## 2024-06-18 NOTE — HISTORY OF PRESENT ILLNESS
[FreeTextEntry1] : follow up [de-identified] : JACKSON SONY is a 69 year old F who presents today for INR

## 2024-07-16 ENCOUNTER — APPOINTMENT (OUTPATIENT)
Dept: INTERNAL MEDICINE | Facility: CLINIC | Age: 69
End: 2024-07-16
Payer: MEDICARE

## 2024-07-16 DIAGNOSIS — D68.61 ANTIPHOSPHOLIPID SYNDROME: ICD-10-CM

## 2024-07-16 LAB
INR PPP: 2.5 RATIO
POCT-PROTHROMBIN TIME: 30.6 SECS
QUALITY CONTROL: YES

## 2024-07-16 PROCEDURE — 85610 PROTHROMBIN TIME: CPT | Mod: QW

## 2024-08-20 ENCOUNTER — APPOINTMENT (OUTPATIENT)
Dept: INTERNAL MEDICINE | Facility: CLINIC | Age: 69
End: 2024-08-20
Payer: MEDICARE

## 2024-08-20 DIAGNOSIS — D68.61 ANTIPHOSPHOLIPID SYNDROME: ICD-10-CM

## 2024-08-20 LAB
INR PPP: 2.2 RATIO
POCT-PROTHROMBIN TIME: 26.9 SECS
QUALITY CONTROL: YES

## 2024-08-20 PROCEDURE — 85610 PROTHROMBIN TIME: CPT | Mod: QW

## 2024-08-20 NOTE — HISTORY OF PRESENT ILLNESS
[FreeTextEntry1] : follow up [de-identified] : JACKSON CARDOZA is a 69 year old F who presents today for  INR  level 2.2 today

## 2024-08-22 NOTE — DIETITIAN INITIAL EVALUATION ADULT. - EST PROTEIN NEEDS4
Mask Type (Optional): calming Price (Use Numbers Only, No Special Characters Or $): 100 Detail Level: Generalized Treatment Type (Optional): Acne Facial Facial Steaming: steamed Treatment Type Override: extractions Exfoliation Type: scrub 67.1

## 2024-09-06 NOTE — DISCHARGE NOTE ADULT - NS AS DC FU INST LIST INST
Lab work was stable. Sorry for delay.   Lipid panel slightly elevated.  ASCVD Risk score is 5.4  We can try life style modifications first including mediterranean diet and increase in activity.  After recheck if trending up would consider statin.  
Pt called and wants the results of her labwork that was done on the 23rd of August   
no

## 2024-09-17 ENCOUNTER — RX RENEWAL (OUTPATIENT)
Age: 69
End: 2024-09-17

## 2024-09-17 ENCOUNTER — APPOINTMENT (OUTPATIENT)
Dept: INTERNAL MEDICINE | Facility: CLINIC | Age: 69
End: 2024-09-17
Payer: MEDICARE

## 2024-09-17 DIAGNOSIS — D68.61 ANTIPHOSPHOLIPID SYNDROME: ICD-10-CM

## 2024-09-17 LAB
INR PPP: 2 RATIO
POCT-PROTHROMBIN TIME: 23.6 SECS
QUALITY CONTROL: YES

## 2024-09-17 PROCEDURE — 85610 PROTHROMBIN TIME: CPT | Mod: QW

## 2024-09-17 NOTE — HISTORY OF PRESENT ILLNESS
[FreeTextEntry1] : Follow up [de-identified] : JACKSON SONY is a 69 year old F who presents today for INR

## 2024-10-18 ENCOUNTER — APPOINTMENT (OUTPATIENT)
Dept: INTERNAL MEDICINE | Facility: CLINIC | Age: 69
End: 2024-10-18

## 2024-10-29 NOTE — PROGRESS NOTE ADULT - ALLERGIC/IMMUNOLOGIC
Body Location Override (Optional): Right posterior scalp
Which Doctor Performed Mohs? (Optional): Dr. Eros Mireles
negative

## 2024-11-04 ENCOUNTER — APPOINTMENT (OUTPATIENT)
Dept: INTERNAL MEDICINE | Facility: CLINIC | Age: 69
End: 2024-11-04
Payer: MEDICARE

## 2024-11-04 DIAGNOSIS — G47.00 INSOMNIA, UNSPECIFIED: ICD-10-CM

## 2024-11-04 DIAGNOSIS — M17.0 BILATERAL PRIMARY OSTEOARTHRITIS OF KNEE: ICD-10-CM

## 2024-11-04 DIAGNOSIS — D68.61 ANTIPHOSPHOLIPID SYNDROME: ICD-10-CM

## 2024-11-04 PROCEDURE — 85610 PROTHROMBIN TIME: CPT | Mod: QW

## 2024-11-07 LAB
INR PPP: 3.7 RATIO
POCT-PROTHROMBIN TIME: 43.7 SECS
QUALITY CONTROL: YES

## 2024-12-03 ENCOUNTER — APPOINTMENT (OUTPATIENT)
Dept: INTERNAL MEDICINE | Facility: CLINIC | Age: 69
End: 2024-12-03
Payer: MEDICARE

## 2024-12-03 DIAGNOSIS — D68.61 ANTIPHOSPHOLIPID SYNDROME: ICD-10-CM

## 2024-12-03 DIAGNOSIS — D68.59 OTHER PRIMARY THROMBOPHILIA: ICD-10-CM

## 2024-12-03 DIAGNOSIS — G47.00 INSOMNIA, UNSPECIFIED: ICD-10-CM

## 2024-12-03 LAB
INR PPP: 2.5 RATIO
POCT-PROTHROMBIN TIME: 30.1 SECS
QUALITY CONTROL: YES

## 2024-12-03 PROCEDURE — 99213 OFFICE O/P EST LOW 20 MIN: CPT

## 2024-12-13 ENCOUNTER — RX RENEWAL (OUTPATIENT)
Age: 69
End: 2024-12-13

## 2024-12-14 ENCOUNTER — RX RENEWAL (OUTPATIENT)
Age: 69
End: 2024-12-14

## 2024-12-26 ENCOUNTER — NON-APPOINTMENT (OUTPATIENT)
Age: 69
End: 2024-12-26

## 2025-01-03 ENCOUNTER — APPOINTMENT (OUTPATIENT)
Dept: INTERNAL MEDICINE | Facility: CLINIC | Age: 70
End: 2025-01-03
Payer: MEDICARE

## 2025-01-03 DIAGNOSIS — D68.61 ANTIPHOSPHOLIPID SYNDROME: ICD-10-CM

## 2025-01-03 LAB
INR PPP: 1.7 RATIO
POCT-PROTHROMBIN TIME: 20.9 SECS
QUALITY CONTROL: YES

## 2025-01-03 PROCEDURE — 85610 PROTHROMBIN TIME: CPT | Mod: QW

## 2025-01-29 ENCOUNTER — APPOINTMENT (OUTPATIENT)
Dept: INTERNAL MEDICINE | Facility: CLINIC | Age: 70
End: 2025-01-29
Payer: MEDICARE

## 2025-01-29 DIAGNOSIS — D68.61 ANTIPHOSPHOLIPID SYNDROME: ICD-10-CM

## 2025-01-29 LAB
INR PPP: 2.3 RATIO
POCT-PROTHROMBIN TIME: 27.9 SECS
QUALITY CONTROL: YES

## 2025-01-29 PROCEDURE — 85610 PROTHROMBIN TIME: CPT | Mod: QW

## 2025-02-21 ENCOUNTER — RX RENEWAL (OUTPATIENT)
Age: 70
End: 2025-02-21

## 2025-02-25 ENCOUNTER — APPOINTMENT (OUTPATIENT)
Dept: INTERNAL MEDICINE | Facility: CLINIC | Age: 70
End: 2025-02-25
Payer: MEDICARE

## 2025-02-25 DIAGNOSIS — D68.61 ANTIPHOSPHOLIPID SYNDROME: ICD-10-CM

## 2025-02-25 LAB
INR PPP: 2.9 RATIO
POCT-PROTHROMBIN TIME: 26.7 SECS
QUALITY CONTROL: YES

## 2025-02-25 PROCEDURE — 85610 PROTHROMBIN TIME: CPT | Mod: QW

## 2025-03-20 NOTE — ED ADULT NURSE NOTE - BRAND OF COVID-19 VACCINATION
"Goal Outcome Evaluation:      Plan of Care Reviewed With: patient    Overall Patient Progress: improvingOverall Patient Progress: improving    Patient Name: Keerthi  MRN: [id number]  Date of Admission: 3/13/2025  Reason for Admission: Chest Pain  Level of Care: CV    Vitals:   BP Readings from Last 1 Encounters:   03/20/25 125/65     Pulse Readings from Last 1 Encounters:   03/20/25 59     Wt Readings from Last 1 Encounters:   03/20/25 105 kg (231 lb 8 oz)     Ht Readings from Last 1 Encounters:   03/19/25 1.727 m (5' 8\")     Estimated body mass index is 35.2 kg/m  as calculated from the following:    Height as of this encounter: 1.727 m (5' 8\").    Weight as of this encounter: 105 kg (231 lb 8 oz).  Temp Readings from Last 1 Encounters:   03/20/25 98.8  F (37.1  C) (Oral)       Pain: Pain goal :0 Pain Rating 7/10 Effective pain medication/regimen: Scheduled Medication and PRN Atarax and Robaxin    CV Surgery Patient: Yes Post Op Day #: 5    Assessment    General: Afebrile yes  Rhythm: normal sinus rhythm  Blood Pressure Medications given/held: Beta blocker Given   Resp: Oxygen Status: NC x 2 LPM  Patient slept last night Yes Approx hours slept; 8 hours  Incentive Spirometry Q 1-2 hour when awake: yes Volume: 700  Neuro: Alert yes Orientation: self, person, time, and place  GI/:          Bowel Activity: no if yes indicate when: 03/19/25          Bowel Medications: no          Urinary Catheter: no  Skin:          Incision: Incision status: opened          Epicardial Pacing Wires: not applicable  Chest Tubes   Pleural: not applicable Draining: not applicable               Suction: not applicable              Mediastinal: not applicable Draining: not applicable               Suction: not applicable   Dressing Change Daily: yes    Assessment    Resp: Diminished lung sounds,on NC x 2-3 LPM.IS and Deep breathing encouraged.  Telemetry: SR with 1st Degree AV Block  VS/Pain: Vital signs stable,Pain controlled with " scheduled and Prn Medication  Neuro: Alert and oriented x 4,Neuro's intact.  Diet: Regular  GI/: No BM on this shift,passing gas.Pending sample for C- Diff.Adequate urine output thru urinal.  Skin/Wounds: Midline Sternal incision,R Groin and knee harvest site.Old surgical incision R Upper Back.  Lines/Drains: L PIV SL  Activity: Assist x 1,GBW  Sleep: Sleep between cares  Abnormal Labs: on K+,Mg,Phos protocol    Aggression Stop Light: Green          Patient Care Plan: Continue plan of care.         Pfizer dose 1, 2, and 3

## 2025-03-21 ENCOUNTER — RX RENEWAL (OUTPATIENT)
Age: 70
End: 2025-03-21

## 2025-03-28 ENCOUNTER — APPOINTMENT (OUTPATIENT)
Dept: INTERNAL MEDICINE | Facility: CLINIC | Age: 70
End: 2025-03-28
Payer: MEDICARE

## 2025-03-28 DIAGNOSIS — D68.61 ANTIPHOSPHOLIPID SYNDROME: ICD-10-CM

## 2025-03-28 LAB
INR PPP: 2.3 RATIO
POCT-PROTHROMBIN TIME: 28.1 SECS
QUALITY CONTROL: YES

## 2025-03-28 PROCEDURE — 85610 PROTHROMBIN TIME: CPT | Mod: QW

## 2025-04-22 ENCOUNTER — APPOINTMENT (OUTPATIENT)
Dept: INTERNAL MEDICINE | Facility: CLINIC | Age: 70
End: 2025-04-22
Payer: MEDICARE

## 2025-04-22 DIAGNOSIS — D68.61 ANTIPHOSPHOLIPID SYNDROME: ICD-10-CM

## 2025-04-22 PROCEDURE — 85610 PROTHROMBIN TIME: CPT | Mod: QW

## 2025-04-23 LAB
INR PPP: 1.9 RATIO
POCT-PROTHROMBIN TIME: 23.3 SECS
QUALITY CONTROL: YES

## 2025-05-08 ENCOUNTER — APPOINTMENT (OUTPATIENT)
Dept: ORTHOPEDIC SURGERY | Facility: CLINIC | Age: 70
End: 2025-05-08
Payer: MEDICARE

## 2025-05-08 PROCEDURE — 99215 OFFICE O/P EST HI 40 MIN: CPT

## 2025-05-08 PROCEDURE — 73502 X-RAY EXAM HIP UNI 2-3 VIEWS: CPT

## 2025-05-28 ENCOUNTER — APPOINTMENT (OUTPATIENT)
Dept: INTERNAL MEDICINE | Facility: CLINIC | Age: 70
End: 2025-05-28
Payer: MEDICARE

## 2025-05-28 DIAGNOSIS — D68.61 ANTIPHOSPHOLIPID SYNDROME: ICD-10-CM

## 2025-05-28 PROCEDURE — 85610 PROTHROMBIN TIME: CPT | Mod: QW

## 2025-06-13 ENCOUNTER — RX RENEWAL (OUTPATIENT)
Age: 70
End: 2025-06-13

## 2025-06-18 ENCOUNTER — NON-APPOINTMENT (OUTPATIENT)
Age: 70
End: 2025-06-18

## 2025-06-18 ENCOUNTER — APPOINTMENT (OUTPATIENT)
Dept: CARDIOLOGY | Facility: CLINIC | Age: 70
End: 2025-06-18

## 2025-06-23 ENCOUNTER — APPOINTMENT (OUTPATIENT)
Dept: INTERNAL MEDICINE | Facility: CLINIC | Age: 70
End: 2025-06-23
Payer: MEDICARE

## 2025-06-23 LAB
INR PPP: 2.2 RATIO
POCT-PROTHROMBIN TIME: 26 SECS
QUALITY CONTROL: YES

## 2025-06-23 PROCEDURE — 85610 PROTHROMBIN TIME: CPT | Mod: QW

## 2025-06-28 ENCOUNTER — OUTPATIENT (OUTPATIENT)
Dept: OUTPATIENT SERVICES | Facility: HOSPITAL | Age: 70
LOS: 1 days | Discharge: HOME | End: 2025-06-28

## 2025-06-28 DIAGNOSIS — Z96.659 PRESENCE OF UNSPECIFIED ARTIFICIAL KNEE JOINT: Chronic | ICD-10-CM

## 2025-06-28 DIAGNOSIS — Z96.651 PRESENCE OF RIGHT ARTIFICIAL KNEE JOINT: Chronic | ICD-10-CM

## 2025-06-28 DIAGNOSIS — Z90.710 ACQUIRED ABSENCE OF BOTH CERVIX AND UTERUS: Chronic | ICD-10-CM

## 2025-06-28 DIAGNOSIS — Z98.890 OTHER SPECIFIED POSTPROCEDURAL STATES: Chronic | ICD-10-CM

## 2025-06-28 DIAGNOSIS — D68.2 HEREDITARY DEFICIENCY OF OTHER CLOTTING FACTORS: ICD-10-CM

## 2025-06-30 ENCOUNTER — APPOINTMENT (OUTPATIENT)
Dept: CARDIOLOGY | Facility: CLINIC | Age: 70
End: 2025-06-30
Payer: MEDICARE

## 2025-06-30 VITALS
BODY MASS INDEX: 34.99 KG/M2 | DIASTOLIC BLOOD PRESSURE: 84 MMHG | OXYGEN SATURATION: 94 % | SYSTOLIC BLOOD PRESSURE: 154 MMHG | WEIGHT: 210 LBS | HEART RATE: 73 BPM | HEIGHT: 65 IN

## 2025-06-30 PROCEDURE — G2211 COMPLEX E/M VISIT ADD ON: CPT

## 2025-06-30 PROCEDURE — 99204 OFFICE O/P NEW MOD 45 MIN: CPT

## 2025-06-30 PROCEDURE — 93000 ELECTROCARDIOGRAM COMPLETE: CPT

## 2025-07-02 ENCOUNTER — OUTPATIENT (OUTPATIENT)
Dept: OUTPATIENT SERVICES | Facility: HOSPITAL | Age: 70
LOS: 1 days | End: 2025-07-02
Payer: MEDICARE

## 2025-07-02 VITALS
RESPIRATION RATE: 15 BRPM | WEIGHT: 205.91 LBS | DIASTOLIC BLOOD PRESSURE: 86 MMHG | OXYGEN SATURATION: 97 % | TEMPERATURE: 98 F | SYSTOLIC BLOOD PRESSURE: 150 MMHG | HEART RATE: 62 BPM | HEIGHT: 65 IN

## 2025-07-02 DIAGNOSIS — M16.12 UNILATERAL PRIMARY OSTEOARTHRITIS, LEFT HIP: ICD-10-CM

## 2025-07-02 DIAGNOSIS — Z96.651 PRESENCE OF RIGHT ARTIFICIAL KNEE JOINT: Chronic | ICD-10-CM

## 2025-07-02 DIAGNOSIS — Z98.890 OTHER SPECIFIED POSTPROCEDURAL STATES: Chronic | ICD-10-CM

## 2025-07-02 DIAGNOSIS — Z90.710 ACQUIRED ABSENCE OF BOTH CERVIX AND UTERUS: Chronic | ICD-10-CM

## 2025-07-02 DIAGNOSIS — Z01.818 ENCOUNTER FOR OTHER PREPROCEDURAL EXAMINATION: ICD-10-CM

## 2025-07-02 DIAGNOSIS — Z96.652 PRESENCE OF LEFT ARTIFICIAL KNEE JOINT: Chronic | ICD-10-CM

## 2025-07-02 DIAGNOSIS — Z96.659 PRESENCE OF UNSPECIFIED ARTIFICIAL KNEE JOINT: Chronic | ICD-10-CM

## 2025-07-02 PROBLEM — M17.12 UNILATERAL PRIMARY OSTEOARTHRITIS, LEFT KNEE: Chronic | Status: INACTIVE | Noted: 2017-11-10 | Resolved: 2025-07-02

## 2025-07-02 LAB
A1C WITH ESTIMATED AVERAGE GLUCOSE RESULT: 5.4 % — SIGNIFICANT CHANGE UP (ref 4–5.6)
ALBUMIN SERPL ELPH-MCNC: 3.8 G/DL — SIGNIFICANT CHANGE UP (ref 3.3–5)
ALP SERPL-CCNC: 89 U/L — SIGNIFICANT CHANGE UP (ref 30–120)
ALT FLD-CCNC: 30 U/L — SIGNIFICANT CHANGE UP (ref 10–60)
ANION GAP SERPL CALC-SCNC: 9 MMOL/L — SIGNIFICANT CHANGE UP (ref 5–17)
APTT BLD: 42.6 SEC — HIGH (ref 26.1–36.8)
AST SERPL-CCNC: 27 U/L — SIGNIFICANT CHANGE UP (ref 10–40)
BILIRUB SERPL-MCNC: 0.6 MG/DL — SIGNIFICANT CHANGE UP (ref 0.2–1.2)
BLD GP AB SCN SERPL QL: SIGNIFICANT CHANGE UP
BUN SERPL-MCNC: 12 MG/DL — SIGNIFICANT CHANGE UP (ref 7–23)
CALCIUM SERPL-MCNC: 9.3 MG/DL — SIGNIFICANT CHANGE UP (ref 8.4–10.5)
CHLORIDE SERPL-SCNC: 105 MMOL/L — SIGNIFICANT CHANGE UP (ref 96–108)
CO2 SERPL-SCNC: 28 MMOL/L — SIGNIFICANT CHANGE UP (ref 22–31)
CREAT SERPL-MCNC: 0.77 MG/DL — SIGNIFICANT CHANGE UP (ref 0.5–1.3)
EGFR: 83 ML/MIN/1.73M2 — SIGNIFICANT CHANGE UP
EGFR: 83 ML/MIN/1.73M2 — SIGNIFICANT CHANGE UP
ESTIMATED AVERAGE GLUCOSE: 108 MG/DL — SIGNIFICANT CHANGE UP (ref 68–114)
GLUCOSE SERPL-MCNC: 110 MG/DL — HIGH (ref 70–99)
HCT VFR BLD CALC: 36.8 % — SIGNIFICANT CHANGE UP (ref 34.5–45)
HGB BLD-MCNC: 12.1 G/DL — SIGNIFICANT CHANGE UP (ref 11.5–15.5)
INR BLD: 3.1 RATIO — HIGH (ref 0.85–1.16)
MCHC RBC-ENTMCNC: 31.8 PG — SIGNIFICANT CHANGE UP (ref 27–34)
MCHC RBC-ENTMCNC: 32.9 G/DL — SIGNIFICANT CHANGE UP (ref 32–36)
MCV RBC AUTO: 96.8 FL — SIGNIFICANT CHANGE UP (ref 80–100)
MRSA PCR RESULT.: DETECTED
NRBC # BLD AUTO: 0 K/UL — SIGNIFICANT CHANGE UP (ref 0–0)
NRBC # FLD: 0 K/UL — SIGNIFICANT CHANGE UP (ref 0–0)
NRBC BLD AUTO-RTO: 0 /100 WBCS — SIGNIFICANT CHANGE UP (ref 0–0)
PLATELET # BLD AUTO: 210 K/UL — SIGNIFICANT CHANGE UP (ref 150–400)
PMV BLD: 9.9 FL — SIGNIFICANT CHANGE UP (ref 7–13)
POTASSIUM SERPL-MCNC: 3.9 MMOL/L — SIGNIFICANT CHANGE UP (ref 3.5–5.3)
POTASSIUM SERPL-SCNC: 3.9 MMOL/L — SIGNIFICANT CHANGE UP (ref 3.5–5.3)
PROT SERPL-MCNC: 7.3 G/DL — SIGNIFICANT CHANGE UP (ref 6–8.3)
PROTHROM AB SERPL-ACNC: 35.6 SEC — HIGH (ref 9.9–13.4)
RBC # BLD: 3.8 M/UL — SIGNIFICANT CHANGE UP (ref 3.8–5.2)
RBC # FLD: 12.3 % — SIGNIFICANT CHANGE UP (ref 10.3–14.5)
S AUREUS DNA NOSE QL NAA+PROBE: DETECTED
SODIUM SERPL-SCNC: 142 MMOL/L — SIGNIFICANT CHANGE UP (ref 135–145)
WBC # BLD: 6.13 K/UL — SIGNIFICANT CHANGE UP (ref 3.8–10.5)
WBC # FLD AUTO: 6.13 K/UL — SIGNIFICANT CHANGE UP (ref 3.8–10.5)

## 2025-07-02 PROCEDURE — 87641 MR-STAPH DNA AMP PROBE: CPT

## 2025-07-02 PROCEDURE — 85610 PROTHROMBIN TIME: CPT

## 2025-07-02 PROCEDURE — 87640 STAPH A DNA AMP PROBE: CPT

## 2025-07-02 PROCEDURE — 85730 THROMBOPLASTIN TIME PARTIAL: CPT

## 2025-07-02 PROCEDURE — 86900 BLOOD TYPING SEROLOGIC ABO: CPT

## 2025-07-02 PROCEDURE — 83036 HEMOGLOBIN GLYCOSYLATED A1C: CPT

## 2025-07-02 PROCEDURE — 36415 COLL VENOUS BLD VENIPUNCTURE: CPT

## 2025-07-02 PROCEDURE — 80053 COMPREHEN METABOLIC PANEL: CPT

## 2025-07-02 PROCEDURE — 86850 RBC ANTIBODY SCREEN: CPT

## 2025-07-02 PROCEDURE — G0463: CPT

## 2025-07-02 PROCEDURE — 85027 COMPLETE CBC AUTOMATED: CPT

## 2025-07-02 PROCEDURE — 86901 BLOOD TYPING SEROLOGIC RH(D): CPT

## 2025-07-02 RX ORDER — LOSARTAN POTASSIUM 100 MG/1
1 TABLET, FILM COATED ORAL
Refills: 0 | DISCHARGE

## 2025-07-02 NOTE — H&P PST ADULT - NSICDXPASTSURGICALHX_GEN_ALL_CORE_FT
PAST SURGICAL HISTORY:  History of hernia repair 2000, 2015    History of left knee replacement     History of total right knee replacement 2017    S/P hysterectomy 1970's

## 2025-07-02 NOTE — H&P PST ADULT - NSICDXPASTMEDICALHX_GEN_ALL_CORE_FT
PAST MEDICAL HISTORY:  Antiphospholipid syndrome     Chronic low back pain     Chronic pain     DVT (deep venous thrombosis), bilateral     History of DVT (deep vein thrombosis) bilateral, 2015 related to HRT    History of pulmonary embolism 2015 related to HRT    Hyperlipidemia     Hypertension     Insomnia     Obesity (BMI 30-39.9)     Osteoarthritis of left hip     PE (pulmonary embolism)      PAST MEDICAL HISTORY:  Allergy to metal     Antiphospholipid syndrome     Chronic low back pain     Chronic pain     DVT (deep venous thrombosis), bilateral     History of DVT (deep vein thrombosis) bilateral, 2015 related to HRT    History of pulmonary embolism 2015 related to HRT    Hyperlipidemia     Hypertension     Insomnia     Obesity (BMI 30-39.9)     Osteoarthritis of left hip     PE (pulmonary embolism)

## 2025-07-02 NOTE — H&P PST ADULT - PROBLEM SELECTOR PLAN 1
Diagnostic testing performed  medical, cardiac and hematology clearances  Instructions for warfarin and pain management to be obtained by patient from prescribers   Pre op instructions and best wishes given

## 2025-07-02 NOTE — H&P PST ADULT - HISTORY OF PRESENT ILLNESS
71 yo female reports left hip pain with radiation to groin for last 2 years.  pain rates 10/10 at worst when she is bearing weight.  She is scheduled forleft direct total anterior hip replacement on 7/21/2025

## 2025-07-03 ENCOUNTER — APPOINTMENT (OUTPATIENT)
Dept: HEMATOLOGY ONCOLOGY | Facility: CLINIC | Age: 70
End: 2025-07-03
Payer: MEDICARE

## 2025-07-03 VITALS
TEMPERATURE: 97.4 F | HEIGHT: 65 IN | OXYGEN SATURATION: 99 % | DIASTOLIC BLOOD PRESSURE: 85 MMHG | HEART RATE: 58 BPM | RESPIRATION RATE: 17 BRPM | BODY MASS INDEX: 35.11 KG/M2 | WEIGHT: 210.74 LBS | SYSTOLIC BLOOD PRESSURE: 168 MMHG

## 2025-07-03 PROCEDURE — 99204 OFFICE O/P NEW MOD 45 MIN: CPT

## 2025-07-03 RX ORDER — MUPIROCIN CALCIUM 20 MG/G
0 CREAM TOPICAL
Refills: 0 | DISCHARGE

## 2025-07-03 RX ORDER — MUPIROCIN CALCIUM 20 MG/G
1 CREAM TOPICAL
Qty: 1 | Refills: 0
Start: 2025-07-03 | End: 2025-07-07

## 2025-07-08 ENCOUNTER — NON-APPOINTMENT (OUTPATIENT)
Age: 70
End: 2025-07-08

## 2025-07-10 ENCOUNTER — APPOINTMENT (OUTPATIENT)
Dept: ORTHOPEDIC SURGERY | Facility: CLINIC | Age: 70
End: 2025-07-10

## 2025-07-10 PROCEDURE — 99213 OFFICE O/P EST LOW 20 MIN: CPT

## 2025-07-11 ENCOUNTER — NON-APPOINTMENT (OUTPATIENT)
Age: 70
End: 2025-07-11

## 2025-07-11 ENCOUNTER — APPOINTMENT (OUTPATIENT)
Dept: INTERNAL MEDICINE | Facility: CLINIC | Age: 70
End: 2025-07-11
Payer: MEDICARE

## 2025-07-11 VITALS
BODY MASS INDEX: 34.82 KG/M2 | OXYGEN SATURATION: 97 % | RESPIRATION RATE: 17 BRPM | SYSTOLIC BLOOD PRESSURE: 142 MMHG | WEIGHT: 209 LBS | TEMPERATURE: 98.1 F | HEIGHT: 65 IN | HEART RATE: 82 BPM | DIASTOLIC BLOOD PRESSURE: 74 MMHG

## 2025-07-11 VITALS — DIASTOLIC BLOOD PRESSURE: 76 MMHG | SYSTOLIC BLOOD PRESSURE: 138 MMHG

## 2025-07-11 PROBLEM — M16.12 UNILATERAL PRIMARY OSTEOARTHRITIS, LEFT HIP: Chronic | Status: ACTIVE | Noted: 2025-07-02

## 2025-07-11 PROBLEM — D68.61 ANTIPHOSPHOLIPID SYNDROME: Chronic | Status: ACTIVE | Noted: 2025-07-02

## 2025-07-11 PROBLEM — I10 ESSENTIAL (PRIMARY) HYPERTENSION: Chronic | Status: ACTIVE | Noted: 2025-07-02

## 2025-07-11 PROBLEM — Z01.818 PRE-OP EXAMINATION: Status: ACTIVE | Noted: 2025-07-11

## 2025-07-11 PROBLEM — Z91.09 OTHER ALLERGY STATUS, OTHER THAN TO DRUGS AND BIOLOGICAL SUBSTANCES: Chronic | Status: ACTIVE | Noted: 2025-07-02

## 2025-07-11 PROBLEM — M54.50 LOW BACK PAIN, UNSPECIFIED: Chronic | Status: ACTIVE | Noted: 2025-07-02

## 2025-07-11 LAB
INR PPP: 1.9 RATIO
POCT-PROTHROMBIN TIME: 22.6 SECS
QUALITY CONTROL: YES

## 2025-07-11 PROCEDURE — 85610 PROTHROMBIN TIME: CPT | Mod: QW

## 2025-07-11 PROCEDURE — 99214 OFFICE O/P EST MOD 30 MIN: CPT

## 2025-07-11 RX ORDER — ENOXAPARIN SODIUM 100 MG/ML
100 INJECTION, SOLUTION SUBCUTANEOUS TWICE DAILY
Qty: 1 | Refills: 0 | Status: ACTIVE | COMMUNITY
Start: 2025-07-11 | End: 1900-01-01

## 2025-07-14 ENCOUNTER — LABORATORY RESULT (OUTPATIENT)
Age: 70
End: 2025-07-14

## 2025-07-14 ENCOUNTER — APPOINTMENT (OUTPATIENT)
Dept: HEMATOLOGY ONCOLOGY | Facility: CLINIC | Age: 70
End: 2025-07-14

## 2025-07-14 ENCOUNTER — APPOINTMENT (OUTPATIENT)
Dept: CARDIOLOGY | Facility: CLINIC | Age: 70
End: 2025-07-14
Payer: MEDICARE

## 2025-07-14 PROCEDURE — 93306 TTE W/DOPPLER COMPLETE: CPT

## 2025-07-14 RX ORDER — CEFAZOLIN SODIUM IN 0.9 % NACL 3 G/100 ML
2000 INTRAVENOUS SOLUTION, PIGGYBACK (ML) INTRAVENOUS ONCE
Refills: 0 | Status: DISCONTINUED | OUTPATIENT
Start: 2025-07-21 | End: 2025-07-21

## 2025-07-16 ENCOUNTER — NON-APPOINTMENT (OUTPATIENT)
Age: 70
End: 2025-07-16

## 2025-07-21 ENCOUNTER — TRANSCRIPTION ENCOUNTER (OUTPATIENT)
Age: 70
End: 2025-07-21

## 2025-07-21 ENCOUNTER — APPOINTMENT (OUTPATIENT)
Dept: ORTHOPEDIC SURGERY | Facility: HOSPITAL | Age: 70
End: 2025-07-21

## 2025-07-21 ENCOUNTER — INPATIENT (INPATIENT)
Facility: HOSPITAL | Age: 70
LOS: 0 days | Discharge: HOME | DRG: 554 | End: 2025-07-22
Attending: STUDENT IN AN ORGANIZED HEALTH CARE EDUCATION/TRAINING PROGRAM | Admitting: STUDENT IN AN ORGANIZED HEALTH CARE EDUCATION/TRAINING PROGRAM
Payer: MEDICARE

## 2025-07-21 VITALS
HEART RATE: 64 BPM | RESPIRATION RATE: 15 BRPM | OXYGEN SATURATION: 96 % | HEIGHT: 65 IN | SYSTOLIC BLOOD PRESSURE: 128 MMHG | TEMPERATURE: 97 F | DIASTOLIC BLOOD PRESSURE: 60 MMHG | WEIGHT: 207.9 LBS

## 2025-07-21 DIAGNOSIS — Z98.890 OTHER SPECIFIED POSTPROCEDURAL STATES: Chronic | ICD-10-CM

## 2025-07-21 DIAGNOSIS — Z90.710 ACQUIRED ABSENCE OF BOTH CERVIX AND UTERUS: Chronic | ICD-10-CM

## 2025-07-21 DIAGNOSIS — Z96.651 PRESENCE OF RIGHT ARTIFICIAL KNEE JOINT: Chronic | ICD-10-CM

## 2025-07-21 DIAGNOSIS — Z96.652 PRESENCE OF LEFT ARTIFICIAL KNEE JOINT: Chronic | ICD-10-CM

## 2025-07-21 DIAGNOSIS — M16.12 UNILATERAL PRIMARY OSTEOARTHRITIS, LEFT HIP: ICD-10-CM

## 2025-07-21 LAB
APTT BLD: 39 SEC — HIGH (ref 26.1–36.8)
INR BLD: 1.18 RATIO — HIGH (ref 0.85–1.16)
PROTHROM AB SERPL-ACNC: 13.9 SEC — HIGH (ref 9.9–13.4)

## 2025-07-21 PROCEDURE — 97165 OT EVAL LOW COMPLEX 30 MIN: CPT

## 2025-07-21 PROCEDURE — 97607 NEG PRS WND THR NDME<=50SQCM: CPT

## 2025-07-21 PROCEDURE — 85730 THROMBOPLASTIN TIME PARTIAL: CPT

## 2025-07-21 PROCEDURE — 36415 COLL VENOUS BLD VENIPUNCTURE: CPT

## 2025-07-21 PROCEDURE — 76000 FLUOROSCOPY <1 HR PHYS/QHP: CPT

## 2025-07-21 PROCEDURE — 97116 GAIT TRAINING THERAPY: CPT

## 2025-07-21 PROCEDURE — 85610 PROTHROMBIN TIME: CPT

## 2025-07-21 PROCEDURE — 97161 PT EVAL LOW COMPLEX 20 MIN: CPT

## 2025-07-21 PROCEDURE — 97110 THERAPEUTIC EXERCISES: CPT

## 2025-07-21 PROCEDURE — 27130 TOTAL HIP ARTHROPLASTY: CPT | Mod: LT

## 2025-07-21 PROCEDURE — 99223 1ST HOSP IP/OBS HIGH 75: CPT

## 2025-07-21 DEVICE — PIN STEINMAN TROC 3/16X9IN STRL: Type: IMPLANTABLE DEVICE | Site: LEFT | Status: FUNCTIONAL

## 2025-07-21 DEVICE — BONE WAX 2.5GM: Type: IMPLANTABLE DEVICE | Site: LEFT | Status: FUNCTIONAL

## 2025-07-21 DEVICE — SHELL ACET EMPHASYS 3H 50MM: Type: IMPLANTABLE DEVICE | Site: LEFT | Status: FUNCTIONAL

## 2025-07-21 DEVICE — SCREW BONE 30MM CANCELLOUS: Type: IMPLANTABLE DEVICE | Site: LEFT | Status: FUNCTIONAL

## 2025-07-21 DEVICE — STEM FEM TAPR STD COLLAR 12/14 SZ 5: Type: IMPLANTABLE DEVICE | Site: LEFT | Status: FUNCTIONAL

## 2025-07-21 DEVICE — LINER ACET EMPHASYS AOX NEUTRAL 50X36MM: Type: IMPLANTABLE DEVICE | Site: LEFT | Status: FUNCTIONAL

## 2025-07-21 DEVICE — HEAD FEM CEM TAPR PLUS 1.5MM 12/14X36MM: Type: IMPLANTABLE DEVICE | Site: LEFT | Status: FUNCTIONAL

## 2025-07-21 RX ORDER — CEFAZOLIN SODIUM IN 0.9 % NACL 3 G/100 ML
2000 INTRAVENOUS SOLUTION, PIGGYBACK (ML) INTRAVENOUS EVERY 8 HOURS
Refills: 0 | Status: COMPLETED | OUTPATIENT
Start: 2025-07-21 | End: 2025-07-21

## 2025-07-21 RX ORDER — HYDROMORPHONE/SOD CHLOR,ISO/PF 2 MG/10 ML
1 SYRINGE (ML) INJECTION
Refills: 0 | Status: DISCONTINUED | OUTPATIENT
Start: 2025-07-21 | End: 2025-07-21

## 2025-07-21 RX ORDER — ROSUVASTATIN CALCIUM 20 MG/1
10 TABLET, FILM COATED ORAL AT BEDTIME
Refills: 0 | Status: DISCONTINUED | OUTPATIENT
Start: 2025-07-21 | End: 2025-07-22

## 2025-07-21 RX ORDER — POLYETHYLENE GLYCOL 3350 17 G/17G
17 POWDER, FOR SOLUTION ORAL AT BEDTIME
Refills: 0 | Status: DISCONTINUED | OUTPATIENT
Start: 2025-07-21 | End: 2025-07-22

## 2025-07-21 RX ORDER — OXYCODONE HYDROCHLORIDE 30 MG/1
10 TABLET ORAL
Refills: 0 | Status: DISCONTINUED | OUTPATIENT
Start: 2025-07-21 | End: 2025-07-22

## 2025-07-21 RX ORDER — SODIUM CHLORIDE 9 G/1000ML
1000 INJECTION, SOLUTION INTRAVENOUS
Refills: 0 | Status: DISCONTINUED | OUTPATIENT
Start: 2025-07-21 | End: 2025-07-22

## 2025-07-21 RX ORDER — HYDROMORPHONE/SOD CHLOR,ISO/PF 2 MG/10 ML
0.5 SYRINGE (ML) INJECTION
Refills: 0 | Status: COMPLETED | OUTPATIENT
Start: 2025-07-21 | End: 2025-07-28

## 2025-07-21 RX ORDER — SENNA 187 MG
2 TABLET ORAL AT BEDTIME
Refills: 0 | Status: DISCONTINUED | OUTPATIENT
Start: 2025-07-21 | End: 2025-07-22

## 2025-07-21 RX ORDER — CELECOXIB 50 MG/1
200 CAPSULE ORAL EVERY 12 HOURS
Refills: 0 | Status: DISCONTINUED | OUTPATIENT
Start: 2025-07-21 | End: 2025-07-22

## 2025-07-21 RX ORDER — ACETAMINOPHEN 500 MG/5ML
1000 LIQUID (ML) ORAL ONCE
Refills: 0 | Status: COMPLETED | OUTPATIENT
Start: 2025-07-21 | End: 2025-07-21

## 2025-07-21 RX ORDER — OXYCODONE HYDROCHLORIDE 30 MG/1
5 TABLET ORAL
Refills: 0 | Status: DISCONTINUED | OUTPATIENT
Start: 2025-07-21 | End: 2025-07-22

## 2025-07-21 RX ORDER — ACETAMINOPHEN 500 MG/5ML
500 LIQUID (ML) ORAL
Refills: 0 | Status: DISCONTINUED | OUTPATIENT
Start: 2025-07-21 | End: 2025-07-22

## 2025-07-21 RX ORDER — VANCOMYCIN HCL IN 5 % DEXTROSE 1.5G/250ML
1500 PLASTIC BAG, INJECTION (ML) INTRAVENOUS ONCE
Refills: 0 | Status: COMPLETED | OUTPATIENT
Start: 2025-07-21 | End: 2025-07-21

## 2025-07-21 RX ORDER — APIXABAN 5 MG/1
2.5 TABLET, FILM COATED ORAL EVERY 12 HOURS
Refills: 0 | Status: DISCONTINUED | OUTPATIENT
Start: 2025-07-22 | End: 2025-07-22

## 2025-07-21 RX ORDER — HYDROMORPHONE/SOD CHLOR,ISO/PF 2 MG/10 ML
0.5 SYRINGE (ML) INJECTION
Refills: 0 | Status: DISCONTINUED | OUTPATIENT
Start: 2025-07-21 | End: 2025-07-21

## 2025-07-21 RX ORDER — ENOXAPARIN SODIUM 100 MG/ML
40 INJECTION SUBCUTANEOUS EVERY 24 HOURS
Refills: 0 | Status: DISCONTINUED | OUTPATIENT
Start: 2025-07-21 | End: 2025-07-21

## 2025-07-21 RX ORDER — HYDROMORPHONE/SOD CHLOR,ISO/PF 2 MG/10 ML
0.5 SYRINGE (ML) INJECTION
Refills: 0 | Status: DISCONTINUED | OUTPATIENT
Start: 2025-07-21 | End: 2025-07-22

## 2025-07-21 RX ORDER — EZETIMIBE 10 MG/1
10 TABLET ORAL DAILY
Refills: 0 | Status: DISCONTINUED | OUTPATIENT
Start: 2025-07-21 | End: 2025-07-22

## 2025-07-21 RX ORDER — ONDANSETRON HCL/PF 4 MG/2 ML
4 VIAL (ML) INJECTION ONCE
Refills: 0 | Status: DISCONTINUED | OUTPATIENT
Start: 2025-07-21 | End: 2025-07-21

## 2025-07-21 RX ORDER — ONDANSETRON HCL/PF 4 MG/2 ML
4 VIAL (ML) INJECTION EVERY 6 HOURS
Refills: 0 | Status: DISCONTINUED | OUTPATIENT
Start: 2025-07-21 | End: 2025-07-22

## 2025-07-21 RX ORDER — DEXAMETHASONE 0.5 MG/1
8 TABLET ORAL ONCE
Refills: 0 | Status: COMPLETED | OUTPATIENT
Start: 2025-07-22 | End: 2025-07-22

## 2025-07-21 RX ORDER — MAGNESIUM HYDROXIDE 400 MG/5ML
30 SUSPENSION ORAL DAILY
Refills: 0 | Status: DISCONTINUED | OUTPATIENT
Start: 2025-07-21 | End: 2025-07-22

## 2025-07-21 RX ORDER — LOSARTAN POTASSIUM 100 MG/1
100 TABLET, FILM COATED ORAL DAILY
Refills: 0 | Status: DISCONTINUED | OUTPATIENT
Start: 2025-07-23 | End: 2025-07-22

## 2025-07-21 RX ADMIN — OXYCODONE HYDROCHLORIDE 10 MILLIGRAM(S): 30 TABLET ORAL at 19:00

## 2025-07-21 RX ADMIN — CELECOXIB 200 MILLIGRAM(S): 50 CAPSULE ORAL at 21:24

## 2025-07-21 RX ADMIN — ROSUVASTATIN CALCIUM 10 MILLIGRAM(S): 20 TABLET, FILM COATED ORAL at 21:24

## 2025-07-21 RX ADMIN — Medication 4 MILLIGRAM(S): at 11:52

## 2025-07-21 RX ADMIN — OXYCODONE HYDROCHLORIDE 5 MILLIGRAM(S): 30 TABLET ORAL at 13:09

## 2025-07-21 RX ADMIN — SODIUM CHLORIDE 75 MILLILITER(S): 9 INJECTION, SOLUTION INTRAVENOUS at 10:33

## 2025-07-21 RX ADMIN — SODIUM CHLORIDE 125 MILLILITER(S): 9 INJECTION, SOLUTION INTRAVENOUS at 11:52

## 2025-07-21 RX ADMIN — Medication 5 MILLIGRAM(S): at 21:24

## 2025-07-21 RX ADMIN — Medication 400 MILLIGRAM(S): at 14:22

## 2025-07-21 RX ADMIN — Medication 5 MILLIGRAM(S): at 22:25

## 2025-07-21 RX ADMIN — OXYCODONE HYDROCHLORIDE 10 MILLIGRAM(S): 30 TABLET ORAL at 18:10

## 2025-07-21 RX ADMIN — CELECOXIB 200 MILLIGRAM(S): 50 CAPSULE ORAL at 22:29

## 2025-07-21 RX ADMIN — Medication 500 MILLIGRAM(S): at 22:29

## 2025-07-21 RX ADMIN — Medication 300 MILLIGRAM(S): at 06:37

## 2025-07-21 RX ADMIN — Medication 300 MILLIGRAM(S): at 19:05

## 2025-07-21 RX ADMIN — OXYCODONE HYDROCHLORIDE 5 MILLIGRAM(S): 30 TABLET ORAL at 14:00

## 2025-07-21 RX ADMIN — Medication 500 MILLILITER(S): at 10:33

## 2025-07-21 RX ADMIN — Medication 1000 MILLIGRAM(S): at 15:31

## 2025-07-21 RX ADMIN — Medication 100 MILLIGRAM(S): at 23:57

## 2025-07-21 RX ADMIN — Medication 0.5 MILLIGRAM(S): at 10:36

## 2025-07-21 RX ADMIN — Medication 1 APPLICATION(S): at 06:37

## 2025-07-21 RX ADMIN — Medication 0.5 MILLIGRAM(S): at 10:50

## 2025-07-21 RX ADMIN — Medication 500 MILLILITER(S): at 16:08

## 2025-07-21 RX ADMIN — Medication 100 MILLIGRAM(S): at 15:28

## 2025-07-21 RX ADMIN — Medication 500 MILLIGRAM(S): at 21:25

## 2025-07-22 VITALS
OXYGEN SATURATION: 98 % | DIASTOLIC BLOOD PRESSURE: 60 MMHG | HEART RATE: 60 BPM | SYSTOLIC BLOOD PRESSURE: 112 MMHG | RESPIRATION RATE: 17 BRPM | TEMPERATURE: 98 F

## 2025-07-22 LAB
ANION GAP SERPL CALC-SCNC: 7 MMOL/L — SIGNIFICANT CHANGE UP (ref 5–17)
BUN SERPL-MCNC: 13 MG/DL — SIGNIFICANT CHANGE UP (ref 7–23)
CALCIUM SERPL-MCNC: 8.6 MG/DL — SIGNIFICANT CHANGE UP (ref 8.4–10.5)
CHLORIDE SERPL-SCNC: 107 MMOL/L — SIGNIFICANT CHANGE UP (ref 96–108)
CO2 SERPL-SCNC: 28 MMOL/L — SIGNIFICANT CHANGE UP (ref 22–31)
CREAT SERPL-MCNC: 0.82 MG/DL — SIGNIFICANT CHANGE UP (ref 0.5–1.3)
EGFR: 77 ML/MIN/1.73M2 — SIGNIFICANT CHANGE UP
EGFR: 77 ML/MIN/1.73M2 — SIGNIFICANT CHANGE UP
GLUCOSE SERPL-MCNC: 138 MG/DL — HIGH (ref 70–99)
HCT VFR BLD CALC: 25.8 % — LOW (ref 34.5–45)
HGB BLD-MCNC: 8.4 G/DL — LOW (ref 11.5–15.5)
MCHC RBC-ENTMCNC: 32.6 G/DL — SIGNIFICANT CHANGE UP (ref 32–36)
MCHC RBC-ENTMCNC: 32.6 PG — SIGNIFICANT CHANGE UP (ref 27–34)
MCV RBC AUTO: 100 FL — SIGNIFICANT CHANGE UP (ref 80–100)
NRBC # BLD AUTO: 0 K/UL — SIGNIFICANT CHANGE UP (ref 0–0)
NRBC # FLD: 0 K/UL — SIGNIFICANT CHANGE UP (ref 0–0)
NRBC BLD AUTO-RTO: 0 /100 WBCS — SIGNIFICANT CHANGE UP (ref 0–0)
PLATELET # BLD AUTO: 149 K/UL — LOW (ref 150–400)
PMV BLD: 10.1 FL — SIGNIFICANT CHANGE UP (ref 7–13)
POTASSIUM SERPL-MCNC: 4.3 MMOL/L — SIGNIFICANT CHANGE UP (ref 3.5–5.3)
POTASSIUM SERPL-SCNC: 4.3 MMOL/L — SIGNIFICANT CHANGE UP (ref 3.5–5.3)
RBC # BLD: 2.58 M/UL — LOW (ref 3.8–5.2)
RBC # FLD: 12.5 % — SIGNIFICANT CHANGE UP (ref 10.3–14.5)
SODIUM SERPL-SCNC: 142 MMOL/L — SIGNIFICANT CHANGE UP (ref 135–145)
WBC # BLD: 10.43 K/UL — SIGNIFICANT CHANGE UP (ref 3.8–10.5)
WBC # FLD AUTO: 10.43 K/UL — SIGNIFICANT CHANGE UP (ref 3.8–10.5)

## 2025-07-22 PROCEDURE — 97607 NEG PRS WND THR NDME<=50SQCM: CPT

## 2025-07-22 PROCEDURE — 80048 BASIC METABOLIC PNL TOTAL CA: CPT

## 2025-07-22 PROCEDURE — 97535 SELF CARE MNGMENT TRAINING: CPT

## 2025-07-22 PROCEDURE — 85027 COMPLETE CBC AUTOMATED: CPT

## 2025-07-22 PROCEDURE — 97161 PT EVAL LOW COMPLEX 20 MIN: CPT

## 2025-07-22 PROCEDURE — 85730 THROMBOPLASTIN TIME PARTIAL: CPT

## 2025-07-22 PROCEDURE — 97530 THERAPEUTIC ACTIVITIES: CPT

## 2025-07-22 PROCEDURE — 76000 FLUOROSCOPY <1 HR PHYS/QHP: CPT

## 2025-07-22 PROCEDURE — 97116 GAIT TRAINING THERAPY: CPT

## 2025-07-22 PROCEDURE — C1776: CPT

## 2025-07-22 PROCEDURE — 97165 OT EVAL LOW COMPLEX 30 MIN: CPT

## 2025-07-22 PROCEDURE — 85610 PROTHROMBIN TIME: CPT

## 2025-07-22 PROCEDURE — 97110 THERAPEUTIC EXERCISES: CPT

## 2025-07-22 PROCEDURE — C1713: CPT

## 2025-07-22 PROCEDURE — 36415 COLL VENOUS BLD VENIPUNCTURE: CPT

## 2025-07-22 PROCEDURE — C1889: CPT

## 2025-07-22 PROCEDURE — 99232 SBSQ HOSP IP/OBS MODERATE 35: CPT

## 2025-07-22 PROCEDURE — 27130 TOTAL HIP ARTHROPLASTY: CPT

## 2025-07-22 RX ORDER — CELECOXIB 50 MG/1
100 CAPSULE ORAL EVERY 12 HOURS
Refills: 0 | Status: DISCONTINUED | OUTPATIENT
Start: 2025-07-22 | End: 2025-07-22

## 2025-07-22 RX ORDER — SULFAMETHOXAZOLE/TRIMETHOPRIM 800-160 MG
1 TABLET ORAL
Qty: 14 | Refills: 0
Start: 2025-07-22 | End: 2025-07-28

## 2025-07-22 RX ORDER — CELECOXIB 50 MG/1
1 CAPSULE ORAL
Qty: 56 | Refills: 0
Start: 2025-07-22 | End: 2025-08-18

## 2025-07-22 RX ORDER — HYDROMORPHONE/SOD CHLOR,ISO/PF 2 MG/10 ML
2 SYRINGE (ML) INJECTION
Refills: 0 | Status: DISCONTINUED | OUTPATIENT
Start: 2025-07-22 | End: 2025-07-22

## 2025-07-22 RX ORDER — APIXABAN 5 MG/1
1 TABLET, FILM COATED ORAL
Qty: 56 | Refills: 0
Start: 2025-07-22 | End: 2025-08-18

## 2025-07-22 RX ORDER — HYDROMORPHONE/SOD CHLOR,ISO/PF 2 MG/10 ML
4 SYRINGE (ML) INJECTION
Refills: 0 | Status: DISCONTINUED | OUTPATIENT
Start: 2025-07-22 | End: 2025-07-22

## 2025-07-22 RX ORDER — HYDROCODONE BITARTRATE AND ACETAMINOPHEN 5; 325 MG/1; MG/1
1 TABLET ORAL
Refills: 0 | DISCHARGE

## 2025-07-22 RX ORDER — ACETAMINOPHEN 500 MG/5ML
1 LIQUID (ML) ORAL
Qty: 0 | Refills: 0 | DISCHARGE
Start: 2025-07-22

## 2025-07-22 RX ORDER — OMEPRAZOLE 20 MG/1
1 CAPSULE, DELAYED RELEASE ORAL
Qty: 30 | Refills: 0
Start: 2025-07-22

## 2025-07-22 RX ORDER — HYDROMORPHONE/SOD CHLOR,ISO/PF 2 MG/10 ML
1 SYRINGE (ML) INJECTION
Qty: 42 | Refills: 0
Start: 2025-07-22 | End: 2025-07-28

## 2025-07-22 RX ORDER — NALOXONE HYDROCHLORIDE 0.4 MG/ML
4 INJECTION, SOLUTION INTRAMUSCULAR; INTRAVENOUS; SUBCUTANEOUS
Qty: 1 | Refills: 0
Start: 2025-07-22

## 2025-07-22 RX ADMIN — DEXAMETHASONE 101.6 MILLIGRAM(S): 0.5 TABLET ORAL at 06:05

## 2025-07-22 RX ADMIN — Medication 500 MILLIGRAM(S): at 08:36

## 2025-07-22 RX ADMIN — CELECOXIB 100 MILLIGRAM(S): 50 CAPSULE ORAL at 08:39

## 2025-07-22 RX ADMIN — OXYCODONE HYDROCHLORIDE 10 MILLIGRAM(S): 30 TABLET ORAL at 06:50

## 2025-07-22 RX ADMIN — OXYCODONE HYDROCHLORIDE 10 MILLIGRAM(S): 30 TABLET ORAL at 06:11

## 2025-07-22 RX ADMIN — Medication 500 MILLIGRAM(S): at 09:00

## 2025-07-22 RX ADMIN — Medication 4 MILLIGRAM(S): at 12:45

## 2025-07-22 RX ADMIN — OXYCODONE HYDROCHLORIDE 10 MILLIGRAM(S): 30 TABLET ORAL at 03:55

## 2025-07-22 RX ADMIN — APIXABAN 2.5 MILLIGRAM(S): 5 TABLET, FILM COATED ORAL at 08:37

## 2025-07-22 RX ADMIN — CELECOXIB 100 MILLIGRAM(S): 50 CAPSULE ORAL at 09:00

## 2025-07-22 RX ADMIN — Medication 40 MILLIGRAM(S): at 06:05

## 2025-07-22 RX ADMIN — Medication 2 MILLIGRAM(S): at 09:15

## 2025-07-22 RX ADMIN — Medication 500 MILLIGRAM(S): at 14:53

## 2025-07-22 RX ADMIN — OXYCODONE HYDROCHLORIDE 10 MILLIGRAM(S): 30 TABLET ORAL at 03:09

## 2025-07-22 RX ADMIN — Medication 2 MILLIGRAM(S): at 08:39

## 2025-07-22 RX ADMIN — Medication 4 MILLIGRAM(S): at 12:10

## 2025-07-28 ENCOUNTER — NON-APPOINTMENT (OUTPATIENT)
Age: 70
End: 2025-07-28

## 2025-08-07 ENCOUNTER — APPOINTMENT (OUTPATIENT)
Dept: ORTHOPEDIC SURGERY | Facility: CLINIC | Age: 70
End: 2025-08-07
Payer: MEDICARE

## 2025-08-07 DIAGNOSIS — Z96.642 PRESENCE OF LEFT ARTIFICIAL HIP JOINT: ICD-10-CM

## 2025-08-07 PROCEDURE — 99024 POSTOP FOLLOW-UP VISIT: CPT

## 2025-08-07 PROCEDURE — 73502 X-RAY EXAM HIP UNI 2-3 VIEWS: CPT

## 2025-08-15 ENCOUNTER — APPOINTMENT (OUTPATIENT)
Dept: INTERNAL MEDICINE | Facility: CLINIC | Age: 70
End: 2025-08-15
Payer: MEDICARE

## 2025-08-15 VITALS — DIASTOLIC BLOOD PRESSURE: 76 MMHG | SYSTOLIC BLOOD PRESSURE: 134 MMHG | HEART RATE: 76 BPM

## 2025-08-15 DIAGNOSIS — M19.90 UNSPECIFIED OSTEOARTHRITIS, UNSPECIFIED SITE: ICD-10-CM

## 2025-08-15 DIAGNOSIS — I10 ESSENTIAL (PRIMARY) HYPERTENSION: ICD-10-CM

## 2025-08-15 LAB
INR PPP: 1.2 RATIO
POCT-PROTHROMBIN TIME: 14.2 SECS
QUALITY CONTROL: YES

## 2025-08-15 PROCEDURE — 99214 OFFICE O/P EST MOD 30 MIN: CPT

## 2025-08-21 RX ORDER — APIXABAN 2.5 MG/1
2.5 TABLET, FILM COATED ORAL
Qty: 60 | Refills: 3 | Status: ACTIVE | COMMUNITY
Start: 2025-08-21 | End: 1900-01-01

## 2025-09-05 ENCOUNTER — NON-APPOINTMENT (OUTPATIENT)
Age: 70
End: 2025-09-05

## 2025-09-24 PROBLEM — G57.92 NEUROPATHY OF LEFT LOWER EXTREMITY: Status: ACTIVE | Noted: 2025-09-24

## (undated) DEVICE — SYR LUER LOK 50CC

## (undated) DEVICE — SOL IRR BAG NS 0.9% 3000ML

## (undated) DEVICE — HOOD T7 PLUS PEELAWAY

## (undated) DEVICE — ELCTR PENCIL SMOKE EVACUATOR COATED PUSH BUTTON 70MM

## (undated) DEVICE — DRAPE MAYO STAND 30"

## (undated) DEVICE — BAG SPONGE COUNTER EZ

## (undated) DEVICE — DRSG DERMABOND 0.7ML

## (undated) DEVICE — DRSG STERISTRIPS 0.5 X 4"

## (undated) DEVICE — POSITIONER FOAM ABDUCTION PILLOW MED (PINK)

## (undated) DEVICE — Device

## (undated) DEVICE — DRAPE C ARM 41X140"

## (undated) DEVICE — STAPLER SKIN VISI-STAT 35 WIDE

## (undated) DEVICE — SPECIMEN CONTAINER PET

## (undated) DEVICE — GRIPPER MEDENVISION

## (undated) DEVICE — SUT PDS II 0 36" CT-1

## (undated) DEVICE — SYR ASEPTO

## (undated) DEVICE — SOL IRR POUR H2O 1500ML

## (undated) DEVICE — SOLIDIFIER CANN EXPRESS 3K

## (undated) DEVICE — POSITIONER HANA PATIENT CARE KIT

## (undated) DEVICE — VENODYNE/SCD SLEEVE CALF MEDIUM

## (undated) DEVICE — WARMING BLANKET UPPER ADULT

## (undated) DEVICE — SPECIMEN CONTAINER 4OZ

## (undated) DEVICE — POSITIONER POSITIONING ROLL  5X17"

## (undated) DEVICE — STRYKER PULSE LAVAGE WITH COAXIAL MULTI-ORIFICE TIP

## (undated) DEVICE — POSITIONER STRAP ARMBOARD VELCRO TS-30

## (undated) DEVICE — HOOD T7 NON-PEELAWAY

## (undated) DEVICE — DRAPE TOP SHEET 53" X 101"

## (undated) DEVICE — POSITIONER STIRRUP STRAP W SLIP RING 19X3.5"

## (undated) DEVICE — SUT QUILL PDO 1 30CM T9 DA

## (undated) DEVICE — SOL IRR POUR NS 0.9% 500ML

## (undated) DEVICE — POSITIONER FOAM HEAD CRADLE (PINK)

## (undated) DEVICE — DRSG PICO NPWT 4X8"

## (undated) DEVICE — SUT VICRYL PLUS 1 27" CP UNDYED

## (undated) DEVICE — DRAPE STICKY 15 X 26"

## (undated) DEVICE — SUT PDS II 1 27" CT-1

## (undated) DEVICE — DRAPE SHOWER CURTAIN ISOLATION

## (undated) DEVICE — SAW BLADE STRYKER SAGITTAL 21.0X90X1.27MM

## (undated) DEVICE — SUT MONOCRYL 2-0 36" CT-1 UNDYED

## (undated) DEVICE — ELCTR STRYKER BLADE COATED 125MM

## (undated) DEVICE — PACK TOTAL HIP

## (undated) DEVICE — POSITIONER PATIENT SAFETY STRAP 3X60"

## (undated) DEVICE — DRAPE XL SHEET 77X98"

## (undated) DEVICE — ELCTR AQUAMANTYS BIPOLAR SEALER 6.0

## (undated) DEVICE — CANISTER SUCTION LID GUARD 3000CC